# Patient Record
Sex: FEMALE | Race: OTHER | HISPANIC OR LATINO | Employment: OTHER | URBAN - METROPOLITAN AREA
[De-identification: names, ages, dates, MRNs, and addresses within clinical notes are randomized per-mention and may not be internally consistent; named-entity substitution may affect disease eponyms.]

---

## 2017-01-27 ENCOUNTER — HOSPITAL ENCOUNTER (EMERGENCY)
Facility: HOSPITAL | Age: 72
Discharge: HOME | End: 2017-01-27
Attending: EMERGENCY MEDICINE | Admitting: EMERGENCY MEDICINE
Payer: MEDICARE

## 2017-01-27 ENCOUNTER — APPOINTMENT (EMERGENCY)
Dept: CT IMAGING | Facility: HOSPITAL | Age: 72
End: 2017-01-27
Payer: MEDICARE

## 2017-01-27 ENCOUNTER — APPOINTMENT (EMERGENCY)
Dept: RADIOLOGY | Facility: HOSPITAL | Age: 72
End: 2017-01-27
Payer: MEDICARE

## 2017-01-27 VITALS
HEART RATE: 69 BPM | RESPIRATION RATE: 16 BRPM | SYSTOLIC BLOOD PRESSURE: 138 MMHG | DIASTOLIC BLOOD PRESSURE: 75 MMHG | TEMPERATURE: 98.6 F | OXYGEN SATURATION: 95 %

## 2017-01-27 DIAGNOSIS — J18.9 PNEUMONIA: Primary | ICD-10-CM

## 2017-01-27 DIAGNOSIS — R10.9 ABDOMINAL PAIN: ICD-10-CM

## 2017-01-27 LAB
ANION GAP SERPL CALCULATED.3IONS-SCNC: 6 MMOL/L (ref 4–13)
BASOPHILS # BLD AUTO: 0.01 THOUSANDS/ΜL (ref 0–0.1)
BASOPHILS NFR BLD AUTO: 0 % (ref 0–1)
BILIRUB UR QL STRIP: NEGATIVE
BUN SERPL-MCNC: 11 MG/DL (ref 5–25)
CALCIUM SERPL-MCNC: 8.8 MG/DL (ref 8.3–10.1)
CHLORIDE SERPL-SCNC: 107 MMOL/L (ref 100–108)
CLARITY UR: CLEAR
CO2 SERPL-SCNC: 28 MMOL/L (ref 21–32)
COLOR UR: YELLOW
COLOR, POC: YELLOW
CREAT SERPL-MCNC: 0.63 MG/DL (ref 0.6–1.3)
EOSINOPHIL # BLD AUTO: 0.06 THOUSAND/ΜL (ref 0–0.61)
EOSINOPHIL NFR BLD AUTO: 1 % (ref 0–6)
ERYTHROCYTE [DISTWIDTH] IN BLOOD BY AUTOMATED COUNT: 13.3 % (ref 11.6–15.1)
GFR SERPL CREATININE-BSD FRML MDRD: >60 ML/MIN/1.73SQ M
GLUCOSE SERPL-MCNC: 95 MG/DL (ref 65–140)
GLUCOSE UR STRIP-MCNC: NEGATIVE MG/DL
HCT VFR BLD AUTO: 35.1 % (ref 34.8–46.1)
HGB BLD-MCNC: 11.5 G/DL (ref 11.5–15.4)
HGB UR QL STRIP.AUTO: NEGATIVE
KETONES UR STRIP-MCNC: NEGATIVE MG/DL
LEUKOCYTE ESTERASE UR QL STRIP: NEGATIVE
LIPASE SERPL-CCNC: 160 U/L (ref 73–393)
LYMPHOCYTES # BLD AUTO: 1.92 THOUSANDS/ΜL (ref 0.6–4.47)
LYMPHOCYTES NFR BLD AUTO: 36 % (ref 14–44)
MCH RBC QN AUTO: 30.1 PG (ref 26.8–34.3)
MCHC RBC AUTO-ENTMCNC: 32.8 G/DL (ref 31.4–37.4)
MCV RBC AUTO: 92 FL (ref 82–98)
MONOCYTES # BLD AUTO: 0.46 THOUSAND/ΜL (ref 0.17–1.22)
MONOCYTES NFR BLD AUTO: 9 % (ref 4–12)
NEUTROPHILS # BLD AUTO: 2.93 THOUSANDS/ΜL (ref 1.85–7.62)
NEUTS SEG NFR BLD AUTO: 54 % (ref 43–75)
NITRITE UR QL STRIP: NEGATIVE
NRBC BLD AUTO-RTO: 0 /100 WBCS
PH UR STRIP.AUTO: 5 [PH] (ref 4.5–8)
PLATELET # BLD AUTO: 203 THOUSANDS/UL (ref 149–390)
PMV BLD AUTO: 10.4 FL (ref 8.9–12.7)
POTASSIUM SERPL-SCNC: 4 MMOL/L (ref 3.5–5.3)
PROT UR STRIP-MCNC: NEGATIVE MG/DL
RBC # BLD AUTO: 3.82 MILLION/UL (ref 3.81–5.12)
SODIUM SERPL-SCNC: 141 MMOL/L (ref 136–145)
SP GR UR STRIP.AUTO: 1.01 (ref 1–1.03)
UROBILINOGEN UR QL STRIP.AUTO: 0.2 E.U./DL
WBC # BLD AUTO: 5.38 THOUSAND/UL (ref 4.31–10.16)

## 2017-01-27 PROCEDURE — 74177 CT ABD & PELVIS W/CONTRAST: CPT

## 2017-01-27 PROCEDURE — 85025 COMPLETE CBC W/AUTO DIFF WBC: CPT | Performed by: EMERGENCY MEDICINE

## 2017-01-27 PROCEDURE — 94640 AIRWAY INHALATION TREATMENT: CPT

## 2017-01-27 PROCEDURE — 71020 HB CHEST X-RAY 2VW FRONTAL&LATL: CPT

## 2017-01-27 PROCEDURE — 81002 URINALYSIS NONAUTO W/O SCOPE: CPT | Performed by: EMERGENCY MEDICINE

## 2017-01-27 PROCEDURE — 96374 THER/PROPH/DIAG INJ IV PUSH: CPT

## 2017-01-27 PROCEDURE — 83690 ASSAY OF LIPASE: CPT | Performed by: EMERGENCY MEDICINE

## 2017-01-27 PROCEDURE — 99284 EMERGENCY DEPT VISIT MOD MDM: CPT

## 2017-01-27 PROCEDURE — 81003 URINALYSIS AUTO W/O SCOPE: CPT

## 2017-01-27 PROCEDURE — 36415 COLL VENOUS BLD VENIPUNCTURE: CPT | Performed by: EMERGENCY MEDICINE

## 2017-01-27 PROCEDURE — 80048 BASIC METABOLIC PNL TOTAL CA: CPT | Performed by: EMERGENCY MEDICINE

## 2017-01-27 RX ORDER — LEVOFLOXACIN 750 MG/1
750 TABLET ORAL DAILY
Qty: 4 TABLET | Refills: 0 | Status: SHIPPED | OUTPATIENT
Start: 2017-01-27 | End: 2019-01-14

## 2017-01-27 RX ORDER — KETOROLAC TROMETHAMINE 30 MG/ML
15 INJECTION, SOLUTION INTRAMUSCULAR; INTRAVENOUS ONCE
Status: COMPLETED | OUTPATIENT
Start: 2017-01-27 | End: 2017-01-27

## 2017-01-27 RX ORDER — ALBUTEROL SULFATE 90 UG/1
2 AEROSOL, METERED RESPIRATORY (INHALATION) EVERY 4 HOURS PRN
Qty: 1 INHALER | Refills: 0 | Status: SHIPPED | OUTPATIENT
Start: 2017-01-27 | End: 2017-02-26

## 2017-01-27 RX ADMIN — KETOROLAC TROMETHAMINE 15 MG: 30 INJECTION, SOLUTION INTRAMUSCULAR at 13:36

## 2017-01-27 RX ADMIN — LEVOFLOXACIN 750 MG: 500 TABLET, FILM COATED ORAL at 14:56

## 2017-01-27 RX ADMIN — IOHEXOL 100 ML: 350 INJECTION, SOLUTION INTRAVENOUS at 14:26

## 2017-01-27 RX ADMIN — ALBUTEROL SULFATE 5 MG: 2.5 SOLUTION RESPIRATORY (INHALATION) at 13:34

## 2018-06-18 ENCOUNTER — OFFICE VISIT (OUTPATIENT)
Dept: INTERNAL MEDICINE CLINIC | Facility: CLINIC | Age: 73
End: 2018-06-18
Payer: MEDICARE

## 2018-06-18 VITALS
RESPIRATION RATE: 15 BRPM | WEIGHT: 147 LBS | BODY MASS INDEX: 27.05 KG/M2 | DIASTOLIC BLOOD PRESSURE: 72 MMHG | HEIGHT: 62 IN | SYSTOLIC BLOOD PRESSURE: 134 MMHG | TEMPERATURE: 98.2 F | HEART RATE: 61 BPM

## 2018-06-18 DIAGNOSIS — R01.1 SYSTOLIC MURMUR: ICD-10-CM

## 2018-06-18 DIAGNOSIS — R00.2 PALPITATION: ICD-10-CM

## 2018-06-18 DIAGNOSIS — R51.9 FREQUENT HEADACHES: ICD-10-CM

## 2018-06-18 DIAGNOSIS — M17.0 PRIMARY OSTEOARTHRITIS OF BOTH KNEES: ICD-10-CM

## 2018-06-18 DIAGNOSIS — M85.80 OSTEOPENIA, UNSPECIFIED LOCATION: ICD-10-CM

## 2018-06-18 DIAGNOSIS — H53.8 BLURRED VISION, BILATERAL: ICD-10-CM

## 2018-06-18 DIAGNOSIS — K21.9 GASTROESOPHAGEAL REFLUX DISEASE, ESOPHAGITIS PRESENCE NOT SPECIFIED: ICD-10-CM

## 2018-06-18 DIAGNOSIS — M85.89 OTHER SPECIFIED DISORDERS OF BONE DENSITY AND STRUCTURE, MULTIPLE SITES: ICD-10-CM

## 2018-06-18 DIAGNOSIS — I10 ESSENTIAL HYPERTENSION: Primary | ICD-10-CM

## 2018-06-18 PROCEDURE — 99204 OFFICE O/P NEW MOD 45 MIN: CPT | Performed by: INTERNAL MEDICINE

## 2018-06-18 RX ORDER — MELOXICAM 7.5 MG/1
7.5 TABLET ORAL DAILY
COMMUNITY
End: 2018-06-18 | Stop reason: SDUPTHER

## 2018-06-18 RX ORDER — LIDOCAINE 40 MG/G
CREAM TOPICAL AS NEEDED
Qty: 30 G | Refills: 2 | Status: SHIPPED | OUTPATIENT
Start: 2018-06-18 | End: 2018-08-29 | Stop reason: SDUPTHER

## 2018-06-18 RX ORDER — METOPROLOL SUCCINATE 50 MG/1
50 TABLET, EXTENDED RELEASE ORAL DAILY
COMMUNITY
End: 2018-06-18 | Stop reason: SDUPTHER

## 2018-06-18 RX ORDER — OMEPRAZOLE 20 MG/1
20 CAPSULE, DELAYED RELEASE ORAL DAILY
COMMUNITY
End: 2018-06-18 | Stop reason: SDUPTHER

## 2018-06-18 RX ORDER — METOPROLOL SUCCINATE 50 MG/1
50 TABLET, EXTENDED RELEASE ORAL DAILY
Qty: 30 TABLET | Refills: 1 | Status: SHIPPED | OUTPATIENT
Start: 2018-06-18 | End: 2018-07-18 | Stop reason: SDUPTHER

## 2018-06-18 RX ORDER — OMEPRAZOLE 20 MG/1
20 CAPSULE, DELAYED RELEASE ORAL DAILY
Qty: 30 CAPSULE | Refills: 1 | Status: SHIPPED | OUTPATIENT
Start: 2018-06-18 | End: 2018-07-18 | Stop reason: SDUPTHER

## 2018-06-18 RX ORDER — MELOXICAM 7.5 MG/1
7.5 TABLET ORAL DAILY PRN
Qty: 30 TABLET | Refills: 0 | Status: SHIPPED | OUTPATIENT
Start: 2018-06-18 | End: 2018-08-29 | Stop reason: SDUPTHER

## 2018-06-18 RX ORDER — ACETAMINOPHEN 325 MG/1
650 TABLET ORAL EVERY 6 HOURS PRN
COMMUNITY

## 2018-06-18 RX ORDER — MULTIVITAMIN
1 CAPSULE ORAL EVERY MORNING
COMMUNITY
End: 2022-07-21 | Stop reason: SDUPTHER

## 2018-06-18 NOTE — ASSESSMENT & PLAN NOTE
Try Tylenol, topical agents, Mobic if needed with caution of worsening acid reflux  She notes that she did not tolerate or have a good therapeutic response from steroid injections  Depending on her x-ray results, will refer to see Orthopedics for operative management if needed

## 2018-06-18 NOTE — ASSESSMENT & PLAN NOTE
Well controlled on current dose of metoprolol  Will get an echocardiogram and Holter monitor given her systolic murmur and palpitations

## 2018-06-18 NOTE — PROGRESS NOTES
Assessment/Plan:    Essential hypertension  Well controlled on current dose of metoprolol  Will get an echocardiogram and Holter monitor given her systolic murmur and palpitations  Gastroesophageal reflux disease  Well controlled on current dose of omeprazole  Asked her to avoid meloxicam as much as possible  Primary osteoarthritis of both knees  Try Tylenol, topical agents, Mobic if needed with caution of worsening acid reflux  She notes that she did not tolerate or have a good therapeutic response from steroid injections  Depending on her x-ray results, will refer to see Orthopedics for operative management if needed  Osteopenia  Recheck DEXA scan, vitamin-D level  Diagnoses and all orders for this visit:    Essential hypertension  -     CBC and differential  -     Comprehensive metabolic panel  -     TSH, 3rd generation with Free T4 reflex  -     Lipid Panel with Direct LDL reflex  -     Hemoglobin A1C  -     Microalbumin / creatinine urine ratio  -     Sedimentation rate, automated  -     C-reactive protein  -     Echo complete with contrast if indicated; Future  -     Holter monitor - 48 hour; Future  -     metoprolol succinate (TOPROL-XL) 50 mg 24 hr tablet; Take 1 tablet (50 mg total) by mouth daily    Blurred vision, bilateral  -     CBC and differential  -     Comprehensive metabolic panel  -     TSH, 3rd generation with Free T4 reflex  -     Sedimentation rate, automated  -     C-reactive protein  -     Ambulatory Referral to Ophthalmology; Future    Frequent headaches  -     Sedimentation rate, automated  -     C-reactive protein    Palpitation  -     Echo complete with contrast if indicated; Future  -     Holter monitor - 48 hour; Future    Systolic murmur  -     Echo complete with contrast if indicated; Future  -     Holter monitor - 48 hour; Future    Gastroesophageal reflux disease, esophagitis presence not specified  -     omeprazole (PriLOSEC) 20 mg delayed release capsule;  Take 1 capsule (20 mg total) by mouth daily    Primary osteoarthritis of both knees  -     CBC and differential  -     Comprehensive metabolic panel  -     TSH, 3rd generation with Free T4 reflex  -     Vitamin D 25 hydroxy  -     meloxicam (MOBIC) 7 5 mg tablet; Take 1 tablet (7 5 mg total) by mouth daily as needed for moderate pain  -     lidocaine (LMX) 4 % cream; Apply topically as needed for mild pain  -     XR knee 3 vw left non injury; Future  -     XR knee 3 vw right non injury; Future    Osteopenia, unspecified location  -     DXA bone density spine hip and pelvis; Future    Other specified disorders of bone density and structure, multiple sites   -     DXA bone density spine hip and pelvis; Future    Other orders  -     Discontinue: meloxicam (MOBIC) 7 5 mg tablet; Take 7 5 mg by mouth daily  -     Discontinue: metoprolol succinate (TOPROL-XL) 50 mg 24 hr tablet; Take 50 mg by mouth daily  -     Discontinue: omeprazole (PriLOSEC) 20 mg delayed release capsule; Take 20 mg by mouth daily  -     acetaminophen (TYLENOL) 325 mg tablet; Take 650 mg by mouth every 6 (six) hours as needed for mild pain  -     Multiple Vitamin (MULTIVITAMIN) capsule; Take 1 capsule by mouth daily          Subjective:   Chief Complaint   Patient presents with   [address] patient  PHQ-9 score is 4, no falls, no urinary incontinence   Blurred Vision    Anxiety    Knee Pain     bilateral knee pain for a long time  Patient ID: Mitchell Mcarthur is a 81 y o  female  She comes in to establish care as a new pt  Chief complaint is headaches, dizzy spells recently  Has had blurred vision recently too  No double vision    She has had palpitations for some time  Doesn't get dizzy at that time  This was assessed by her primary care physician who referred her to see a cardiologist as well    She tells me she had a Holter monitor and an echocardiogram done but unfortunately is not able to get records    Has bilateral knee pain  Was told that she needs surgery but she got worried and didn't get it  Anxiety   Symptoms include dizziness  Patient reports no chest pain, confusion, nausea, palpitations or shortness of breath  Knee Pain          The following portions of the patient's history were reviewed and updated as appropriate: past family history, past medical history, past social history and past surgical history  PHQ-9 Depression Screening    PHQ-9:    Frequency of the following problems over the past two weeks:       Little interest or pleasure in doing things:  1 - several days  Feeling down, depressed, or hopeless:  1 - several days  Trouble falling or staying asleep, or sleeping too much:  0 - not at all  Feeling tired or having little energy:  1 - several days  Poor appetite or overeatin - several days  Feeling bad about yourself - or that you are a failure or have let yourself or your family down:  0 - not at all  Trouble concentrating on things, such as reading the newspaper or watching television:  0 - not at all  Moving or speaking so slowly that other people could have noticed   Or the opposite - being so fidgety or restless that you have been moving around a lot more than usual:  0 - not at all  Thoughts that you would be better off dead, or of hurting yourself in some way:  0 - not at all  PHQ-2 Score:  2  PHQ-9 Score:  4           Current Outpatient Prescriptions:     acetaminophen (TYLENOL) 325 mg tablet, Take 650 mg by mouth every 6 (six) hours as needed for mild pain, Disp: , Rfl:     meloxicam (MOBIC) 7 5 mg tablet, Take 1 tablet (7 5 mg total) by mouth daily as needed for moderate pain, Disp: 30 tablet, Rfl: 0    metoprolol succinate (TOPROL-XL) 50 mg 24 hr tablet, Take 1 tablet (50 mg total) by mouth daily, Disp: 30 tablet, Rfl: 1    Multiple Vitamin (MULTIVITAMIN) capsule, Take 1 capsule by mouth daily, Disp: , Rfl:     omeprazole (PriLOSEC) 20 mg delayed release capsule, Take 1 capsule (20 mg total) by mouth daily, Disp: 30 capsule, Rfl: 1    lidocaine (LMX) 4 % cream, Apply topically as needed for mild pain, Disp: 30 g, Rfl: 2    Review of Systems   Constitutional: Positive for fatigue  Negative for fever and unexpected weight change  HENT: Negative for ear pain, hearing loss and sore throat  Eyes: Positive for visual disturbance  Negative for pain and discharge  Respiratory: Negative for cough, chest tightness and shortness of breath  Cardiovascular: Negative for chest pain and palpitations  Gastrointestinal: Negative for abdominal pain, blood in stool, constipation, diarrhea and nausea  Genitourinary: Negative for dysuria, frequency and hematuria  Musculoskeletal: Negative for arthralgias and joint swelling  Skin: Negative for rash  Allergic/Immunologic: Negative for immunocompromised state  Neurological: Positive for dizziness and headaches  Hematological: Negative for adenopathy  Psychiatric/Behavioral: Negative for confusion and sleep disturbance  Objective:  /72 (BP Location: Left arm, Patient Position: Sitting, Cuff Size: Standard)   Pulse 61   Temp 98 2 °F (36 8 °C)   Resp 15   Ht 5' 1 5" (1 562 m)   Wt 66 7 kg (147 lb)   BMI 27 33 kg/m²      Physical Exam   Constitutional: She appears well-developed and well-nourished  HENT:   Head: Normocephalic and atraumatic  Right Ear: Tympanic membrane normal    Left Ear: Tympanic membrane normal    Nose: Nose normal    Mouth/Throat: Oropharynx is clear and moist  No posterior oropharyngeal edema or posterior oropharyngeal erythema  Eyes: Conjunctivae are normal  Pupils are equal, round, and reactive to light  Right eye exhibits no discharge  Neck: Normal range of motion  Neck supple  No thyromegaly present  Cardiovascular: Normal rate, regular rhythm, S1 normal and S2 normal   PMI is not displaced  Murmur heard     Systolic murmur is present with a grade of 2/6 Pulmonary/Chest: Effort normal and breath sounds normal  No accessory muscle usage  No apnea  No respiratory distress  She has no rhonchi  She has no rales  Abdominal: Soft  Normal appearance and bowel sounds are normal  She exhibits no shifting dullness  There is no hepatosplenomegaly  There is no tenderness  There is no rebound and no CVA tenderness  Musculoskeletal: Normal range of motion  She exhibits no edema  Right knee: Tenderness found  Medial joint line tenderness noted  Left knee: Tenderness found  Medial joint line tenderness noted  Bilateral knee crepitus   Lymphadenopathy:     She has no cervical adenopathy  Neurological: She is alert  Skin: Skin is warm and intact  No rash noted  Psychiatric: She has a normal mood and affect  Her speech is normal    Nursing note and vitals reviewed  No results found for this or any previous visit (from the past 1008 hour(s))  ]    No results found

## 2018-06-20 ENCOUNTER — TRANSCRIBE ORDERS (OUTPATIENT)
Dept: LAB | Facility: CLINIC | Age: 73
End: 2018-06-20

## 2018-06-22 ENCOUNTER — APPOINTMENT (OUTPATIENT)
Dept: LAB | Age: 73
End: 2018-06-22
Payer: MEDICARE

## 2018-06-22 ENCOUNTER — APPOINTMENT (OUTPATIENT)
Dept: RADIOLOGY | Age: 73
End: 2018-06-22
Payer: MEDICARE

## 2018-06-22 ENCOUNTER — HOSPITAL ENCOUNTER (OUTPATIENT)
Dept: RADIOLOGY | Age: 73
Discharge: HOME | End: 2018-06-22
Payer: MEDICARE

## 2018-06-22 ENCOUNTER — TRANSCRIBE ORDERS (OUTPATIENT)
Dept: ADMINISTRATIVE | Age: 73
End: 2018-06-22

## 2018-06-22 DIAGNOSIS — M85.80 OSTEOPENIA, UNSPECIFIED LOCATION: ICD-10-CM

## 2018-06-22 DIAGNOSIS — M17.0 PRIMARY OSTEOARTHRITIS OF BOTH KNEES: ICD-10-CM

## 2018-06-22 DIAGNOSIS — M85.89 OTHER SPECIFIED DISORDERS OF BONE DENSITY AND STRUCTURE, MULTIPLE SITES: ICD-10-CM

## 2018-06-22 LAB
25(OH)D3 SERPL-MCNC: 28.4 NG/ML (ref 30–100)
ALBUMIN SERPL BCP-MCNC: 3.9 G/DL (ref 3.5–5)
ALP SERPL-CCNC: 102 U/L (ref 46–116)
ALT SERPL W P-5'-P-CCNC: 33 U/L (ref 12–78)
ANION GAP SERPL CALCULATED.3IONS-SCNC: 6 MMOL/L (ref 4–13)
AST SERPL W P-5'-P-CCNC: 30 U/L (ref 5–45)
BASOPHILS # BLD AUTO: 0.02 THOUSANDS/ΜL (ref 0–0.1)
BASOPHILS NFR BLD AUTO: 0 % (ref 0–1)
BILIRUB SERPL-MCNC: 0.36 MG/DL (ref 0.2–1)
BUN SERPL-MCNC: 14 MG/DL (ref 5–25)
CALCIUM SERPL-MCNC: 9.2 MG/DL (ref 8.3–10.1)
CHLORIDE SERPL-SCNC: 106 MMOL/L (ref 100–108)
CHOLEST SERPL-MCNC: 189 MG/DL (ref 50–200)
CO2 SERPL-SCNC: 29 MMOL/L (ref 21–32)
CREAT SERPL-MCNC: 0.63 MG/DL (ref 0.6–1.3)
CREAT UR-MCNC: 76.9 MG/DL
CRP SERPL QL: <3 MG/L
EOSINOPHIL # BLD AUTO: 0.11 THOUSAND/ΜL (ref 0–0.61)
EOSINOPHIL NFR BLD AUTO: 2 % (ref 0–6)
ERYTHROCYTE [DISTWIDTH] IN BLOOD BY AUTOMATED COUNT: 12.9 % (ref 11.6–15.1)
ERYTHROCYTE [SEDIMENTATION RATE] IN BLOOD: 16 MM/HOUR (ref 0–20)
EST. AVERAGE GLUCOSE BLD GHB EST-MCNC: 126 MG/DL
GFR SERPL CREATININE-BSD FRML MDRD: 91 ML/MIN/1.73SQ M
GLUCOSE P FAST SERPL-MCNC: 84 MG/DL (ref 65–99)
HBA1C MFR BLD: 6 % (ref 4.2–6.3)
HCT VFR BLD AUTO: 37.4 % (ref 34.8–46.1)
HDLC SERPL-MCNC: 50 MG/DL (ref 40–60)
HGB BLD-MCNC: 11.7 G/DL (ref 11.5–15.4)
IMM GRANULOCYTES # BLD AUTO: 0.01 THOUSAND/UL (ref 0–0.2)
IMM GRANULOCYTES NFR BLD AUTO: 0 % (ref 0–2)
LDLC SERPL CALC-MCNC: 113 MG/DL (ref 0–100)
LYMPHOCYTES # BLD AUTO: 1.97 THOUSANDS/ΜL (ref 0.6–4.47)
LYMPHOCYTES NFR BLD AUTO: 30 % (ref 14–44)
MCH RBC QN AUTO: 29.5 PG (ref 26.8–34.3)
MCHC RBC AUTO-ENTMCNC: 31.3 G/DL (ref 31.4–37.4)
MCV RBC AUTO: 94 FL (ref 82–98)
MICROALBUMIN UR-MCNC: <5 MG/L (ref 0–20)
MICROALBUMIN/CREAT 24H UR: <7 MG/G CREATININE (ref 0–30)
MONOCYTES # BLD AUTO: 0.64 THOUSAND/ΜL (ref 0.17–1.22)
MONOCYTES NFR BLD AUTO: 10 % (ref 4–12)
NEUTROPHILS # BLD AUTO: 3.76 THOUSANDS/ΜL (ref 1.85–7.62)
NEUTS SEG NFR BLD AUTO: 58 % (ref 43–75)
NRBC BLD AUTO-RTO: 0 /100 WBCS
PLATELET # BLD AUTO: 234 THOUSANDS/UL (ref 149–390)
PMV BLD AUTO: 11 FL (ref 8.9–12.7)
POTASSIUM SERPL-SCNC: 4.3 MMOL/L (ref 3.5–5.3)
PROT SERPL-MCNC: 7.5 G/DL (ref 6.4–8.2)
RBC # BLD AUTO: 3.96 MILLION/UL (ref 3.81–5.12)
SODIUM SERPL-SCNC: 141 MMOL/L (ref 136–145)
TRIGL SERPL-MCNC: 132 MG/DL
TSH SERPL DL<=0.05 MIU/L-ACNC: 1.48 UIU/ML (ref 0.36–3.74)
WBC # BLD AUTO: 6.51 THOUSAND/UL (ref 4.31–10.16)

## 2018-06-22 PROCEDURE — 86140 C-REACTIVE PROTEIN: CPT | Performed by: INTERNAL MEDICINE

## 2018-06-22 PROCEDURE — 77080 DXA BONE DENSITY AXIAL: CPT

## 2018-06-22 PROCEDURE — 82570 ASSAY OF URINE CREATININE: CPT | Performed by: INTERNAL MEDICINE

## 2018-06-22 PROCEDURE — 80053 COMPREHEN METABOLIC PANEL: CPT | Performed by: INTERNAL MEDICINE

## 2018-06-22 PROCEDURE — 85652 RBC SED RATE AUTOMATED: CPT | Performed by: INTERNAL MEDICINE

## 2018-06-22 PROCEDURE — 82043 UR ALBUMIN QUANTITATIVE: CPT | Performed by: INTERNAL MEDICINE

## 2018-06-22 PROCEDURE — 73562 X-RAY EXAM OF KNEE 3: CPT

## 2018-06-22 PROCEDURE — 84443 ASSAY THYROID STIM HORMONE: CPT | Performed by: INTERNAL MEDICINE

## 2018-06-22 PROCEDURE — 82306 VITAMIN D 25 HYDROXY: CPT | Performed by: INTERNAL MEDICINE

## 2018-06-22 PROCEDURE — 83036 HEMOGLOBIN GLYCOSYLATED A1C: CPT | Performed by: INTERNAL MEDICINE

## 2018-06-22 PROCEDURE — 36415 COLL VENOUS BLD VENIPUNCTURE: CPT | Performed by: INTERNAL MEDICINE

## 2018-06-22 PROCEDURE — 80061 LIPID PANEL: CPT | Performed by: INTERNAL MEDICINE

## 2018-06-22 PROCEDURE — 85025 COMPLETE CBC W/AUTO DIFF WBC: CPT | Performed by: INTERNAL MEDICINE

## 2018-06-26 ENCOUNTER — TELEPHONE (OUTPATIENT)
Dept: INTERNAL MEDICINE CLINIC | Facility: CLINIC | Age: 73
End: 2018-06-26

## 2018-06-26 NOTE — TELEPHONE ENCOUNTER
----- Message from Elida Santos MD sent at 6/22/2018  2:21 PM EDT -----  No major abnormalities on blood work, DEXA scan showed osteopenia  We can discuss in details when she comes for her follow-up  No new medications needed at this time

## 2018-06-27 ENCOUNTER — HOSPITAL ENCOUNTER (OUTPATIENT)
Dept: NON INVASIVE DIAGNOSTICS | Facility: CLINIC | Age: 73
Discharge: HOME | End: 2018-06-27
Payer: MEDICARE

## 2018-06-27 DIAGNOSIS — I10 ESSENTIAL HYPERTENSION: ICD-10-CM

## 2018-06-27 DIAGNOSIS — R00.2 PALPITATION: ICD-10-CM

## 2018-06-27 DIAGNOSIS — R01.1 SYSTOLIC MURMUR: ICD-10-CM

## 2018-06-27 PROCEDURE — 93306 TTE W/DOPPLER COMPLETE: CPT

## 2018-06-27 PROCEDURE — 93226 XTRNL ECG REC<48 HR SCAN A/R: CPT

## 2018-06-27 PROCEDURE — 93306 TTE W/DOPPLER COMPLETE: CPT | Performed by: INTERNAL MEDICINE

## 2018-06-27 PROCEDURE — 93225 XTRNL ECG REC<48 HRS REC: CPT

## 2018-06-29 PROCEDURE — 93227 XTRNL ECG REC<48 HR R&I: CPT | Performed by: INTERNAL MEDICINE

## 2018-07-02 ENCOUNTER — TELEPHONE (OUTPATIENT)
Dept: INTERNAL MEDICINE CLINIC | Facility: CLINIC | Age: 73
End: 2018-07-02

## 2018-07-02 NOTE — TELEPHONE ENCOUNTER
----- Message from Brenda Vides MD sent at 6/30/2018  2:13 PM EDT -----  Please let patient know that lholter did not have any major abnormalitites   We will discuss details on follow up

## 2018-07-18 ENCOUNTER — OFFICE VISIT (OUTPATIENT)
Dept: INTERNAL MEDICINE CLINIC | Facility: CLINIC | Age: 73
End: 2018-07-18
Payer: MEDICARE

## 2018-07-18 VITALS
WEIGHT: 149.6 LBS | DIASTOLIC BLOOD PRESSURE: 78 MMHG | HEIGHT: 62 IN | HEART RATE: 70 BPM | SYSTOLIC BLOOD PRESSURE: 122 MMHG | RESPIRATION RATE: 14 BRPM | TEMPERATURE: 97.9 F | BODY MASS INDEX: 27.53 KG/M2

## 2018-07-18 DIAGNOSIS — M54.12 CERVICAL RADICULOPATHY: ICD-10-CM

## 2018-07-18 DIAGNOSIS — I10 ESSENTIAL HYPERTENSION: ICD-10-CM

## 2018-07-18 DIAGNOSIS — Z23 NEED FOR VACCINATION FOR STREP PNEUMONIAE: ICD-10-CM

## 2018-07-18 DIAGNOSIS — M85.80 OSTEOPENIA, UNSPECIFIED LOCATION: ICD-10-CM

## 2018-07-18 DIAGNOSIS — K21.9 GASTROESOPHAGEAL REFLUX DISEASE, ESOPHAGITIS PRESENCE NOT SPECIFIED: ICD-10-CM

## 2018-07-18 DIAGNOSIS — I77.819 AORTIC DILATATION (HCC): Primary | ICD-10-CM

## 2018-07-18 DIAGNOSIS — M17.0 PRIMARY OSTEOARTHRITIS OF BOTH KNEES: ICD-10-CM

## 2018-07-18 PROCEDURE — 90670 PCV13 VACCINE IM: CPT | Performed by: INTERNAL MEDICINE

## 2018-07-18 PROCEDURE — G0009 ADMIN PNEUMOCOCCAL VACCINE: HCPCS | Performed by: INTERNAL MEDICINE

## 2018-07-18 PROCEDURE — 99214 OFFICE O/P EST MOD 30 MIN: CPT | Performed by: INTERNAL MEDICINE

## 2018-07-18 RX ORDER — OMEPRAZOLE 40 MG/1
40 CAPSULE, DELAYED RELEASE ORAL DAILY
Qty: 30 CAPSULE | Refills: 2 | Status: SHIPPED | OUTPATIENT
Start: 2018-07-18 | End: 2018-08-29 | Stop reason: SDUPTHER

## 2018-07-18 RX ORDER — METOPROLOL SUCCINATE 50 MG/1
50 TABLET, EXTENDED RELEASE ORAL DAILY
Qty: 90 TABLET | Refills: 0 | Status: SHIPPED | OUTPATIENT
Start: 2018-07-18 | End: 2018-08-29 | Stop reason: SDUPTHER

## 2018-07-19 PROBLEM — M54.12 CERVICAL RADICULOPATHY: Status: ACTIVE | Noted: 2018-07-19

## 2018-07-19 PROBLEM — I77.819 AORTIC DILATATION (HCC): Status: ACTIVE | Noted: 2018-07-19

## 2018-07-19 NOTE — ASSESSMENT & PLAN NOTE
Increase omeprazole dose and asked her to take it regularly, stop Mobic and continue Tylenol and topical lidocaine for pain  If symptoms do not get better controlled, will refer to see GI for endoscopy

## 2018-07-19 NOTE — ASSESSMENT & PLAN NOTE
Discussed treatment options with Tylenol, intra-articular steroid injections, total knee replacement  She does not want to get any injections or surgery at this time, will continue to discuss

## 2018-07-19 NOTE — PROGRESS NOTES
Assessment/Plan:    Gastroesophageal reflux disease  Increase omeprazole dose and asked her to take it regularly, stop Mobic and continue Tylenol and topical lidocaine for pain  If symptoms do not get better controlled, will refer to see GI for endoscopy  Essential hypertension  Well control, echocardiogram showed aortic root dilatation, will get a CTA of the chest to rule out aneurysm  Referral to vascular depending on the study results  Osteopenia  Continue vitamin-D supplements, continue to monitor  Primary osteoarthritis of both knees  Discussed treatment options with Tylenol, intra-articular steroid injections, total knee replacement  She does not want to get any injections or surgery at this time, will continue to discuss  Cervical radiculopathy  Get MRI of the neck, referral to see Orthopedics depending on the results    Prevnar was given today, once cardiac workup is done, will refer to see:  GI for colonoscopy  Also at next visit   Will discuss about mammo     Diagnoses and all orders for this visit:    Aortic dilatation ([address] )  -     CTA chest wo w contrast; Future    Cervical radiculopathy  -     MRI cervical spine wo contrast; Future    Osteopenia, unspecified location  -     MRI cervical spine wo contrast; Future    Gastroesophageal reflux disease, esophagitis presence not specified  -     omeprazole (PriLOSEC) 40 MG capsule; Take 1 capsule (40 mg total) by mouth daily    Need for vaccination for Strep pneumoniae  -     PNEUMOCOCCAL CONJUGATE VACCINE 13-VALENT GREATER THAN 6 MONTHS    Essential hypertension    Primary osteoarthritis of both knees          Subjective:   Chief Complaint   Patient presents with    Follow-up     1 month    Neck Pain    Arm Pain     Left    Nausea    Heartburn        Patient ID: Kalie Marti is a 81 y o  female      She comes in for follow-up of hypertension, acid reflux, bilateral knee osteoarthritis    Chief complaint today is tingling and numbness on the left arm, pain on the neck with somewhat radiates to the shoulder and the arm  Acid reflux is not well controlled  She is taking meloxicam only once a week, does not take omeprazole regularly either    Knees continue to bother her but scared of getting surgery    Occasional palpitations continue      Neck Pain    Associated symptoms include numbness  Pertinent negatives include no chest pain, fever or headaches  Arm Pain    Associated symptoms include numbness  Pertinent negatives include no chest pain  Nausea   Associated symptoms include abdominal pain, arthralgias, nausea, neck pain and numbness  Pertinent negatives include no chest pain, coughing, fatigue, fever, headaches, joint swelling, rash or sore throat  Heartburn   She complains of abdominal pain, heartburn and nausea  She reports no chest pain, no coughing or no sore throat  Pertinent negatives include no fatigue  The following portions of the patient's history were reviewed and updated as appropriate: current medications, past medical history, past social history and past surgical history      PHQ-9 Depression Screening    PHQ-9:    Frequency of the following problems over the past two weeks:                Current Outpatient Prescriptions:     acetaminophen (TYLENOL) 325 mg tablet, Take 650 mg by mouth every 6 (six) hours as needed for mild pain, Disp: , Rfl:     Multiple Vitamin (MULTIVITAMIN) capsule, Take 1 capsule by mouth daily, Disp: , Rfl:     omeprazole (PriLOSEC) 40 MG capsule, Take 1 capsule (40 mg total) by mouth daily, Disp: 30 capsule, Rfl: 2    lidocaine (LMX) 4 % cream, Apply topically as needed for mild pain, Disp: 30 g, Rfl: 2    meloxicam (MOBIC) 7 5 mg tablet, Take 1 tablet (7 5 mg total) by mouth daily as needed for moderate pain, Disp: 30 tablet, Rfl: 0    metoprolol succinate (TOPROL-XL) 50 mg 24 hr tablet, Take 1 tablet (50 mg total) by mouth daily, Disp: 90 tablet, Rfl: 0    Review of Systems Constitutional: Negative for fatigue, fever and unexpected weight change  HENT: Negative for ear pain, hearing loss and sore throat  Eyes: Negative for pain and discharge  Respiratory: Negative for cough, chest tightness and shortness of breath  Cardiovascular: Negative for chest pain and palpitations  Gastrointestinal: Positive for abdominal pain, heartburn and nausea  Negative for blood in stool, constipation and diarrhea  Genitourinary: Negative for dysuria, frequency and hematuria  Musculoskeletal: Positive for arthralgias and neck pain  Negative for joint swelling  Skin: Negative for rash  Allergic/Immunologic: Negative for immunocompromised state  Neurological: Positive for numbness  Negative for dizziness and headaches  Hematological: Negative for adenopathy  Psychiatric/Behavioral: Negative for confusion and sleep disturbance  Objective:  /78 (BP Location: Left arm, Patient Position: Sitting, Cuff Size: Standard)   Pulse 70   Temp 97 9 °F (36 6 °C)   Resp 14   Ht 5' 1 5" (1 562 m)   Wt 67 9 kg (149 lb 9 6 oz)   BMI 27 81 kg/m²      Physical Exam   Constitutional: She appears well-developed and well-nourished  HENT:   Head: Normocephalic and atraumatic  Right Ear: Tympanic membrane normal    Left Ear: Tympanic membrane normal    Nose: Nose normal    Mouth/Throat: Oropharynx is clear and moist  No posterior oropharyngeal edema or posterior oropharyngeal erythema  Eyes: Conjunctivae are normal  Pupils are equal, round, and reactive to light  Right eye exhibits no discharge  Neck: Normal range of motion  Neck supple  No thyromegaly present  Cardiovascular: Normal rate, regular rhythm, S1 normal, S2 normal and normal heart sounds  PMI is not displaced  No murmur heard  Pulmonary/Chest: Effort normal and breath sounds normal  No accessory muscle usage  No apnea  No respiratory distress  She has no rhonchi  She has no rales  Abdominal: Soft   Normal appearance and bowel sounds are normal  She exhibits no shifting dullness  There is no hepatosplenomegaly  There is tenderness in the epigastric area  There is no rebound and no CVA tenderness  Musculoskeletal: Normal range of motion  She exhibits no edema or tenderness  Lymphadenopathy:     She has no cervical adenopathy  Neurological: She is alert  Skin: Skin is warm and intact  No rash noted  Psychiatric: She has a normal mood and affect  Her speech is normal    Nursing note and vitals reviewed          Recent Results (from the past 1008 hour(s))   CBC and differential    Collection Time: 06/22/18  9:27 AM   Result Value Ref Range    WBC 6 51 4 31 - 10 16 Thousand/uL    RBC 3 96 3 81 - 5 12 Million/uL    Hemoglobin 11 7 11 5 - 15 4 g/dL    Hematocrit 37 4 34 8 - 46 1 %    MCV 94 82 - 98 fL    MCH 29 5 26 8 - 34 3 pg    MCHC 31 3 (L) 31 4 - 37 4 g/dL    RDW 12 9 11 6 - 15 1 %    MPV 11 0 8 9 - 12 7 fL    Platelets 937 987 - 671 Thousands/uL    nRBC 0 /100 WBCs    Neutrophils Relative 58 43 - 75 %    Immat GRANS % 0 0 - 2 %    Lymphocytes Relative 30 14 - 44 %    Monocytes Relative 10 4 - 12 %    Eosinophils Relative 2 0 - 6 %    Basophils Relative 0 0 - 1 %    Neutrophils Absolute 3 76 1 85 - 7 62 Thousands/µL    Immature Grans Absolute 0 01 0 00 - 0 20 Thousand/uL    Lymphocytes Absolute 1 97 0 60 - 4 47 Thousands/µL    Monocytes Absolute 0 64 0 17 - 1 22 Thousand/µL    Eosinophils Absolute 0 11 0 00 - 0 61 Thousand/µL    Basophils Absolute 0 02 0 00 - 0 10 Thousands/µL   Comprehensive metabolic panel    Collection Time: 06/22/18  9:27 AM   Result Value Ref Range    Sodium 141 136 - 145 mmol/L    Potassium 4 3 3 5 - 5 3 mmol/L    Chloride 106 100 - 108 mmol/L    CO2 29 21 - 32 mmol/L    Anion Gap 6 4 - 13 mmol/L    BUN 14 5 - 25 mg/dL    Creatinine 0 63 0 60 - 1 30 mg/dL    Glucose, Fasting 84 65 - 99 mg/dL    Calcium 9 2 8 3 - 10 1 mg/dL    AST 30 5 - 45 U/L    ALT 33 12 - 78 U/L    Alkaline Phosphatase 102 46 - 116 U/L    Total Protein 7 5 6 4 - 8 2 g/dL    Albumin 3 9 3 5 - 5 0 g/dL    Total Bilirubin 0 36 0 20 - 1 00 mg/dL    eGFR 91 ml/min/1 73sq m   TSH, 3rd generation with Free T4 reflex    Collection Time: 06/22/18  9:27 AM   Result Value Ref Range    TSH 3RD GENERATON 1 480 0 358 - 3 740 uIU/mL   Lipid Panel with Direct LDL reflex    Collection Time: 06/22/18  9:27 AM   Result Value Ref Range    Cholesterol 189 50 - 200 mg/dL    Triglycerides 132 <=150 mg/dL    HDL, Direct 50 40 - 60 mg/dL    LDL Calculated 113 (H) 0 - 100 mg/dL   Hemoglobin A1C    Collection Time: 06/22/18  9:27 AM   Result Value Ref Range    Hemoglobin A1C 6 0 4 2 - 6 3 %     mg/dl   Microalbumin / creatinine urine ratio    Collection Time: 06/22/18  9:27 AM   Result Value Ref Range    Creatinine, Ur 76 9 mg/dL    Microalbum  ,U,Random <5 0 0 0 - 20 0 mg/L    Microalb Creat Ratio <7 0 - 30 mg/g creatinine   Vitamin D 25 hydroxy    Collection Time: 06/22/18  9:27 AM   Result Value Ref Range    Vit D, 25-Hydroxy 28 4 (L) 30 0 - 100 0 ng/mL   Sedimentation rate, automated    Collection Time: 06/22/18  9:27 AM   Result Value Ref Range    Sed Rate 16 0 - 20 mm/hour   C-reactive protein    Collection Time: 06/22/18  9:27 AM   Result Value Ref Range    CRP <3 0 <3 0 mg/L   ]    No results found

## 2018-07-19 NOTE — ASSESSMENT & PLAN NOTE
Well control, echocardiogram showed aortic root dilatation, will get a CTA of the chest to rule out aneurysm  Referral to vascular depending on the study results

## 2018-08-27 ENCOUNTER — HOSPITAL ENCOUNTER (OUTPATIENT)
Dept: RADIOLOGY | Facility: HOSPITAL | Age: 73
Discharge: HOME | End: 2018-08-27
Payer: MEDICARE

## 2018-08-27 ENCOUNTER — TRANSCRIBE ORDERS (OUTPATIENT)
Dept: RADIOLOGY | Facility: HOSPITAL | Age: 73
End: 2018-08-27

## 2018-08-27 DIAGNOSIS — I77.819 AORTIC DILATATION (HCC): ICD-10-CM

## 2018-08-27 DIAGNOSIS — M85.80 OSTEOPENIA, UNSPECIFIED LOCATION: ICD-10-CM

## 2018-08-27 DIAGNOSIS — M54.12 CERVICAL RADICULOPATHY: ICD-10-CM

## 2018-08-27 PROCEDURE — 71275 CT ANGIOGRAPHY CHEST: CPT

## 2018-08-27 PROCEDURE — 72141 MRI NECK SPINE W/O DYE: CPT

## 2018-08-27 RX ADMIN — IOHEXOL 85 ML: 350 INJECTION, SOLUTION INTRAVENOUS at 08:04

## 2018-08-29 ENCOUNTER — OFFICE VISIT (OUTPATIENT)
Dept: INTERNAL MEDICINE CLINIC | Facility: CLINIC | Age: 73
End: 2018-08-29
Payer: COMMERCIAL

## 2018-08-29 VITALS
BODY MASS INDEX: 26.87 KG/M2 | HEART RATE: 66 BPM | HEIGHT: 62 IN | TEMPERATURE: 98 F | WEIGHT: 146 LBS | RESPIRATION RATE: 14 BRPM | DIASTOLIC BLOOD PRESSURE: 73 MMHG | SYSTOLIC BLOOD PRESSURE: 135 MMHG

## 2018-08-29 DIAGNOSIS — M54.12 CERVICAL RADICULOPATHY: ICD-10-CM

## 2018-08-29 DIAGNOSIS — I10 ESSENTIAL HYPERTENSION: ICD-10-CM

## 2018-08-29 DIAGNOSIS — K21.9 GASTROESOPHAGEAL REFLUX DISEASE, ESOPHAGITIS PRESENCE NOT SPECIFIED: ICD-10-CM

## 2018-08-29 DIAGNOSIS — Z00.00 MEDICARE ANNUAL WELLNESS VISIT, INITIAL: Primary | ICD-10-CM

## 2018-08-29 DIAGNOSIS — Z12.39 SCREENING FOR MALIGNANT NEOPLASM OF BREAST: ICD-10-CM

## 2018-08-29 DIAGNOSIS — Z23 NEED FOR INFLUENZA VACCINATION: ICD-10-CM

## 2018-08-29 DIAGNOSIS — M17.0 PRIMARY OSTEOARTHRITIS OF BOTH KNEES: ICD-10-CM

## 2018-08-29 DIAGNOSIS — Z11.59 NEED FOR HEPATITIS C SCREENING TEST: ICD-10-CM

## 2018-08-29 DIAGNOSIS — M85.80 OSTEOPENIA, UNSPECIFIED LOCATION: ICD-10-CM

## 2018-08-29 DIAGNOSIS — I77.819 AORTIC DILATATION (HCC): ICD-10-CM

## 2018-08-29 PROCEDURE — 99214 OFFICE O/P EST MOD 30 MIN: CPT | Performed by: INTERNAL MEDICINE

## 2018-08-29 PROCEDURE — G0438 PPPS, INITIAL VISIT: HCPCS | Performed by: INTERNAL MEDICINE

## 2018-08-29 PROCEDURE — G0008 ADMIN INFLUENZA VIRUS VAC: HCPCS | Performed by: INTERNAL MEDICINE

## 2018-08-29 PROCEDURE — 90662 IIV NO PRSV INCREASED AG IM: CPT | Performed by: INTERNAL MEDICINE

## 2018-08-29 RX ORDER — LIDOCAINE 40 MG/G
CREAM TOPICAL AS NEEDED
Qty: 30 G | Refills: 0 | Status: SHIPPED | OUTPATIENT
Start: 2018-08-29 | End: 2019-01-14

## 2018-08-29 RX ORDER — OMEPRAZOLE 40 MG/1
40 CAPSULE, DELAYED RELEASE ORAL DAILY
Qty: 90 CAPSULE | Refills: 0 | Status: SHIPPED | OUTPATIENT
Start: 2018-08-29 | End: 2018-11-26 | Stop reason: SDUPTHER

## 2018-08-29 RX ORDER — METOPROLOL SUCCINATE 50 MG/1
50 TABLET, EXTENDED RELEASE ORAL DAILY
Qty: 90 TABLET | Refills: 0 | Status: SHIPPED | OUTPATIENT
Start: 2018-08-29 | End: 2018-09-27 | Stop reason: SDUPTHER

## 2018-08-29 RX ORDER — MELOXICAM 7.5 MG/1
7.5 TABLET ORAL DAILY PRN
Qty: 30 TABLET | Refills: 0 | Status: SHIPPED | OUTPATIENT
Start: 2018-08-29 | End: 2019-01-04 | Stop reason: SDUPTHER

## 2018-08-29 NOTE — PROGRESS NOTES
Assessment and Plan:    Problem List Items Addressed This Visit     Essential hypertension    Relevant Medications    metoprolol succinate (TOPROL-XL) 50 mg 24 hr tablet    Other Relevant Orders    Basic metabolic panel    Lipid Panel with Direct LDL reflex    Gastroesophageal reflux disease    Relevant Medications    omeprazole (PriLOSEC) 40 MG capsule    Primary osteoarthritis of both knees    Relevant Medications    meloxicam (MOBIC) 7 5 mg tablet    lidocaine (LMX) 4 % cream    Osteopenia    Relevant Orders    Vitamin D 25 hydroxy    Cervical radiculopathy      Other Visit Diagnoses     Medicare annual wellness visit, initial    -  Primary    Need for hepatitis C screening test        Relevant Orders    Hepatitis C antibody    Need for influenza vaccination        Relevant Orders    influenza vaccine, 7669-9040, high-dose, PF 0 5 mL, for patients 65 yr+ (FLUZONE HIGH-DOSE) (Completed)    Screening for malignant neoplasm of breast        Relevant Orders    Mammo screening bilateral w cad        Health Maintenance Due   Topic Date Due    CRC Screening: Colonoscopy  1945    DTaP,Tdap,and Td Vaccines (1 - Tdap) 08/10/1968         HPI:  Anibal Paulson is a 72 y o  female here for her Initial Wellness Visit  Patient Active Problem List   Diagnosis    Essential hypertension    Palpitation    Gastroesophageal reflux disease    Primary osteoarthritis of both knees    Osteopenia    Cervical radiculopathy    Aortic dilatation ([facility] [address] )     No past medical history on file  No past surgical history on file  No family history on file    History   Smoking Status    Never Smoker   Smokeless Tobacco    Never Used     History   Alcohol Use No      History   Drug Use No       Current Outpatient Prescriptions   Medication Sig Dispense Refill    acetaminophen (TYLENOL) 325 mg tablet Take 650 mg by mouth every 6 (six) hours as needed for mild pain      lidocaine (LMX) 4 % cream Apply topically as needed for mild pain 30 g 0    meloxicam (MOBIC) 7 5 mg tablet Take 1 tablet (7 5 mg total) by mouth daily as needed for moderate pain 30 tablet 0    metoprolol succinate (TOPROL-XL) 50 mg 24 hr tablet Take 1 tablet (50 mg total) by mouth daily 90 tablet 0    Multiple Vitamin (MULTIVITAMIN) capsule Take 1 capsule by mouth daily      omeprazole (PriLOSEC) 40 MG capsule Take 1 capsule (40 mg total) by mouth daily 90 capsule 0     No current facility-administered medications for this visit  Allergies   Allergen Reactions    Other Nausea Only     SEAFOOD     Immunization History   Administered Date(s) Administered    Influenza, high dose seasonal 0 5 mL 08/29/2018    Pneumococcal Conjugate 13-Valent 07/18/2018       Patient Care Team:  Elida Santos MD as PCP - General (Internal Medicine)    Medicare Screening Tests and Risk Assessments:  Kennedy Newton is here for her Initial Wellness visit  Health Risk Assessment:  Patient rates overall health as good  Patient feels that their physical health rating is Same  Eyesight was rated as Slightly worse  Hearing was rated as Same  Patient feels that their emotional and mental health rating is Same  Pain experienced by patient in the last 7 days has been Some  Patient's pain rating has been 5/10  Emotional/Mental Health:  Patient has been feeling nervous/anxious  PHQ-9 Depression Screening:    Frequency of the following problems over the past two weeks:      1  Little interest or pleasure in doing things: 0 - not at all      2  Feeling down, depressed, or hopeless: 0 - not at all  PHQ-2 Score: 0          Broken Bones/Falls: Fall Risk Assessment:    In the past year, patient has experienced: No history of falling in past year          Bladder/Bowel:  Patient has not leaked urine accidently in the last six months  Patient reports no loss of bowel control  Immunizations:  Patient has not had a flu vaccination within the last year    Patient has received a pneumonia shot   Patient has not received a shingles shot  Patient has not received tetanus/diphtheria shot  Home Safety:  Patient has trouble with stairs inside or outside of their home  Patient currently reports that there are no safety hazards present in home, working smoke alarms, working carbon monoxide detectors  Preventative Screenings:   Breast cancer screening performed, colon cancer screen completed, cholesterol screen completed,     Nutrition:  Current diet: Regular with servings of the following:    Medications:  Patient is currently taking over-the-counter supplements  Patient is able to manage medications  Lifestyle Choices:  Patient reports no tobacco use  Patient has not smoked or used tobacco in the past   Patient reports no alcohol use  Patient does not drive a vehicle  Patient wears seat belt  Current level of exercise of physical activity described by patient as: Active  Activities of Daily Living:  Can get out of bed by his or her self, able to dress self, able to make own meals, able to do own shopping, able to bathe self, can do own laundry/housekeeping, can manage own money, pay bills and track expenses    Previous Hospitalizations:  No hospitalization or ED visit in past 12 months        Advanced Directives:  Patient has decided on a power of   Patient has spoken to designated power of   Patient has completed advanced directive          Preventative Screening/Counseling:      Cardiovascular:      General: Screening Current          Diabetes:      General: Screening Current          Colorectal Cancer:      General: Screening Current          Breast Cancer:      General: Risks and Benefits Discussed          Cervical Cancer:      General: Screening Not Indicated          Osteoporosis:      General: Screening Current          AAA:      General: Screening Not Indicated          Glaucoma:      General: Risks and Benefits Discussed          HIV:      General: Screening Not Indicated          Hepatitis C:      General: Risks and Benefits Discussed      Counseling: has received general HCV counseling        Advanced Directives:   Patient has no living will for healthcare, has durable POA for healthcare, patient does not have an advanced directive  Information on ACP and/or AD provided  5 wishes given  End of life assessment reviewed with patient  Provider agrees with end of life decisions   Immunizations:      Influenza: Influenza Due Today      Shingrix: Risks & Benefits Discussed      Hepatitis B (Low risk patients): Series Not Indicated      Other Preventative Counseling (Non-Medicare):  Nutrition Counseling       Advised to increase physical activity as tolerated, try to keep mentally active as well  Influenza vaccination was done today  Screen for hepatitis C with next blood work, Morris Blue was given in July, Pneumovax will be given next July  Advised to get shingrix at her pharmacy  Will get records of her last colonoscopy

## 2018-08-29 NOTE — ASSESSMENT & PLAN NOTE
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

## 2018-08-29 NOTE — PROGRESS NOTES
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 resulting in mild multilevel central and foraminal narrowing  She notes that her symptoms are not very severe at this time  Continue close monitoring, referral to Orthopedics if symptoms get worse  Osteopenia  Monitor vitamin-D levels  She accepted influenza vaccination today, Prevnar was given in July, DEXA scan last done in June 2018, will try to get records of colonoscopy which she notes that was done last year by Dr Shila Yadav at [facility] in [place]  Refer to get a mammogram   Advised to get shingrix at her pharmacy     Diagnoses and all orders for this visit:    Medicare annual wellness visit, initial    Essential hypertension  -     Basic metabolic panel  -     Lipid Panel with Direct LDL reflex  -     metoprolol succinate (TOPROL-XL) 50 mg 24 hr tablet; Take 1 tablet (50 mg total) by mouth daily    Gastroesophageal reflux disease, esophagitis presence not specified  -     omeprazole (PriLOSEC) 40 MG capsule; Take 1 capsule (40 mg total) by mouth daily    Cervical radiculopathy    Osteopenia, unspecified location  -     Vitamin D 25 hydroxy    Need for hepatitis C screening test  -     Hepatitis C antibody    Need for influenza vaccination  -     influenza vaccine, [id number], high-dose, PF 0 5 mL, for patients 65 yr+ (FLUZONE HIGH-DOSE)    Primary osteoarthritis of both knees  -     meloxicam (MOBIC) 7 5 mg tablet;  Take 1 tablet (7 5 mg total) by mouth daily as needed for moderate pain  -     lidocaine (LMX) 4 % cream; Apply topically as needed for mild pain    Screening for malignant neoplasm of breast  -     Mammo screening bilateral w cad; Future    Aortic dilatation (HCC)          Subjective:   Chief Complaint   Patient presents with    Follow-up     1 month    Medicare Wellness Visit        Patient ID: Sid Kiser is a 72 y o  female  She comes in for follow-up of hypertension, acid reflux, osteopenia, osteoarthritis  Blood pressure is well controlled  No headaches or dizziness  No chest pain or shortness of breath  Neck stiffness is somewhat stable  No significant tingling or numbness on the hands  Knees continue to bother for which she takes meloxicam very rarely  Acid reflux is well controlled at this time  The following portions of the patient's history were reviewed and updated as appropriate: current medications, past medical history, past social history and past surgical history  PHQ-9 Depression Screening    PHQ-9:    Frequency of the following problems over the past two weeks:                Current Outpatient Prescriptions:     acetaminophen (TYLENOL) 325 mg tablet, Take 650 mg by mouth every 6 (six) hours as needed for mild pain, Disp: , Rfl:     lidocaine (LMX) 4 % cream, Apply topically as needed for mild pain, Disp: 30 g, Rfl: 0    meloxicam (MOBIC) 7 5 mg tablet, Take 1 tablet (7 5 mg total) by mouth daily as needed for moderate pain, Disp: 30 tablet, Rfl: 0    metoprolol succinate (TOPROL-XL) 50 mg 24 hr tablet, Take 1 tablet (50 mg total) by mouth daily, Disp: 90 tablet, Rfl: 0    Multiple Vitamin (MULTIVITAMIN) capsule, Take 1 capsule by mouth daily, Disp: , Rfl:     omeprazole (PriLOSEC) 40 MG capsule, Take 1 capsule (40 mg total) by mouth daily, Disp: 90 capsule, Rfl: 0    Review of Systems   Constitutional: Negative for fatigue, fever and unexpected weight change  HENT: Negative for ear pain, hearing loss and sore throat  Eyes: Negative for pain and discharge  Respiratory: Negative for cough, chest tightness and shortness of breath      Cardiovascular: Negative for chest pain and palpitations  Gastrointestinal: Negative for abdominal pain, blood in stool, constipation, diarrhea and nausea  Genitourinary: Negative for dysuria, frequency and hematuria  Musculoskeletal: Positive for arthralgias  Negative for joint swelling  Skin: Negative for rash  Allergic/Immunologic: Negative for immunocompromised state  Neurological: Negative for dizziness and headaches  Hematological: Negative for adenopathy  Psychiatric/Behavioral: Negative for confusion and sleep disturbance  Objective:  /73 (BP Location: Left arm, Patient Position: Sitting, Cuff Size: Standard)   Pulse 66   Temp 98 °F (36 7 °C)   Resp 14   Ht 5' 1 5" (1 562 m)   Wt 66 2 kg (146 lb)   BMI 27 14 kg/m²      Physical Exam   Constitutional: She appears well-developed and well-nourished  HENT:   Head: Normocephalic and atraumatic  Right Ear: Tympanic membrane normal    Left Ear: Tympanic membrane normal    Nose: Nose normal    Mouth/Throat: Oropharynx is clear and moist  No posterior oropharyngeal edema or posterior oropharyngeal erythema  Eyes: Conjunctivae are normal  Pupils are equal, round, and reactive to light  Right eye exhibits no discharge  Neck: Normal range of motion  Neck supple  No thyromegaly present  Cardiovascular: Normal rate, regular rhythm, S1 normal and S2 normal   PMI is not displaced  Murmur heard  Systolic murmur is present with a grade of 2/6   Pulmonary/Chest: Effort normal and breath sounds normal  No accessory muscle usage  No apnea  No respiratory distress  She has no rhonchi  She has no rales  Abdominal: Soft  Normal appearance and bowel sounds are normal  She exhibits no shifting dullness  There is no hepatosplenomegaly  There is no tenderness  There is no rebound and no CVA tenderness  Musculoskeletal: Normal range of motion  She exhibits no edema or tenderness  Lymphadenopathy:     She has no cervical adenopathy  Neurological: She is alert     Skin: Skin is warm and intact  No rash noted  Psychiatric: She has a normal mood and affect  Her speech is normal    Nursing note and vitals reviewed  No results found for this or any previous visit (from the past 1008 hour(s))  ]    No results found

## 2018-08-31 ENCOUNTER — TELEPHONE (OUTPATIENT)
Dept: INTERNAL MEDICINE CLINIC | Facility: CLINIC | Age: 73
End: 2018-08-31

## 2018-08-31 NOTE — TELEPHONE ENCOUNTER
----- Message from Chantel Mcguire MD sent at 8/30/2018  1:59 PM EDT -----  Her CT scan showed that she has an ascending aortic aneurysm but the size is not at a dangerous level  No concerns at this time, repeat imaging in 6 months  I can explain to her more when she comes back for her follow-up in 3 mo    Also when she was checked out the other day I forgot to give her the screening mammography script if you can give her the phone number for [facility] scheduling to help her schedule the mammography

## 2018-09-27 DIAGNOSIS — I10 ESSENTIAL HYPERTENSION: ICD-10-CM

## 2018-09-28 RX ORDER — METOPROLOL SUCCINATE 50 MG/1
50 TABLET, EXTENDED RELEASE ORAL DAILY
Qty: 90 TABLET | Refills: 2 | Status: SHIPPED | OUTPATIENT
Start: 2018-09-28 | End: 2018-11-29 | Stop reason: SDUPTHER

## 2018-11-26 DIAGNOSIS — K21.9 GASTROESOPHAGEAL REFLUX DISEASE, ESOPHAGITIS PRESENCE NOT SPECIFIED: ICD-10-CM

## 2018-11-26 RX ORDER — OMEPRAZOLE 40 MG/1
CAPSULE, DELAYED RELEASE ORAL
Qty: 90 CAPSULE | Refills: 0 | Status: SHIPPED | OUTPATIENT
Start: 2018-11-26 | End: 2019-03-12 | Stop reason: SDUPTHER

## 2018-11-29 ENCOUNTER — OFFICE VISIT (OUTPATIENT)
Dept: INTERNAL MEDICINE CLINIC | Facility: CLINIC | Age: 73
End: 2018-11-29
Payer: MEDICARE

## 2018-11-29 VITALS
HEART RATE: 73 BPM | RESPIRATION RATE: 15 BRPM | BODY MASS INDEX: 27.82 KG/M2 | WEIGHT: 151.2 LBS | HEIGHT: 62 IN | TEMPERATURE: 98.2 F | DIASTOLIC BLOOD PRESSURE: 86 MMHG | SYSTOLIC BLOOD PRESSURE: 145 MMHG

## 2018-11-29 DIAGNOSIS — R00.2 PALPITATION: ICD-10-CM

## 2018-11-29 DIAGNOSIS — I10 ESSENTIAL HYPERTENSION: ICD-10-CM

## 2018-11-29 DIAGNOSIS — D22.9 NEVUS: ICD-10-CM

## 2018-11-29 DIAGNOSIS — M75.101 ROTATOR CUFF SYNDROME OF RIGHT SHOULDER: ICD-10-CM

## 2018-11-29 DIAGNOSIS — Z11.59 NEED FOR HEPATITIS C SCREENING TEST: ICD-10-CM

## 2018-11-29 DIAGNOSIS — M85.80 OSTEOPENIA, UNSPECIFIED LOCATION: ICD-10-CM

## 2018-11-29 DIAGNOSIS — J20.9 ACUTE BRONCHITIS, UNSPECIFIED ORGANISM: Primary | ICD-10-CM

## 2018-11-29 PROCEDURE — 99214 OFFICE O/P EST MOD 30 MIN: CPT | Performed by: INTERNAL MEDICINE

## 2018-11-29 RX ORDER — METOPROLOL SUCCINATE 50 MG/1
50 TABLET, EXTENDED RELEASE ORAL DAILY
Qty: 90 TABLET | Refills: 1 | Status: SHIPPED | OUTPATIENT
Start: 2018-11-29 | End: 2019-02-21 | Stop reason: SDUPTHER

## 2018-11-30 NOTE — ASSESSMENT & PLAN NOTE
Slightly high today, not sure if high BP at night is true, get 24hr BP monitor  Continue current dose of metoprolol for now

## 2018-11-30 NOTE — PROGRESS NOTES
Assessment/Plan:    Palpitation  Continues to be symptomatic, refer to see cardiology    Osteopenia  Monitor vit d level    Essential hypertension  Slightly high today, not sure if high BP at night is true, get 24hr BP monitor  Continue current dose of metoprolol for now  Rotator cuff syndrome of right shoulder  Start physical therapy, xray to evaluate for significant OA    Refer to see dermatology for nevus removal    Bronchitis seems to be viral, if not improved, get chest xray  Diagnoses and all orders for this visit:    Acute bronchitis, unspecified organism  -     XR chest pa & lateral; Future  -     CBC and differential  -     Comprehensive metabolic panel    Essential hypertension  -     metoprolol succinate (TOPROL-XL) 50 mg 24 hr tablet; Take 1 tablet (50 mg total) by mouth daily  -     CBC and differential  -     Comprehensive metabolic panel  -     Lipid Panel with Direct LDL reflex  -     24 hour blood pressure monitoring; Future    Palpitation  -     Ambulatory referral to Cardiology; Future  -     24 hour blood pressure monitoring; Future    Osteopenia, unspecified location  -     CBC and differential  -     Vitamin D 25 hydroxy    Rotator cuff syndrome of right shoulder  -     Ambulatory referral to Physical Therapy; Future  -     XR shoulder 2+ vw right; Future    Nevus  -     Ambulatory referral to Dermatology; Future    Need for hepatitis C screening test  -     Hepatitis C antibody          Subjective:   Chief Complaint   Patient presents with    Follow-up     3 month    Shoulder Pain     Right    Cough    Headache    Hypertension        Patient ID: Eric Angel is a 72 y o  female  She comes in for follow up of HTN, OA, osteopenia  She notes that she has been having palpitations more recently  No chest pain or SOB on exertion  She feels like her BP is high at night, few nights back she measured it and was 132 systolic  She never checked it again      Cough for last 1 week, no fever or SOB  Acute pain in right shoulder for 1 week, no injury  Mole on her chest, bothering her now, would like it removed        The following portions of the patient's history were reviewed and updated as appropriate: current medications, past medical history, past social history and past surgical history  PHQ-9 Depression Screening    PHQ-9:    Frequency of the following problems over the past two weeks:                Current Outpatient Prescriptions:     acetaminophen (TYLENOL) 325 mg tablet, Take 650 mg by mouth every 6 (six) hours as needed for mild pain, Disp: , Rfl:     lidocaine (LMX) 4 % cream, Apply topically as needed for mild pain, Disp: 30 g, Rfl: 0    meloxicam (MOBIC) 7 5 mg tablet, Take 1 tablet (7 5 mg total) by mouth daily as needed for moderate pain, Disp: 30 tablet, Rfl: 0    metoprolol succinate (TOPROL-XL) 50 mg 24 hr tablet, Take 1 tablet (50 mg total) by mouth daily, Disp: 90 tablet, Rfl: 1    Multiple Vitamin (MULTIVITAMIN) capsule, Take 1 capsule by mouth daily, Disp: , Rfl:     omeprazole (PriLOSEC) 40 MG capsule, TAKE 1 CAPSULE(40 MG) BY MOUTH DAILY, Disp: 90 capsule, Rfl: 0    Review of Systems   Constitutional: Negative for fatigue, fever and unexpected weight change  HENT: Negative for ear pain, hearing loss and sore throat  Eyes: Negative for pain and discharge  Respiratory: Positive for cough  Negative for chest tightness and shortness of breath  Cardiovascular: Positive for palpitations  Negative for chest pain  Gastrointestinal: Negative for abdominal pain, blood in stool, constipation, diarrhea and nausea  Genitourinary: Negative for dysuria, frequency and hematuria  Musculoskeletal: Positive for arthralgias  Negative for joint swelling  Skin: Negative for rash  Allergic/Immunologic: Negative for immunocompromised state  Neurological: Negative for dizziness and headaches  Hematological: Negative for adenopathy  Psychiatric/Behavioral: Negative for confusion and sleep disturbance  Objective:  /86 (BP Location: Left arm, Patient Position: Sitting, Cuff Size: Standard)   Pulse 73   Temp 98 2 °F (36 8 °C)   Resp 15   Ht 5' 1 5" (1 562 m)   Wt 68 6 kg (151 lb 3 2 oz)   BMI 28 11 kg/m²      Physical Exam   Constitutional: She appears well-developed and well-nourished  HENT:   Head: Normocephalic and atraumatic  Right Ear: Tympanic membrane normal    Left Ear: Tympanic membrane normal    Nose: Nose normal    Mouth/Throat: Oropharynx is clear and moist  No posterior oropharyngeal edema or posterior oropharyngeal erythema  Eyes: Pupils are equal, round, and reactive to light  Conjunctivae are normal  Right eye exhibits no discharge  Neck: Normal range of motion  Neck supple  No thyromegaly present  Cardiovascular: Normal rate, regular rhythm, S1 normal, S2 normal and normal heart sounds  PMI is not displaced  No murmur heard  Pulmonary/Chest: Effort normal and breath sounds normal  No accessory muscle usage  No apnea  No respiratory distress  She has no rhonchi  She has no rales  Abdominal: Soft  Normal appearance and bowel sounds are normal  She exhibits no shifting dullness  There is no hepatosplenomegaly  There is no tenderness  There is no rebound and no CVA tenderness  Musculoskeletal: Normal range of motion  She exhibits no edema  Right shoulder: She exhibits tenderness  Limited abduction beyond 90 degrees, limited flexion and external rotation   Lymphadenopathy:     She has no cervical adenopathy  Neurological: She is alert  Skin: Skin is warm and intact  No rash noted  Psychiatric: She has a normal mood and affect  Her speech is normal    Nursing note and vitals reviewed  No results found for this or any previous visit (from the past 1008 hour(s))  ]    No results found

## 2019-01-04 ENCOUNTER — APPOINTMENT (OUTPATIENT)
Dept: RADIOLOGY | Facility: MEDICAL CENTER | Age: 74
End: 2019-01-04
Payer: COMMERCIAL

## 2019-01-04 ENCOUNTER — OFFICE VISIT (OUTPATIENT)
Dept: INTERNAL MEDICINE CLINIC | Facility: CLINIC | Age: 74
End: 2019-01-04
Payer: COMMERCIAL

## 2019-01-04 VITALS
SYSTOLIC BLOOD PRESSURE: 156 MMHG | WEIGHT: 151 LBS | HEART RATE: 72 BPM | TEMPERATURE: 98.2 F | DIASTOLIC BLOOD PRESSURE: 82 MMHG | RESPIRATION RATE: 16 BRPM | HEIGHT: 62 IN | BODY MASS INDEX: 27.79 KG/M2

## 2019-01-04 DIAGNOSIS — M75.101 ROTATOR CUFF SYNDROME OF RIGHT SHOULDER: ICD-10-CM

## 2019-01-04 DIAGNOSIS — M17.0 PRIMARY OSTEOARTHRITIS OF BOTH KNEES: ICD-10-CM

## 2019-01-04 DIAGNOSIS — M17.0 PRIMARY OSTEOARTHRITIS OF BOTH KNEES: Primary | ICD-10-CM

## 2019-01-04 DIAGNOSIS — I10 ESSENTIAL HYPERTENSION: ICD-10-CM

## 2019-01-04 PROCEDURE — 73562 X-RAY EXAM OF KNEE 3: CPT

## 2019-01-04 PROCEDURE — 99214 OFFICE O/P EST MOD 30 MIN: CPT | Performed by: INTERNAL MEDICINE

## 2019-01-04 RX ORDER — LISINOPRIL 10 MG/1
10 TABLET ORAL DAILY
Qty: 30 TABLET | Refills: 2 | Status: SHIPPED | OUTPATIENT
Start: 2019-01-04 | End: 2019-01-07

## 2019-01-04 NOTE — PROGRESS NOTES
Assessment/Plan:    Essential hypertension  Not at goal, will add lisinopril to metoprolol  Cardiology appointment in 2 weeks, advised to call the office if notices any dizziness or lightheadedness  Try to monitor blood pressure at home  Primary osteoarthritis of both knees  Get an x-ray, try meloxicam at the pain is severe, can use Voltaren if not so severe since she has significant acid reflux  if pain does not improve with meloxicam or topical voltaren, will refer to Orthopedics for further treatment  Rotator cuff syndrome of right shoulder  Reminded to start physical therapy       Diagnoses and all orders for this visit:    Primary osteoarthritis of both knees  -     XR knee 3 vw right non injury; Future  -     diclofenac sodium (VOLTAREN) 1 %; Apply 2 g topically 4 (four) times a day    Essential hypertension  -     lisinopril (ZESTRIL) 10 mg tablet; Take 1 tablet (10 mg total) by mouth daily    Rotator cuff syndrome of right shoulder          Subjective:   Chief Complaint   Patient presents with    Follow-up     4 weeks, pain in knees and arms        Patient ID: Irasema Adames is a 72 y o  female  She comes in for follow-up of hypertension, shoulder pain, knee pain  She notes that for the last 3 days she has had increased pain in the knee  She noticed swelling  She is not sure if she hurt herself  A Mobic help somewhat with the pain  She has not monitored her blood pressure but thinks that is probably high specially at night  She feels palpitations and unwell  Shoulder pain is not improved  She will try to make an appointment with physical therapy soon  The following portions of the patient's history were reviewed and updated as appropriate: current medications, past medical history, past social history and past surgical history      PHQ-9 Depression Screening    PHQ-9:    Frequency of the following problems over the past two weeks:                Current Outpatient Prescriptions:     acetaminophen (TYLENOL) 325 mg tablet, Take 650 mg by mouth every 6 (six) hours as needed for mild pain, Disp: , Rfl:     lidocaine (LMX) 4 % cream, Apply topically as needed for mild pain, Disp: 30 g, Rfl: 0    meloxicam (MOBIC) 7 5 mg tablet, Take 1 tablet (7 5 mg total) by mouth daily as needed for moderate pain, Disp: 30 tablet, Rfl: 0    metoprolol succinate (TOPROL-XL) 50 mg 24 hr tablet, Take 1 tablet (50 mg total) by mouth daily, Disp: 90 tablet, Rfl: 1    Multiple Vitamin (MULTIVITAMIN) capsule, Take 1 capsule by mouth daily, Disp: , Rfl:     omeprazole (PriLOSEC) 40 MG capsule, TAKE 1 CAPSULE(40 MG) BY MOUTH DAILY, Disp: 90 capsule, Rfl: 0    diclofenac sodium (VOLTAREN) 1 %, Apply 2 g topically 4 (four) times a day, Disp: 1 Tube, Rfl: 2    lisinopril (ZESTRIL) 10 mg tablet, Take 1 tablet (10 mg total) by mouth daily, Disp: 30 tablet, Rfl: 2    Review of Systems   Constitutional: Negative for fatigue, fever and unexpected weight change  HENT: Negative for ear pain, hearing loss and sore throat  Eyes: Negative for pain and discharge  Respiratory: Negative for cough, chest tightness and shortness of breath  Cardiovascular: Negative for chest pain and palpitations  Gastrointestinal: Negative for abdominal pain, blood in stool, constipation, diarrhea and nausea  Genitourinary: Negative for dysuria, frequency and hematuria  Musculoskeletal: Positive for arthralgias and joint swelling  Skin: Negative for rash  Allergic/Immunologic: Negative for immunocompromised state  Neurological: Negative for dizziness and headaches  Hematological: Negative for adenopathy  Psychiatric/Behavioral: Negative for confusion and sleep disturbance           Objective:  /82 (BP Location: Left arm, Patient Position: Sitting, Cuff Size: Standard)   Pulse 72   Temp 98 2 °F (36 8 °C) (Tympanic)   Resp 16   Ht 5' 1 5" (1 562 m)   Wt 68 5 kg (151 lb)   BMI 28 07 kg/m² Physical Exam   Constitutional: She appears well-developed and well-nourished  HENT:   Head: Normocephalic and atraumatic  Right Ear: Tympanic membrane normal    Left Ear: Tympanic membrane normal    Nose: Nose normal    Mouth/Throat: Oropharynx is clear and moist  No posterior oropharyngeal edema or posterior oropharyngeal erythema  Eyes: Pupils are equal, round, and reactive to light  Conjunctivae are normal  Right eye exhibits no discharge  Neck: Normal range of motion  Neck supple  No thyromegaly present  Cardiovascular: Normal rate, regular rhythm, S1 normal, S2 normal and normal heart sounds  PMI is not displaced  No murmur heard  Pulmonary/Chest: Effort normal and breath sounds normal  No accessory muscle usage  No apnea  No respiratory distress  She has no rhonchi  She has no rales  Abdominal: Soft  Normal appearance and bowel sounds are normal  She exhibits no shifting dullness  There is no hepatosplenomegaly  There is no tenderness  There is no rebound and no CVA tenderness  Musculoskeletal: Normal range of motion  She exhibits no edema  Right knee: She exhibits swelling  Tenderness found  Medial joint line tenderness noted  Lymphadenopathy:     She has no cervical adenopathy  Neurological: She is alert  Skin: Skin is warm and intact  No rash noted  Psychiatric: She has a normal mood and affect  Her speech is normal    Nursing note and vitals reviewed  No results found for this or any previous visit (from the past 1008 hour(s))  ]    No results found

## 2019-01-04 NOTE — ASSESSMENT & PLAN NOTE
Get an x-ray, try meloxicam at the pain is severe, can use Voltaren if not so severe since she has significant acid reflux  if pain does not improve with meloxicam or topical voltaren, will refer to Orthopedics for further treatment

## 2019-01-04 NOTE — ASSESSMENT & PLAN NOTE
Not at goal, will add lisinopril to metoprolol  Cardiology appointment in 2 weeks, advised to call the office if notices any dizziness or lightheadedness  Try to monitor blood pressure at home

## 2019-01-07 ENCOUNTER — TELEPHONE (OUTPATIENT)
Dept: INTERNAL MEDICINE CLINIC | Facility: CLINIC | Age: 74
End: 2019-01-07

## 2019-01-07 DIAGNOSIS — M25.561 RIGHT KNEE PAIN, UNSPECIFIED CHRONICITY: Primary | ICD-10-CM

## 2019-01-07 DIAGNOSIS — M17.0 PRIMARY OSTEOARTHRITIS OF BOTH KNEES: Primary | ICD-10-CM

## 2019-01-07 DIAGNOSIS — M17.0 PRIMARY OSTEOARTHRITIS OF BOTH KNEES: ICD-10-CM

## 2019-01-07 DIAGNOSIS — I10 ESSENTIAL HYPERTENSION: Primary | ICD-10-CM

## 2019-01-07 RX ORDER — MELOXICAM 7.5 MG/1
7.5 TABLET ORAL DAILY PRN
Qty: 30 TABLET | Refills: 0 | Status: SHIPPED | OUTPATIENT
Start: 2019-01-07 | End: 2019-03-12 | Stop reason: SDUPTHER

## 2019-01-07 RX ORDER — MELOXICAM 7.5 MG/1
TABLET ORAL
Qty: 30 TABLET | Refills: 0 | Status: SHIPPED | OUTPATIENT
Start: 2019-01-07 | End: 2019-01-07 | Stop reason: SDUPTHER

## 2019-01-07 RX ORDER — LOSARTAN POTASSIUM 25 MG/1
25 TABLET ORAL DAILY
Qty: 30 TABLET | Refills: 1 | Status: SHIPPED | OUTPATIENT
Start: 2019-01-07 | End: 2019-02-11 | Stop reason: SDUPTHER

## 2019-01-07 NOTE — TELEPHONE ENCOUNTER
Patient was given the results in [address]  She is requesting that she be referred to orthopedics   She was told that we would place the referral in the chart

## 2019-01-07 NOTE — TELEPHONE ENCOUNTER
----- Message from Lilian Abbasi MD sent at 1/7/2019  8:33 AM EST -----  Her knee is showing worsening arthritis   If she would like to, I would recommend referral to orthopedics

## 2019-01-10 ENCOUNTER — OFFICE VISIT (OUTPATIENT)
Dept: INTERNAL MEDICINE CLINIC | Facility: CLINIC | Age: 74
End: 2019-01-10
Payer: COMMERCIAL

## 2019-01-10 VITALS
BODY MASS INDEX: 27.6 KG/M2 | DIASTOLIC BLOOD PRESSURE: 71 MMHG | HEART RATE: 65 BPM | SYSTOLIC BLOOD PRESSURE: 165 MMHG | TEMPERATURE: 98.4 F | WEIGHT: 150 LBS | OXYGEN SATURATION: 97 % | HEIGHT: 62 IN

## 2019-01-10 DIAGNOSIS — J20.9 ACUTE BRONCHITIS, UNSPECIFIED ORGANISM: Primary | ICD-10-CM

## 2019-01-10 PROCEDURE — 99213 OFFICE O/P EST LOW 20 MIN: CPT | Performed by: INTERNAL MEDICINE

## 2019-01-10 RX ORDER — FLUTICASONE PROPIONATE 50 MCG
1 SPRAY, SUSPENSION (ML) NASAL EVERY 12 HOURS PRN
Qty: 1 BOTTLE | Refills: 0 | Status: SHIPPED | OUTPATIENT
Start: 2019-01-10 | End: 2021-05-04

## 2019-01-10 RX ORDER — BENZONATATE 100 MG/1
100 CAPSULE ORAL 3 TIMES DAILY PRN
Qty: 30 CAPSULE | Refills: 0 | Status: SHIPPED | OUTPATIENT
Start: 2019-01-10 | End: 2019-01-23

## 2019-01-10 RX ORDER — AZITHROMYCIN 250 MG/1
TABLET, FILM COATED ORAL
Qty: 6 TABLET | Refills: 0 | Status: SHIPPED | OUTPATIENT
Start: 2019-01-10 | End: 2019-01-15

## 2019-01-11 NOTE — PROGRESS NOTES
Assessment/Plan:  Start zpack, tessalon, flonase  Call if sympotms dont improve in next 2-3 days  BP still elevated but losartan started only 2 days back in setting of acute illness  Seeing cardiology soon     Diagnoses and all orders for this visit:    Acute bronchitis, unspecified organism  -     benzonatate (TESSALON PERLES) 100 mg capsule; Take 1 capsule (100 mg total) by mouth 3 (three) times a day as needed for cough  -     azithromycin (ZITHROMAX) 250 mg tablet; Take 2 today then 1 daily for 4 days  -     fluticasone (FLONASE) 50 mcg/act nasal spray; 1 spray into each nostril every 12 (twelve) hours as needed for rhinitis          Subjective:   Chief Complaint   Patient presents with    Cough    Nasal Congestion        Patient ID: Ayesha Pleitez is a 72 y o  female  She comes in for an acute appt  Cough, chest congestion since last week  Subjective fever, no SOB or wheezing, shakeel congestion  Taking losartan        The following portions of the patient's history were reviewed and updated as appropriate: current medications, past medical history, past social history and past surgical history      PHQ-9 Depression Screening    PHQ-9:    Frequency of the following problems over the past two weeks:                Current Outpatient Prescriptions:     acetaminophen (TYLENOL) 325 mg tablet, Take 650 mg by mouth every 6 (six) hours as needed for mild pain, Disp: , Rfl:     diclofenac sodium (VOLTAREN) 1 %, Apply 2 g topically 4 (four) times a day, Disp: 1 Tube, Rfl: 2    lidocaine (LMX) 4 % cream, Apply topically as needed for mild pain, Disp: 30 g, Rfl: 0    losartan (COZAAR) 25 mg tablet, Take 1 tablet (25 mg total) by mouth daily, Disp: 30 tablet, Rfl: 1    meloxicam (MOBIC) 7 5 mg tablet, Take 1 tablet (7 5 mg total) by mouth daily as needed for moderate pain, Disp: 30 tablet, Rfl: 0    metoprolol succinate (TOPROL-XL) 50 mg 24 hr tablet, Take 1 tablet (50 mg total) by mouth daily, Disp: 90 tablet, Rfl: 1    Multiple Vitamin (MULTIVITAMIN) capsule, Take 1 capsule by mouth daily, Disp: , Rfl:     omeprazole (PriLOSEC) 40 MG capsule, TAKE 1 CAPSULE(40 MG) BY MOUTH DAILY, Disp: 90 capsule, Rfl: 0    azithromycin (ZITHROMAX) 250 mg tablet, Take 2 today then 1 daily for 4 days, Disp: 6 tablet, Rfl: 0    benzonatate (TESSALON PERLES) 100 mg capsule, Take 1 capsule (100 mg total) by mouth 3 (three) times a day as needed for cough, Disp: 30 capsule, Rfl: 0    fluticasone (FLONASE) 50 mcg/act nasal spray, 1 spray into each nostril every 12 (twelve) hours as needed for rhinitis, Disp: 1 Bottle, Rfl: 0    Review of Systems   Constitutional: Positive for fatigue  Negative for fever and unexpected weight change  HENT: Positive for congestion and sinus pressure  Negative for ear pain, hearing loss and sore throat  Eyes: Negative for pain and discharge  Respiratory: Positive for cough  Negative for chest tightness and shortness of breath  Cardiovascular: Negative for chest pain and palpitations  Gastrointestinal: Negative for abdominal pain, blood in stool, constipation, diarrhea and nausea  Genitourinary: Negative for dysuria, frequency and hematuria  Musculoskeletal: Negative for arthralgias and joint swelling  Skin: Negative for rash  Allergic/Immunologic: Negative for immunocompromised state  Neurological: Negative for dizziness and headaches  Hematological: Negative for adenopathy  Psychiatric/Behavioral: Negative for confusion and sleep disturbance  Objective:  /71 (BP Location: Left arm, Patient Position: Sitting, Cuff Size: Standard)   Pulse 65   Temp 98 4 °F (36 9 °C) (Tympanic)   Ht 5' 1 5" (1 562 m)   Wt 68 kg (150 lb)   SpO2 97%   BMI 27 88 kg/m²      Physical Exam   Constitutional: She appears well-developed and well-nourished  HENT:   Head: Normocephalic and atraumatic     Right Ear: Tympanic membrane normal    Left Ear: Tympanic membrane normal  Nose: Nose normal    Mouth/Throat: Posterior oropharyngeal erythema present  No posterior oropharyngeal edema  Eyes: Pupils are equal, round, and reactive to light  Conjunctivae are normal  Right eye exhibits no discharge  Neck: Normal range of motion  Neck supple  No thyromegaly present  Cardiovascular: Normal rate, regular rhythm, S1 normal, S2 normal and normal heart sounds  PMI is not displaced  No murmur heard  Pulmonary/Chest: Effort normal and breath sounds normal  No accessory muscle usage  No apnea  No respiratory distress  She has no rhonchi  She has no rales  Abdominal: Soft  Normal appearance and bowel sounds are normal  She exhibits no shifting dullness  There is no hepatosplenomegaly  There is no tenderness  There is no rebound and no CVA tenderness  Musculoskeletal: Normal range of motion  She exhibits no edema or tenderness  Lymphadenopathy:     She has no cervical adenopathy  Neurological: She is alert  Skin: Skin is warm and intact  No rash noted  Psychiatric: She has a normal mood and affect  Her speech is normal    Nursing note and vitals reviewed  No results found for this or any previous visit (from the past 1008 hour(s))  ]    No results found

## 2019-01-14 ENCOUNTER — HOSPITAL ENCOUNTER (EMERGENCY)
Facility: HOSPITAL | Age: 74
Discharge: HOME | End: 2019-01-14
Attending: EMERGENCY MEDICINE | Admitting: EMERGENCY MEDICINE
Payer: COMMERCIAL

## 2019-01-14 ENCOUNTER — APPOINTMENT (EMERGENCY)
Dept: RADIOLOGY | Facility: HOSPITAL | Age: 74
End: 2019-01-14
Payer: COMMERCIAL

## 2019-01-14 VITALS
RESPIRATION RATE: 18 BRPM | OXYGEN SATURATION: 98 % | BODY MASS INDEX: 27.14 KG/M2 | DIASTOLIC BLOOD PRESSURE: 78 MMHG | HEIGHT: 62 IN | SYSTOLIC BLOOD PRESSURE: 135 MMHG | TEMPERATURE: 98 F | HEART RATE: 75 BPM | WEIGHT: 147.49 LBS

## 2019-01-14 DIAGNOSIS — J18.9 PNEUMONIA: Primary | ICD-10-CM

## 2019-01-14 LAB
ALBUMIN SERPL BCP-MCNC: 3.8 G/DL (ref 3.5–5)
ALP SERPL-CCNC: 104 U/L (ref 46–116)
ALT SERPL W P-5'-P-CCNC: 31 U/L (ref 12–78)
ANION GAP SERPL CALCULATED.3IONS-SCNC: 7 MMOL/L (ref 4–13)
APTT PPP: 33 SECONDS (ref 26–38)
AST SERPL W P-5'-P-CCNC: 27 U/L (ref 5–45)
ATRIAL RATE: 74 BPM
BASOPHILS # BLD AUTO: 0.05 THOUSANDS/ΜL (ref 0–0.1)
BASOPHILS NFR BLD AUTO: 1 % (ref 0–1)
BILIRUB SERPL-MCNC: 0.27 MG/DL (ref 0.2–1)
BUN SERPL-MCNC: 12 MG/DL (ref 5–25)
CALCIUM SERPL-MCNC: 9.5 MG/DL (ref 8.3–10.1)
CHLORIDE SERPL-SCNC: 104 MMOL/L (ref 100–108)
CO2 SERPL-SCNC: 29 MMOL/L (ref 21–32)
CREAT SERPL-MCNC: 0.65 MG/DL (ref 0.6–1.3)
EOSINOPHIL # BLD AUTO: 0.13 THOUSAND/ΜL (ref 0–0.61)
EOSINOPHIL NFR BLD AUTO: 2 % (ref 0–6)
ERYTHROCYTE [DISTWIDTH] IN BLOOD BY AUTOMATED COUNT: 13 % (ref 11.6–15.1)
GFR SERPL CREATININE-BSD FRML MDRD: 90 ML/MIN/1.73SQ M
GLUCOSE SERPL-MCNC: 96 MG/DL (ref 65–140)
HCT VFR BLD AUTO: 37.5 % (ref 34.8–46.1)
HGB BLD-MCNC: 12 G/DL (ref 11.5–15.4)
IMM GRANULOCYTES # BLD AUTO: 0.03 THOUSAND/UL (ref 0–0.2)
IMM GRANULOCYTES NFR BLD AUTO: 1 % (ref 0–2)
INR PPP: 1.13 (ref 0.86–1.17)
LYMPHOCYTES # BLD AUTO: 1.59 THOUSANDS/ΜL (ref 0.6–4.47)
LYMPHOCYTES NFR BLD AUTO: 27 % (ref 14–44)
MCH RBC QN AUTO: 29.9 PG (ref 26.8–34.3)
MCHC RBC AUTO-ENTMCNC: 32 G/DL (ref 31.4–37.4)
MCV RBC AUTO: 94 FL (ref 82–98)
MONOCYTES # BLD AUTO: 1.13 THOUSAND/ΜL (ref 0.17–1.22)
MONOCYTES NFR BLD AUTO: 19 % (ref 4–12)
NEUTROPHILS # BLD AUTO: 3.07 THOUSANDS/ΜL (ref 1.85–7.62)
NEUTS SEG NFR BLD AUTO: 50 % (ref 43–75)
NRBC BLD AUTO-RTO: 0 /100 WBCS
P AXIS: 50 DEGREES
PLATELET # BLD AUTO: 253 THOUSANDS/UL (ref 149–390)
PMV BLD AUTO: 10 FL (ref 8.9–12.7)
POTASSIUM SERPL-SCNC: 4 MMOL/L (ref 3.5–5.3)
PR INTERVAL: 152 MS
PROT SERPL-MCNC: 7.6 G/DL (ref 6.4–8.2)
PROTHROMBIN TIME: 14.6 SECONDS (ref 11.8–14.2)
QRS AXIS: 28 DEGREES
QRSD INTERVAL: 134 MS
QT INTERVAL: 428 MS
QTC INTERVAL: 475 MS
RBC # BLD AUTO: 4.01 MILLION/UL (ref 3.81–5.12)
SODIUM SERPL-SCNC: 140 MMOL/L (ref 136–145)
T WAVE AXIS: 64 DEGREES
TROPONIN I SERPL-MCNC: 0.02 NG/ML
TSH SERPL DL<=0.05 MIU/L-ACNC: 1.29 UIU/ML (ref 0.36–3.74)
VENTRICULAR RATE: 74 BPM
WBC # BLD AUTO: 6 THOUSAND/UL (ref 4.31–10.16)

## 2019-01-14 PROCEDURE — 71046 X-RAY EXAM CHEST 2 VIEWS: CPT

## 2019-01-14 PROCEDURE — 93005 ELECTROCARDIOGRAM TRACING: CPT

## 2019-01-14 PROCEDURE — 96360 HYDRATION IV INFUSION INIT: CPT

## 2019-01-14 PROCEDURE — 80053 COMPREHEN METABOLIC PANEL: CPT | Performed by: EMERGENCY MEDICINE

## 2019-01-14 PROCEDURE — 84443 ASSAY THYROID STIM HORMONE: CPT | Performed by: EMERGENCY MEDICINE

## 2019-01-14 PROCEDURE — 85610 PROTHROMBIN TIME: CPT | Performed by: EMERGENCY MEDICINE

## 2019-01-14 PROCEDURE — 36415 COLL VENOUS BLD VENIPUNCTURE: CPT | Performed by: EMERGENCY MEDICINE

## 2019-01-14 PROCEDURE — 99284 EMERGENCY DEPT VISIT MOD MDM: CPT

## 2019-01-14 PROCEDURE — 84484 ASSAY OF TROPONIN QUANT: CPT | Performed by: EMERGENCY MEDICINE

## 2019-01-14 PROCEDURE — 85025 COMPLETE CBC W/AUTO DIFF WBC: CPT | Performed by: EMERGENCY MEDICINE

## 2019-01-14 PROCEDURE — 93010 ELECTROCARDIOGRAM REPORT: CPT | Performed by: INTERNAL MEDICINE

## 2019-01-14 PROCEDURE — 85730 THROMBOPLASTIN TIME PARTIAL: CPT | Performed by: EMERGENCY MEDICINE

## 2019-01-14 RX ORDER — LEVOFLOXACIN 500 MG/1
500 TABLET, FILM COATED ORAL DAILY
Qty: 6 TABLET | Refills: 0 | Status: SHIPPED | OUTPATIENT
Start: 2019-01-14 | End: 2019-01-14

## 2019-01-14 RX ORDER — LEVOFLOXACIN 500 MG/1
500 TABLET, FILM COATED ORAL DAILY
Qty: 7 TABLET | Refills: 0 | Status: SHIPPED | OUTPATIENT
Start: 2019-01-14 | End: 2019-01-21

## 2019-01-14 RX ORDER — METOPROLOL SUCCINATE 50 MG/1
50 TABLET, EXTENDED RELEASE ORAL DAILY
Status: DISCONTINUED | OUTPATIENT
Start: 2019-01-14 | End: 2019-01-14 | Stop reason: HOSPADM

## 2019-01-14 RX ADMIN — SODIUM CHLORIDE 500 ML: 0.9 INJECTION, SOLUTION INTRAVENOUS at 10:39

## 2019-01-14 RX ADMIN — METOPROLOL SUCCINATE 50 MG: 50 TABLET, EXTENDED RELEASE ORAL at 10:39

## 2019-01-14 NOTE — ED PROVIDER NOTES
History  Chief Complaint   Patient presents with    Cough     Productve cough, dizzy, feeling fatigued symptoms x 1 week, denies fevers  Saw PCP, taking flonase, z-pack, tessalon pearls, and mucinex with no relief     C/o productive cough for 5 days, worsening  Pt  Was seen by her family   4 days ago and given zithromax, tessolon perrles, and mucinex without relief  No fevers  No wheezing, no h/o asthma or COPD  Nonsmoker  Pt  Got a flu shot this year  Prior to Admission Medications   Prescriptions Last Dose Informant Patient Reported? Taking?    Dextromethorphan-Guaifenesin ([place] [address] FAST-MAX DM MAX) 5-100 MG/5ML LIQD   Yes Yes   Sig: Take by mouth   Multiple Vitamin (MULTIVITAMIN) capsule  Self Yes Yes   Sig: Take 1 capsule by mouth daily   acetaminophen (TYLENOL) 325 mg tablet  Self Yes Yes   Sig: Take 650 mg by mouth every 6 (six) hours as needed for mild pain   azithromycin (ZITHROMAX) 250 mg tablet   No Yes   Sig: Take 2 today then 1 daily for 4 days   benzonatate (TESSALON PERLES) 100 mg capsule   No Yes   Sig: Take 1 capsule (100 mg total) by mouth 3 (three) times a day as needed for cough   diclofenac sodium (VOLTAREN) 1 %  Self No Yes   Sig: Apply 2 g topically 4 (four) times a day   fluticasone (FLONASE) 50 mcg/act nasal spray   No Yes   Si spray into each nostril every 12 (twelve) hours as needed for rhinitis   losartan (COZAAR) 25 mg tablet  Self No Yes   Sig: Take 1 tablet (25 mg total) by mouth daily   meloxicam (MOBIC) 7 5 mg tablet  Self No Yes   Sig: Take 1 tablet (7 5 mg total) by mouth daily as needed for moderate pain   metoprolol succinate (TOPROL-XL) 50 mg 24 hr tablet  Self No Yes   Sig: Take 1 tablet (50 mg total) by mouth daily   omeprazole (PriLOSEC) 40 MG capsule  Self No Yes   Sig: TAKE 1 CAPSULE(40 MG) BY MOUTH DAILY      Facility-Administered Medications: None       Past Medical History:   Diagnosis Date    Diverticulosis     Hypertension        Past Surgical History:   Procedure Laterality Date    TUBAL LIGATION         History reviewed  No pertinent family history  I have reviewed and agree with the history as documented  Social History   Substance Use Topics    Smoking status: Never Smoker    Smokeless tobacco: Never Used    Alcohol use No        Review of Systems   Constitutional: Positive for fatigue  Negative for appetite change and fever  HENT: Positive for congestion  Negative for sore throat  Respiratory: Positive for cough  Negative for shortness of breath and wheezing  Cardiovascular: Negative for chest pain and leg swelling  Gastrointestinal: Negative for abdominal pain, diarrhea and vomiting  Genitourinary: Negative for dysuria and flank pain  Musculoskeletal: Negative for back pain and neck pain  Skin: Negative for rash  Neurological: Positive for dizziness and weakness  Negative for syncope and headaches  Psychiatric/Behavioral:        Mood normal       Physical Exam  Physical Exam   Constitutional: She is oriented to person, place, and time  She appears well-developed and well-nourished  HENT:   Head: Normocephalic and atraumatic  Mildly dry MM   Neck: Normal range of motion  Neck supple  Cardiovascular: Normal rate and regular rhythm  Pulmonary/Chest: Effort normal  No respiratory distress  She has no wheezes  Slight decreased air movement b/l   Abdominal: Soft  There is no tenderness  Musculoskeletal: Normal range of motion  Neurological: She is alert and oriented to person, place, and time  Skin: Skin is warm and dry  Nursing note and vitals reviewed        Vital Signs  ED Triage Vitals   Temperature Pulse Respirations Blood Pressure SpO2   01/14/19 0916 01/14/19 0916 01/14/19 0916 01/14/19 0916 01/14/19 0916   98 °F (36 7 °C) 82 18 157/87 98 %      Temp Source Heart Rate Source Patient Position - Orthostatic VS BP Location FiO2 (%)   01/14/19 0916 01/14/19 0916 01/14/19 1038 01/14/19 0916 --   Oral Monitor Lying Right arm       Pain Score       01/14/19 0916       8           Vitals:    01/14/19 0916 01/14/19 1038   BP: 157/87 135/78   Pulse: 82 75   Patient Position - Orthostatic VS:  Lying       Visual Acuity      ED Medications  Medications   sodium chloride 0 9 % bolus 500 mL (0 mL Intravenous Stopped 1/14/19 1139)       Diagnostic Studies  Results Reviewed     Procedure Component Value Units Date/Time    TSH [[id number]]  (Normal) Collected:  01/14/19 1025    Lab Status:  Final result Specimen:  Blood from Arm, Right Updated:  01/14/19 1117     TSH 3RD GENERATON 1 290 uIU/mL     Narrative:         Patients undergoing fluorescein dye angiography may retain small amounts of fluorescein in the body for 48-72 hours post procedure  Samples containing fluorescein can produce falsely depressed TSH values  If the patient had this procedure,a specimen should be resubmitted post fluorescein clearance            The recommended reference ranges for TSH during pregnancy are as follows:  First trimester 0 1 to 2 5 uIU/mL  Second trimester  0 2 to 3 0 uIU/mL  Third trimester 0 3 to 3 0 uIU/m      Troponin I [[id number]]  (Normal) Collected:  01/14/19 1025    Lab Status:  Final result Specimen:  Blood from Arm, Right Updated:  01/14/19 1054     Troponin I 0 02 ng/mL     Comprehensive metabolic panel [[id number]] Collected:  01/14/19 1025    Lab Status:  Final result Specimen:  Blood from Arm, Right Updated:  01/14/19 1053     Sodium 140 mmol/L      Potassium 4 0 mmol/L      Chloride 104 mmol/L      CO2 29 mmol/L      ANION GAP 7 mmol/L      BUN 12 mg/dL      Creatinine 0 65 mg/dL      Glucose 96 mg/dL      Calcium 9 5 mg/dL      AST 27 U/L      ALT 31 U/L      Alkaline Phosphatase 104 U/L      Total Protein 7 6 g/dL      Albumin 3 8 g/dL      Total Bilirubin 0 27 mg/dL      eGFR 90 ml/min/1 73sq m     Narrative:         National Kidney Disease Education Program recommendations are as follows:  GFR calculation is accurate only with a steady state creatinine  Chronic Kidney disease less than 60 ml/min/1 73 sq  meters  Kidney failure less than 15 ml/min/1 73 sq  meters  Protime-INR [[id number]]  (Abnormal) Collected:  01/14/19 1025    Lab Status:  Final result Specimen:  Blood from Arm, Right Updated:  01/14/19 1046     Protime 14 6 (H) seconds      INR 1 13    APTT [[id number]]  (Normal) Collected:  01/14/19 1025    Lab Status:  Final result Specimen:  Blood from Arm, Right Updated:  01/14/19 1046     PTT 33 seconds     CBC and differential [[id number]]  (Abnormal) Collected:  01/14/19 1025    Lab Status:  Final result Specimen:  Blood from Arm, Right Updated:  01/14/19 1034     WBC 6 00 Thousand/uL      RBC 4 01 Million/uL      Hemoglobin 12 0 g/dL      Hematocrit 37 5 %      MCV 94 fL      MCH 29 9 pg      MCHC 32 0 g/dL      RDW 13 0 %      MPV 10 0 fL      Platelets 052 Thousands/uL      nRBC 0 /100 WBCs      Neutrophils Relative 50 %      Immat GRANS % 1 %      Lymphocytes Relative 27 %      Monocytes Relative 19 (H) %      Eosinophils Relative 2 %      Basophils Relative 1 %      Neutrophils Absolute 3 07 Thousands/µL      Immature Grans Absolute 0 03 Thousand/uL      Lymphocytes Absolute 1 59 Thousands/µL      Monocytes Absolute 1 13 Thousand/µL      Eosinophils Absolute 0 13 Thousand/µL      Basophils Absolute 0 05 Thousands/µL                  XR chest 2 views   Final Result by Jagdeep Oneill MD (01/14 9322)      No acute cardiopulmonary disease              Workstation performed: [id number]                    Procedures  ECG 12 Lead Documentation  Date/Time: 1/14/2019 10:34 AM  Performed by: YURI Smiley  Authorized by: YURI Smiley     Rate:     ECG rate:  74    ECG rate assessment: normal    Rhythm:     Rhythm: sinus rhythm    Comments:      RBBB           Phone Contacts  ED Phone Contact    ED Course                               MDM  Number of Diagnoses or Management Options  Pneumonia:      Amount and/or Complexity of Data Reviewed  Clinical lab tests: ordered and reviewed  Tests in the radiology section of CPT®: ordered and reviewed    Risk of Complications, Morbidity, and/or Mortality  Presenting problems: moderate  General comments: Pt  Felt better in ER, pulse ox 98% RA, stable for outpt  Follow up      CritCare Time    Disposition  Final diagnoses:   Pneumonia     Time reflects when diagnosis was documented in both MDM as applicable and the Disposition within this note     Time User Action Codes Description Comment    1/14/2019 12:07 PM Anselmo William 6 [J18 9] Pneumonia       ED Disposition     ED Disposition Condition Comment    Discharge  Yazan Cherry discharge to home/self care      Condition at discharge: Stable        Follow-up Information     Follow up With Specialties Details Why Contact Info    Rosario Davila MD Internal Medicine   [address]  [facility] [address]   [phone]            Discharge Medication List as of 1/14/2019 12:09 PM      START taking these medications    Details   pseudoephedrine-codeine-guaifenesin (MYTUSSIN DAC) 30- MG/5ML solution Take 5 mL by mouth 4 (four) times a day as needed for allergies for up to 10 days, Starting Mon 1/14/2019, Until Thu 1/24/2019, Print      levofloxacin (LEVAQUIN) 500 mg tablet Take 1 tablet (500 mg total) by mouth daily for 6 days, Starting Mon 1/14/2019, Until Sun 1/20/2019, Print         CONTINUE these medications which have NOT CHANGED    Details   acetaminophen (TYLENOL) 325 mg tablet Take 650 mg by mouth every 6 (six) hours as needed for mild pain, Historical Med      azithromycin (ZITHROMAX) 250 mg tablet Take 2 today then 1 daily for 4 days, Normal      benzonatate (TESSALON PERLES) 100 mg capsule Take 1 capsule (100 mg total) by mouth 3 (three) times a day as needed for cough, Starting Thu 1/10/2019, Normal      Dextromethorphan-Guaifenesin (MUC[place] FAST-MAX DM MAX) 5-100 MG/5ML LIQD Take by mouth, Historical Med      diclofenac sodium (VOLTAREN) 1 % Apply 2 g topically 4 (four) times a day, Starting Fri 1/4/2019, Normal      fluticasone (FLONASE) 50 mcg/act nasal spray 1 spray into each nostril every 12 (twelve) hours as needed for rhinitis, Starting Thu 1/10/2019, Normal      losartan (COZAAR) 25 mg tablet Take 1 tablet (25 mg total) by mouth daily, Starting Mon 1/7/2019, Normal      meloxicam (MOBIC) 7 5 mg tablet Take 1 tablet (7 5 mg total) by mouth daily as needed for moderate pain, Starting Mon 1/7/2019, Normal      metoprolol succinate (TOPROL-XL) 50 mg 24 hr tablet Take 1 tablet (50 mg total) by mouth daily, Starting Thu 11/29/2018, Normal      Multiple Vitamin (MULTIVITAMIN) capsule Take 1 capsule by mouth daily, Historical Med      omeprazole (PriLOSEC) 40 MG capsule TAKE 1 CAPSULE(40 MG) BY MOUTH DAILY, Normal           No discharge procedures on file      ED Provider  Electronically Signed by           Judy Chen MD  01/17/19 5007

## 2019-01-14 NOTE — DISCHARGE INSTRUCTIONS
Neumonía adquirida en la comunidad   LO QUE NECESITA SABER:   La neumonía adquirida en la comunidad (NAC) es rodrigue infección pulmonar que se contrae fuera de un hospital o de un hogar de ancianos  Luis Miguel pulmones se inflaman y no pueden funcionar de la [place]  La neumonía adquirida en la comunidad puede ser causada por rodrigue bacteria, un virus o un hongo  INSTRUCCIONES SOBRE [facility]:   Regrese a la johnson de emergencias si:   · Usted está confundido y no puede pensar con claridad  · Usted tiene más dificultad para respirar  · Luis Miguel labios o uñas de las lincoln se tornan grises o [place]  Pregúntele a vance Bunch Eli vitaminas y minerales son adecuados para usted  · Luis Miguel síntomas no mejoran o Scarlett Grieves  · Usted está orinando menos o no [place]  · Usted tiene preguntas o inquietudes acerca de vance condición o cuidado  Medicamentos:   · Medicamentos,  para tratar rodrigue infección bacterial, viral o causada por hongos  También podrían administrarle un medicamento para dilatar luis miguel bronquios y ayudarle a que respire con mayor facilidad  · Peterman luis miguel medicamentos camron se le haya indicado  Consulte con vance médico si usted inessa que vance medicamento no le está ayudando o si presenta efectos secundarios  Infórmele si es alérgico a cualquier medicamento  Mantenga rodrigue lista actualizada de los [place], las vitaminas y los productos herbales que curtis  Incluya los siguientes datos de los medicamentos: cantidad, frecuencia y motivo de administración  Traiga con usted la lista o los envases de la píldoras a luis miguel citas de seguimiento  Lleve la lista de los medicamentos con usted en cherie de rodrigue emergencia  Programe rodrigue zelalem con vance médico dentro de 3 días o según indicaciones:  Es posible que deban tomarle otra radiografía  Anote luis miguel preguntas para que se acuerde de hacerlas adarsh luis miguel visitas  Tos y respiración profunda:  La respiración profunda ayuda a abrir las vía aéreas de luis miguel pulmones   El toser [employer] a expulsar las flemas de luis miguel pulmones  Respire hondo y contenga el aliento tanto camron pueda  Luego expulse el aire de luis miguel pulmones con rodrigue tos o expectoración daphne y profunda  Expectore la flema  [place] 10 inhalaciones profundas seguidas, rodrigue detrás de la Lu, cada hora que usted esté despierto  Recuerde toser después de hacer rodrigue inhalación profunda  No fume ni permita que otras personas fumen a vance alrededor:  La nicotina y otras sustancias químicas que contienen los cigarrillos y cigarros pueden dañar los pulmones  Pida información a vance médico si usted actualmente fuma y necesita ayuda para dejar de fumar  Los cigarrillos electrónicos o tabaco sin humo todavía contienen nicotina  Consulte con vance médico antes de [employer]  Mantenga vance neumonía adquirida en la comunidad bajo control en vance hogar:   · Respire aire cálido y húmedo  [place] ayuda a aflojar la flema  Coloque sin presionar rodrigue toalla pequeña tibia y húmeda sobre vance Malissa [place] and Jeni y vance boca  Un humidificador de [employer] puede poner humedad en el aire  · [address] se le haya indicado  Pregunte a vance médico cuánto líquido debe alex a diario y qué líquidos le recomienda  Los líquidos ayudan a disolver la flema y expulsarla de vance cuerpo  · Dese golpecitos suaves en el pecho  [place] ayuda a aflojar la flema y hace que sea más fácil toser  Acuéstese boca arriba con la shade más abajo que vance pecho varias veces al día y golpéese con suavidad el pecho  · Descanse lo suficiente  El descanso ayuda a que vance cuerpo se recupere  Prevenir el CAP:   · [place] frecuentemente con agua y Luis Manuel  Lleve un gel antibacterial con usted  Usted puede usar el gel para limpiar luis miguel [place] cuando no Ester Hollanamaria y agua disponibles  No se toque los ojos, la nariz o la boca a menos que se haya lavado las lincoln larissa  · Limpie las superficies con frecuencia    [address] afua, los muebles de la cocina, teléfonos celulares y otras superficies que la gente toca con frecuencia  · Siempre cubra noel boca al toser  Tosa en un pañuelo desechable o en la manga de noel camisa para no contagiar los gérmenes con luis miguel [place]incoln  · Trate de evitar a las personas que están resfriadas o tienen gripe  Si usted está enfermo, manténgase alejado de otras personas lo más que pueda  · [place] vacunas  Es posible que usted necesite recibir rodrigue vacuna que sirve para prevenir la neumonía  Acuda a que le apliquen la vacuna de la influenza (gripe) cada año tan pronto camron esté disponible  © 2017 [facility] Information is for End User's use only and may not be sold, redistributed or otherwise used for commercial purposes  All illustrations and images included in CareNotes® are the copyrighted property of A D A M , Inc  or Angel Clark  Esta información es sólo para uso en educación  Noel intención no es darle un consejo médico sobre enfermedades o tratamientos  Colsulte con noel Sary [place] farmacéutico antes de seguir cualquier régimen médico para saber si es seguro y efectivo para usted

## 2019-01-23 ENCOUNTER — APPOINTMENT (OUTPATIENT)
Dept: LAB | Facility: HOSPITAL | Age: 74
End: 2019-01-23
Payer: COMMERCIAL

## 2019-01-23 ENCOUNTER — OFFICE VISIT (OUTPATIENT)
Dept: INTERNAL MEDICINE CLINIC | Facility: CLINIC | Age: 74
End: 2019-01-23
Payer: COMMERCIAL

## 2019-01-23 VITALS
HEIGHT: 62 IN | BODY MASS INDEX: 27.31 KG/M2 | TEMPERATURE: 98.5 F | WEIGHT: 148.4 LBS | DIASTOLIC BLOOD PRESSURE: 80 MMHG | OXYGEN SATURATION: 98 % | SYSTOLIC BLOOD PRESSURE: 140 MMHG | HEART RATE: 70 BPM

## 2019-01-23 DIAGNOSIS — I10 ESSENTIAL HYPERTENSION: Primary | ICD-10-CM

## 2019-01-23 DIAGNOSIS — J18.9 COMMUNITY ACQUIRED PNEUMONIA, UNSPECIFIED LATERALITY: ICD-10-CM

## 2019-01-23 DIAGNOSIS — M17.0 PRIMARY OSTEOARTHRITIS OF BOTH KNEES: ICD-10-CM

## 2019-01-23 LAB
25(OH)D3 SERPL-MCNC: 31.9 NG/ML (ref 30–100)
ALBUMIN SERPL BCP-MCNC: 3.8 G/DL (ref 3.5–5)
ALP SERPL-CCNC: 102 U/L (ref 46–116)
ALT SERPL W P-5'-P-CCNC: 33 U/L (ref 12–78)
ANION GAP SERPL CALCULATED.3IONS-SCNC: 8 MMOL/L (ref 4–13)
AST SERPL W P-5'-P-CCNC: 32 U/L (ref 5–45)
BASOPHILS # BLD AUTO: 0.03 THOUSANDS/ΜL (ref 0–0.1)
BASOPHILS NFR BLD AUTO: 1 % (ref 0–1)
BILIRUB SERPL-MCNC: 0.37 MG/DL (ref 0.2–1)
BUN SERPL-MCNC: 13 MG/DL (ref 5–25)
CALCIUM SERPL-MCNC: 9.2 MG/DL (ref 8.3–10.1)
CHLORIDE SERPL-SCNC: 104 MMOL/L (ref 100–108)
CHOLEST SERPL-MCNC: 178 MG/DL (ref 50–200)
CO2 SERPL-SCNC: 29 MMOL/L (ref 21–32)
CREAT SERPL-MCNC: 0.63 MG/DL (ref 0.6–1.3)
EOSINOPHIL # BLD AUTO: 0.16 THOUSAND/ΜL (ref 0–0.61)
EOSINOPHIL NFR BLD AUTO: 3 % (ref 0–6)
ERYTHROCYTE [DISTWIDTH] IN BLOOD BY AUTOMATED COUNT: 12.9 % (ref 11.6–15.1)
GFR SERPL CREATININE-BSD FRML MDRD: 91 ML/MIN/1.73SQ M
GLUCOSE P FAST SERPL-MCNC: 95 MG/DL (ref 65–99)
HCT VFR BLD AUTO: 38.3 % (ref 34.8–46.1)
HCV AB SER QL: NORMAL
HDLC SERPL-MCNC: 50 MG/DL (ref 40–60)
HGB BLD-MCNC: 12.1 G/DL (ref 11.5–15.4)
IMM GRANULOCYTES # BLD AUTO: 0.02 THOUSAND/UL (ref 0–0.2)
IMM GRANULOCYTES NFR BLD AUTO: 0 % (ref 0–2)
LDLC SERPL CALC-MCNC: 103 MG/DL (ref 0–100)
LYMPHOCYTES # BLD AUTO: 2.22 THOUSANDS/ΜL (ref 0.6–4.47)
LYMPHOCYTES NFR BLD AUTO: 37 % (ref 14–44)
MCH RBC QN AUTO: 30 PG (ref 26.8–34.3)
MCHC RBC AUTO-ENTMCNC: 31.6 G/DL (ref 31.4–37.4)
MCV RBC AUTO: 95 FL (ref 82–98)
MONOCYTES # BLD AUTO: 0.61 THOUSAND/ΜL (ref 0.17–1.22)
MONOCYTES NFR BLD AUTO: 10 % (ref 4–12)
NEUTROPHILS # BLD AUTO: 2.94 THOUSANDS/ΜL (ref 1.85–7.62)
NEUTS SEG NFR BLD AUTO: 49 % (ref 43–75)
NRBC BLD AUTO-RTO: 0 /100 WBCS
PLATELET # BLD AUTO: 264 THOUSANDS/UL (ref 149–390)
PMV BLD AUTO: 10 FL (ref 8.9–12.7)
POTASSIUM SERPL-SCNC: 3.9 MMOL/L (ref 3.5–5.3)
PROT SERPL-MCNC: 7.5 G/DL (ref 6.4–8.2)
RBC # BLD AUTO: 4.03 MILLION/UL (ref 3.81–5.12)
SODIUM SERPL-SCNC: 141 MMOL/L (ref 136–145)
TRIGL SERPL-MCNC: 123 MG/DL
WBC # BLD AUTO: 5.98 THOUSAND/UL (ref 4.31–10.16)

## 2019-01-23 PROCEDURE — 85025 COMPLETE CBC W/AUTO DIFF WBC: CPT | Performed by: INTERNAL MEDICINE

## 2019-01-23 PROCEDURE — 36415 COLL VENOUS BLD VENIPUNCTURE: CPT | Performed by: INTERNAL MEDICINE

## 2019-01-23 PROCEDURE — 99214 OFFICE O/P EST MOD 30 MIN: CPT | Performed by: INTERNAL MEDICINE

## 2019-01-23 PROCEDURE — 80053 COMPREHEN METABOLIC PANEL: CPT | Performed by: INTERNAL MEDICINE

## 2019-01-23 PROCEDURE — 80061 LIPID PANEL: CPT | Performed by: INTERNAL MEDICINE

## 2019-01-23 PROCEDURE — 86803 HEPATITIS C AB TEST: CPT | Performed by: INTERNAL MEDICINE

## 2019-01-23 PROCEDURE — 82306 VITAMIN D 25 HYDROXY: CPT | Performed by: INTERNAL MEDICINE

## 2019-01-23 RX ORDER — ALBUTEROL SULFATE 90 UG/1
2 AEROSOL, METERED RESPIRATORY (INHALATION) EVERY 6 HOURS PRN
Qty: 18 G | Refills: 0 | Status: SHIPPED | OUTPATIENT
Start: 2019-01-23 | End: 2019-03-12

## 2019-01-23 RX ORDER — PROMETHAZINE HYDROCHLORIDE AND CODEINE PHOSPHATE 6.25; 1 MG/5ML; MG/5ML
5 SYRUP ORAL
Qty: 120 ML | Refills: 0 | Status: SHIPPED | OUTPATIENT
Start: 2019-01-23 | End: 2019-03-12

## 2019-01-23 NOTE — PROGRESS NOTES
Assessment/Plan:    Essential hypertension  Improved, stopped codeine with pseudoephedrine  Follow-up with Cardiology soon  Primary osteoarthritis of both knees  Await appointment with Orthopedics  Completed Levaquin  Can use codeine at nighttime to help with the cough  Use Ventolin for possible postinfectious bronchospasm  Diagnoses and all orders for this visit:    Essential hypertension    Community acquired pneumonia, unspecified laterality  -     promethazine-codeine (PHENERGAN WITH CODEINE) 6 25-10 mg/5 mL syrup; Take 5 mL by mouth daily at bedtime as needed for cough  -     albuterol (VENTOLIN HFA) 90 mcg/act inhaler; Inhale 2 puffs every 6 (six) hours as needed for wheezing    Primary osteoarthritis of both knees          Subjective:   Chief Complaint   Patient presents with    Follow-up     f/u pneumonia        Patient ID: Audelia Dunbar is a 72 y o  female  She comes in for follow-up after an ER visit  She was seen for continues and worsening cough  She was diagnosed with pneumonia, chest x-ray did not show any consolidation  She was prescribed Levaquin instead of azithromycin and codeine cough syrup  She notes that the cough is slightly improved but she still is unable to sleep well at night  She stopped taking the codeine cough syrup because it was making her tired  She denies any shortness of breath, occasionally feels chest tightness when coughing a lot  She has some fatigue as well  Taking her blood pressure medications  Most blood pressure readings at home have been between 419-600 systolic  Continues to have knee pain  The following portions of the patient's history were reviewed and updated as appropriate: current medications, past medical history, past social history and past surgical history      PHQ-9 Depression Screening    PHQ-9:    Frequency of the following problems over the past two weeks:                Current Outpatient Prescriptions:    acetaminophen (TYLENOL) 325 mg tablet, Take 650 mg by mouth every 6 (six) hours as needed for mild pain, Disp: , Rfl:     Dextromethorphan-Guaifenesin (MUCINEX FAST-MAX DM MAX) 5-100 MG/5ML LIQD, Take by mouth, Disp: , Rfl:     diclofenac sodium (VOLTAREN) 1 %, Apply 2 g topically 4 (four) times a day, Disp: 1 Tube, Rfl: 2    fluticasone (FLONASE) 50 mcg/act nasal spray, 1 spray into each nostril every 12 (twelve) hours as needed for rhinitis, Disp: 1 Bottle, Rfl: 0    losartan (COZAAR) 25 mg tablet, Take 1 tablet (25 mg total) by mouth daily, Disp: 30 tablet, Rfl: 1    meloxicam (MOBIC) 7 5 mg tablet, Take 1 tablet (7 5 mg total) by mouth daily as needed for moderate pain, Disp: 30 tablet, Rfl: 0    metoprolol succinate (TOPROL-XL) 50 mg 24 hr tablet, Take 1 tablet (50 mg total) by mouth daily, Disp: 90 tablet, Rfl: 1    Multiple Vitamin (MULTIVITAMIN) capsule, Take 1 capsule by mouth daily, Disp: , Rfl:     omeprazole (PriLOSEC) 40 MG capsule, TAKE 1 CAPSULE(40 MG) BY MOUTH DAILY, Disp: 90 capsule, Rfl: 0    albuterol (VENTOLIN HFA) 90 mcg/act inhaler, Inhale 2 puffs every 6 (six) hours as needed for wheezing, Disp: 18 g, Rfl: 0    promethazine-codeine (PHENERGAN WITH CODEINE) 6 25-10 mg/5 mL syrup, Take 5 mL by mouth daily at bedtime as needed for cough, Disp: 120 mL, Rfl: 0    Review of Systems   Constitutional: Positive for fatigue  Negative for fever and unexpected weight change  HENT: Negative for ear pain, hearing loss and sore throat  Eyes: Negative for pain and discharge  Respiratory: Positive for cough and chest tightness  Negative for shortness of breath  Cardiovascular: Negative for chest pain and palpitations  Gastrointestinal: Negative for abdominal pain, blood in stool, constipation, diarrhea and nausea  Genitourinary: Negative for dysuria, frequency and hematuria  Musculoskeletal: Positive for arthralgias  Negative for joint swelling  Skin: Negative for rash  Allergic/Immunologic: Negative for immunocompromised state  Neurological: Negative for dizziness and headaches  Hematological: Negative for adenopathy  Psychiatric/Behavioral: Negative for confusion and sleep disturbance  Objective:  /80   Pulse 70   Temp 98 5 °F (36 9 °C) (Tympanic)   Ht 5' 2" (1 575 m)   Wt 67 3 kg (148 lb 6 4 oz)   SpO2 98%   BMI 27 14 kg/m²      Physical Exam   Constitutional: She appears well-developed and well-nourished  HENT:   Head: Normocephalic and atraumatic  Right Ear: Tympanic membrane normal    Left Ear: Tympanic membrane normal    Nose: Nose normal    Mouth/Throat: Oropharynx is clear and moist  No posterior oropharyngeal edema or posterior oropharyngeal erythema  Eyes: Pupils are equal, round, and reactive to light  Conjunctivae are normal  Right eye exhibits no discharge  Neck: Normal range of motion  Neck supple  No thyromegaly present  Cardiovascular: Normal rate, regular rhythm, S1 normal and S2 normal   PMI is not displaced  Murmur heard  Pulmonary/Chest: Effort normal and breath sounds normal  No accessory muscle usage  No apnea  No respiratory distress  She has no rhonchi  She has no rales  Abdominal: Soft  Normal appearance and bowel sounds are normal  She exhibits no shifting dullness  There is no hepatosplenomegaly  There is no tenderness  There is no rebound and no CVA tenderness  Musculoskeletal: Normal range of motion  She exhibits no edema or tenderness  Lymphadenopathy:     She has no cervical adenopathy  Neurological: She is alert  Skin: Skin is warm and intact  No rash noted  Psychiatric: She has a normal mood and affect  Her speech is normal    Nursing note and vitals reviewed          Recent Results (from the past 1008 hour(s))   CBC and differential    Collection Time: 01/14/19 10:25 AM   Result Value Ref Range    WBC 6 00 4 31 - 10 16 Thousand/uL    RBC 4 01 3 81 - 5 12 Million/uL    Hemoglobin 12 0 11 5 - 15 4 g/dL    Hematocrit 37 5 34 8 - 46 1 %    MCV 94 82 - 98 fL    MCH 29 9 26 8 - 34 3 pg    MCHC 32 0 31 4 - 37 4 g/dL    RDW 13 0 11 6 - 15 1 %    MPV 10 0 8 9 - 12 7 fL    Platelets 139 396 - 949 Thousands/uL    nRBC 0 /100 WBCs    Neutrophils Relative 50 43 - 75 %    Immat GRANS % 1 0 - 2 %    Lymphocytes Relative 27 14 - 44 %    Monocytes Relative 19 (H) 4 - 12 %    Eosinophils Relative 2 0 - 6 %    Basophils Relative 1 0 - 1 %    Neutrophils Absolute 3 07 1 85 - 7 62 Thousands/µL    Immature Grans Absolute 0 03 0 00 - 0 20 Thousand/uL    Lymphocytes Absolute 1 59 0 60 - 4 47 Thousands/µL    Monocytes Absolute 1 13 0 17 - 1 22 Thousand/µL    Eosinophils Absolute 0 13 0 00 - 0 61 Thousand/µL    Basophils Absolute 0 05 0 00 - 0 10 Thousands/µL   Protime-INR    Collection Time: 01/14/19 10:25 AM   Result Value Ref Range    Protime 14 6 (H) 11 8 - 14 2 seconds    INR 1 13 0 86 - 1 17   APTT    Collection Time: 01/14/19 10:25 AM   Result Value Ref Range    PTT 33 26 - 38 seconds   Comprehensive metabolic panel    Collection Time: 01/14/19 10:25 AM   Result Value Ref Range    Sodium 140 136 - 145 mmol/L    Potassium 4 0 3 5 - 5 3 mmol/L    Chloride 104 100 - 108 mmol/L    CO2 29 21 - 32 mmol/L    ANION GAP 7 4 - 13 mmol/L    BUN 12 5 - 25 mg/dL    Creatinine 0 65 0 60 - 1 30 mg/dL    Glucose 96 65 - 140 mg/dL    Calcium 9 5 8 3 - 10 1 mg/dL    AST 27 5 - 45 U/L    ALT 31 12 - 78 U/L    Alkaline Phosphatase 104 46 - 116 U/L    Total Protein 7 6 6 4 - 8 2 g/dL    Albumin 3 8 3 5 - 5 0 g/dL    Total Bilirubin 0 27 0 20 - 1 00 mg/dL    eGFR 90 ml/min/1 73sq m   TSH    Collection Time: 01/14/19 10:25 AM   Result Value Ref Range    TSH 3RD GENERATON 1 290 0 358 - 3 740 uIU/mL   Troponin I    Collection Time: 01/14/19 10:25 AM   Result Value Ref Range    Troponin I 0 02 <=0 04 ng/mL   ECG 12 lead    Collection Time: 01/14/19 10:34 AM   Result Value Ref Range    Ventricular Rate 74 BPM    Atrial Rate 74 BPM    HI Interval 152 ms QRSD Interval 134 ms    QT Interval 428 ms    QTC Interval 475 ms    P Axis 50 degrees    QRS Axis 28 degrees    T Wave Axis 64 degrees   Vitamin D 25 hydroxy    Collection Time: 01/23/19  7:11 AM   Result Value Ref Range    Vit D, 25-Hydroxy 31 9 30 0 - 100 0 ng/mL   Hepatitis C antibody    Collection Time: 01/23/19  7:11 AM   Result Value Ref Range    Hepatitis C Ab Non-reactive Non-reactive   Lipid Panel with Direct LDL reflex    Collection Time: 01/23/19  7:11 AM   Result Value Ref Range    Cholesterol 178 50 - 200 mg/dL    Triglycerides 123 <=150 mg/dL    HDL, Direct 50 40 - 60 mg/dL    LDL Calculated 103 (H) 0 - 100 mg/dL   CBC and differential    Collection Time: 01/23/19  7:11 AM   Result Value Ref Range    WBC 5 98 4 31 - 10 16 Thousand/uL    RBC 4 03 3 81 - 5 12 Million/uL    Hemoglobin 12 1 11 5 - 15 4 g/dL    Hematocrit 38 3 34 8 - 46 1 %    MCV 95 82 - 98 fL    MCH 30 0 26 8 - 34 3 pg    MCHC 31 6 31 4 - 37 4 g/dL    RDW 12 9 11 6 - 15 1 %    MPV 10 0 8 9 - 12 7 fL    Platelets 200 948 - 301 Thousands/uL    nRBC 0 /100 WBCs    Neutrophils Relative 49 43 - 75 %    Immat GRANS % 0 0 - 2 %    Lymphocytes Relative 37 14 - 44 %    Monocytes Relative 10 4 - 12 %    Eosinophils Relative 3 0 - 6 %    Basophils Relative 1 0 - 1 %    Neutrophils Absolute 2 94 1 85 - 7 62 Thousands/µL    Immature Grans Absolute 0 02 0 00 - 0 20 Thousand/uL    Lymphocytes Absolute 2 22 0 60 - 4 47 Thousands/µL    Monocytes Absolute 0 61 0 17 - 1 22 Thousand/µL    Eosinophils Absolute 0 16 0 00 - 0 61 Thousand/µL    Basophils Absolute 0 03 0 00 - 0 10 Thousands/µL   Comprehensive metabolic panel    Collection Time: 01/23/19  7:11 AM   Result Value Ref Range    Sodium 141 136 - 145 mmol/L    Potassium 3 9 3 5 - 5 3 mmol/L    Chloride 104 100 - 108 mmol/L    CO2 29 21 - 32 mmol/L    ANION GAP 8 4 - 13 mmol/L    BUN 13 5 - 25 mg/dL    Creatinine 0 63 0 60 - 1 30 mg/dL    Glucose, Fasting 95 65 - 99 mg/dL    Calcium 9 2 8 3 - 10 1 mg/dL    AST 32 5 - 45 U/L    ALT 33 12 - 78 U/L    Alkaline Phosphatase 102 46 - 116 U/L    Total Protein 7 5 6 4 - 8 2 g/dL    Albumin 3 8 3 5 - 5 0 g/dL    Total Bilirubin 0 37 0 20 - 1 00 mg/dL    eGFR 91 ml/min/1 73sq m   ]    No results found

## 2019-01-24 RX ORDER — PROMETHAZINE HYDROCHLORIDE AND CODEINE PHOSPHATE 6.25; 1 MG/5ML; MG/5ML
5 SOLUTION ORAL
Qty: 120 ML | Refills: 0 | OUTPATIENT
Start: 2019-01-24

## 2019-01-29 PROBLEM — R94.31 ABNORMAL EKG: Status: ACTIVE | Noted: 2019-01-29

## 2019-01-29 PROBLEM — I35.1 NONRHEUMATIC AORTIC VALVE INSUFFICIENCY: Status: ACTIVE | Noted: 2019-01-29

## 2019-02-11 DIAGNOSIS — I10 ESSENTIAL HYPERTENSION: ICD-10-CM

## 2019-02-13 RX ORDER — LOSARTAN POTASSIUM 25 MG/1
TABLET ORAL
Qty: 90 TABLET | Refills: 1 | Status: SHIPPED | OUTPATIENT
Start: 2019-02-13 | End: 2019-03-12 | Stop reason: SDUPTHER

## 2019-02-21 ENCOUNTER — OFFICE VISIT (OUTPATIENT)
Dept: CARDIOLOGY CLINIC | Facility: CLINIC | Age: 74
End: 2019-02-21
Payer: COMMERCIAL

## 2019-02-21 VITALS
HEIGHT: 62 IN | HEART RATE: 66 BPM | WEIGHT: 149 LBS | DIASTOLIC BLOOD PRESSURE: 82 MMHG | BODY MASS INDEX: 27.42 KG/M2 | SYSTOLIC BLOOD PRESSURE: 132 MMHG

## 2019-02-21 DIAGNOSIS — I35.1 NONRHEUMATIC AORTIC VALVE INSUFFICIENCY: ICD-10-CM

## 2019-02-21 DIAGNOSIS — I10 ESSENTIAL HYPERTENSION: Primary | ICD-10-CM

## 2019-02-21 DIAGNOSIS — R00.2 PALPITATION: ICD-10-CM

## 2019-02-21 DIAGNOSIS — I10 ESSENTIAL HYPERTENSION: ICD-10-CM

## 2019-02-21 DIAGNOSIS — R94.31 ABNORMAL EKG: ICD-10-CM

## 2019-02-21 DIAGNOSIS — I77.819 AORTIC DILATATION (HCC): ICD-10-CM

## 2019-02-21 PROCEDURE — 99203 OFFICE O/P NEW LOW 30 MIN: CPT | Performed by: INTERNAL MEDICINE

## 2019-02-21 RX ORDER — METOPROLOL SUCCINATE 50 MG/1
50 TABLET, EXTENDED RELEASE ORAL DAILY
Qty: 30 TABLET | Refills: 6 | Status: SHIPPED | OUTPATIENT
Start: 2019-02-21 | End: 2019-04-16 | Stop reason: SDUPTHER

## 2019-02-21 RX ORDER — METOPROLOL SUCCINATE 25 MG/1
25 TABLET, EXTENDED RELEASE ORAL DAILY
Qty: 30 TABLET | Refills: 6 | Status: SHIPPED | OUTPATIENT
Start: 2019-02-21 | End: 2019-04-01 | Stop reason: SDUPTHER

## 2019-02-21 NOTE — PROGRESS NOTES
Cardiology Follow Up    Eliel Bond  1945  [id number]  [address] [facility] [address] [place] [address]  [phone]    1  Essential hypertension  Echo stress test w contrast if indicated   2  Aortic dilatation (HCC)     3  Nonrheumatic aortic valve insufficiency     4  Palpitation  Ambulatory referral to Cardiology    Echo stress test w contrast if indicated   5  Abnormal EKG  Echo stress test w contrast if indicated       Interval History:   Cardiology consultation  Pleasant 33-year-old female who has history of long-standing palpitations  For over a decade  She attributes since her menopausal state  She describes a intermittent rapid heartbeat, can last for several minutes, she has never suffer syncope or presyncope  There is no crescendo pattern  She also complains of exertional dyspnea and fatigue  Over last several months  There is no associated chest discomfort denies any orthopnea or PND  She states that she is anxious at times  She has been on chronic beta-blocker therapy as well as ARB therapy for longstanding hypertension that is been well controlled  She is active but does not exercise regularly  Lipids recently checked, total cholesterol 178 with an HDL 50 and   Patient was recently seen emergency room for possible pneumonia, her chest x-ray was unremarkable however she did not have a high white cell count, she was treated with antibiotics, with improvement of symptoms  Reason EKG, personally reviewed revealed normal sinus rhythm with complete right bundle-branch block and secondary ST segment changes in the septal leads  She underwent a Holter monitor, where she experience symptoms, she did have premature atrial contractions and no sustained tachyarrhythmias  Resting normal sinus rhythm with an average of 69 beats per minute      Patient Active Problem List   Diagnosis  Essential hypertension    Palpitation    Gastroesophageal reflux disease    Primary osteoarthritis of both knees    Osteopenia    Cervical radiculopathy    Aortic dilatation (HCC)    Rotator cuff syndrome of right shoulder    Nonrheumatic aortic valve insufficiency    Abnormal EKG     Past Medical History:   Diagnosis Date    Diverticulosis     Hypertension      Social History     Socioeconomic History    Marital status: /Civil Union     Spouse name: Not on file    Number of children: Not on file    Years of education: Not on file    Highest education level: Not on file   Occupational History    Not on file   Social Needs    Financial resource strain: Not on file    Food insecurity:     Worry: Not on file     Inability: Not on file    Transportation needs:     Medical: Not on file     Non-medical: Not on file   Tobacco Use    Smoking status: Never Smoker    Smokeless tobacco: Never Used   Substance and Sexual Activity    Alcohol use: No    Drug use: No    Sexual activity: Not on file   Lifestyle    Physical activity:     Days per week: Not on file     Minutes per session: Not on file    Stress: Not on file   Relationships    Social connections:     Talks on phone: Not on file     Gets together: Not on file     Attends [employer] service: Not on file     Active member of club or organization: Not on file     Attends meetings of clubs or organizations: Not on file     Relationship status: Not on file    Intimate partner violence:     Fear of current or ex partner: Not on file     Emotionally abused: Not on file     Physically abused: Not on file     Forced sexual activity: Not on file   Other Topics Concern    Not on file   Social History Narrative    ** Merged History Encounter **           History reviewed  No pertinent family history    Past Surgical History:   Procedure Laterality Date    TUBAL LIGATION         Current Outpatient Medications:     acetaminophen (TYLENOL) 325 mg tablet, Take 650 mg by mouth every 6 (six) hours as needed for mild pain, Disp: , Rfl:     albuterol (VENTOLIN HFA) 90 mcg/act inhaler, Inhale 2 puffs every 6 (six) hours as needed for wheezing, Disp: 18 g, Rfl: 0    Dextromethorphan-Guaifenesin (MUCINEX FAST-MAX DM MAX) 5-100 MG/5ML LIQD, Take by mouth, Disp: , Rfl:     diclofenac sodium (VOLTAREN) 1 %, Apply 2 g topically 4 (four) times a day, Disp: 1 Tube, Rfl: 2    fluticasone (FLONASE) 50 mcg/act nasal spray, 1 spray into each nostril every 12 (twelve) hours as needed for rhinitis, Disp: 1 Bottle, Rfl: 0    losartan (COZAAR) 25 mg tablet, TAKE 1 TABLET(25 MG) BY MOUTH DAILY, Disp: 90 tablet, Rfl: 1    meloxicam (MOBIC) 7 5 mg tablet, Take 1 tablet (7 5 mg total) by mouth daily as needed for moderate pain, Disp: 30 tablet, Rfl: 0    metoprolol succinate (TOPROL-XL) 50 mg 24 hr tablet, Take 1 tablet (50 mg total) by mouth daily, Disp: 90 tablet, Rfl: 1    Multiple Vitamin (MULTIVITAMIN) capsule, Take 1 capsule by mouth daily, Disp: , Rfl:     omeprazole (PriLOSEC) 40 MG capsule, TAKE 1 CAPSULE(40 MG) BY MOUTH DAILY, Disp: 90 capsule, Rfl: 0    promethazine-codeine (PHENERGAN WITH CODEINE) 6 25-10 mg/5 mL syrup, Take 5 mL by mouth daily at bedtime as needed for cough, Disp: 120 mL, Rfl: 0  Allergies   Allergen Reactions    Other      Seafood    Other Nausea Only     SEAFOOD    Lisinopril Cough       Labs:  Admission on 01/14/2019, Discharged on 01/14/2019   Component Date Value    WBC 01/14/2019 6 00     RBC 01/14/2019 4 01     Hemoglobin 01/14/2019 12 0     Hematocrit 01/14/2019 37 5     MCV 01/14/2019 94     MCH 01/14/2019 29 9     MCHC 01/14/2019 32 0     RDW 01/14/2019 13 0     MPV 01/14/2019 10 0     Platelets 55/91/8671 253     nRBC 01/14/2019 0     Neutrophils Relative 01/14/2019 50     Immat GRANS % 01/14/2019 1     Lymphocytes Relative 01/14/2019 27     Monocytes Relative 01/14/2019 19*    Eosinophils Relative 01/14/2019 2  Basophils Relative 01/14/2019 1     Neutrophils Absolute 01/14/2019 3 07     Immature Grans Absolute 01/14/2019 0 03     Lymphocytes Absolute 01/14/2019 1 59     Monocytes Absolute 01/14/2019 1 13     Eosinophils Absolute 01/14/2019 0 13     Basophils Absolute 01/14/2019 0 05     Protime 01/14/2019 14 6*    INR 01/14/2019 1 13     PTT 01/14/2019 33     Sodium 01/14/2019 140     Potassium 01/14/2019 4 0     Chloride 01/14/2019 104     CO2 01/14/2019 29     ANION GAP 01/14/2019 7     BUN 01/14/2019 12     Creatinine 01/14/2019 0 65     Glucose 01/14/2019 96     Calcium 01/14/2019 9 5     AST 01/14/2019 27     ALT 01/14/2019 31     Alkaline Phosphatase 01/14/2019 104     Total Protein 01/14/2019 7 6     Albumin 01/14/2019 3 8     Total Bilirubin 01/14/2019 0 27     eGFR 01/14/2019 90     TSH 3RD GENERATON 01/14/2019 1 290     Troponin I 01/14/2019 0 02     Ventricular Rate 01/14/2019 74     Atrial Rate 01/14/2019 74     AL Interval 01/14/2019 152     QRSD Interval 01/14/2019 134     QT Interval 01/14/2019 428     QTC Interval 01/14/2019 475     P Axis 01/14/2019 50     QRS Axis 01/14/2019 28     T Wave [place] 01/14/2019 64    Office Visit on 11/29/2018   Component Date Value    WBC 01/23/2019 5 98     RBC 01/23/2019 4 03     Hemoglobin 01/23/2019 12 1     Hematocrit 01/23/2019 38 3     MCV 01/23/2019 95     MCH 01/23/2019 30 0     MCHC 01/23/2019 31 6     RDW 01/23/2019 12 9     MPV 01/23/2019 10 0     Platelets 08/50/3776 264     nRBC 01/23/2019 0     Neutrophils Relative 01/23/2019 49     Immat GRANS % 01/23/2019 0     Lymphocytes Relative 01/23/2019 37     Monocytes Relative 01/23/2019 10     Eosinophils Relative 01/23/2019 3     Basophils Relative 01/23/2019 1     Neutrophils Absolute 01/23/2019 2 94     Immature Grans Absolute 01/23/2019 0 02     Lymphocytes Absolute 01/23/2019 2 22     Monocytes Absolute 01/23/2019 0 61     Eosinophils Absolute 01/23/2019 0 16     Basophils Absolute 01/23/2019 0 03     Sodium 01/23/2019 141     Potassium 01/23/2019 3 9     Chloride 01/23/2019 104     CO2 01/23/2019 29     ANION GAP 01/23/2019 8     BUN 01/23/2019 13     Creatinine 01/23/2019 0 63     Glucose, Fasting 01/23/2019 95     Calcium 01/23/2019 9 2     AST 01/23/2019 32     ALT 01/23/2019 33     Alkaline Phosphatase 01/23/2019 102     Total Protein 01/23/2019 7 5     Albumin 01/23/2019 3 8     Total Bilirubin 01/23/2019 0 37     eGFR 01/23/2019 91    Office Visit on 08/29/2018   Component Date Value    Vit D, 25-Hydroxy 01/23/2019 31 9     Hepatitis C Ab 01/23/2019 Non-reactive     Cholesterol 01/23/2019 178     Triglycerides 01/23/2019 123     HDL, Direct 01/23/2019 50     LDL Calculated 01/23/2019 103*     Imaging: No results found  Review of Systems:  Review of Systems   Constitutional: Positive for fatigue  Negative for activity change, appetite change, chills, diaphoresis, fever and unexpected weight change  HENT: Positive for congestion  Negative for hearing loss, nosebleeds and trouble swallowing  Eyes: Negative for visual disturbance  Respiratory: Positive for cough and shortness of breath  Negative for apnea, choking, chest tightness, wheezing and stridor  Cardiovascular: Positive for palpitations  Negative for chest pain and leg swelling  Gastrointestinal: Positive for constipation  Negative for abdominal distention, abdominal pain, anal bleeding, blood in stool, diarrhea, nausea, rectal pain and vomiting  Endocrine: Negative for cold intolerance and heat intolerance  Genitourinary: Negative for difficulty urinating, dysuria, flank pain, frequency, hematuria and urgency  Musculoskeletal: Positive for arthralgias, back pain and gait problem  Negative for myalgias  Skin: Negative for pallor and rash  Allergic/Immunologic: Negative for immunocompromised state     Neurological: Positive for dizziness, weakness, light-headedness, numbness and headaches  Negative for tremors, seizures, syncope, facial asymmetry and speech difficulty  Hematological: Does not bruise/bleed easily  Psychiatric/Behavioral: Positive for sleep disturbance  Negative for behavioral problems and dysphoric mood  The patient is nervous/anxious  Physical Exam:  Physical Exam   Constitutional: She is oriented to person, place, and time  She appears well-developed and well-nourished  No distress  HENT:   Head: Normocephalic  Eyes: Conjunctivae are normal  No scleral icterus  Neck: No JVD (Prominent external jugular vein on the right side) present  No tracheal deviation present  No thyromegaly present  Cardiovascular: Normal rate, regular rhythm, normal heart sounds and intact distal pulses  Exam reveals no gallop and no friction rub  No murmur heard  Pulses symmetrical and synchronous   Pulmonary/Chest: Effort normal and breath sounds normal  No stridor  No respiratory distress  She has no wheezes  She has no rales  She exhibits no tenderness  Abdominal: Soft  Bowel sounds are normal  She exhibits no distension and no mass  There is no tenderness  There is no rebound and no guarding  Musculoskeletal: She exhibits no edema  Neurological: She is alert and oriented to person, place, and time  Skin: Skin is warm and dry  She is not diaphoretic  No erythema  No pallor  Psychiatric: She has a normal mood and affect  Discussion/Summary:  Palpitations, likely related to her atrial arrhythmia which is benign  She is already on beta-blocker favor increasing to 75 mg a day  Her EKG is abnormal with right bundle-branch block and secondary ST segment changes  She does have some dyspnea, recent CT scan revealed some mild coronary calcium personally reviewed  Will do a stress echocardiogram   Ascending aortic aneurysm, CTA last year revealed measurements of 43 mm    Very tortuous common carotid arteries and right subclavian systems  Personally reviewed an echocardiogram revealed normal left systolic function with stage I diastolic dysfunction, mild left atrial enlargement very mild mitral insufficiency and mild-to-moderate aortic insufficiency  Mild tricuspid insufficiency with estimated normal pulmonary artery pressures suggested by Doppler criteria  Aortic root was 30 mm in the ascending aorta was reported 43 mm consistent with the CT findings  At the present time blood pressure control including beta-blocker therapy which has been increased at been recommended, avoidance of heavy lifting  And regular aerobic exercise recommended  We will consider statin therapy but will hold that for the time being  Multiple questions were answered to satisfaction  The patient was interviewed in [facility] ([place]  This note was completed in part utilizing [employer] direct voice recognition software  Grammatical errors, random word insertion, spelling mistakes, and incomplete sentences may be an occasional consequence of the system secondary to software limitations, ambient noise and hardware issues  At the time of dictation, efforts were made to edit, clarify and /or correct errors  Please read the chart carefully and recognize, using context, where substitutions have occurred  If you have any questions or concerns about the context, text or information contained within the body of this dictation, please contact myself, the provider, for further clarification

## 2019-03-12 ENCOUNTER — OFFICE VISIT (OUTPATIENT)
Dept: INTERNAL MEDICINE CLINIC | Facility: CLINIC | Age: 74
End: 2019-03-12
Payer: COMMERCIAL

## 2019-03-12 VITALS
DIASTOLIC BLOOD PRESSURE: 78 MMHG | HEART RATE: 69 BPM | BODY MASS INDEX: 28.16 KG/M2 | SYSTOLIC BLOOD PRESSURE: 120 MMHG | TEMPERATURE: 98.7 F | WEIGHT: 153 LBS | HEIGHT: 62 IN

## 2019-03-12 DIAGNOSIS — R09.82 POSTNASAL DRIP: ICD-10-CM

## 2019-03-12 DIAGNOSIS — Z12.39 SCREENING FOR MALIGNANT NEOPLASM OF BREAST: Primary | ICD-10-CM

## 2019-03-12 DIAGNOSIS — K21.9 GASTROESOPHAGEAL REFLUX DISEASE, ESOPHAGITIS PRESENCE NOT SPECIFIED: ICD-10-CM

## 2019-03-12 DIAGNOSIS — M17.0 PRIMARY OSTEOARTHRITIS OF BOTH KNEES: ICD-10-CM

## 2019-03-12 DIAGNOSIS — I10 ESSENTIAL HYPERTENSION: ICD-10-CM

## 2019-03-12 PROCEDURE — 3078F DIAST BP <80 MM HG: CPT | Performed by: INTERNAL MEDICINE

## 2019-03-12 PROCEDURE — 3074F SYST BP LT 130 MM HG: CPT | Performed by: INTERNAL MEDICINE

## 2019-03-12 PROCEDURE — 3008F BODY MASS INDEX DOCD: CPT | Performed by: INTERNAL MEDICINE

## 2019-03-12 PROCEDURE — 99214 OFFICE O/P EST MOD 30 MIN: CPT | Performed by: INTERNAL MEDICINE

## 2019-03-12 PROCEDURE — 1036F TOBACCO NON-USER: CPT | Performed by: INTERNAL MEDICINE

## 2019-03-12 PROCEDURE — 1160F RVW MEDS BY RX/DR IN RCRD: CPT | Performed by: INTERNAL MEDICINE

## 2019-03-12 RX ORDER — MELOXICAM 7.5 MG/1
7.5 TABLET ORAL DAILY PRN
Qty: 30 TABLET | Refills: 0 | Status: SHIPPED | OUTPATIENT
Start: 2019-03-12 | End: 2019-04-08 | Stop reason: SDUPTHER

## 2019-03-12 RX ORDER — OMEPRAZOLE 40 MG/1
40 CAPSULE, DELAYED RELEASE ORAL DAILY
Qty: 90 CAPSULE | Refills: 1 | Status: SHIPPED | OUTPATIENT
Start: 2019-03-12 | End: 2019-07-18 | Stop reason: SDUPTHER

## 2019-03-12 RX ORDER — LOSARTAN POTASSIUM 25 MG/1
25 TABLET ORAL DAILY
Qty: 90 TABLET | Refills: 1 | Status: SHIPPED | OUTPATIENT
Start: 2019-03-12 | End: 2019-04-16 | Stop reason: SDUPTHER

## 2019-03-13 NOTE — PROGRESS NOTES
Assessment/Plan:    Essential hypertension  Seems well controlled, await stress test, explained in detail what to expect from stress test, what to expect if test is positive  LDL slight;y elevated but not very high risk otherwise  Monitor withut statin at this time    Primary osteoarthritis of both knees  Pain is stable, infrequent use of meloxicam     Gastroesophageal reflux disease  Well controlled  Try flonase for possible post nasal drip, if cough continues, will stop ARB  Diagnoses and all orders for this visit:    Screening for malignant neoplasm of breast  -     Mammo screening bilateral w cad; Future    Essential hypertension  -     losartan (COZAAR) 25 mg tablet; Take 1 tablet (25 mg total) by mouth daily    Gastroesophageal reflux disease, esophagitis presence not specified  -     omeprazole (PriLOSEC) 40 MG capsule; Take 1 capsule (40 mg total) by mouth daily    Primary osteoarthritis of both knees  -     meloxicam (MOBIC) 7 5 mg tablet; Take 1 tablet (7 5 mg total) by mouth daily as needed for moderate pain    Postnasal drip          Subjective:   Chief Complaint   Patient presents with    Follow-up     test results and cough         Patient ID: Kameron Andersen is a 72 y o  female  She comes in for follow-up of hypertension, shoulder pain, knee pain  BP improved, no headache, mild palpitation  Worried about stress test   Has had some continued cough, feels phlegm in back of throat  Knee pain continues  Not exercising much  The following portions of the patient's history were reviewed and updated as appropriate: current medications, past medical history, past social history and past surgical history      PHQ-9 Depression Screening    PHQ-9:    Frequency of the following problems over the past two weeks:                Current Outpatient Medications:     acetaminophen (TYLENOL) 325 mg tablet, Take 650 mg by mouth every 6 (six) hours as needed for mild pain, Disp: , Rfl:    diclofenac sodium (VOLTAREN) 1 %, Apply 2 g topically 4 (four) times a day, Disp: 1 Tube, Rfl: 2    losartan (COZAAR) 25 mg tablet, Take 1 tablet (25 mg total) by mouth daily, Disp: 90 tablet, Rfl: 1    meloxicam (MOBIC) 7 5 mg tablet, Take 1 tablet (7 5 mg total) by mouth daily as needed for moderate pain, Disp: 30 tablet, Rfl: 0    metoprolol succinate (TOPROL-XL) 25 mg 24 hr tablet, Take 1 tablet (25 mg total) by mouth daily, Disp: 30 tablet, Rfl: 6    metoprolol succinate (TOPROL-XL) 50 mg 24 hr tablet, Take 1 tablet (50 mg total) by mouth daily, Disp: 30 tablet, Rfl: 6    Multiple Vitamin (MULTIVITAMIN) capsule, Take 1 capsule by mouth daily, Disp: , Rfl:     omeprazole (PriLOSEC) 40 MG capsule, Take 1 capsule (40 mg total) by mouth daily, Disp: 90 capsule, Rfl: 1    fluticasone (FLONASE) 50 mcg/act nasal spray, 1 spray into each nostril every 12 (twelve) hours as needed for rhinitis (Patient not taking: Reported on 3/12/2019), Disp: 1 Bottle, Rfl: 0    Review of Systems   Constitutional: Negative for fatigue, fever and unexpected weight change  HENT: Negative for ear pain, hearing loss and sore throat  Eyes: Negative for pain and discharge  Respiratory: Negative for cough, chest tightness and shortness of breath  Cardiovascular: Positive for palpitations  Negative for chest pain  Gastrointestinal: Negative for abdominal pain, blood in stool, constipation, diarrhea and nausea  Genitourinary: Negative for dysuria, frequency and hematuria  Musculoskeletal: Negative for arthralgias and joint swelling  Skin: Negative for rash  Allergic/Immunologic: Negative for immunocompromised state  Neurological: Negative for dizziness and headaches  Hematological: Negative for adenopathy  Psychiatric/Behavioral: Negative for confusion and sleep disturbance  The patient is nervous/anxious            Objective:  /78   Pulse 69   Temp 98 7 °F (37 1 °C) (Tympanic)   Ht 5' 2" (1 575 m)   Wt 69 4 kg (153 lb)   BMI 27 98 kg/m²      Physical Exam   Constitutional: She appears well-developed and well-nourished  HENT:   Head: Normocephalic and atraumatic  Right Ear: Tympanic membrane normal    Left Ear: Tympanic membrane normal    Nose: Nose normal    Mouth/Throat: Oropharynx is clear and moist  No posterior oropharyngeal edema or posterior oropharyngeal erythema  Eyes: Pupils are equal, round, and reactive to light  Conjunctivae are normal  Right eye exhibits no discharge  Neck: Normal range of motion  Neck supple  No thyromegaly present  Cardiovascular: Normal rate, regular rhythm, S1 normal, S2 normal and normal heart sounds  PMI is not displaced  No murmur heard  Pulmonary/Chest: Effort normal and breath sounds normal  No accessory muscle usage  No apnea  No respiratory distress  She has no rhonchi  She has no rales  Abdominal: Soft  Normal appearance and bowel sounds are normal  She exhibits no shifting dullness  There is no hepatosplenomegaly  There is no tenderness  There is no rebound and no CVA tenderness  Musculoskeletal: Normal range of motion  She exhibits no edema or tenderness  Lymphadenopathy:     She has no cervical adenopathy  Neurological: She is alert  Skin: Skin is warm and intact  No rash noted  Psychiatric: She has a normal mood and affect  Her speech is normal    Nursing note and vitals reviewed  No results found for this or any previous visit (from the past 1008 hour(s))  ]    Procedure: Xr Chest 2 Views    Result Date: 1/14/2019  Narrative: CHEST INDICATION:   cough  Dizziness and fatigue for last week COMPARISON:  1/27/2017; 8/27/2018 EXAM PERFORMED/VIEWS:  XR CHEST PA & LATERAL FINDINGS: Aorta is atherosclerotic and uncoiled  Heart size normal  The lungs are clear  No pneumothorax or pleural effusion  Osseous structures appear within normal limits for patient age  Impression: No acute cardiopulmonary disease   Workstation performed: [id number]     BMI Counseling: Body mass index is 27 98 kg/m²  Discussed the patient's BMI with her  The BMI is above average  BMI counseling and education was provided to the patient  Exercise recommendations include strength training exercises

## 2019-03-13 NOTE — ASSESSMENT & PLAN NOTE
Seems well controlled, await stress test, explained in detail what to expect from stress test, what to expect if test is positive  LDL slight;y elevated but not very high risk otherwise   Monitor withut statin at this time

## 2019-03-20 ENCOUNTER — TELEPHONE (OUTPATIENT)
Dept: CARDIOLOGY CLINIC | Facility: CLINIC | Age: 74
End: 2019-03-20

## 2019-03-20 ENCOUNTER — HOSPITAL ENCOUNTER (OUTPATIENT)
Dept: NON INVASIVE DIAGNOSTICS | Facility: CLINIC | Age: 74
Discharge: HOME | End: 2019-03-20
Payer: COMMERCIAL

## 2019-03-20 DIAGNOSIS — R00.2 PALPITATION: ICD-10-CM

## 2019-03-20 DIAGNOSIS — R94.31 ABNORMAL EKG: ICD-10-CM

## 2019-03-20 DIAGNOSIS — I10 ESSENTIAL HYPERTENSION: ICD-10-CM

## 2019-03-20 PROCEDURE — 93351 STRESS TTE COMPLETE: CPT | Performed by: INTERNAL MEDICINE

## 2019-03-20 PROCEDURE — 93350 STRESS TTE ONLY: CPT

## 2019-03-20 NOTE — TELEPHONE ENCOUNTER
Message   Received: Today   Message Contents   MD Sandy Liriano, RN             Please call the patient regarding her normal result   No new recommendations      I spoke with pt and advised  Verbalized understanding

## 2019-03-21 LAB
CHEST PAIN STATEMENT: NORMAL
MAX DIASTOLIC BP: 80 MMHG
MAX HEART RATE: 134 BPM
MAX PREDICTED HEART RATE: 149 BPM
MAX. SYSTOLIC BP: 160 MMHG
PROTOCOL NAME: NORMAL
REASON FOR TERMINATION: NORMAL
TARGET HR FORMULA: NORMAL
TEST INDICATION: NORMAL
TIME IN EXERCISE PHASE: NORMAL

## 2019-04-01 DIAGNOSIS — I10 ESSENTIAL HYPERTENSION: ICD-10-CM

## 2019-04-01 RX ORDER — METOPROLOL SUCCINATE 25 MG/1
TABLET, EXTENDED RELEASE ORAL
Qty: 90 TABLET | Refills: 6 | Status: SHIPPED | OUTPATIENT
Start: 2019-04-01 | End: 2019-04-16 | Stop reason: SDUPTHER

## 2019-04-08 DIAGNOSIS — M17.0 PRIMARY OSTEOARTHRITIS OF BOTH KNEES: ICD-10-CM

## 2019-04-08 RX ORDER — MELOXICAM 7.5 MG/1
TABLET ORAL
Qty: 30 TABLET | Refills: 0 | Status: SHIPPED | OUTPATIENT
Start: 2019-04-08 | End: 2019-05-13 | Stop reason: SDUPTHER

## 2019-04-11 ENCOUNTER — HOSPITAL ENCOUNTER (OUTPATIENT)
Dept: MAMMOGRAPHY | Facility: MEDICAL CENTER | Age: 74
Discharge: HOME | End: 2019-04-11
Payer: COMMERCIAL

## 2019-04-11 VITALS — HEIGHT: 64 IN | BODY MASS INDEX: 26.46 KG/M2 | WEIGHT: 155 LBS

## 2019-04-11 DIAGNOSIS — Z12.39 SCREENING FOR MALIGNANT NEOPLASM OF BREAST: ICD-10-CM

## 2019-04-11 PROCEDURE — 77067 SCR MAMMO BI INCL CAD: CPT

## 2019-04-16 ENCOUNTER — OFFICE VISIT (OUTPATIENT)
Dept: INTERNAL MEDICINE CLINIC | Facility: CLINIC | Age: 74
End: 2019-04-16
Payer: COMMERCIAL

## 2019-04-16 VITALS
HEART RATE: 68 BPM | HEIGHT: 64 IN | TEMPERATURE: 98.1 F | WEIGHT: 151.6 LBS | OXYGEN SATURATION: 97 % | DIASTOLIC BLOOD PRESSURE: 78 MMHG | SYSTOLIC BLOOD PRESSURE: 128 MMHG | BODY MASS INDEX: 25.88 KG/M2

## 2019-04-16 DIAGNOSIS — R42 DIZZY SPELLS: Primary | ICD-10-CM

## 2019-04-16 DIAGNOSIS — I10 ESSENTIAL HYPERTENSION: ICD-10-CM

## 2019-04-16 DIAGNOSIS — E78.5 HYPERLIPIDEMIA, UNSPECIFIED HYPERLIPIDEMIA TYPE: ICD-10-CM

## 2019-04-16 DIAGNOSIS — R73.03 PREDIABETES: ICD-10-CM

## 2019-04-16 DIAGNOSIS — R00.2 PALPITATION: ICD-10-CM

## 2019-04-16 PROCEDURE — 3078F DIAST BP <80 MM HG: CPT | Performed by: INTERNAL MEDICINE

## 2019-04-16 PROCEDURE — 1036F TOBACCO NON-USER: CPT | Performed by: INTERNAL MEDICINE

## 2019-04-16 PROCEDURE — 3008F BODY MASS INDEX DOCD: CPT | Performed by: INTERNAL MEDICINE

## 2019-04-16 PROCEDURE — 99214 OFFICE O/P EST MOD 30 MIN: CPT | Performed by: INTERNAL MEDICINE

## 2019-04-16 PROCEDURE — 3074F SYST BP LT 130 MM HG: CPT | Performed by: INTERNAL MEDICINE

## 2019-04-16 RX ORDER — METOPROLOL SUCCINATE 25 MG/1
25 TABLET, EXTENDED RELEASE ORAL DAILY
Qty: 90 TABLET | Refills: 1 | Status: SHIPPED | OUTPATIENT
Start: 2019-04-16 | End: 2019-07-18 | Stop reason: SDUPTHER

## 2019-04-16 RX ORDER — METOPROLOL SUCCINATE 50 MG/1
50 TABLET, EXTENDED RELEASE ORAL DAILY
Qty: 90 TABLET | Refills: 1 | Status: SHIPPED | OUTPATIENT
Start: 2019-04-16 | End: 2019-07-18 | Stop reason: SDUPTHER

## 2019-04-16 RX ORDER — LOSARTAN POTASSIUM 25 MG/1
25 TABLET ORAL DAILY
Qty: 90 TABLET | Refills: 1 | Status: SHIPPED | OUTPATIENT
Start: 2019-04-16 | End: 2019-07-18 | Stop reason: SDUPTHER

## 2019-05-13 DIAGNOSIS — M17.0 PRIMARY OSTEOARTHRITIS OF BOTH KNEES: ICD-10-CM

## 2019-05-13 RX ORDER — MELOXICAM 7.5 MG/1
TABLET ORAL
Qty: 30 TABLET | Refills: 2 | Status: SHIPPED | OUTPATIENT
Start: 2019-05-13 | End: 2019-07-18

## 2019-06-28 ENCOUNTER — APPOINTMENT (OUTPATIENT)
Dept: LAB | Facility: HOSPITAL | Age: 74
End: 2019-06-28
Payer: COMMERCIAL

## 2019-06-28 LAB
ALBUMIN SERPL BCP-MCNC: 3.7 G/DL (ref 3.5–5)
ALP SERPL-CCNC: 109 U/L (ref 46–116)
ALT SERPL W P-5'-P-CCNC: 38 U/L (ref 12–78)
ANION GAP SERPL CALCULATED.3IONS-SCNC: 8 MMOL/L (ref 4–13)
AST SERPL W P-5'-P-CCNC: 34 U/L (ref 5–45)
BASOPHILS # BLD AUTO: 0.03 THOUSANDS/ΜL (ref 0–0.1)
BASOPHILS NFR BLD AUTO: 1 % (ref 0–1)
BILIRUB SERPL-MCNC: 0.31 MG/DL (ref 0.2–1)
BUN SERPL-MCNC: 13 MG/DL (ref 5–25)
CALCIUM SERPL-MCNC: 9.5 MG/DL (ref 8.3–10.1)
CHLORIDE SERPL-SCNC: 104 MMOL/L (ref 100–108)
CHOLEST SERPL-MCNC: 191 MG/DL (ref 50–200)
CO2 SERPL-SCNC: 28 MMOL/L (ref 21–32)
CREAT SERPL-MCNC: 0.66 MG/DL (ref 0.6–1.3)
CREAT UR-MCNC: 51.1 MG/DL
EOSINOPHIL # BLD AUTO: 0.14 THOUSAND/ΜL (ref 0–0.61)
EOSINOPHIL NFR BLD AUTO: 2 % (ref 0–6)
ERYTHROCYTE [DISTWIDTH] IN BLOOD BY AUTOMATED COUNT: 13.2 % (ref 11.6–15.1)
EST. AVERAGE GLUCOSE BLD GHB EST-MCNC: 128 MG/DL
GFR SERPL CREATININE-BSD FRML MDRD: 89 ML/MIN/1.73SQ M
GLUCOSE P FAST SERPL-MCNC: 89 MG/DL (ref 65–99)
HBA1C MFR BLD: 6.1 % (ref 4.2–6.3)
HCT VFR BLD AUTO: 36.7 % (ref 34.8–46.1)
HDLC SERPL-MCNC: 54 MG/DL (ref 40–60)
HGB BLD-MCNC: 11.7 G/DL (ref 11.5–15.4)
IMM GRANULOCYTES # BLD AUTO: 0.01 THOUSAND/UL (ref 0–0.2)
IMM GRANULOCYTES NFR BLD AUTO: 0 % (ref 0–2)
LDLC SERPL CALC-MCNC: 118 MG/DL (ref 0–100)
LYMPHOCYTES # BLD AUTO: 2.21 THOUSANDS/ΜL (ref 0.6–4.47)
LYMPHOCYTES NFR BLD AUTO: 36 % (ref 14–44)
MCH RBC QN AUTO: 30.6 PG (ref 26.8–34.3)
MCHC RBC AUTO-ENTMCNC: 31.9 G/DL (ref 31.4–37.4)
MCV RBC AUTO: 96 FL (ref 82–98)
MICROALBUMIN UR-MCNC: <5 MG/L (ref 0–20)
MICROALBUMIN/CREAT 24H UR: <10 MG/G CREATININE (ref 0–30)
MONOCYTES # BLD AUTO: 0.64 THOUSAND/ΜL (ref 0.17–1.22)
MONOCYTES NFR BLD AUTO: 11 % (ref 4–12)
NEUTROPHILS # BLD AUTO: 3.05 THOUSANDS/ΜL (ref 1.85–7.62)
NEUTS SEG NFR BLD AUTO: 50 % (ref 43–75)
NRBC BLD AUTO-RTO: 0 /100 WBCS
PLATELET # BLD AUTO: 241 THOUSANDS/UL (ref 149–390)
PMV BLD AUTO: 10.4 FL (ref 8.9–12.7)
POTASSIUM SERPL-SCNC: 3.9 MMOL/L (ref 3.5–5.3)
PROT SERPL-MCNC: 7.5 G/DL (ref 6.4–8.2)
RBC # BLD AUTO: 3.82 MILLION/UL (ref 3.81–5.12)
SODIUM SERPL-SCNC: 140 MMOL/L (ref 136–145)
TRIGL SERPL-MCNC: 96 MG/DL
WBC # BLD AUTO: 6.08 THOUSAND/UL (ref 4.31–10.16)

## 2019-06-28 PROCEDURE — 36415 COLL VENOUS BLD VENIPUNCTURE: CPT | Performed by: INTERNAL MEDICINE

## 2019-06-28 PROCEDURE — 83036 HEMOGLOBIN GLYCOSYLATED A1C: CPT | Performed by: INTERNAL MEDICINE

## 2019-06-28 PROCEDURE — 82570 ASSAY OF URINE CREATININE: CPT | Performed by: INTERNAL MEDICINE

## 2019-06-28 PROCEDURE — 80053 COMPREHEN METABOLIC PANEL: CPT | Performed by: INTERNAL MEDICINE

## 2019-06-28 PROCEDURE — 82043 UR ALBUMIN QUANTITATIVE: CPT | Performed by: INTERNAL MEDICINE

## 2019-06-28 PROCEDURE — 80061 LIPID PANEL: CPT | Performed by: INTERNAL MEDICINE

## 2019-06-28 PROCEDURE — 85025 COMPLETE CBC W/AUTO DIFF WBC: CPT | Performed by: INTERNAL MEDICINE

## 2019-07-18 ENCOUNTER — OFFICE VISIT (OUTPATIENT)
Dept: INTERNAL MEDICINE CLINIC | Facility: CLINIC | Age: 74
End: 2019-07-18
Payer: COMMERCIAL

## 2019-07-18 VITALS
HEART RATE: 70 BPM | HEIGHT: 64 IN | RESPIRATION RATE: 15 BRPM | TEMPERATURE: 98.2 F | DIASTOLIC BLOOD PRESSURE: 78 MMHG | BODY MASS INDEX: 25.44 KG/M2 | WEIGHT: 149 LBS | SYSTOLIC BLOOD PRESSURE: 128 MMHG

## 2019-07-18 DIAGNOSIS — M85.80 OSTEOPENIA, UNSPECIFIED LOCATION: ICD-10-CM

## 2019-07-18 DIAGNOSIS — R73.03 PRE-DIABETES: ICD-10-CM

## 2019-07-18 DIAGNOSIS — M17.0 PRIMARY OSTEOARTHRITIS OF BOTH KNEES: ICD-10-CM

## 2019-07-18 DIAGNOSIS — I10 ESSENTIAL HYPERTENSION: ICD-10-CM

## 2019-07-18 DIAGNOSIS — R00.2 PALPITATION: ICD-10-CM

## 2019-07-18 DIAGNOSIS — K21.9 GASTROESOPHAGEAL REFLUX DISEASE, ESOPHAGITIS PRESENCE NOT SPECIFIED: Primary | ICD-10-CM

## 2019-07-18 PROCEDURE — 99214 OFFICE O/P EST MOD 30 MIN: CPT | Performed by: INTERNAL MEDICINE

## 2019-07-18 RX ORDER — METOPROLOL SUCCINATE 50 MG/1
50 TABLET, EXTENDED RELEASE ORAL DAILY
Qty: 90 TABLET | Refills: 1 | Status: SHIPPED | OUTPATIENT
Start: 2019-07-18 | End: 2019-10-24 | Stop reason: SDUPTHER

## 2019-07-18 RX ORDER — OMEPRAZOLE 40 MG/1
40 CAPSULE, DELAYED RELEASE ORAL DAILY
Qty: 90 CAPSULE | Refills: 1 | Status: SHIPPED | OUTPATIENT
Start: 2019-07-18 | End: 2020-04-16

## 2019-07-18 RX ORDER — METOPROLOL SUCCINATE 25 MG/1
25 TABLET, EXTENDED RELEASE ORAL DAILY
Qty: 90 TABLET | Refills: 1 | Status: SHIPPED | OUTPATIENT
Start: 2019-07-18 | End: 2019-10-24 | Stop reason: SDUPTHER

## 2019-07-18 RX ORDER — LOSARTAN POTASSIUM 25 MG/1
25 TABLET ORAL DAILY
Qty: 90 TABLET | Refills: 1 | Status: SHIPPED | OUTPATIENT
Start: 2019-07-18 | End: 2019-10-24 | Stop reason: SDUPTHER

## 2019-07-18 NOTE — ASSESSMENT & PLAN NOTE
Reassured that no significant abnormality in testing  Likely symptomatic PVCs    Continue current dose of metoprolol

## 2019-07-18 NOTE — ASSESSMENT & PLAN NOTE
A1c stable, discussed lifestyle modifications  Continue monitoring at this time  LDL slightly high as well but no indication for coronary artery disease

## 2019-07-18 NOTE — ASSESSMENT & PLAN NOTE
Take omeprazole everyday  Stop mobic completely, only tylenol for pain  Given her age, will refer to GI for possible EGD

## 2019-07-18 NOTE — PROGRESS NOTES
Assessment/Plan:    Gastroesophageal reflux disease  Take omeprazole everyday  Stop mobic completely, only tylenol for pain  Given her age, will refer to GI for possible EGD  Essential hypertension  Well controlled    Palpitation  Reassured that no significant abnormality in testing  Likely symptomatic PVCs  Continue current dose of metoprolol    Primary osteoarthritis of both knees  Discussed treatment options with injections or knee replacement  She declines both at this time would like to continue with just Tylenol as needed for now  Pre-diabetes  A1c stable, discussed lifestyle modifications  Continue monitoring at this time  LDL slightly high as well but no indication for coronary artery disease  Pneumovax was given today     Diagnoses and all orders for this visit:    Gastroesophageal reflux disease, esophagitis presence not specified  -     Ambulatory referral to Gastroenterology; Future  -     omeprazole (PriLOSEC) 40 MG capsule; Take 1 capsule (40 mg total) by mouth daily    Essential hypertension  -     metoprolol succinate (TOPROL-XL) 50 mg 24 hr tablet; Take 1 tablet (50 mg total) by mouth daily  -     metoprolol succinate (TOPROL-XL) 25 mg 24 hr tablet; Take 1 tablet (25 mg total) by mouth daily  -     losartan (COZAAR) 25 mg tablet; Take 1 tablet (25 mg total) by mouth daily    Osteopenia, unspecified location    Palpitation    Primary osteoarthritis of both knees    Pre-diabetes          Subjective:   Chief Complaint   Patient presents with    Follow-up     3 month        Patient ID: Leann Vidal is a 72 y o  female  She comes in for follow-up of hypertension, prediabetes, hyperlipidemia, knee pain, acid reflux    Chief complaint today is abdominal pain, flatulence, decreased appetite, nausea for the last several weeks  She takes her omeprazole as needed  No diarrhea or blood in stool, no significant weight loss      Blood pressure well control, knee pain is stable, has not taken Mobic recently  Taking Tylenol as needed  Still having occasional palpitations at nighttime      The following portions of the patient's history were reviewed and updated as appropriate: current medications, past medical history, past social history and past surgical history  PHQ-9 Depression Screening    PHQ-9:    Frequency of the following problems over the past two weeks:                Current Outpatient Medications:     acetaminophen (TYLENOL) 325 mg tablet, Take 650 mg by mouth every 6 (six) hours as needed for mild pain, Disp: , Rfl:     diclofenac sodium (VOLTAREN) 1 %, Apply 2 g topically 4 (four) times a day, Disp: 1 Tube, Rfl: 2    fluticasone (FLONASE) 50 mcg/act nasal spray, 1 spray into each nostril every 12 (twelve) hours as needed for rhinitis, Disp: 1 Bottle, Rfl: 0    losartan (COZAAR) 25 mg tablet, Take 1 tablet (25 mg total) by mouth daily, Disp: 90 tablet, Rfl: 1    metoprolol succinate (TOPROL-XL) 25 mg 24 hr tablet, Take 1 tablet (25 mg total) by mouth daily, Disp: 90 tablet, Rfl: 1    metoprolol succinate (TOPROL-XL) 50 mg 24 hr tablet, Take 1 tablet (50 mg total) by mouth daily, Disp: 90 tablet, Rfl: 1    Multiple Vitamin (MULTIVITAMIN) capsule, Take 1 capsule by mouth daily, Disp: , Rfl:     omeprazole (PriLOSEC) 40 MG capsule, Take 1 capsule (40 mg total) by mouth daily, Disp: 90 capsule, Rfl: 1    Review of Systems   Constitutional: Negative for fatigue, fever and unexpected weight change  HENT: Negative for ear pain, hearing loss and sore throat  Eyes: Negative for pain and discharge  Respiratory: Negative for cough, chest tightness and shortness of breath  Cardiovascular: Positive for palpitations  Negative for chest pain  Gastrointestinal: Positive for abdominal pain and nausea  Negative for blood in stool, constipation and diarrhea  Genitourinary: Negative for dysuria, frequency and hematuria  Musculoskeletal: Positive for arthralgias   Negative for joint swelling  Skin: Negative for rash  Allergic/Immunologic: Negative for immunocompromised state  Neurological: Negative for dizziness and headaches  Hematological: Negative for adenopathy  Psychiatric/Behavioral: Negative for confusion and sleep disturbance  Objective:  /78 (BP Location: Left arm, Patient Position: Sitting, Cuff Size: Standard)   Pulse 70   Temp 98 2 °F (36 8 °C)   Resp 15   Ht 5' 4" (1 626 m)   Wt 67 6 kg (149 lb)   BMI 25 58 kg/m²      Physical Exam   Constitutional: She appears well-developed and well-nourished  HENT:   Head: Normocephalic and atraumatic  Right Ear: Tympanic membrane normal    Left Ear: Tympanic membrane normal    Nose: Nose normal    Mouth/Throat: Oropharynx is clear and moist  No posterior oropharyngeal edema or posterior oropharyngeal erythema  Eyes: Pupils are equal, round, and reactive to light  Conjunctivae are normal  Right eye exhibits no discharge  Left eye exhibits no discharge  Neck: Normal range of motion  Neck supple  No thyromegaly present  Cardiovascular: Normal rate, regular rhythm, S1 normal, S2 normal and normal heart sounds  PMI is not displaced  No murmur heard  Pulmonary/Chest: Effort normal and breath sounds normal  No accessory muscle usage  No apnea  No respiratory distress  She has no rhonchi  She has no rales  Abdominal: Soft  Normal appearance and bowel sounds are normal  She exhibits no shifting dullness  There is no hepatosplenomegaly  There is tenderness in the epigastric area  There is no rebound, no guarding and no CVA tenderness  Musculoskeletal: Normal range of motion  She exhibits no edema or tenderness  Lymphadenopathy:     She has no cervical adenopathy  Neurological: She is alert  Skin: Skin is warm and intact  No rash noted  Psychiatric: She has a normal mood and affect  Her speech is normal    Nursing note and vitals reviewed          Recent Results (from the past 1008 hour(s)) CBC and differential    Collection Time: 06/28/19  7:52 AM   Result Value Ref Range    WBC 6 08 4 31 - 10 16 Thousand/uL    RBC 3 82 3 81 - 5 12 Million/uL    Hemoglobin 11 7 11 5 - 15 4 g/dL    Hematocrit 36 7 34 8 - 46 1 %    MCV 96 82 - 98 fL    MCH 30 6 26 8 - 34 3 pg    MCHC 31 9 31 4 - 37 4 g/dL    RDW 13 2 11 6 - 15 1 %    MPV 10 4 8 9 - 12 7 fL    Platelets 146 595 - 289 Thousands/uL    nRBC 0 /100 WBCs    Neutrophils Relative 50 43 - 75 %    Immat GRANS % 0 0 - 2 %    Lymphocytes Relative 36 14 - 44 %    Monocytes Relative 11 4 - 12 %    Eosinophils Relative 2 0 - 6 %    Basophils Relative 1 0 - 1 %    Neutrophils Absolute 3 05 1 85 - 7 62 Thousands/µL    Immature Grans Absolute 0 01 0 00 - 0 20 Thousand/uL    Lymphocytes Absolute 2 21 0 60 - 4 47 Thousands/µL    Monocytes Absolute 0 64 0 17 - 1 22 Thousand/µL    Eosinophils Absolute 0 14 0 00 - 0 61 Thousand/µL    Basophils Absolute 0 03 0 00 - 0 10 Thousands/µL   Comprehensive metabolic panel    Collection Time: 06/28/19  7:52 AM   Result Value Ref Range    Sodium 140 136 - 145 mmol/L    Potassium 3 9 3 5 - 5 3 mmol/L    Chloride 104 100 - 108 mmol/L    CO2 28 21 - 32 mmol/L    ANION GAP 8 4 - 13 mmol/L    BUN 13 5 - 25 mg/dL    Creatinine 0 66 0 60 - 1 30 mg/dL    Glucose, Fasting 89 65 - 99 mg/dL    Calcium 9 5 8 3 - 10 1 mg/dL    AST 34 5 - 45 U/L    ALT 38 12 - 78 U/L    Alkaline Phosphatase 109 46 - 116 U/L    Total Protein 7 5 6 4 - 8 2 g/dL    Albumin 3 7 3 5 - 5 0 g/dL    Total Bilirubin 0 31 0 20 - 1 00 mg/dL    eGFR 89 ml/min/1 73sq m   Lipid Panel with Direct LDL reflex    Collection Time: 06/28/19  7:52 AM   Result Value Ref Range    Cholesterol 191 50 - 200 mg/dL    Triglycerides 96 <=150 mg/dL    HDL, Direct 54 40 - 60 mg/dL    LDL Calculated 118 (H) 0 - 100 mg/dL   Microalbumin / creatinine urine ratio    Collection Time: 06/28/19  7:52 AM   Result Value Ref Range    Creatinine, Ur 51 1 mg/dL    Microalbum  ,U,Random <5 0 0 0 - 20 0 mg/L Microalb Creat Ratio <10 0 - 30 mg/g creatinine   Hemoglobin A1C    Collection Time: 06/28/19  7:52 AM   Result Value Ref Range    Hemoglobin A1C 6 1 4 2 - 6 3 %     mg/dl   ]    No results found

## 2019-07-18 NOTE — ASSESSMENT & PLAN NOTE
Discussed treatment options with injections or knee replacement  She declines both at this time would like to continue with just Tylenol as needed for now

## 2019-07-30 ENCOUNTER — APPOINTMENT (EMERGENCY)
Dept: CT IMAGING | Facility: HOSPITAL | Age: 74
End: 2019-07-30
Payer: COMMERCIAL

## 2019-07-30 ENCOUNTER — HOSPITAL ENCOUNTER (EMERGENCY)
Facility: HOSPITAL | Age: 74
Discharge: HOME | End: 2019-07-30
Attending: EMERGENCY MEDICINE | Admitting: EMERGENCY MEDICINE
Payer: COMMERCIAL

## 2019-07-30 VITALS
BODY MASS INDEX: 24.94 KG/M2 | TEMPERATURE: 98 F | HEART RATE: 60 BPM | OXYGEN SATURATION: 97 % | SYSTOLIC BLOOD PRESSURE: 169 MMHG | RESPIRATION RATE: 18 BRPM | DIASTOLIC BLOOD PRESSURE: 80 MMHG | WEIGHT: 145.28 LBS

## 2019-07-30 DIAGNOSIS — R20.2 PARESTHESIAS: Primary | ICD-10-CM

## 2019-07-30 LAB
ALBUMIN SERPL BCP-MCNC: 3.7 G/DL (ref 3.5–5)
ALP SERPL-CCNC: 107 U/L (ref 46–116)
ALT SERPL W P-5'-P-CCNC: 42 U/L (ref 12–78)
ANION GAP SERPL CALCULATED.3IONS-SCNC: 7 MMOL/L (ref 4–13)
AST SERPL W P-5'-P-CCNC: 41 U/L (ref 5–45)
BASOPHILS # BLD AUTO: 0.04 THOUSANDS/ΜL (ref 0–0.1)
BASOPHILS NFR BLD AUTO: 1 % (ref 0–1)
BILIRUB SERPL-MCNC: 0.26 MG/DL (ref 0.2–1)
BUN SERPL-MCNC: 11 MG/DL (ref 5–25)
CALCIUM SERPL-MCNC: 9.1 MG/DL (ref 8.3–10.1)
CHLORIDE SERPL-SCNC: 105 MMOL/L (ref 100–108)
CO2 SERPL-SCNC: 27 MMOL/L (ref 21–32)
CREAT SERPL-MCNC: 0.65 MG/DL (ref 0.6–1.3)
EOSINOPHIL # BLD AUTO: 0.1 THOUSAND/ΜL (ref 0–0.61)
EOSINOPHIL NFR BLD AUTO: 2 % (ref 0–6)
ERYTHROCYTE [DISTWIDTH] IN BLOOD BY AUTOMATED COUNT: 12.9 % (ref 11.6–15.1)
GFR SERPL CREATININE-BSD FRML MDRD: 90 ML/MIN/1.73SQ M
GLUCOSE SERPL-MCNC: 99 MG/DL (ref 65–140)
HCT VFR BLD AUTO: 37.8 % (ref 34.8–46.1)
HGB BLD-MCNC: 12.2 G/DL (ref 11.5–15.4)
IMM GRANULOCYTES # BLD AUTO: 0.01 THOUSAND/UL (ref 0–0.2)
IMM GRANULOCYTES NFR BLD AUTO: 0 % (ref 0–2)
LYMPHOCYTES # BLD AUTO: 1.93 THOUSANDS/ΜL (ref 0.6–4.47)
LYMPHOCYTES NFR BLD AUTO: 31 % (ref 14–44)
MCH RBC QN AUTO: 30.6 PG (ref 26.8–34.3)
MCHC RBC AUTO-ENTMCNC: 32.3 G/DL (ref 31.4–37.4)
MCV RBC AUTO: 95 FL (ref 82–98)
MONOCYTES # BLD AUTO: 0.53 THOUSAND/ΜL (ref 0.17–1.22)
MONOCYTES NFR BLD AUTO: 9 % (ref 4–12)
NEUTROPHILS # BLD AUTO: 3.55 THOUSANDS/ΜL (ref 1.85–7.62)
NEUTS SEG NFR BLD AUTO: 57 % (ref 43–75)
NRBC BLD AUTO-RTO: 0 /100 WBCS
PLATELET # BLD AUTO: 227 THOUSANDS/UL (ref 149–390)
PMV BLD AUTO: 9.8 FL (ref 8.9–12.7)
POTASSIUM SERPL-SCNC: 4 MMOL/L (ref 3.5–5.3)
PROT SERPL-MCNC: 7.4 G/DL (ref 6.4–8.2)
RBC # BLD AUTO: 3.99 MILLION/UL (ref 3.81–5.12)
SODIUM SERPL-SCNC: 139 MMOL/L (ref 136–145)
WBC # BLD AUTO: 6.16 THOUSAND/UL (ref 4.31–10.16)

## 2019-07-30 PROCEDURE — 85025 COMPLETE CBC W/AUTO DIFF WBC: CPT | Performed by: EMERGENCY MEDICINE

## 2019-07-30 PROCEDURE — 36415 COLL VENOUS BLD VENIPUNCTURE: CPT | Performed by: EMERGENCY MEDICINE

## 2019-07-30 PROCEDURE — 80053 COMPREHEN METABOLIC PANEL: CPT | Performed by: EMERGENCY MEDICINE

## 2019-07-30 PROCEDURE — 70450 CT HEAD/BRAIN W/O DYE: CPT

## 2019-07-30 PROCEDURE — 99284 EMERGENCY DEPT VISIT MOD MDM: CPT

## 2019-07-30 PROCEDURE — 99284 EMERGENCY DEPT VISIT MOD MDM: CPT | Performed by: EMERGENCY MEDICINE

## 2019-07-30 NOTE — ED NOTES
Pt reports "I feel like my left side is getting more heavy"      Yane Mcguire, [place]  07/30/19 1016

## 2019-07-30 NOTE — ED PROVIDER NOTES
History  Chief Complaint   Patient presents with    Numbness     Pt reports at around 0800 woke up due to having "pressure" in her face  Reports numbness/tingling to left face, left hand and left foot  Speech is clear, pt ambulatory with even steady gait  30-year-old female with a history of hypertension/GERD presents to the emergency department with symptoms of numbness to her left upper lip, left 5th finger and bottom of left foot  Patient woke with the symptoms at about 830 this morning  They are gradually getting better  She has no headache, visual disturbance or speech deficits  No extremity weakness  History provided by:  Patient and relative   used: Yes    CVA/TIA-like Symptoms   Presenting symptoms: sensory loss    Presenting symptoms: no change in level of consciousness, no confusion, no headaches, no disturbances in coordination, no language symptoms, no loss of balance, no visual change and no weakness    Last known well instant: Last p m  Before bed  Onset quality:  Unable to specify  Timing:  Intermittent  Progression:  Improving  Similar to previous episodes: no    Associated symptoms: no chest pain, no trouble swallowing, no dizziness, no facial pain, no fall, no fever, no hearing loss, no bladder incontinence, no nausea, no neck pain, no paresthesias, no seizures, no tinnitus, no vertigo and no vomiting        Prior to Admission Medications   Prescriptions Last Dose Informant Patient Reported? Taking?    Multiple Vitamin (MULTIVITAMIN) capsule  Self Yes No   Sig: Take 1 capsule by mouth daily   acetaminophen (TYLENOL) 325 mg tablet  Self Yes No   Sig: Take 650 mg by mouth every 6 (six) hours as needed for mild pain   diclofenac sodium (VOLTAREN) 1 %  Self No No   Sig: Apply 2 g topically 4 (four) times a day   fluticasone (FLONASE) 50 mcg/act nasal spray  Self No No   Si spray into each nostril every 12 (twelve) hours as needed for rhinitis   losartan (COZAAR) 25 mg tablet   No No   Sig: Take 1 tablet (25 mg total) by mouth daily   metoprolol succinate (TOPROL-XL) 25 mg 24 hr tablet   No No   Sig: Take 1 tablet (25 mg total) by mouth daily   metoprolol succinate (TOPROL-XL) 50 mg 24 hr tablet   No No   Sig: Take 1 tablet (50 mg total) by mouth daily   omeprazole (PriLOSEC) 40 MG capsule   No No   Sig: Take 1 capsule (40 mg total) by mouth daily      Facility-Administered Medications: None       Past Medical History:   Diagnosis Date    Diverticulosis     Hypertension        Past Surgical History:   Procedure Laterality Date    TUBAL LIGATION         History reviewed  No pertinent family history  I have reviewed and agree with the history as documented  Social History     Tobacco Use    Smoking status: Never Smoker    Smokeless tobacco: Never Used   Substance Use Topics    Alcohol use: No    Drug use: No        Review of Systems   Constitutional: Negative  Negative for chills, diaphoresis, fatigue and fever  HENT: Negative  Negative for congestion, hearing loss, rhinorrhea, sore throat, tinnitus and trouble swallowing  Eyes: Negative  Negative for discharge, redness and itching  Respiratory: Negative  Negative for apnea, cough, chest tightness, shortness of breath and wheezing  Cardiovascular: Negative for chest pain, palpitations and leg swelling  Gastrointestinal: Negative  Negative for abdominal pain, nausea and vomiting  Endocrine: Negative  Genitourinary: Negative  Negative for bladder incontinence, flank pain, frequency and urgency  Musculoskeletal: Negative  Negative for back pain and neck pain  Skin: Negative  Allergic/Immunologic: Negative  Neurological: Negative  Negative for dizziness, vertigo, seizures, syncope, weakness, light-headedness, numbness, headaches, disturbances in coordination, paresthesias and loss of balance  Hematological: Negative  Psychiatric/Behavioral: Negative for confusion     All other systems reviewed and are negative  Physical Exam  Physical Exam   Constitutional: She is oriented to person, place, and time  She appears well-developed and well-nourished  Non-toxic appearance  She does not have a sickly appearance  She does not appear ill  No distress  HENT:   Head: Normocephalic and atraumatic  Right Ear: External ear normal    Left Ear: External ear normal    Mouth/Throat: Oropharynx is clear and moist    Eyes: Pupils are equal, round, and reactive to light  Conjunctivae, EOM and lids are normal  Right eye exhibits no discharge  Left eye exhibits no discharge  No scleral icterus  Right eye exhibits normal extraocular motion and no nystagmus  Left eye exhibits normal extraocular motion and no nystagmus  Right pupil is round and reactive  Left pupil is round and reactive  Neck: Normal range of motion  Neck supple  No JVD present  No tracheal deviation present  No thyromegaly present  Cardiovascular: Normal rate and regular rhythm  Exam reveals no gallop and no friction rub  Murmur heard  Systolic murmur is present with a grade of 2/6  Pulmonary/Chest: Effort normal and breath sounds normal  No stridor  No respiratory distress  She has no wheezes  She has no rales  She exhibits no tenderness  Abdominal: Soft  Bowel sounds are normal  She exhibits no distension and no mass  There is no tenderness  No hernia  Musculoskeletal: Normal range of motion  She exhibits no edema, tenderness or deformity  Lymphadenopathy:     She has no cervical adenopathy  Neurological: She is alert and oriented to person, place, and time  She has normal reflexes  She displays normal reflexes  A sensory deficit (Subjective numbness left upper lip left 5th finger) is present  No cranial nerve deficit  She exhibits normal muscle tone  Coordination normal    Skin: Skin is warm and dry  No rash noted  She is not diaphoretic  No erythema  No pallor  Psychiatric: She has a normal mood and affect  Nursing note and vitals reviewed        Vital Signs  ED Triage Vitals   Temperature Pulse Respirations Blood Pressure SpO2   07/30/19 0926 07/30/19 0926 07/30/19 0926 07/30/19 0926 07/30/19 0926   98 °F (36 7 °C) 68 14 153/80 99 %      Temp Source Heart Rate Source Patient Position - Orthostatic VS BP Location FiO2 (%)   07/30/19 0926 07/30/19 0926 07/30/19 1014 07/30/19 1014 --   Temporal Monitor Sitting Right arm       Pain Score       07/30/19 0926       4           Vitals:    07/30/19 0926 07/30/19 1014   BP: 153/80 168/82   Pulse: 68 65   Patient Position - Orthostatic VS:  Sitting         Visual Acuity      ED Medications  Medications - No data to display    Diagnostic Studies  Results Reviewed     Procedure Component Value Units Date/Time    Comprehensive metabolic panel [[id number]] Collected:  07/30/19 0944    Lab Status:  Final result Specimen:  Blood from Arm, Right Updated:  07/30/19 1018     Sodium 139 mmol/L      Potassium 4 0 mmol/L      Chloride 105 mmol/L      CO2 27 mmol/L      ANION GAP 7 mmol/L      BUN 11 mg/dL      Creatinine 0 65 mg/dL      Glucose 99 mg/dL      Calcium 9 1 mg/dL      AST 41 U/L      ALT 42 U/L      Alkaline Phosphatase 107 U/L      Total Protein 7 4 g/dL      Albumin 3 7 g/dL      Total Bilirubin 0 26 mg/dL      eGFR 90 ml/min/1 73sq m     Narrative:       [place] guidelines for Chronic Kidney Disease (CKD):     Stage 1 with normal or high GFR (GFR > 90 mL/min/1 73 square meters)    Stage 2 Mild CKD (GFR = 60-89 mL/min/1 73 square meters)    Stage 3A Moderate CKD (GFR = 45-59 mL/min/1 73 square meters)    Stage 3B Moderate CKD (GFR = 30-44 mL/min/1 73 square meters)    Stage 4 Severe CKD (GFR = 15-29 mL/min/1 73 square meters)    Stage 5 End Stage CKD (GFR <15 mL/min/1 73 square meters)  Note: GFR calculation is accurate only with a steady state creatinine    CBC and differential [[id number]] Collected:  07/30/19 0944    Lab Status:  Final result Specimen:  Blood from Arm, Right Updated:  07/30/19 0951     WBC 6 16 Thousand/uL      RBC 3 99 Million/uL      Hemoglobin 12 2 g/dL      Hematocrit 37 8 %      MCV 95 fL      MCH 30 6 pg      MCHC 32 3 g/dL      RDW 12 9 %      MPV 9 8 fL      Platelets 714 Thousands/uL      nRBC 0 /100 WBCs      Neutrophils Relative 57 %      Immat GRANS % 0 %      Lymphocytes Relative 31 %      Monocytes Relative 9 %      Eosinophils Relative 2 %      Basophils Relative 1 %      Neutrophils Absolute 3 55 Thousands/µL      Immature Grans Absolute 0 01 Thousand/uL      Lymphocytes Absolute 1 93 Thousands/µL      Monocytes Absolute 0 53 Thousand/µL      Eosinophils Absolute 0 10 Thousand/µL      Basophils Absolute 0 04 Thousands/µL                  CT head without contrast   Final Result by Flaquito Rodriguez MD (07/30 1000)      No acute intracranial abnormality  Workstation performed: [id number]                    Procedures  Procedures       ED Course                               MDM  Number of Diagnoses or Management Options  Diagnosis management comments: 82-year-old female presents with numbness to the left upper lip, left 5th finger and bottle left foot  She woke with the symptoms at 8:30 a m  This morning  No weakness, headaches, visual deficits or speech deficits  She states her symptoms are starting to get better  On exam she appears well in no distress  Other than some subjective numbness to the upper lip in left 5th finger the rest of her neuro exam is normal   Patient will not be a stroke alert or tPA candidate secondary to uncertain timing of symptoms, since she woke with the symptoms this morning, and also improvement of the symptoms  Will check basic labs to rule out electrolyte abnormality  Will check CT head to rule out the possibility of CVA         Amount and/or Complexity of Data Reviewed  Clinical lab tests: ordered and reviewed  Tests in the radiology section of CPT®: ordered and reviewed        Disposition  Final diagnoses:   Paresthesias     Time reflects when diagnosis was documented in both MDM as applicable and the Disposition within this note     Time User Action Codes Description Comment    7/30/2019 11:01 AM Rendall Boxer A Add [R20 2] Paresthesias       ED Disposition     ED Disposition Condition Date/Time Comment    Discharge Good Tue Jul 30, 2019 11:01 AM Madison Vega discharge to home/self care  Follow-up Information     Follow up With Specialties Details Why Contact Info    Edilia Cruz MD Internal Medicine Schedule an appointment as soon as possible for a visit in 1 day  [address] [phone]            Patient's Medications   Discharge Prescriptions    No medications on file     No discharge procedures on file      ED Provider  Electronically Signed by           Jazzy Gomez DO  07/30/19 4660

## 2019-08-01 ENCOUNTER — OFFICE VISIT (OUTPATIENT)
Dept: INTERNAL MEDICINE CLINIC | Facility: CLINIC | Age: 74
End: 2019-08-01
Payer: COMMERCIAL

## 2019-08-01 VITALS
RESPIRATION RATE: 15 BRPM | WEIGHT: 147 LBS | BODY MASS INDEX: 25.1 KG/M2 | HEART RATE: 69 BPM | HEIGHT: 64 IN | TEMPERATURE: 98.7 F | DIASTOLIC BLOOD PRESSURE: 80 MMHG | SYSTOLIC BLOOD PRESSURE: 140 MMHG

## 2019-08-01 DIAGNOSIS — I77.819 AORTIC DILATATION (HCC): ICD-10-CM

## 2019-08-01 DIAGNOSIS — R20.0 FACIAL NUMBNESS: ICD-10-CM

## 2019-08-01 DIAGNOSIS — R20.2 PARESTHESIA OF LEFT UPPER AND LOWER EXTREMITY: ICD-10-CM

## 2019-08-01 DIAGNOSIS — I10 ESSENTIAL HYPERTENSION: ICD-10-CM

## 2019-08-01 DIAGNOSIS — R20.2 NUMBNESS AND TINGLING OF LEFT UPPER AND LOWER EXTREMITY: Primary | ICD-10-CM

## 2019-08-01 DIAGNOSIS — E78.49 OTHER HYPERLIPIDEMIA: ICD-10-CM

## 2019-08-01 DIAGNOSIS — R20.0 NUMBNESS AND TINGLING OF LEFT UPPER AND LOWER EXTREMITY: Primary | ICD-10-CM

## 2019-08-01 PROCEDURE — 99215 OFFICE O/P EST HI 40 MIN: CPT | Performed by: INTERNAL MEDICINE

## 2019-08-01 RX ORDER — ATORVASTATIN CALCIUM 40 MG/1
40 TABLET, FILM COATED ORAL DAILY
Qty: 90 TABLET | Refills: 1 | Status: SHIPPED | OUTPATIENT
Start: 2019-08-01 | End: 2019-10-24 | Stop reason: SDUPTHER

## 2019-08-01 NOTE — PROGRESS NOTES
Assessment/Plan:  Sudden onset of left-sided tingling and numbness, continued paresthesias in a 61-year-old lady with hypertension, hyperlipidemia his concerning for an acute CVA  I discussed in detail with the patient and the daughter that she is not candidate for tPA, but we should consider to 1 confirm the diagnosis and 2nd evaluate for possible causes and treat for secondary prevention of a CVA  Likely there is no significant motor symptoms  CT scan of the head done in the ER did not show any intracranial hemorrhage  This point will get an MRI of the brain to evaluate this better, check an echocardiogram and carotid duplex to evaluate for embolic causes  I asked her to resume her ARB since it is already 48 hours since her symptoms started  Also start low-dose aspirin for anti-platelet agent, and statin  Last LDL was above 100  I will send in for referral with Neurology  She has had a Holter monitor in the past for palpitations which did not show any concerning arrhythmias like AFib  I discussed with the daughter that it would be okay to travel to [facility] [place] as long as she feels okay  In the interim if symptoms get worse, encouraged to go to the ER right away  Diagnoses and all orders for this visit:    Numbness and tingling of left upper and lower extremity  -     MRI brain wo contrast; Future  -     Echo complete with contrast if indicated; Future  -     VAS carotid complete study; Future  -     Ambulatory referral to Neurology; Future  -     aspirin 81 MG tablet; Take 1 tablet (81 mg total) by mouth daily    Paresthesia of left upper and lower extremity  -     MRI brain wo contrast; Future  -     Echo complete with contrast if indicated; Future  -     VAS carotid complete study; Future  -     Ambulatory referral to Neurology; Future  -     aspirin 81 MG tablet;  Take 1 tablet (81 mg total) by mouth daily    Facial numbness  -     MRI brain wo contrast; Future  -     Echo complete with contrast if indicated; Future  -     VAS carotid complete study; Future    Essential hypertension    Aortic dilatation (HCC)    Other hyperlipidemia  -     atorvastatin (LIPITOR) 40 mg tablet; Take 1 tablet (40 mg total) by mouth daily          Subjective:   Chief Complaint   Patient presents with    Numbness     Left side        Patient ID: Reena Nam is a 72 y o  female  She comes in for an acute appointment  She was seen in the ER after she woke up with sudden onset of pressure in the head and then felt tingling and numbness on the left side of the face, left arm, left leg  She has been having difficulty holding onto things and feels discomfort while walking due to loss of sensation in the feet  In the emergency to a CT scan was done along with blood work which did not she notes any acute abnormalities  She was discharged in a stable state  Her daughter asked her to not take the losartan since that was latest medicine she was on  Symptoms have not progressed or gotten better since this started  No chest pain or shortness of breath, did not complain of any palpitations  The following portions of the patient's history were reviewed and updated as appropriate: current medications, past medical history, past social history and past surgical history      PHQ-9 Depression Screening    PHQ-9:    Frequency of the following problems over the past two weeks:                Current Outpatient Medications:     acetaminophen (TYLENOL) 325 mg tablet, Take 650 mg by mouth every 6 (six) hours as needed for mild pain, Disp: , Rfl:     diclofenac sodium (VOLTAREN) 1 %, Apply 2 g topically 4 (four) times a day, Disp: 1 Tube, Rfl: 2    fluticasone (FLONASE) 50 mcg/act nasal spray, 1 spray into each nostril every 12 (twelve) hours as needed for rhinitis, Disp: 1 Bottle, Rfl: 0    metoprolol succinate (TOPROL-XL) 25 mg 24 hr tablet, Take 1 tablet (25 mg total) by mouth daily, Disp: 90 tablet, Rfl: 1    metoprolol succinate (TOPROL-XL) 50 mg 24 hr tablet, Take 1 tablet (50 mg total) by mouth daily, Disp: 90 tablet, Rfl: 1    Multiple Vitamin (MULTIVITAMIN) capsule, Take 1 capsule by mouth daily, Disp: , Rfl:     omeprazole (PriLOSEC) 40 MG capsule, Take 1 capsule (40 mg total) by mouth daily, Disp: 90 capsule, Rfl: 1    aspirin 81 MG tablet, Take 1 tablet (81 mg total) by mouth daily, Disp: 90 tablet, Rfl: 1    atorvastatin (LIPITOR) 40 mg tablet, Take 1 tablet (40 mg total) by mouth daily, Disp: 90 tablet, Rfl: 1    losartan (COZAAR) 25 mg tablet, Take 1 tablet (25 mg total) by mouth daily (Patient not taking: Reported on 8/1/2019), Disp: 90 tablet, Rfl: 1    Review of Systems   Constitutional: Positive for fatigue  Negative for fever and unexpected weight change  HENT: Negative for ear pain, hearing loss and sore throat  Eyes: Negative for pain and discharge  Respiratory: Negative for cough, chest tightness and shortness of breath  Cardiovascular: Negative for chest pain and palpitations  Gastrointestinal: Negative for abdominal pain, blood in stool, constipation, diarrhea and nausea  Genitourinary: Negative for dysuria, frequency and hematuria  Musculoskeletal: Negative for arthralgias and joint swelling  Skin: Negative for rash  Allergic/Immunologic: Negative for immunocompromised state  Neurological: Positive for numbness  Negative for dizziness and headaches  Hematological: Negative for adenopathy  Psychiatric/Behavioral: Negative for confusion and sleep disturbance  Objective:  /80 (BP Location: Left arm, Patient Position: Sitting, Cuff Size: Standard)   Pulse 69   Temp 98 7 °F (37 1 °C)   Resp 15   Ht 5' 4" (1 626 m)   Wt 66 7 kg (147 lb)   BMI 25 23 kg/m²      Physical Exam   Constitutional: She is oriented to person, place, and time  She appears well-developed and well-nourished  HENT:   Head: Normocephalic and atraumatic     Right Ear: Tympanic membrane normal  Left Ear: Tympanic membrane normal    Nose: Nose normal    Mouth/Throat: Oropharynx is clear and moist  No posterior oropharyngeal edema or posterior oropharyngeal erythema  Eyes: Pupils are equal, round, and reactive to light  Conjunctivae are normal  Right eye exhibits no discharge  Left eye exhibits no discharge  Neck: Normal range of motion  Neck supple  No thyromegaly present  Cardiovascular: Normal rate, regular rhythm, S1 normal, S2 normal and normal heart sounds  PMI is not displaced  No murmur heard  Pulmonary/Chest: Effort normal and breath sounds normal  No accessory muscle usage  No apnea  No respiratory distress  She has no rhonchi  She has no rales  Abdominal: Soft  Normal appearance and bowel sounds are normal  She exhibits no shifting dullness  There is no hepatosplenomegaly  There is no tenderness  There is no rebound and no CVA tenderness  Musculoskeletal: Normal range of motion  She exhibits no edema or tenderness  Lymphadenopathy:     She has no cervical adenopathy  Neurological: She is alert and oriented to person, place, and time  She has normal strength  She displays no tremor  A sensory deficit is present  No cranial nerve deficit  She displays a negative Romberg sign  Reflex Scores:       Tricep reflexes are 1+ on the right side and 1+ on the left side  Bicep reflexes are 1+ on the right side and 1+ on the left side  Brachioradialis reflexes are 1+ on the right side and 1+ on the left side  Patellar reflexes are 2+ on the right side and 2+ on the left side  Achilles reflexes are 1+ on the right side and 1+ on the left side  Slightly reduced sensation on left upper and lower extremity  Skin: Skin is warm and intact  No rash noted  Psychiatric: She has a normal mood and affect  Her speech is normal    Nursing note and vitals reviewed          Recent Results (from the past 1008 hour(s))   CBC and differential    Collection Time: 06/28/19  7:52 AM Result Value Ref Range    WBC 6 08 4 31 - 10 16 Thousand/uL    RBC 3 82 3 81 - 5 12 Million/uL    Hemoglobin 11 7 11 5 - 15 4 g/dL    Hematocrit 36 7 34 8 - 46 1 %    MCV 96 82 - 98 fL    MCH 30 6 26 8 - 34 3 pg    MCHC 31 9 31 4 - 37 4 g/dL    RDW 13 2 11 6 - 15 1 %    MPV 10 4 8 9 - 12 7 fL    Platelets 101 030 - 412 Thousands/uL    nRBC 0 /100 WBCs    Neutrophils Relative 50 43 - 75 %    Immat GRANS % 0 0 - 2 %    Lymphocytes Relative 36 14 - 44 %    Monocytes Relative 11 4 - 12 %    Eosinophils Relative 2 0 - 6 %    Basophils Relative 1 0 - 1 %    Neutrophils Absolute 3 05 1 85 - 7 62 Thousands/µL    Immature Grans Absolute 0 01 0 00 - 0 20 Thousand/uL    Lymphocytes Absolute 2 21 0 60 - 4 47 Thousands/µL    Monocytes Absolute 0 64 0 17 - 1 22 Thousand/µL    Eosinophils Absolute 0 14 0 00 - 0 61 Thousand/µL    Basophils Absolute 0 03 0 00 - 0 10 Thousands/µL   Comprehensive metabolic panel    Collection Time: 06/28/19  7:52 AM   Result Value Ref Range    Sodium 140 136 - 145 mmol/L    Potassium 3 9 3 5 - 5 3 mmol/L    Chloride 104 100 - 108 mmol/L    CO2 28 21 - 32 mmol/L    ANION GAP 8 4 - 13 mmol/L    BUN 13 5 - 25 mg/dL    Creatinine 0 66 0 60 - 1 30 mg/dL    Glucose, Fasting 89 65 - 99 mg/dL    Calcium 9 5 8 3 - 10 1 mg/dL    AST 34 5 - 45 U/L    ALT 38 12 - 78 U/L    Alkaline Phosphatase 109 46 - 116 U/L    Total Protein 7 5 6 4 - 8 2 g/dL    Albumin 3 7 3 5 - 5 0 g/dL    Total Bilirubin 0 31 0 20 - 1 00 mg/dL    eGFR 89 ml/min/1 73sq m   Lipid Panel with Direct LDL reflex    Collection Time: 06/28/19  7:52 AM   Result Value Ref Range    Cholesterol 191 50 - 200 mg/dL    Triglycerides 96 <=150 mg/dL    HDL, Direct 54 40 - 60 mg/dL    LDL Calculated 118 (H) 0 - 100 mg/dL   Microalbumin / creatinine urine ratio    Collection Time: 06/28/19  7:52 AM   Result Value Ref Range    Creatinine, Ur 51 1 mg/dL    Microalbum  ,U,Random <5 0 0 0 - 20 0 mg/L    Microalb Creat Ratio <10 0 - 30 mg/g creatinine Hemoglobin A1C    Collection Time: 06/28/19  7:52 AM   Result Value Ref Range    Hemoglobin A1C 6 1 4 2 - 6 3 %     mg/dl   CBC and differential    Collection Time: 07/30/19  9:44 AM   Result Value Ref Range    WBC 6 16 4 31 - 10 16 Thousand/uL    RBC 3 99 3 81 - 5 12 Million/uL    Hemoglobin 12 2 11 5 - 15 4 g/dL    Hematocrit 37 8 34 8 - 46 1 %    MCV 95 82 - 98 fL    MCH 30 6 26 8 - 34 3 pg    MCHC 32 3 31 4 - 37 4 g/dL    RDW 12 9 11 6 - 15 1 %    MPV 9 8 8 9 - 12 7 fL    Platelets 897 344 - 472 Thousands/uL    nRBC 0 /100 WBCs    Neutrophils Relative 57 43 - 75 %    Immat GRANS % 0 0 - 2 %    Lymphocytes Relative 31 14 - 44 %    Monocytes Relative 9 4 - 12 %    Eosinophils Relative 2 0 - 6 %    Basophils Relative 1 0 - 1 %    Neutrophils Absolute 3 55 1 85 - 7 62 Thousands/µL    Immature Grans Absolute 0 01 0 00 - 0 20 Thousand/uL    Lymphocytes Absolute 1 93 0 60 - 4 47 Thousands/µL    Monocytes Absolute 0 53 0 17 - 1 22 Thousand/µL    Eosinophils Absolute 0 10 0 00 - 0 61 Thousand/µL    Basophils Absolute 0 04 0 00 - 0 10 Thousands/µL   Comprehensive metabolic panel    Collection Time: 07/30/19  9:44 AM   Result Value Ref Range    Sodium 139 136 - 145 mmol/L    Potassium 4 0 3 5 - 5 3 mmol/L    Chloride 105 100 - 108 mmol/L    CO2 27 21 - 32 mmol/L    ANION GAP 7 4 - 13 mmol/L    BUN 11 5 - 25 mg/dL    Creatinine 0 65 0 60 - 1 30 mg/dL    Glucose 99 65 - 140 mg/dL    Calcium 9 1 8 3 - 10 1 mg/dL    AST 41 5 - 45 U/L    ALT 42 12 - 78 U/L    Alkaline Phosphatase 107 46 - 116 U/L    Total Protein 7 4 6 4 - 8 2 g/dL    Albumin 3 7 3 5 - 5 0 g/dL    Total Bilirubin 0 26 0 20 - 1 00 mg/dL    eGFR 90 ml/min/1 73sq m   ]    No results found

## 2019-08-15 ENCOUNTER — HOSPITAL ENCOUNTER (OUTPATIENT)
Dept: MRI IMAGING | Facility: HOSPITAL | Age: 74
Discharge: HOME | End: 2019-08-15
Payer: COMMERCIAL

## 2019-08-15 DIAGNOSIS — R20.0 FACIAL NUMBNESS: ICD-10-CM

## 2019-08-15 DIAGNOSIS — R20.0 NUMBNESS AND TINGLING OF LEFT UPPER AND LOWER EXTREMITY: ICD-10-CM

## 2019-08-15 DIAGNOSIS — R20.2 PARESTHESIA OF LEFT UPPER AND LOWER EXTREMITY: ICD-10-CM

## 2019-08-15 DIAGNOSIS — R20.2 NUMBNESS AND TINGLING OF LEFT UPPER AND LOWER EXTREMITY: ICD-10-CM

## 2019-08-15 PROCEDURE — 70551 MRI BRAIN STEM W/O DYE: CPT

## 2019-08-20 ENCOUNTER — OFFICE VISIT (OUTPATIENT)
Dept: INTERNAL MEDICINE CLINIC | Facility: CLINIC | Age: 74
End: 2019-08-20
Payer: COMMERCIAL

## 2019-08-20 VITALS
HEIGHT: 64 IN | SYSTOLIC BLOOD PRESSURE: 100 MMHG | WEIGHT: 149 LBS | RESPIRATION RATE: 14 BRPM | DIASTOLIC BLOOD PRESSURE: 70 MMHG | TEMPERATURE: 98.3 F | BODY MASS INDEX: 25.44 KG/M2 | HEART RATE: 60 BPM

## 2019-08-20 DIAGNOSIS — R20.2 NUMBNESS AND TINGLING OF LEFT ARM AND LEG: Primary | ICD-10-CM

## 2019-08-20 DIAGNOSIS — R20.2 PARESTHESIA OF LEFT UPPER AND LOWER EXTREMITY: ICD-10-CM

## 2019-08-20 DIAGNOSIS — M54.12 CERVICAL RADICULOPATHY: ICD-10-CM

## 2019-08-20 DIAGNOSIS — I10 ESSENTIAL HYPERTENSION: ICD-10-CM

## 2019-08-20 DIAGNOSIS — R20.0 NUMBNESS AND TINGLING OF LEFT ARM AND LEG: Primary | ICD-10-CM

## 2019-08-20 PROCEDURE — 3074F SYST BP LT 130 MM HG: CPT | Performed by: INTERNAL MEDICINE

## 2019-08-20 PROCEDURE — 3078F DIAST BP <80 MM HG: CPT | Performed by: INTERNAL MEDICINE

## 2019-08-20 PROCEDURE — 1160F RVW MEDS BY RX/DR IN RCRD: CPT | Performed by: INTERNAL MEDICINE

## 2019-08-20 PROCEDURE — 3008F BODY MASS INDEX DOCD: CPT | Performed by: INTERNAL MEDICINE

## 2019-08-20 PROCEDURE — 99214 OFFICE O/P EST MOD 30 MIN: CPT | Performed by: INTERNAL MEDICINE

## 2019-08-20 PROCEDURE — 3725F SCREEN DEPRESSION PERFORMED: CPT | Performed by: INTERNAL MEDICINE

## 2019-08-20 PROCEDURE — 1036F TOBACCO NON-USER: CPT | Performed by: INTERNAL MEDICINE

## 2019-08-21 NOTE — PROGRESS NOTES
Assessment/Plan:  MRI didn't show any acute ischemic focus, microangiopathic changes  She has continued symptoms, await echo, carotid duplex  Will obtain EMG  BP well controlled, tolertaing statin, on low dose aspirin, arrange for neuro appt     Diagnoses and all orders for this visit:    Numbness and tingling of left arm and leg  -     Vitamin B12  -     Methylmalonic acid, serum  -     EMG 1 Upper/1 Lower Neuropathy; Future    Paresthesia of left upper and lower extremity  -     Vitamin B12  -     Methylmalonic acid, serum  -     EMG 1 Upper/1 Lower Neuropathy; Future    Essential hypertension    Cervical radiculopathy          Subjective:   Chief Complaint   Patient presents with    Follow-up     Meds not verified    Numbness     numbness on the left side of her body   Fatigue     weakness & nausea in the morning    Nausea        Patient ID: Skipper Castleman is a 69 y o  female  She comes In for follow up of acute nset on numbness in left upper and lower extremity  Continues with numbness, bothered by weird burning pain as weel  Quite upset about her symptoms  Taking bp meds, no major issues on cholesterol medication      The following portions of the patient's history were reviewed and updated as appropriate: current medications, past medical history, past social history and past surgical history      PHQ-9 Depression Screening    PHQ-9:    Frequency of the following problems over the past two weeks:       Little interest or pleasure in doing things:  0 - not at all  Feeling down, depressed, or hopeless:  0 - not at all  PHQ-2 Score:  0           Current Outpatient Medications:     acetaminophen (TYLENOL) 325 mg tablet, Take 650 mg by mouth every 6 (six) hours as needed for mild pain, Disp: , Rfl:     aspirin 81 MG tablet, Take 1 tablet (81 mg total) by mouth daily, Disp: 90 tablet, Rfl: 1    atorvastatin (LIPITOR) 40 mg tablet, Take 1 tablet (40 mg total) by mouth daily, Disp: 90 tablet, Rfl: 1    diclofenac sodium (VOLTAREN) 1 %, Apply 2 g topically 4 (four) times a day, Disp: 1 Tube, Rfl: 2    fluticasone (FLONASE) 50 mcg/act nasal spray, 1 spray into each nostril every 12 (twelve) hours as needed for rhinitis, Disp: 1 Bottle, Rfl: 0    losartan (COZAAR) 25 mg tablet, Take 1 tablet (25 mg total) by mouth daily (Patient not taking: Reported on 8/1/2019), Disp: 90 tablet, Rfl: 1    metoprolol succinate (TOPROL-XL) 25 mg 24 hr tablet, Take 1 tablet (25 mg total) by mouth daily, Disp: 90 tablet, Rfl: 1    metoprolol succinate (TOPROL-XL) 50 mg 24 hr tablet, Take 1 tablet (50 mg total) by mouth daily, Disp: 90 tablet, Rfl: 1    Multiple Vitamin (MULTIVITAMIN) capsule, Take 1 capsule by mouth daily, Disp: , Rfl:     omeprazole (PriLOSEC) 40 MG capsule, Take 1 capsule (40 mg total) by mouth daily, Disp: 90 capsule, Rfl: 1    Review of Systems   Constitutional: Negative for fatigue, fever and unexpected weight change  HENT: Negative for ear pain, hearing loss and sore throat  Eyes: Negative for pain and discharge  Respiratory: Negative for cough, chest tightness and shortness of breath  Cardiovascular: Negative for chest pain and palpitations  Gastrointestinal: Negative for abdominal pain, blood in stool, constipation, diarrhea and nausea  Genitourinary: Negative for dysuria, frequency and hematuria  Musculoskeletal: Negative for arthralgias and joint swelling  Skin: Negative for rash  Allergic/Immunologic: Negative for immunocompromised state  Neurological: Positive for numbness  Negative for dizziness and headaches  Hematological: Negative for adenopathy  Psychiatric/Behavioral: Negative for confusion and sleep disturbance           Objective:  /70 (BP Location: Left arm, Patient Position: Sitting, Cuff Size: Standard)   Pulse 60   Temp 98 3 °F (36 8 °C)   Resp 14   Ht 5' 4" (1 626 m)   Wt 67 6 kg (149 lb)   BMI 25 58 kg/m²      Physical Exam   Constitutional: She appears well-developed and well-nourished  HENT:   Head: Normocephalic and atraumatic  Right Ear: Tympanic membrane normal    Left Ear: Tympanic membrane normal    Nose: Nose normal    Mouth/Throat: Oropharynx is clear and moist  No posterior oropharyngeal edema or posterior oropharyngeal erythema  Eyes: Pupils are equal, round, and reactive to light  Conjunctivae are normal  Right eye exhibits no discharge  Left eye exhibits no discharge  Neck: Normal range of motion  Neck supple  No thyromegaly present  Cardiovascular: Normal rate, regular rhythm, S1 normal, S2 normal and normal heart sounds  PMI is not displaced  No murmur heard  Pulmonary/Chest: Effort normal and breath sounds normal  No accessory muscle usage  No apnea  No respiratory distress  She has no rhonchi  She has no rales  Abdominal: Soft  Normal appearance and bowel sounds are normal  She exhibits no shifting dullness  There is no hepatosplenomegaly  There is no tenderness  There is no rebound and no CVA tenderness  Musculoskeletal: Normal range of motion  She exhibits no edema or tenderness  Lymphadenopathy:     She has no cervical adenopathy  Neurological: She is alert  Skin: Skin is warm and intact  No rash noted  Psychiatric: She has a normal mood and affect  Her speech is normal    Nursing note and vitals reviewed          Recent Results (from the past 1008 hour(s))   CBC and differential    Collection Time: 07/30/19  9:44 AM   Result Value Ref Range    WBC 6 16 4 31 - 10 16 Thousand/uL    RBC 3 99 3 81 - 5 12 Million/uL    Hemoglobin 12 2 11 5 - 15 4 g/dL    Hematocrit 37 8 34 8 - 46 1 %    MCV 95 82 - 98 fL    MCH 30 6 26 8 - 34 3 pg    MCHC 32 3 31 4 - 37 4 g/dL    RDW 12 9 11 6 - 15 1 %    MPV 9 8 8 9 - 12 7 fL    Platelets 607 900 - 197 Thousands/uL    nRBC 0 /100 WBCs    Neutrophils Relative 57 43 - 75 %    Immat GRANS % 0 0 - 2 %    Lymphocytes Relative 31 14 - 44 %    Monocytes Relative 9 4 - 12 %    Eosinophils Relative 2 0 - 6 %    Basophils Relative 1 0 - 1 %    Neutrophils Absolute 3 55 1 85 - 7 62 Thousands/µL    Immature Grans Absolute 0 01 0 00 - 0 20 Thousand/uL    Lymphocytes Absolute 1 93 0 60 - 4 47 Thousands/µL    Monocytes Absolute 0 53 0 17 - 1 22 Thousand/µL    Eosinophils Absolute 0 10 0 00 - 0 61 Thousand/µL    Basophils Absolute 0 04 0 00 - 0 10 Thousands/µL   Comprehensive metabolic panel    Collection Time: 07/30/19  9:44 AM   Result Value Ref Range    Sodium 139 136 - 145 mmol/L    Potassium 4 0 3 5 - 5 3 mmol/L    Chloride 105 100 - 108 mmol/L    CO2 27 21 - 32 mmol/L    ANION GAP 7 4 - 13 mmol/L    BUN 11 5 - 25 mg/dL    Creatinine 0 65 0 60 - 1 30 mg/dL    Glucose 99 65 - 140 mg/dL    Calcium 9 1 8 3 - 10 1 mg/dL    AST 41 5 - 45 U/L    ALT 42 12 - 78 U/L    Alkaline Phosphatase 107 46 - 116 U/L    Total Protein 7 4 6 4 - 8 2 g/dL    Albumin 3 7 3 5 - 5 0 g/dL    Total Bilirubin 0 26 0 20 - 1 00 mg/dL    eGFR 90 ml/min/1 73sq m   ]    Procedure: Ct Head Without Contrast    Result Date: 7/30/2019  Narrative: CT BRAIN - WITHOUT CONTRAST INDICATION:   Left upper lip numbness/left 5th finger numbness/left foot numbness  COMPARISON:  None  TECHNIQUE:  CT examination of the brain was performed  In addition to axial images, coronal 2D reformatted images were created and submitted for interpretation  Radiation dose length product (DLP) for this visit:  921 mGy-cm   This examination, like all CT scans performed in the [facility], was performed utilizing techniques to minimize radiation dose exposure, including the use of iterative reconstruction and automated exposure control  IMAGE QUALITY:  Diagnostic  FINDINGS: PARENCHYMA:  No intracranial mass, mass effect or midline shift  No CT signs of acute infarction  No acute parenchymal hemorrhage  VENTRICLES AND EXTRA-AXIAL SPACES:  Normal for the patient's age  VISUALIZED ORBITS AND PARANASAL SINUSES:  Unremarkable   CALVARIUM AND EXTRACRANIAL SOFT TISSUES:  Normal      Impression: No acute intracranial abnormality  Workstation performed: [id number]     Procedure: Mri Brain Wo Contrast    Result Date: 8/16/2019  Narrative: MRI BRAIN WITHOUT CONTRAST INDICATION: R20 0: Anesthesia of skin R20 2: Paresthesia of skin R20 2: Paresthesia of skin R20 0: Anesthesia of skin  Stroke suspected  Left-sided tingling and numbness  Paresthesias  COMPARISON:   None  TECHNIQUE:  Sagittal T1, axial T2, axial FLAIR, axial T1, axial Matador and axial diffusion imaging  IMAGE QUALITY:  Diagnostic  FINDINGS: BRAIN PARENCHYMA:  There is no discrete mass, mass effect or midline shift  There is no intracranial hemorrhage  There is no evidence of acute infarction and diffusion imaging is unremarkable  Small scattered hyperintensities on T2/FLAIR imaging are noted in the periventricular and subcortical white matter demonstrating an appearance that is statistically most likely to represent mild microangiopathic change  VENTRICLES:  The ventricles are normal in size and contour  SELLA AND PITUITARY GLAND:  Normal  ORBITS:  Normal  PARANASAL SINUSES:  Normal  VASCULATURE:  Evaluation of the major intracranial vasculature demonstrates appropriate flow voids  CALVARIUM AND SKULL BASE:  Normal  EXTRACRANIAL SOFT TISSUES:  Normal      Impression: White matter changes suggestive of chronic microangiopathy  No acute intracranial pathology   Workstation performed: [id number]

## 2019-08-22 ENCOUNTER — HOSPITAL ENCOUNTER (OUTPATIENT)
Dept: NON INVASIVE DIAGNOSTICS | Facility: CLINIC | Age: 74
Discharge: HOME | End: 2019-08-22
Payer: COMMERCIAL

## 2019-08-22 DIAGNOSIS — R20.0 FACIAL NUMBNESS: ICD-10-CM

## 2019-08-22 DIAGNOSIS — R20.2 PARESTHESIA OF LEFT UPPER AND LOWER EXTREMITY: ICD-10-CM

## 2019-08-22 DIAGNOSIS — R20.2 NUMBNESS AND TINGLING OF LEFT UPPER AND LOWER EXTREMITY: ICD-10-CM

## 2019-08-22 DIAGNOSIS — R20.0 NUMBNESS AND TINGLING OF LEFT UPPER AND LOWER EXTREMITY: ICD-10-CM

## 2019-08-22 PROCEDURE — 93880 EXTRACRANIAL BILAT STUDY: CPT | Performed by: SURGERY

## 2019-08-22 PROCEDURE — 93880 EXTRACRANIAL BILAT STUDY: CPT

## 2019-08-28 ENCOUNTER — TELEPHONE (OUTPATIENT)
Dept: INTERNAL MEDICINE CLINIC | Facility: CLINIC | Age: 74
End: 2019-08-28

## 2019-08-28 NOTE — TELEPHONE ENCOUNTER
----- Message from Samson Pettit MD sent at 8/26/2019  8:32 AM EDT -----  No major stenosis on carotid duplex

## 2019-09-05 ENCOUNTER — APPOINTMENT (OUTPATIENT)
Dept: LAB | Facility: HOSPITAL | Age: 74
End: 2019-09-05
Payer: COMMERCIAL

## 2019-09-05 LAB — VIT B12 SERPL-MCNC: 525 PG/ML (ref 100–900)

## 2019-09-05 PROCEDURE — 82607 VITAMIN B-12: CPT | Performed by: INTERNAL MEDICINE

## 2019-09-05 PROCEDURE — 83918 ORGANIC ACIDS TOTAL QUANT: CPT | Performed by: INTERNAL MEDICINE

## 2019-09-05 PROCEDURE — 36415 COLL VENOUS BLD VENIPUNCTURE: CPT | Performed by: INTERNAL MEDICINE

## 2019-09-09 LAB
METHYLMALONATE SERPL-SCNC: 209 NMOL/L (ref 0–378)
SL AMB DISCLAIMER: NORMAL

## 2019-09-12 ENCOUNTER — HOSPITAL ENCOUNTER (OUTPATIENT)
Dept: NON INVASIVE DIAGNOSTICS | Facility: CLINIC | Age: 74
Discharge: HOME | End: 2019-09-12
Payer: COMMERCIAL

## 2019-09-12 DIAGNOSIS — R20.2 PARESTHESIA OF LEFT UPPER AND LOWER EXTREMITY: ICD-10-CM

## 2019-09-12 DIAGNOSIS — R20.2 NUMBNESS AND TINGLING OF LEFT UPPER AND LOWER EXTREMITY: ICD-10-CM

## 2019-09-12 DIAGNOSIS — R20.0 FACIAL NUMBNESS: ICD-10-CM

## 2019-09-12 DIAGNOSIS — R20.0 NUMBNESS AND TINGLING OF LEFT UPPER AND LOWER EXTREMITY: ICD-10-CM

## 2019-09-12 PROCEDURE — 93306 TTE W/DOPPLER COMPLETE: CPT

## 2019-09-12 PROCEDURE — 93306 TTE W/DOPPLER COMPLETE: CPT | Performed by: INTERNAL MEDICINE

## 2019-09-23 ENCOUNTER — TELEPHONE (OUTPATIENT)
Dept: INTERNAL MEDICINE CLINIC | Facility: CLINIC | Age: 74
End: 2019-09-23

## 2019-09-23 NOTE — TELEPHONE ENCOUNTER
Her echo and bloodwork didn't show any significant abnormality to be concerned about that would explain her current symptoms

## 2019-09-23 NOTE — TELEPHONE ENCOUNTER
The patient states she has called twice for her lab results and her echo results   Please review her test and I can call her back

## 2019-09-23 NOTE — TELEPHONE ENCOUNTER
Spoke to patient's daughter, she says her mom is not doing better, she would like her to come in  Due to conflicting schedule she will reach  out to a family member to see when they can bring her in

## 2019-09-24 ENCOUNTER — TELEPHONE (OUTPATIENT)
Dept: INTERNAL MEDICINE CLINIC | Facility: CLINIC | Age: 74
End: 2019-09-24

## 2019-10-10 ENCOUNTER — OFFICE VISIT (OUTPATIENT)
Dept: CARDIOLOGY CLINIC | Facility: CLINIC | Age: 74
End: 2019-10-10
Payer: COMMERCIAL

## 2019-10-10 VITALS
SYSTOLIC BLOOD PRESSURE: 120 MMHG | WEIGHT: 148 LBS | HEIGHT: 64 IN | HEART RATE: 64 BPM | DIASTOLIC BLOOD PRESSURE: 74 MMHG | BODY MASS INDEX: 25.27 KG/M2

## 2019-10-10 DIAGNOSIS — I35.1 NONRHEUMATIC AORTIC VALVE INSUFFICIENCY: ICD-10-CM

## 2019-10-10 DIAGNOSIS — I77.819 AORTIC DILATATION (HCC): Primary | ICD-10-CM

## 2019-10-10 DIAGNOSIS — I10 ESSENTIAL HYPERTENSION: ICD-10-CM

## 2019-10-10 DIAGNOSIS — R00.2 PALPITATION: ICD-10-CM

## 2019-10-10 DIAGNOSIS — R94.31 ABNORMAL EKG: ICD-10-CM

## 2019-10-10 PROCEDURE — 3074F SYST BP LT 130 MM HG: CPT | Performed by: INTERNAL MEDICINE

## 2019-10-10 PROCEDURE — 3078F DIAST BP <80 MM HG: CPT | Performed by: INTERNAL MEDICINE

## 2019-10-10 PROCEDURE — 99213 OFFICE O/P EST LOW 20 MIN: CPT | Performed by: INTERNAL MEDICINE

## 2019-10-10 NOTE — PROGRESS NOTES
Cardiology Follow Up    Maura Radford  1945  [id number]  [facility]  [facility] [address] [facility]   [phone]    1  Aortic dilatation (HCC)     2  Nonrheumatic aortic valve insufficiency     3  Essential hypertension     4  Palpitation     5  Abnormal EKG         Interval History:   Cardiology follow-up  Patient has no cardiac complaints including chest pain or dyspnea  She perseverates in her symptoms of numbness in her fingers and warm feeling in her left feet which appears to be very distressing to her  She actually presented emergency room because of that  Cardiac MRI to suggested no abnormalities carotid duplex was unremarkable as well  She is scheduled to have an EMG  Follow-up with neurology as well, reassurance was given to the patient  Despite she still very anxious about her symptomatology  Patient was [place] [facility] [place]  She is compliant low-sodium diet, blood pressures been well control  Last LDL was 118 with an HDL 54  She is on high-intensity statin therapy  No bleeding issues on aspirin therapy      Patient Active Problem List   Diagnosis    Essential hypertension    Palpitation    Gastroesophageal reflux disease    Primary osteoarthritis of both knees    Osteopenia    Cervical radiculopathy    Aortic dilatation (HCC)    Rotator cuff syndrome of right shoulder    Nonrheumatic aortic valve insufficiency    Abnormal EKG    Pre-diabetes    Numbness and tingling of left arm and leg     Past Medical History:   Diagnosis Date    Diverticulosis     Hypertension      Social History     Socioeconomic History    Marital status: /Civil Union     Spouse name: Not on file    Number of children: Not on file    Years of education: Not on file    Highest education level: Not on file   Occupational History    Not on file   Social Needs    Financial resource strain: Not on file    Food insecurity:     Worry: Not on file     Inability: Not on file    Transportation needs:     Medical: Not on file     Non-medical: Not on file   Tobacco Use    Smoking status: Never Smoker    Smokeless tobacco: Never Used   Substance and Sexual Activity    Alcohol use: No    Drug use: No    Sexual activity: Not on file   Lifestyle    Physical activity:     Days per week: Not on file     Minutes per session: Not on file    Stress: Not on file   Relationships    Social connections:     Talks on phone: Not on file     Gets together: Not on file     Attends Hoahaoism service: Not on file     Active member of club or organization: Not on file     Attends meetings of clubs or organizations: Not on file     Relationship status: Not on file    Intimate partner violence:     Fear of current or ex partner: Not on file     Emotionally abused: Not on file     Physically abused: Not on file     Forced sexual activity: Not on file   Other Topics Concern    Not on file   Social History Narrative    ** Merged History Encounter **           Family History   Problem Relation Age of Onset    Diabetes Mother     Hypertension Mother     Hypertension Father     Stroke Father     No Known Problems Sister     No Known Problems Brother      Past Surgical History:   Procedure Laterality Date    TUBAL LIGATION         Current Outpatient Medications:     acetaminophen (TYLENOL) 325 mg tablet, Take 650 mg by mouth every 6 (six) hours as needed for mild pain, Disp: , Rfl:     aspirin 81 MG tablet, Take 1 tablet (81 mg total) by mouth daily, Disp: 90 tablet, Rfl: 1    atorvastatin (LIPITOR) 40 mg tablet, Take 1 tablet (40 mg total) by mouth daily, Disp: 90 tablet, Rfl: 1    losartan (COZAAR) 25 mg tablet, Take 1 tablet (25 mg total) by mouth daily, Disp: 90 tablet, Rfl: 1    metoprolol succinate (TOPROL-XL) 25 mg 24 hr tablet, Take 1 tablet (25 mg total) by mouth daily, Disp: 90 tablet, Rfl: 1   metoprolol succinate (TOPROL-XL) 50 mg 24 hr tablet, Take 1 tablet (50 mg total) by mouth daily, Disp: 90 tablet, Rfl: 1    Multiple Vitamin (MULTIVITAMIN) capsule, Take 1 capsule by mouth daily, Disp: , Rfl:     omeprazole (PriLOSEC) 40 MG capsule, Take 1 capsule (40 mg total) by mouth daily, Disp: 90 capsule, Rfl: 1    diclofenac sodium (VOLTAREN) 1 %, Apply 2 g topically 4 (four) times a day (Patient not taking: Reported on 10/10/2019), Disp: 1 Tube, Rfl: 2    fluticasone (FLONASE) 50 mcg/act nasal spray, 1 spray into each nostril every 12 (twelve) hours as needed for rhinitis (Patient not taking: Reported on 10/10/2019), Disp: 1 Bottle, Rfl: 0  Allergies   Allergen Reactions    Other      Seafood    Other Nausea Only     SEAFOOD    Shellfish-Derived Products     Lisinopril Cough       Labs:  Office Visit on 08/20/2019   Component Date Value    Vitamin B-12 09/05/2019 525     Methylmalonic Acid, S 09/05/2019 209     Disclaimer: 09/05/2019 Comment      Imaging: No results found  Review of Systems:  Review of Systems   Constitutional: Negative for fatigue  HENT: Negative for nosebleeds  Eyes: Negative for visual disturbance  Respiratory: Negative for apnea, shortness of breath, wheezing and stridor  Cardiovascular: Negative for chest pain, palpitations and leg swelling  Gastrointestinal: Negative for abdominal pain and blood in stool  Genitourinary: Negative for hematuria  Musculoskeletal: Positive for arthralgias  Negative for gait problem and myalgias  Skin: Negative for pallor and rash  Allergic/Immunologic: Negative for immunocompromised state  Neurological: Positive for numbness  Negative for syncope, speech difficulty and weakness  Hematological: Does not bruise/bleed easily  Psychiatric/Behavioral: Positive for sleep disturbance  The patient is nervous/anxious  Physical Exam:  Physical Exam   Constitutional: She appears well-developed and well-nourished   No distress  Neck: No JVD present  Cardiovascular: Normal rate, regular rhythm and intact distal pulses  Exam reveals no gallop and no friction rub  Murmur (Soft decrescendo diastolic murmur at the base) heard  Pulses symmetrical and synchronous   Pulmonary/Chest: Effort normal and breath sounds normal  No stridor  No respiratory distress  She has no wheezes  She has no rales  Musculoskeletal: She exhibits no edema  Neurological: She is alert  Skin: Skin is warm and dry  Capillary refill takes less than 2 seconds  She is not diaphoretic  Psychiatric: Her mood appears anxious  Vitals reviewed  Discussion/Summary:  Palpitations, symptomatic in the setting of premature atrial contractions, well control on beta-blocker therapy  She does have a right bundle-branch block with secondary ST segment changes in the septal leads on her electrocardiogram   Recent echocardiogram revealed normal left systolic function with stage I diastolic dysfunction, mild mitral insufficiency mild-to-moderate insufficiency with ectatic dilated ascending aorta at 43 mm which is unchanged compared with CT scan of the year prior  Continue beta-blocker  Stress test this year she did 5 minutes of Joey protocol  Achieving target heart rate  There was no EKG or echocardiographic criteria for ischemia  Reassured was given to the patient, continue current medications  This note was completed in part utilizing [employer] direct voice recognition software  Grammatical errors, random word insertion, spelling mistakes, and incomplete sentences may be an occasional consequence of the system secondary to software limitations, ambient noise and hardware issues  At the time of dictation, efforts were made to edit, clarify and /or correct errors  Please read the chart carefully and recognize, using context, where substitutions have occurred    If you have any questions or concerns about the context, text or information contained within the body of this dictation, please contact myself, the provider, for further clarification

## 2019-10-24 ENCOUNTER — OFFICE VISIT (OUTPATIENT)
Dept: INTERNAL MEDICINE CLINIC | Facility: CLINIC | Age: 74
End: 2019-10-24
Payer: COMMERCIAL

## 2019-10-24 VITALS
WEIGHT: 150 LBS | DIASTOLIC BLOOD PRESSURE: 80 MMHG | HEIGHT: 64 IN | RESPIRATION RATE: 14 BRPM | HEART RATE: 66 BPM | BODY MASS INDEX: 25.61 KG/M2 | TEMPERATURE: 98.1 F | SYSTOLIC BLOOD PRESSURE: 130 MMHG

## 2019-10-24 DIAGNOSIS — E78.49 OTHER HYPERLIPIDEMIA: ICD-10-CM

## 2019-10-24 DIAGNOSIS — I10 ESSENTIAL HYPERTENSION: Primary | ICD-10-CM

## 2019-10-24 DIAGNOSIS — R20.2 NUMBNESS AND TINGLING OF LEFT ARM AND LEG: ICD-10-CM

## 2019-10-24 DIAGNOSIS — M54.12 CERVICAL RADICULOPATHY: ICD-10-CM

## 2019-10-24 DIAGNOSIS — R20.2 PARESTHESIA OF LEFT UPPER AND LOWER EXTREMITY: ICD-10-CM

## 2019-10-24 DIAGNOSIS — R20.0 NUMBNESS AND TINGLING OF LEFT UPPER AND LOWER EXTREMITY: ICD-10-CM

## 2019-10-24 DIAGNOSIS — R20.2 NUMBNESS AND TINGLING OF LEFT UPPER AND LOWER EXTREMITY: ICD-10-CM

## 2019-10-24 DIAGNOSIS — R20.0 NUMBNESS AND TINGLING OF LEFT ARM AND LEG: ICD-10-CM

## 2019-10-24 DIAGNOSIS — Z23 ENCOUNTER FOR IMMUNIZATION: ICD-10-CM

## 2019-10-24 DIAGNOSIS — M85.80 OSTEOPENIA, UNSPECIFIED LOCATION: ICD-10-CM

## 2019-10-24 PROCEDURE — 90662 IIV NO PRSV INCREASED AG IM: CPT | Performed by: INTERNAL MEDICINE

## 2019-10-24 PROCEDURE — 99214 OFFICE O/P EST MOD 30 MIN: CPT | Performed by: INTERNAL MEDICINE

## 2019-10-24 PROCEDURE — G0008 ADMIN INFLUENZA VIRUS VAC: HCPCS | Performed by: INTERNAL MEDICINE

## 2019-10-24 PROCEDURE — 1101F PT FALLS ASSESS-DOCD LE1/YR: CPT | Performed by: INTERNAL MEDICINE

## 2019-10-24 RX ORDER — GABAPENTIN 100 MG/1
100 CAPSULE ORAL 3 TIMES DAILY
Qty: 90 CAPSULE | Refills: 1 | Status: SHIPPED | OUTPATIENT
Start: 2019-10-24 | End: 2020-07-16

## 2019-10-24 RX ORDER — LOSARTAN POTASSIUM 25 MG/1
25 TABLET ORAL DAILY
Qty: 90 TABLET | Refills: 1 | Status: SHIPPED | OUTPATIENT
Start: 2019-10-24 | End: 2020-04-16 | Stop reason: SDUPTHER

## 2019-10-24 RX ORDER — METOPROLOL SUCCINATE 25 MG/1
25 TABLET, EXTENDED RELEASE ORAL DAILY
Qty: 90 TABLET | Refills: 1 | Status: SHIPPED | OUTPATIENT
Start: 2019-10-24 | End: 2020-02-27

## 2019-10-24 RX ORDER — METOPROLOL SUCCINATE 50 MG/1
50 TABLET, EXTENDED RELEASE ORAL DAILY
Qty: 90 TABLET | Refills: 1 | Status: SHIPPED | OUTPATIENT
Start: 2019-10-24 | End: 2020-04-16 | Stop reason: SDUPTHER

## 2019-10-24 RX ORDER — ATORVASTATIN CALCIUM 40 MG/1
40 TABLET, FILM COATED ORAL DAILY
Qty: 90 TABLET | Refills: 1 | Status: SHIPPED | OUTPATIENT
Start: 2019-10-24 | End: 2020-07-16

## 2019-10-24 NOTE — PROGRESS NOTES
Assessment/Plan:  Reassurance was given to the patient that all the testing including echocardiogram, carotid duplex, MRI, vitamin B12 level, other blood work has been all within normal limits  Will try gabapentin for symptomatic relief  Await EMG in November and follow-up with Neurology  Will try to arrange for an earlier appointment with Neurology if possible  Recheck lipid panel before next appointment to ensure adequate response with current dose of Lipitor  Blood pressure well control, aortic dilatation is stable  Influenza vaccine was given today, Pneumovax at next office visit     Diagnoses and all orders for this visit:    Essential hypertension  -     metoprolol succinate (TOPROL-XL) 50 mg 24 hr tablet; Take 1 tablet (50 mg total) by mouth daily  -     metoprolol succinate (TOPROL-XL) 25 mg 24 hr tablet; Take 1 tablet (25 mg total) by mouth daily  -     losartan (COZAAR) 25 mg tablet; Take 1 tablet (25 mg total) by mouth daily  -     CBC and differential  -     Hemoglobin A1C    Cervical radiculopathy  -     CBC and differential    Other hyperlipidemia  -     atorvastatin (LIPITOR) 40 mg tablet; Take 1 tablet (40 mg total) by mouth daily  -     CBC and differential  -     Comprehensive metabolic panel  -     Lipid Panel with Direct LDL reflex  -     Hemoglobin A1C    Numbness and tingling of left upper and lower extremity  -     aspirin 81 MG tablet; Take 1 tablet (81 mg total) by mouth daily    Paresthesia of left upper and lower extremity  -     aspirin 81 MG tablet; Take 1 tablet (81 mg total) by mouth daily  -     gabapentin (NEURONTIN) 100 mg capsule;  Take 1 capsule (100 mg total) by mouth 3 (three) times a day  -     Comprehensive metabolic panel    Osteopenia, unspecified location  -     Vitamin D 25 hydroxy    Numbness and tingling of left arm and leg    Encounter for immunization  -     FLUZONE HIGH-DOSE: influenza vaccine, high-dose, preservative-free 0 5 mL          Subjective:   Chief Complaint   Patient presents with    Follow-up     3 month        Patient ID: Johana Son is a 69 y o  female  She comes in for follow-up of hypertension, prediabetes, hyperlipidemia, knee pain, acid reflux    She is taking all her medications regularly  She is very frustrated with her tingling and numbness on the left side  No headaches or dizziness  Tolerating cholesterol medication well  Acid reflux is well control, taking omeprazole only as needed      The following portions of the patient's history were reviewed and updated as appropriate: current medications, past medical history, past social history and past surgical history      PHQ-9 Depression Screening    PHQ-9:    Frequency of the following problems over the past two weeks:                Current Outpatient Medications:     acetaminophen (TYLENOL) 325 mg tablet, Take 650 mg by mouth every 6 (six) hours as needed for mild pain, Disp: , Rfl:     aspirin 81 MG tablet, Take 1 tablet (81 mg total) by mouth daily, Disp: 90 tablet, Rfl: 1    atorvastatin (LIPITOR) 40 mg tablet, Take 1 tablet (40 mg total) by mouth daily, Disp: 90 tablet, Rfl: 1    diclofenac sodium (VOLTAREN) 1 %, Apply 2 g topically 4 (four) times a day, Disp: 1 Tube, Rfl: 2    fluticasone (FLONASE) 50 mcg/act nasal spray, 1 spray into each nostril every 12 (twelve) hours as needed for rhinitis, Disp: 1 Bottle, Rfl: 0    losartan (COZAAR) 25 mg tablet, Take 1 tablet (25 mg total) by mouth daily, Disp: 90 tablet, Rfl: 1    metoprolol succinate (TOPROL-XL) 25 mg 24 hr tablet, Take 1 tablet (25 mg total) by mouth daily, Disp: 90 tablet, Rfl: 1    metoprolol succinate (TOPROL-XL) 50 mg 24 hr tablet, Take 1 tablet (50 mg total) by mouth daily, Disp: 90 tablet, Rfl: 1    Multiple Vitamin (MULTIVITAMIN) capsule, Take 1 capsule by mouth daily, Disp: , Rfl:     omeprazole (PriLOSEC) 40 MG capsule, Take 1 capsule (40 mg total) by mouth daily, Disp: 90 capsule, Rfl: 1   gabapentin (NEURONTIN) 100 mg capsule, Take 1 capsule (100 mg total) by mouth 3 (three) times a day, Disp: 90 capsule, Rfl: 1    Review of Systems   Constitutional: Negative for fatigue, fever and unexpected weight change  HENT: Negative for ear pain, hearing loss and sore throat  Eyes: Negative for pain and discharge  Respiratory: Negative for cough, chest tightness and shortness of breath  Cardiovascular: Negative for chest pain and palpitations  Gastrointestinal: Negative for abdominal pain, blood in stool, constipation, diarrhea and nausea  Genitourinary: Negative for dysuria, frequency and hematuria  Musculoskeletal: Negative for arthralgias and joint swelling  Skin: Negative for rash  Allergic/Immunologic: Negative for immunocompromised state  Neurological: Positive for numbness  Negative for dizziness and headaches  Hematological: Negative for adenopathy  Psychiatric/Behavioral: Negative for confusion and sleep disturbance  Objective:  /80 (BP Location: Left arm, Patient Position: Sitting, Cuff Size: Standard)   Pulse 66   Temp 98 1 °F (36 7 °C)   Resp 14   Ht 5' 4" (1 626 m)   Wt 68 kg (150 lb)   BMI 25 75 kg/m²      Physical Exam   Constitutional: She appears well-developed and well-nourished  HENT:   Head: Normocephalic and atraumatic  Right Ear: Tympanic membrane normal    Left Ear: Tympanic membrane normal    Nose: Nose normal    Mouth/Throat: Oropharynx is clear and moist  No posterior oropharyngeal edema or posterior oropharyngeal erythema  Eyes: Pupils are equal, round, and reactive to light  Conjunctivae are normal  Right eye exhibits no discharge  Left eye exhibits no discharge  Neck: Normal range of motion  Neck supple  No thyromegaly present  Cardiovascular: Normal rate, regular rhythm, S1 normal and S2 normal  PMI is not displaced  Murmur heard  Diastolic murmur is present with a grade of 3/6    Pulmonary/Chest: Effort normal and breath sounds normal  No accessory muscle usage  No apnea  No respiratory distress  She has no rhonchi  She has no rales  Abdominal: Soft  Normal appearance and bowel sounds are normal  She exhibits no shifting dullness  There is no hepatosplenomegaly  There is no tenderness  There is no rebound and no CVA tenderness  Musculoskeletal: Normal range of motion  She exhibits no edema or tenderness  Lymphadenopathy:     She has no cervical adenopathy  Neurological: She is alert  Skin: Skin is warm and intact  No rash noted  Psychiatric: She has a normal mood and affect  Her speech is normal    Nursing note and vitals reviewed  No results found for this or any previous visit (from the past 1008 hour(s))  ]    No results found

## 2019-11-06 DIAGNOSIS — I10 ESSENTIAL HYPERTENSION: ICD-10-CM

## 2019-11-18 RX ORDER — METOPROLOL SUCCINATE 50 MG/1
TABLET, EXTENDED RELEASE ORAL
Qty: 90 TABLET | Refills: 5 | Status: SHIPPED | OUTPATIENT
Start: 2019-11-18 | End: 2020-01-06

## 2019-11-20 ENCOUNTER — HOSPITAL ENCOUNTER (OUTPATIENT)
Dept: NEUROLOGY | Facility: CLINIC | Age: 74
Discharge: HOME | End: 2019-11-20
Payer: COMMERCIAL

## 2019-11-20 DIAGNOSIS — R20.2 PARESTHESIA OF LEFT UPPER AND LOWER EXTREMITY: ICD-10-CM

## 2019-11-20 DIAGNOSIS — R20.2 NUMBNESS AND TINGLING OF LEFT ARM AND LEG: ICD-10-CM

## 2019-11-20 DIAGNOSIS — R20.0 NUMBNESS AND TINGLING OF LEFT ARM AND LEG: ICD-10-CM

## 2019-11-20 PROCEDURE — 95911 NRV CNDJ TEST 9-10 STUDIES: CPT | Performed by: PSYCHIATRY & NEUROLOGY

## 2019-11-20 PROCEDURE — 95886 MUSC TEST DONE W/N TEST COMP: CPT | Performed by: PSYCHIATRY & NEUROLOGY

## 2019-11-22 ENCOUNTER — TELEPHONE (OUTPATIENT)
Dept: INTERNAL MEDICINE CLINIC | Facility: CLINIC | Age: 74
End: 2019-11-22

## 2019-11-22 NOTE — TELEPHONE ENCOUNTER
----- Message from Jose Castillo MD sent at 11/21/2019  1:37 PM EST -----  Please let patient know that EMG showed evidence of carpal tunnel syndrome, it cannot explain her lower extremity symptoms, carpal tunnel was also very minimal and not as severe as what she has been experiencing  Unfortunately do not still have a clear-cut answer of her symptoms  Will wait for the neurologist to evaluate her and gives some suggestions as to what to do next

## 2019-12-05 DIAGNOSIS — M17.0 PRIMARY OSTEOARTHRITIS OF BOTH KNEES: ICD-10-CM

## 2019-12-05 RX ORDER — MELOXICAM 7.5 MG/1
TABLET ORAL
Qty: 30 TABLET | Refills: 0 | OUTPATIENT
Start: 2019-12-05

## 2020-01-06 ENCOUNTER — TRANSCRIBE ORDERS (OUTPATIENT)
Dept: ADMINISTRATIVE | Facility: HOSPITAL | Age: 75
End: 2020-01-06

## 2020-01-06 ENCOUNTER — HOSPITAL ENCOUNTER (OUTPATIENT)
Dept: CT IMAGING | Facility: HOSPITAL | Age: 75
Discharge: HOME | End: 2020-01-06
Payer: COMMERCIAL

## 2020-01-06 ENCOUNTER — OFFICE VISIT (OUTPATIENT)
Dept: INTERNAL MEDICINE CLINIC | Facility: CLINIC | Age: 75
End: 2020-01-06
Payer: COMMERCIAL

## 2020-01-06 ENCOUNTER — APPOINTMENT (OUTPATIENT)
Dept: LAB | Facility: HOSPITAL | Age: 75
End: 2020-01-06
Payer: COMMERCIAL

## 2020-01-06 VITALS
RESPIRATION RATE: 15 BRPM | TEMPERATURE: 98.5 F | SYSTOLIC BLOOD PRESSURE: 128 MMHG | HEIGHT: 64 IN | BODY MASS INDEX: 24.34 KG/M2 | HEART RATE: 64 BPM | WEIGHT: 142.6 LBS | DIASTOLIC BLOOD PRESSURE: 78 MMHG

## 2020-01-06 DIAGNOSIS — R10.32 LLQ PAIN: Primary | ICD-10-CM

## 2020-01-06 DIAGNOSIS — R10.32 LLQ PAIN: ICD-10-CM

## 2020-01-06 DIAGNOSIS — I10 ESSENTIAL HYPERTENSION: ICD-10-CM

## 2020-01-06 DIAGNOSIS — R20.2 PARESTHESIA OF LEFT UPPER AND LOWER EXTREMITY: ICD-10-CM

## 2020-01-06 DIAGNOSIS — K57.92 ACUTE DIVERTICULITIS: Primary | ICD-10-CM

## 2020-01-06 DIAGNOSIS — K21.9 GASTROESOPHAGEAL REFLUX DISEASE, ESOPHAGITIS PRESENCE NOT SPECIFIED: ICD-10-CM

## 2020-01-06 DIAGNOSIS — E78.49 OTHER HYPERLIPIDEMIA: Primary | ICD-10-CM

## 2020-01-06 LAB
25(OH)D3 SERPL-MCNC: 20.7 NG/ML (ref 30–100)
ALBUMIN SERPL BCP-MCNC: 4.4 G/DL (ref 3–5.2)
ALP SERPL-CCNC: 218 U/L (ref 43–122)
ALT SERPL W P-5'-P-CCNC: 95 U/L (ref 9–52)
ANION GAP SERPL CALCULATED.3IONS-SCNC: 11 MMOL/L (ref 5–14)
AST SERPL W P-5'-P-CCNC: 71 U/L (ref 14–36)
BASOPHILS # BLD AUTO: 0 THOUSANDS/ΜL (ref 0–0.1)
BASOPHILS NFR BLD AUTO: 1 % (ref 0–1)
BILIRUB SERPL-MCNC: 0.5 MG/DL
BILIRUB UR QL STRIP: NEGATIVE
BUN SERPL-MCNC: 10 MG/DL (ref 5–25)
CALCIUM SERPL-MCNC: 10.1 MG/DL (ref 8.4–10.2)
CHLORIDE SERPL-SCNC: 101 MMOL/L (ref 97–108)
CHOLEST SERPL-MCNC: 178 MG/DL
CLARITY UR: CLEAR
CO2 SERPL-SCNC: 27 MMOL/L (ref 22–30)
COLOR UR: NORMAL
CREAT SERPL-MCNC: 0.61 MG/DL (ref 0.6–1.2)
EOSINOPHIL # BLD AUTO: 0.1 THOUSAND/ΜL (ref 0–0.4)
EOSINOPHIL NFR BLD AUTO: 1 % (ref 0–6)
ERYTHROCYTE [DISTWIDTH] IN BLOOD BY AUTOMATED COUNT: 13.5 %
EST. AVERAGE GLUCOSE BLD GHB EST-MCNC: 120 MG/DL
GFR SERPL CREATININE-BSD FRML MDRD: 91 ML/MIN/1.73SQ M
GLUCOSE P FAST SERPL-MCNC: 99 MG/DL (ref 70–99)
GLUCOSE SERPL-MCNC: 99 MG/DL (ref 70–99)
GLUCOSE UR STRIP-MCNC: NEGATIVE MG/DL
HBA1C MFR BLD: 5.8 % (ref 4.2–6.3)
HCT VFR BLD AUTO: 39.3 % (ref 36–46)
HDLC SERPL-MCNC: 52 MG/DL
HGB BLD-MCNC: 13.1 G/DL (ref 12–16)
HGB UR QL STRIP.AUTO: NEGATIVE
KETONES UR STRIP-MCNC: NEGATIVE MG/DL
LDLC SERPL CALC-MCNC: 101 MG/DL
LEUKOCYTE ESTERASE UR QL STRIP: NEGATIVE
LYMPHOCYTES # BLD AUTO: 1.8 THOUSANDS/ΜL (ref 0.5–4)
LYMPHOCYTES NFR BLD AUTO: 24 % (ref 25–45)
MCH RBC QN AUTO: 31.5 PG (ref 26–34)
MCHC RBC AUTO-ENTMCNC: 33.4 G/DL (ref 31–36)
MCV RBC AUTO: 94 FL (ref 80–100)
MONOCYTES # BLD AUTO: 0.7 THOUSAND/ΜL (ref 0.2–0.9)
MONOCYTES NFR BLD AUTO: 9 % (ref 1–10)
NEUTROPHILS # BLD AUTO: 4.9 THOUSANDS/ΜL (ref 1.8–7.8)
NEUTS SEG NFR BLD AUTO: 65 % (ref 45–65)
NITRITE UR QL STRIP: NEGATIVE
PH UR STRIP.AUTO: 7 [PH]
PLATELET # BLD AUTO: 261 THOUSANDS/UL (ref 150–450)
PMV BLD AUTO: 9.1 FL (ref 8.9–12.7)
POTASSIUM SERPL-SCNC: 4.1 MMOL/L (ref 3.6–5)
PROT SERPL-MCNC: 8.1 G/DL (ref 5.9–8.4)
PROT UR STRIP-MCNC: NEGATIVE MG/DL
RBC # BLD AUTO: 4.16 MILLION/UL (ref 4–5.2)
SODIUM SERPL-SCNC: 139 MMOL/L (ref 137–147)
SP GR UR STRIP.AUTO: 1.01 (ref 1–1.04)
TRIGL SERPL-MCNC: 123 MG/DL
UROBILINOGEN UA: NEGATIVE MG/DL
WBC # BLD AUTO: 7.5 THOUSAND/UL (ref 4.5–11)

## 2020-01-06 PROCEDURE — 81003 URINALYSIS AUTO W/O SCOPE: CPT | Performed by: INTERNAL MEDICINE

## 2020-01-06 PROCEDURE — 80053 COMPREHEN METABOLIC PANEL: CPT | Performed by: INTERNAL MEDICINE

## 2020-01-06 PROCEDURE — 80061 LIPID PANEL: CPT | Performed by: INTERNAL MEDICINE

## 2020-01-06 PROCEDURE — 82306 VITAMIN D 25 HYDROXY: CPT | Performed by: INTERNAL MEDICINE

## 2020-01-06 PROCEDURE — 36415 COLL VENOUS BLD VENIPUNCTURE: CPT | Performed by: INTERNAL MEDICINE

## 2020-01-06 PROCEDURE — 83036 HEMOGLOBIN GLYCOSYLATED A1C: CPT | Performed by: INTERNAL MEDICINE

## 2020-01-06 PROCEDURE — 74177 CT ABD & PELVIS W/CONTRAST: CPT

## 2020-01-06 PROCEDURE — 85025 COMPLETE CBC W/AUTO DIFF WBC: CPT

## 2020-01-06 PROCEDURE — 99214 OFFICE O/P EST MOD 30 MIN: CPT | Performed by: INTERNAL MEDICINE

## 2020-01-06 RX ORDER — METRONIDAZOLE 500 MG/1
500 TABLET ORAL EVERY 8 HOURS SCHEDULED
Qty: 21 TABLET | Refills: 0 | Status: SHIPPED | OUTPATIENT
Start: 2020-01-06 | End: 2020-01-13

## 2020-01-06 RX ORDER — CIPROFLOXACIN 500 MG/1
500 TABLET, FILM COATED ORAL EVERY 12 HOURS SCHEDULED
Qty: 14 TABLET | Refills: 0 | Status: SHIPPED | OUTPATIENT
Start: 2020-01-06 | End: 2020-01-13

## 2020-01-06 RX ADMIN — IOHEXOL 80 ML: 350 INJECTION, SOLUTION INTRAVENOUS at 13:28

## 2020-01-06 NOTE — PROGRESS NOTES
Assessment/Plan:  1  LLQ pain  -     UA w Reflex to Microscopic w Reflex to Culture  -     CT abdomen pelvis w contrast; Future; Expected date: 01/06/2020    2  Gastroesophageal reflux disease, esophagitis presence not specified    3  Essential hypertension    Tenderness with guarding in the left lower quadrant with prior history of diverticulitis, will get a stat CT scan and blood work to evaluate for diverticulitis, treat accordingly  Will also evaluate any other possible causes of weight loss and decreased appetite  She has an appointment with GI in February Luck[place] vitals are stable and she does not appear septic  Continue omeprazole for acid reflux for now  Subjective:   Chief Complaint   Patient presents with    Abdominal Pain        Patient ID: Denilson Mcclellan is a 69 y o  female  Comes in for an acute appointment  For the last 3 days she has had constant pain on the left lower quadrant, sharp, not radiating, not associated with nausea or vomiting, mild constipation but that is chronic  She had a bout of diverticulitis 4 years ago when she was treated with ciprofloxacin  No blood in stool  She notes that for the last several months she has not had a good appetite  The following portions of the patient's history were reviewed and updated as appropriate: current medications, past medical history, past social history and past surgical history      PHQ-9 Depression Screening    PHQ-9:    Frequency of the following problems over the past two weeks:                Current Outpatient Medications:     acetaminophen (TYLENOL) 325 mg tablet, Take 650 mg by mouth every 6 (six) hours as needed for mild pain, Disp: , Rfl:     aspirin 81 MG tablet, Take 1 tablet (81 mg total) by mouth daily, Disp: 90 tablet, Rfl: 1    atorvastatin (LIPITOR) 40 mg tablet, Take 1 tablet (40 mg total) by mouth daily, Disp: 90 tablet, Rfl: 1    diclofenac sodium (VOLTAREN) 1 %, Apply 2 g topically 4 (four) times a day, Disp: 1 Tube, Rfl: 2    fluticasone (FLONASE) 50 mcg/act nasal spray, 1 spray into each nostril every 12 (twelve) hours as needed for rhinitis, Disp: 1 Bottle, Rfl: 0    gabapentin (NEURONTIN) 100 mg capsule, Take 1 capsule (100 mg total) by mouth 3 (three) times a day, Disp: 90 capsule, Rfl: 1    losartan (COZAAR) 25 mg tablet, Take 1 tablet (25 mg total) by mouth daily, Disp: 90 tablet, Rfl: 1    metoprolol succinate (TOPROL-XL) 25 mg 24 hr tablet, Take 1 tablet (25 mg total) by mouth daily, Disp: 90 tablet, Rfl: 1    metoprolol succinate (TOPROL-XL) 50 mg 24 hr tablet, Take 1 tablet (50 mg total) by mouth daily, Disp: 90 tablet, Rfl: 1    Multiple Vitamin (MULTIVITAMIN) capsule, Take 1 capsule by mouth daily, Disp: , Rfl:     omeprazole (PriLOSEC) 40 MG capsule, Take 1 capsule (40 mg total) by mouth daily, Disp: 90 capsule, Rfl: 1    Review of Systems   Constitutional: Negative for fatigue, fever and unexpected weight change  HENT: Negative for ear pain, hearing loss and sore throat  Eyes: Negative for pain and discharge  Respiratory: Negative for cough, chest tightness and shortness of breath  Cardiovascular: Negative for chest pain and palpitations  Gastrointestinal: Positive for abdominal pain and constipation  Negative for blood in stool, diarrhea and nausea  Genitourinary: Negative for dysuria, frequency and hematuria  Musculoskeletal: Negative for arthralgias and joint swelling  Skin: Negative for rash  Allergic/Immunologic: Negative for immunocompromised state  Neurological: Negative for dizziness and headaches  Hematological: Negative for adenopathy  Psychiatric/Behavioral: Negative for confusion and sleep disturbance           Objective:  /78 (BP Location: Left arm, Patient Position: Sitting, Cuff Size: Standard)   Pulse 64   Temp 98 5 °F (36 9 °C)   Resp 15   Ht 5' 4" (1 626 m)   Wt 64 7 kg (142 lb 9 6 oz)   BMI 24 48 kg/m²      Physical Exam Constitutional: She appears well-developed and well-nourished  HENT:   Head: Normocephalic and atraumatic  Right Ear: Tympanic membrane normal    Left Ear: Tympanic membrane normal    Nose: Nose normal    Mouth/Throat: Oropharynx is clear and moist  No posterior oropharyngeal edema or posterior oropharyngeal erythema  Eyes: Pupils are equal, round, and reactive to light  Conjunctivae are normal  Right eye exhibits no discharge  Left eye exhibits no discharge  Neck: Normal range of motion  Neck supple  No thyromegaly present  Cardiovascular: Normal rate, regular rhythm, S1 normal, S2 normal and normal heart sounds  PMI is not displaced  No murmur heard  Pulmonary/Chest: Effort normal and breath sounds normal  No accessory muscle usage  No apnea  No respiratory distress  She has no rhonchi  She has no rales  Abdominal: Soft  Normal appearance and bowel sounds are normal  She exhibits no shifting dullness  There is no hepatosplenomegaly  There is tenderness in the left lower quadrant  There is guarding  There is no rebound and no CVA tenderness  Musculoskeletal: Normal range of motion  She exhibits no edema or tenderness  Lymphadenopathy:     She has no cervical adenopathy  Neurological: She is alert  Skin: Skin is warm and intact  No rash noted  Psychiatric: She has a normal mood and affect  Her speech is normal    Nursing note and vitals reviewed  No results found for this or any previous visit (from the past 1008 hour(s))  ]    No results found

## 2020-01-13 ENCOUNTER — OFFICE VISIT (OUTPATIENT)
Dept: INTERNAL MEDICINE CLINIC | Facility: CLINIC | Age: 75
End: 2020-01-13
Payer: COMMERCIAL

## 2020-01-13 VITALS
RESPIRATION RATE: 16 BRPM | TEMPERATURE: 98.5 F | HEIGHT: 64 IN | BODY MASS INDEX: 24.79 KG/M2 | WEIGHT: 145.2 LBS | DIASTOLIC BLOOD PRESSURE: 77 MMHG | HEART RATE: 65 BPM | SYSTOLIC BLOOD PRESSURE: 132 MMHG

## 2020-01-13 DIAGNOSIS — R20.2 NUMBNESS AND TINGLING OF LEFT ARM AND LEG: ICD-10-CM

## 2020-01-13 DIAGNOSIS — K57.92 ACUTE DIVERTICULITIS: Primary | ICD-10-CM

## 2020-01-13 DIAGNOSIS — E55.9 VITAMIN D DEFICIENCY: ICD-10-CM

## 2020-01-13 DIAGNOSIS — R20.0 NUMBNESS AND TINGLING OF LEFT ARM AND LEG: ICD-10-CM

## 2020-01-13 DIAGNOSIS — K21.9 GASTROESOPHAGEAL REFLUX DISEASE, ESOPHAGITIS PRESENCE NOT SPECIFIED: ICD-10-CM

## 2020-01-13 DIAGNOSIS — M85.80 OSTEOPENIA, UNSPECIFIED LOCATION: ICD-10-CM

## 2020-01-13 DIAGNOSIS — I10 ESSENTIAL HYPERTENSION: ICD-10-CM

## 2020-01-13 DIAGNOSIS — R74.8 ABNORMAL LIVER ENZYMES: ICD-10-CM

## 2020-01-13 PROCEDURE — 3078F DIAST BP <80 MM HG: CPT | Performed by: INTERNAL MEDICINE

## 2020-01-13 PROCEDURE — 3075F SYST BP GE 130 - 139MM HG: CPT | Performed by: INTERNAL MEDICINE

## 2020-01-13 PROCEDURE — 99214 OFFICE O/P EST MOD 30 MIN: CPT | Performed by: INTERNAL MEDICINE

## 2020-01-13 NOTE — ASSESSMENT & PLAN NOTE
Continue omeprazole, seeing GI in February, explained to her that would be better to get a colonoscopy as well to follow-up on the CT scan findings

## 2020-01-13 NOTE — PROGRESS NOTES
Assessment/Plan:  1  Acute diverticulitis    2  Abnormal liver enzymes  -     Comprehensive metabolic panel  -     Gamma GT    3  Essential hypertension  Assessment & Plan:  Well controlled, continue current dose of medications  4  Gastroesophageal reflux disease, esophagitis presence not specified  Assessment & Plan:  Continue omeprazole, seeing GI in February, explained to her that would be better to get a colonoscopy as well to follow-up on the CT scan findings  5  Osteopenia, unspecified location  Assessment & Plan:  Start vitamin-D supplements    Orders:  -     Cholecalciferol (VITAMIN D3) 1 25 MG (42694 UT) TABS; Take 1 tablet (50,000 Units total) by mouth once a week for 12 doses    6  Vitamin D deficiency  -     Cholecalciferol (VITAMIN D3) 1 25 MG (06630 UT) TABS; Take 1 tablet (50,000 Units total) by mouth once a week for 12 doses    7  Numbness and tingling of left arm and leg  Assessment & Plan:  Seeing neurology soon          Subjective:   Chief Complaint   Patient presents with    Follow-up     1 week        Patient ID: Wilton Stephens is a 69 y o  female  She comes in for a 1 week follow-up for diverticulitis  She notes that the pain is improved but not completely gone  Tolerating p o  Diet but does not have much of an appetite  She is wondering if she should take any appetites sin stimulants  No fevers, bowel movements are within normal limits  Tolerating the antibiotics okay except for mild burning sensation after taking Flagyl  The following portions of the patient's history were reviewed and updated as appropriate: current medications, past medical history, past social history and past surgical history      PHQ-9 Depression Screening    PHQ-9:    Frequency of the following problems over the past two weeks:                Current Outpatient Medications:     acetaminophen (TYLENOL) 325 mg tablet, Take 650 mg by mouth every 6 (six) hours as needed for mild pain, Disp: , Rfl:     aspirin 81 MG tablet, Take 1 tablet (81 mg total) by mouth daily, Disp: 90 tablet, Rfl: 1    atorvastatin (LIPITOR) 40 mg tablet, Take 1 tablet (40 mg total) by mouth daily, Disp: 90 tablet, Rfl: 1    ciprofloxacin (CIPRO) 500 mg tablet, Take 1 tablet (500 mg total) by mouth every 12 (twelve) hours for 7 days, Disp: 14 tablet, Rfl: 0    diclofenac sodium (VOLTAREN) 1 %, Apply 2 g topically 4 (four) times a day, Disp: 1 Tube, Rfl: 2    fluticasone (FLONASE) 50 mcg/act nasal spray, 1 spray into each nostril every 12 (twelve) hours as needed for rhinitis, Disp: 1 Bottle, Rfl: 0    gabapentin (NEURONTIN) 100 mg capsule, Take 1 capsule (100 mg total) by mouth 3 (three) times a day, Disp: 90 capsule, Rfl: 1    losartan (COZAAR) 25 mg tablet, Take 1 tablet (25 mg total) by mouth daily, Disp: 90 tablet, Rfl: 1    metoprolol succinate (TOPROL-XL) 25 mg 24 hr tablet, Take 1 tablet (25 mg total) by mouth daily, Disp: 90 tablet, Rfl: 1    metoprolol succinate (TOPROL-XL) 50 mg 24 hr tablet, Take 1 tablet (50 mg total) by mouth daily, Disp: 90 tablet, Rfl: 1    metroNIDAZOLE (FLAGYL) 500 mg tablet, Take 1 tablet (500 mg total) by mouth every 8 (eight) hours for 7 days, Disp: 21 tablet, Rfl: 0    Multiple Vitamin (MULTIVITAMIN) capsule, Take 1 capsule by mouth daily, Disp: , Rfl:     omeprazole (PriLOSEC) 40 MG capsule, Take 1 capsule (40 mg total) by mouth daily, Disp: 90 capsule, Rfl: 1    Cholecalciferol (VITAMIN D3) 1 25 MG (11290 UT) TABS, Take 1 tablet (50,000 Units total) by mouth once a week for 12 doses, Disp: 12 tablet, Rfl: 0    Review of Systems   Constitutional: Negative for fatigue, fever and unexpected weight change  HENT: Negative for ear pain, hearing loss and sore throat  Eyes: Negative for pain and discharge  Respiratory: Negative for cough, chest tightness and shortness of breath  Cardiovascular: Negative for chest pain and palpitations     Gastrointestinal: Positive for abdominal pain  Negative for blood in stool, constipation, diarrhea and nausea  Genitourinary: Negative for dysuria, frequency and hematuria  Musculoskeletal: Negative for arthralgias and joint swelling  Skin: Negative for rash  Allergic/Immunologic: Negative for immunocompromised state  Neurological: Negative for dizziness and headaches  Hematological: Negative for adenopathy  Psychiatric/Behavioral: Negative for confusion and sleep disturbance  Objective:  /77 (BP Location: Left arm, Patient Position: Sitting, Cuff Size: Standard)   Pulse 65   Temp 98 5 °F (36 9 °C)   Resp 16   Ht 5' 4" (1 626 m)   Wt 65 9 kg (145 lb 3 2 oz)   BMI 24 92 kg/m²      Physical Exam   Constitutional: She appears well-developed and well-nourished  HENT:   Head: Normocephalic and atraumatic  Right Ear: Tympanic membrane normal    Left Ear: Tympanic membrane normal    Nose: Nose normal    Mouth/Throat: Oropharynx is clear and moist  No posterior oropharyngeal edema or posterior oropharyngeal erythema  Eyes: Pupils are equal, round, and reactive to light  Conjunctivae are normal  Right eye exhibits no discharge  Left eye exhibits no discharge  Neck: Normal range of motion  Neck supple  No thyromegaly present  Cardiovascular: Normal rate, regular rhythm, S1 normal and S2 normal  PMI is not displaced  Murmur heard  Diastolic murmur is present with a grade of 2/6  Pulmonary/Chest: Effort normal and breath sounds normal  No accessory muscle usage  No apnea  No respiratory distress  She has no rhonchi  She has no rales  Abdominal: Soft  Normal appearance and bowel sounds are normal  She exhibits no shifting dullness  There is no hepatosplenomegaly  There is tenderness  There is no rebound and no CVA tenderness  Musculoskeletal: Normal range of motion  She exhibits no edema or tenderness  Lymphadenopathy:     She has no cervical adenopathy  Neurological: She is alert     Skin: Skin is warm and intact  No rash noted  Psychiatric: She has a normal mood and affect  Her speech is normal    Nursing note and vitals reviewed          Recent Results (from the past 1008 hour(s))   Lipid Panel with Direct LDL reflex    Collection Time: 01/06/20 12:03 PM   Result Value Ref Range    Cholesterol 178 <200 mg/dL    Triglycerides 123 <150 mg/dL    HDL, Direct 52 >=40 mg/dL    LDL Calculated 101 <130 mg/dL   Vitamin D 25 hydroxy    Collection Time: 01/06/20 12:03 PM   Result Value Ref Range    Vit D, 25-Hydroxy 20 7 (L) 30 0 - 100 0 ng/mL   Hemoglobin A1C    Collection Time: 01/06/20 12:03 PM   Result Value Ref Range    Hemoglobin A1C 5 8 4 2 - 6 3 %     mg/dl   UA w Reflex to Microscopic w Reflex to Culture    Collection Time: 01/06/20 12:03 PM   Result Value Ref Range    Color, UA Straw Straw, Yellow, Pale Yellow    Clarity, UA Clear Clear, Other    Specific [place], UA 1 010 1 003 - 1 040    pH, UA 7 0 4 5, 5 0, 5 5, 6 0, 6 5, 7 0, 7 5, 8 0    Leukocytes, UA Negative Negative    Nitrite, UA Negative Negative    Protein, UA Negative Negative mg/dl    Glucose, UA Negative Negative mg/dl    Ketones, UA Negative Negative mg/dl    Bilirubin, UA Negative Negative    Blood, UA Negative Negative    UROBILINOGEN UA Negative 1 0, Negative mg/dL   Comprehensive metabolic panel    Collection Time: 01/06/20 12:03 PM   Result Value Ref Range    Sodium 139 137 - 147 mmol/L    Potassium 4 1 3 6 - 5 0 mmol/L    Chloride 101 97 - 108 mmol/L    CO2 27 22 - 30 mmol/L    ANION GAP 11 5 - 14 mmol/L    BUN 10 5 - 25 mg/dL    Creatinine 0 61 0 60 - 1 20 mg/dL    Glucose 99 70 - 99 mg/dL    Glucose, Fasting 99 70 - 99 mg/dL    Calcium 10 1 8 4 - 10 2 mg/dL    AST 71 (H) 14 - 36 U/L    ALT 95 (H) 9 - 52 U/L    Alkaline Phosphatase 218 (H) 43 - 122 U/L    Total Protein 8 1 5 9 - 8 4 g/dL    Albumin 4 4 3 0 - 5 2 g/dL    Total Bilirubin 0 50 <1 30 mg/dL    eGFR 91 >60 ml/min/1 73sq m   CBC and differential    Collection Time: 01/06/20 12:11 PM   Result Value Ref Range    WBC 7 50 4 50 - 11 00 Thousand/uL    RBC 4 16 4 00 - 5 20 Million/uL    Hemoglobin 13 1 12 0 - 16 0 g/dL    Hematocrit 39 3 36 0 - 46 0 %    MCV 94 80 - 100 fL    MCH 31 5 26 0 - 34 0 pg    MCHC 33 4 31 0 - 36 0 g/dL    RDW 13 5 <15 3 %    MPV 9 1 8 9 - 12 7 fL    Platelets 728 964 - 952 Thousands/uL    Neutrophils Relative 65 45 - 65 %    Lymphocytes Relative 24 (L) 25 - 45 %    Monocytes Relative 9 1 - 10 %    Eosinophils Relative 1 0 - 6 %    Basophils Relative 1 0 - 1 %    Neutrophils Absolute 4 90 1 80 - 7 80 Thousands/µL    Lymphocytes Absolute 1 80 0 50 - 4 00 Thousands/µL    Monocytes Absolute 0 70 0 20 - 0 90 Thousand/µL    Eosinophils Absolute 0 10 0 00 - 0 40 Thousand/µL    Basophils Absolute 0 00 0 00 - 0 10 Thousands/µL   ]    Procedure: Ct Abdomen Pelvis W Contrast    Result Date: 1/6/2020  Narrative: CT ABDOMEN AND PELVIS WITH IV CONTRAST INDICATION:   R10 32: Left lower quadrant pain  COMPARISON:  10/27/2017 TECHNIQUE:  CT examination of the abdomen and pelvis was performed  Axial, sagittal, and coronal 2D reformatted images were created from the source data and submitted for interpretation  Radiation dose length product (DLP) for this visit:  581 mGy-cm   This examination, like all CT scans performed in the [facility], was performed utilizing techniques to minimize radiation dose exposure, including the use of iterative reconstruction and automated exposure control  IV Contrast:  80 mL of iohexol (OMNIPAQUE) Enteric Contrast:  Enteric contrast was not administered  FINDINGS: ABDOMEN LOWER CHEST:  Atelectatic changes both lung bases  LIVER/BILIARY TREE:  Numerous hepatic cysts very similar to prior exam GALLBLADDER:  No calcified gallstones  No pericholecystic inflammatory change  SPLEEN:  Unremarkable   PANCREAS:  Tiny cyst interposed between the pancreatic body and left hepatic lobe measuring 6 mm image 2/20, ADRENAL GLANDS: Unremarkable  KIDNEYS/URETERS:  Left renal cyst  STOMACH AND BOWEL:  Wall thickening and pericolonic inflammatory changes proximal sigmoid colon image 2/52 in the left lower abdomen  Findings most compatible with acute diverticulitis  No eliane perforation, abscess, or obstruction  APPENDIX:  No findings to suggest appendicitis  ABDOMINOPELVIC CAVITY:  No ascites or free intraperitoneal air  No lymphadenopathy  VESSELS:  Calcified atherosclerotic plaque  No aneurysm PELVIS REPRODUCTIVE ORGANS:  Pelvic congestion suspected  Endometrium measures up to 4 mm  URINARY BLADDER:  Unremarkable  ABDOMINAL WALL/INGUINAL REGIONS:  Tiny fat-containing umbilical hernia  OSSEOUS STRUCTURES:  Multilevel degenerative changes  Stable tiny sclerotic lesion right ninth rib  Stable sclerotic lesion in the right lower sacrum     Impression: Findings most compatible with mild acute diverticulitis affecting proximal sigmoid colon  No eliane perforation, abscess, or obstruction  Follow-up recommended in several weeks following treatment to ensure resolution since inflammatory colon carcinoma can have a similar appearance  The study was marked in [facility] for immediate notification  The study was marked in EPIC for significant notification   Workstation performed: [id number]

## 2020-01-31 ENCOUNTER — TELEPHONE (OUTPATIENT)
Dept: NEUROLOGY | Facility: CLINIC | Age: 75
End: 2020-01-31

## 2020-01-31 ENCOUNTER — OFFICE VISIT (OUTPATIENT)
Dept: GASTROENTEROLOGY | Facility: CLINIC | Age: 75
End: 2020-01-31
Payer: COMMERCIAL

## 2020-01-31 VITALS
SYSTOLIC BLOOD PRESSURE: 144 MMHG | TEMPERATURE: 96.7 F | DIASTOLIC BLOOD PRESSURE: 81 MMHG | WEIGHT: 143 LBS | HEART RATE: 65 BPM | BODY MASS INDEX: 24.41 KG/M2 | HEIGHT: 64 IN

## 2020-01-31 DIAGNOSIS — K21.9 GASTROESOPHAGEAL REFLUX DISEASE, ESOPHAGITIS PRESENCE NOT SPECIFIED: ICD-10-CM

## 2020-01-31 DIAGNOSIS — K86.2 CYST OF PANCREAS: ICD-10-CM

## 2020-01-31 DIAGNOSIS — K57.92 DIVERTICULITIS: Primary | ICD-10-CM

## 2020-01-31 PROCEDURE — 4040F PNEUMOC VAC/ADMIN/RCVD: CPT | Performed by: INTERNAL MEDICINE

## 2020-01-31 PROCEDURE — 1160F RVW MEDS BY RX/DR IN RCRD: CPT | Performed by: INTERNAL MEDICINE

## 2020-01-31 PROCEDURE — 99204 OFFICE O/P NEW MOD 45 MIN: CPT | Performed by: INTERNAL MEDICINE

## 2020-01-31 RX ORDER — SODIUM, POTASSIUM,MAG SULFATES 17.5-3.13G
180 SOLUTION, RECONSTITUTED, ORAL ORAL ONCE
Qty: 180 ML | Refills: 0 | Status: SHIPPED | OUTPATIENT
Start: 2020-01-31 | End: 2020-04-16

## 2020-01-31 RX ORDER — FAMOTIDINE 20 MG/1
20 TABLET, FILM COATED ORAL
Qty: 60 TABLET | Refills: 2 | Status: SHIPPED | OUTPATIENT
Start: 2020-01-31 | End: 2020-03-01

## 2020-01-31 NOTE — PATIENT INSTRUCTIONS
EGD/colonoscopy scheduled 3/31/20 with Dr Cj Locke at [facility]  Suprep instructions given to pt by Kandy SANTIAGO during OV     Number for central scheduling given to pt to schedule MRI in 1 year per pt request

## 2020-01-31 NOTE — TELEPHONE ENCOUNTER
1/31/2020 AT 2:22PM SPOKE W/BEATRIS AT [facility]- INS REF IS REQ-CALL REF# [id number]  1/31/2020 AT 2:42 PM SPOKE W/BIANCA AT PCP OFFICE-REQ HUMAN INS REF     2/4/2020 AT 11:52AM SPOKE W/BIANCA AT PCP OFFICE-PER BIANCA-SHE SPOKE W/ESPINOZA AT [facility] ON 1- AT 2:50PM  PER ESPINOZA AT [facility]-PA OFFICE DOES NOT REQ  INS REF  (CALL [id number])

## 2020-01-31 NOTE — PROGRESS NOTES
[facility] - Outpatient Consultation  Melia Lopes 69 y o  female MRN: [id number]  Encounter: [id number]          ASSESSMENT AND PLAN:      1  Diverticulitis  There is increased risk of malignancy in areas of diverticulitis  Patient had colonoscopy 3 years ago but should have re-evaluation for recent bout of diverticulitis to rule out malignancy  - Colonoscopy; Future  - SUPR BOWEL PREP KIT 17 5-3 13-1 6 GM/177ML SOLN; Take 180 mL by mouth once for 1 dose  Dispense: 180 mL; Refill: 0    The rationale for colonoscopy with possible biopsy, possible polypectomy, with IV sedation as well as all risks, benefits, and alternatives were discussed with the patient in detail  Risks including but not limited to perforation, bleeding, need for blood transfusion or emergent surgery, and missed neoplasm were reviewed in detail with the patient  The patient demonstrates full understanding and wishes to proceed  2  Cyst of pancreas  Suggest MR/MRCP in 1 year for surveillance of the cyst   - MRI abdomen w wo contrast and mrcp; Future    3  Gastroesophageal reflux disease, esophagitis presence not specified  Given that she wants to avoid PPI, will prescribe pepcid to take at nightime  Will also proceed with EGD to further evaluate  Discussed lifestyle interventions for reducing reflux including:  a  Loss of a few pounds to help reduce heartburn and regurgitation  b  Avoid late night meals  c  Elevate head of bed by 30 degrees  d  Decrease coffee and chocolate intake, decrease foods with citric acid    - famotidine (PEPCID) 20 mg tablet; Take 1 tablet (20 mg total) by mouth daily at bedtime  Dispense: 60 tablet; Refill: 2  - EGD; Future      ______________________________________________________________    HPI:  Melia Lopes is a 69y o  year old female who presents to the office for evaluation of diverticulitis      She presented to her PCP with LLQ pain and had a CT which revealed thickening of the colon in the sigmoid consistent with diverticulitis  I have personally viewed the images in PACS  She was given antibiotics and sent home  Currently the pain is almost gone and she feels well  She is able to eat now and prior to this she was not eating at all  She does have GERD and the reflux is severe in the evenings when she sleeps  She tried taking omeprazole because of the concern for osteoporosis  She does not take an H2 blocker  They have a past medical history of osteoarthirtis, pre diabetes, GERD, HTN and follows with their PCP Dr Maritza Guevara MD    REVIEW OF SYSTEMS:    CONSTITUTIONAL: Denies any fever, chills, rigors, and weight loss  HEENT: No earache or tinnitus  Denies hearing loss or visual disturbances  CARDIOVASCULAR: No chest pain or palpitations  RESPIRATORY: Denies any cough, hemoptysis, shortness of breath or dyspnea on exertion  GASTROINTESTINAL: As noted in the History of Present Illness  GENITOURINARY: No problems with urination  Denies any hematuria or dysuria  NEUROLOGIC: No dizziness or vertigo, denies headaches  MUSCULOSKELETAL: Denies any muscle or joint pain  SKIN: Denies skin rashes or itching  ENDOCRINE: Denies excessive thirst  Denies intolerance to heat or cold  PSYCHOSOCIAL: Denies depression or anxiety  Denies any recent memory loss         Historical Information   Past Medical History:   Diagnosis Date    Diverticulosis     Hypertension      Past Surgical History:   Procedure Laterality Date    COLONOSCOPY      TUBAL LIGATION      UPPER GASTROINTESTINAL ENDOSCOPY       Social History   Social History     Substance and Sexual Activity   Alcohol Use No     Social History     Substance and Sexual Activity   Drug Use No     Social History     Tobacco Use   Smoking Status Never Smoker   Smokeless Tobacco Never Used     Family History   Problem Relation Age of Onset    Diabetes Mother     Hypertension Mother    [facility] Hypertension Father     Stroke Father     No Known Problems Sister     No Known Problems Brother        Meds/Allergies       Current Outpatient Medications:     acetaminophen (TYLENOL) 325 mg tablet    aspirin 81 MG tablet    atorvastatin (LIPITOR) 40 mg tablet    Cholecalciferol (VITAMIN D3) 1 25 MG (77455 UT) TABS    diclofenac sodium (VOLTAREN) 1 %    fluticasone (FLONASE) 50 mcg/act nasal spray    gabapentin (NEURONTIN) 100 mg capsule    losartan (COZAAR) 25 mg tablet    metoprolol succinate (TOPROL-XL) 25 mg 24 hr tablet    metoprolol succinate (TOPROL-XL) 50 mg 24 hr tablet    Multiple Vitamin (MULTIVITAMIN) capsule    omeprazole (PriLOSEC) 40 MG capsule    Allergies   Allergen Reactions    Other      Seafood    Other Nausea Only     SEAFOOD    Shellfish-Derived Products     Lisinopril Cough           Objective     Blood pressure 144/81, pulse 65, temperature (!) 96 7 °F (35 9 °C), temperature source Tympanic, height 5' 4" (1 626 m), weight 64 9 kg (143 lb)  Body mass index is 24 55 kg/m²  PHYSICAL EXAM:      General Appearance:   Alert, cooperative, no distress   HEENT:   Normocephalic, atraumatic, anicteric  Neck:  Supple, symmetrical, trachea midline   Lungs:   Clear to auscultation bilaterally; no rales, rhonchi or wheezing; respirations unlabored    Heart[de-identified]   Regular rate and rhythm; no murmur, rub, or gallop  Abdomen:   Soft, non-tender, non-distended; normal bowel sounds; no masses, no organomegaly    Genitalia:   Deferred    Rectal:   Deferred    Extremities:  No cyanosis, clubbing or edema    Pulses:  2+ and symmetric    Skin:  No jaundice, rashes, or lesions    Lymph nodes:  No palpable cervical lymphadenopathy        Lab Results:   No visits with results within 1 Day(s) from this visit     Latest known visit with results is:   Hospital Outpatient Visit on 01/06/2020   Component Date Value    Sodium 01/06/2020 139     Potassium 01/06/2020 4 1     Chloride 01/06/2020 101     CO2 01/06/2020 27     ANION GAP 01/06/2020 11     BUN 01/06/2020 10     Creatinine 01/06/2020 0 61     Glucose 01/06/2020 99     Glucose, Fasting 01/06/2020 99     Calcium 01/06/2020 10 1     AST 01/06/2020 71*    ALT 01/06/2020 95*    Alkaline Phosphatase 01/06/2020 218*    Total Protein 01/06/2020 8 1     Albumin 01/06/2020 4 4     Total Bilirubin 01/06/2020 0 50     eGFR 01/06/2020 91          Radiology Results:   Ct Abdomen Pelvis W Contrast    Result Date: 1/6/2020  Narrative: CT ABDOMEN AND PELVIS WITH IV CONTRAST INDICATION:   R10 32: Left lower quadrant pain  COMPARISON:  10/27/2017 TECHNIQUE:  CT examination of the abdomen and pelvis was performed  Axial, sagittal, and coronal 2D reformatted images were created from the source data and submitted for interpretation  Radiation dose length product (DLP) for this visit:  581 mGy-cm   This examination, like all CT scans performed in the [facility], was performed utilizing techniques to minimize radiation dose exposure, including the use of iterative reconstruction and automated exposure control  IV Contrast:  80 mL of iohexol (OMNIPAQUE) Enteric Contrast:  Enteric contrast was not administered  FINDINGS: ABDOMEN LOWER CHEST:  Atelectatic changes both lung bases  LIVER/BILIARY TREE:  Numerous hepatic cysts very similar to prior exam GALLBLADDER:  No calcified gallstones  No pericholecystic inflammatory change  SPLEEN:  Unremarkable  PANCREAS:  Tiny cyst interposed between the pancreatic body and left hepatic lobe measuring 6 mm image 2/20, ADRENAL GLANDS:  Unremarkable  KIDNEYS/URETERS:  Left renal cyst  STOMACH AND BOWEL:  Wall thickening and pericolonic inflammatory changes proximal sigmoid colon image 2/52 in the left lower abdomen  Findings most compatible with acute diverticulitis  No eliane perforation, abscess, or obstruction  APPENDIX:  No findings to suggest appendicitis   ABDOMINOPELVIC CAVITY:  No ascites or free intraperitoneal air  No lymphadenopathy  VESSELS:  Calcified atherosclerotic plaque  No aneurysm PELVIS REPRODUCTIVE ORGANS:  Pelvic congestion suspected  Endometrium measures up to 4 mm  URINARY BLADDER:  Unremarkable  ABDOMINAL WALL/INGUINAL REGIONS:  Tiny fat-containing umbilical hernia  OSSEOUS STRUCTURES:  Multilevel degenerative changes  Stable tiny sclerotic lesion right ninth rib  Stable sclerotic lesion in the right lower sacrum     Impression: Findings most compatible with mild acute diverticulitis affecting proximal sigmoid colon  No eliane perforation, abscess, or obstruction  Follow-up recommended in several weeks following treatment to ensure resolution since inflammatory colon carcinoma can have a similar appearance  The study was marked in [facility] for immediate notification  The study was marked in EPIC for significant notification   Workstation performed: [id number]

## 2020-02-05 ENCOUNTER — CONSULT (OUTPATIENT)
Dept: NEUROLOGY | Facility: CLINIC | Age: 75
End: 2020-02-05
Payer: COMMERCIAL

## 2020-02-05 VITALS
WEIGHT: 145 LBS | DIASTOLIC BLOOD PRESSURE: 84 MMHG | BODY MASS INDEX: 24.75 KG/M2 | SYSTOLIC BLOOD PRESSURE: 140 MMHG | HEIGHT: 64 IN | RESPIRATION RATE: 16 BRPM | HEART RATE: 68 BPM

## 2020-02-05 DIAGNOSIS — R20.2 NUMBNESS AND TINGLING OF LEFT ARM AND LEG: Primary | ICD-10-CM

## 2020-02-05 DIAGNOSIS — F32.89 OTHER DEPRESSION: ICD-10-CM

## 2020-02-05 DIAGNOSIS — R20.0 NUMBNESS AND TINGLING OF LEFT ARM AND LEG: Primary | ICD-10-CM

## 2020-02-05 PROCEDURE — 3077F SYST BP >= 140 MM HG: CPT | Performed by: PSYCHIATRY & NEUROLOGY

## 2020-02-05 PROCEDURE — 99204 OFFICE O/P NEW MOD 45 MIN: CPT | Performed by: PSYCHIATRY & NEUROLOGY

## 2020-02-05 PROCEDURE — 3079F DIAST BP 80-89 MM HG: CPT | Performed by: PSYCHIATRY & NEUROLOGY

## 2020-02-05 PROCEDURE — 1036F TOBACCO NON-USER: CPT | Performed by: PSYCHIATRY & NEUROLOGY

## 2020-02-05 PROCEDURE — 3008F BODY MASS INDEX DOCD: CPT | Performed by: PSYCHIATRY & NEUROLOGY

## 2020-02-05 PROCEDURE — 1160F RVW MEDS BY RX/DR IN RCRD: CPT | Performed by: PSYCHIATRY & NEUROLOGY

## 2020-02-05 PROCEDURE — 4040F PNEUMOC VAC/ADMIN/RCVD: CPT | Performed by: PSYCHIATRY & NEUROLOGY

## 2020-02-05 RX ORDER — CITALOPRAM 10 MG/1
10 TABLET ORAL DAILY
Qty: 30 TABLET | Refills: 2 | Status: SHIPPED | OUTPATIENT
Start: 2020-02-05 | End: 2020-04-21

## 2020-02-05 NOTE — ASSESSMENT & PLAN NOTE
The tingling of the left arm is more predominantly distally and more predominantly involving the 1st three digits  This is suggested of carpal tunnel syndrome which could be aggravated by the great deal of activity she performs at home  I have asked her to utilize left brace  Her clinical symptoms today as well as her examination today does not suggest the presence of a cortical ischemia

## 2020-02-05 NOTE — PROGRESS NOTES
Patient ID: Kim Fisher is a 69 y o  female  Assessment/Plan:    Carpal tunnel syndrome of left wrist  Patient's symptoms are consistent with it  She is to utilize a wrist brace    Numbness and tingling of left arm and leg  The tingling of the left arm is more predominantly distally and more predominantly involving the 1st three digits  This is suggested of carpal tunnel syndrome which could be aggravated by the great deal of activity she performs at home  I have asked her to utilize left brace  Her clinical symptoms today as well as her examination today does not suggest the presence of a cortical ischemia  This is a 43-year-old woman who presents for evaluation of complaints of numbness   however on further questioning it seems to be a different type complaints  The symptoms on face included tugging sensation in her eye as as tingling mouth  This can be seen in patients with stress and anxiety   She did have an MRI which failed to reveal any evidence of an acute ischemic event  There is no evidence on her clinical exam today ischemic event  The symptoms on the left hand are suggestive of carpal tunnel syndrome which was confirmed by EMG testing  I did ask her to utilize  I have also asked her to reduce the amount heavy cleaning she performs  This could help reduce her symptoms  Her examination the left leg failed to reveal any evidence of a neuropathy  Given high arches and a lack this may be more of compression of the foot in the top dorsum region  A podiatry evaluation may be helpful  There is no clinical symptoms or neurological exam is suggestive of a radiculopathy  Plan 1  I have asked the patient to discontinue the gabapentin as this is causing cognitive side effects and was ineffective  She had actually stopped this medicine three weeks previously  2   A vitamin-D level was and was low at 20  She was asked to start on vitamin D which she has not    I have informed her that the vitamin-D deficiency can cause multiple symptoms of fatigue and numbness joint pain  3   I did order and magnesium level  4   I have recommended that she reduce her stress and an due accept some help at home  5   I have started her on Celexa 10 mg a day  She does seem to have a component of depression and anxiety which could be contributing to her symptomatology  If she has any more new symptomatology including hemiparesis hemisensory loss,  loss of vision she is to inform our offices and may need acute medical attention  She is to return to our offices in several months but if she has any other new questions or problems in the interim she is certainly call     Diagnoses and all orders for this visit:    Numbness and tingling of left arm and leg  -     Magnesium; Future    Other depression  -     citalopram (CeleXA) 10 mg tablet; Take 1 tablet (10 mg total) by mouth daily         Subjective:    71 y/o right-handed woman who presents in a new patient appointment  She is accompanied by her niece Kimberly Manuel who also provides history  She is [place]-speaking she presented in July of 2019 with a sudden onset of numbness  She   described at the numbness beginning in her left side of her face , involving the area near her left eye and above her left lip  She describes tugging sensation  She also describes numbness in the bottom of the left foot  It occurs from the toes to the mid section of the left foot  The foot and I symptoms occurred is at the same time however then this was then by numbness and tingling involving in the 1st three digits left hand  She is on further questioning she reports more like a achy sensation in the left foot  She describes that the left foot is  heavy  The left hand involves the entire left hand but more predominantly the 1st three digits from the wrist down to the fingers    She also describes is tugging sensation behind her left eye including twitching sensation of the left muscles  There is no other numbness and noted in the left side of the face or her left upper leg  Due to the symptoms she was seen in the emergency at [facility] and underwent a CT scan of the head that was negative  She does not thought to be a tPA candidate  Echocardiogram showed no evidence of a thrombus and 60% EF  Carotid Doppler showed less than 50% stenosis  A MRI imaging study was performed of the brain on 08/15  It demonstrated no evidence of a stroke but MS demonstrated microangiopathic changes  She was started on aspirin as well Lipitor  She reports in his since her ER visit the symptoms have been increasing  Today she informs me that she stop utilizing the gabapentin three weeks ago  she also stop the Lipitor several weeks ago  She does utilize aspirin daily  She  does also report increase in stressors at home  her  has leukemia     She did undergo vitamin B12 level that was normal she did have a vitamin D level that was abnormal at 20 and she was started on vitamin D several weeks ago  She has not started the prescription yet    She was also evaluated by Ophthalmology who reports they were unable to find any abnormalities  Unfortunately does reports were unavailable at today's visit    Other complaints include dizziness when she puts her head forward  She denies any symptoms on the right  She did have an EMG study performed in November of 219  It demonstrated no evidence of a neuropathy but did demonstrate mild left carpal tunnel syndrome  MRI of brain White matter changes suggestive of chronic microangiopathy  No acute intracranial pathology  08/15/19     She did have laboratory studies performed in January which showed elevation of her LFTs  GGT is pending    She did complain of a swishing sound regarding her hearing                  The following portions of the patient's history were reviewed and updated as appropriate:   She has a past medical history of Diverticulosis and Hypertension  She  has a past surgical history that includes Tubal ligation; Colonoscopy; and Upper gastrointestinal endoscopy  Her family history includes Diabetes in her mother; Hypertension in her father and mother; No Known Problems in her brother and sister; Stroke in her father  She  reports that she has never smoked  She has never used smokeless tobacco  She reports that she does not drink alcohol or use drugs    Current Outpatient Medications   Medication Sig Dispense Refill    acetaminophen (TYLENOL) 325 mg tablet Take 650 mg by mouth every 6 (six) hours as needed for mild pain      aspirin 81 MG tablet Take 1 tablet (81 mg total) by mouth daily 90 tablet 1    atorvastatin (LIPITOR) 40 mg tablet Take 1 tablet (40 mg total) by mouth daily 90 tablet 1    Cholecalciferol (VITAMIN D3) 1 25 MG (19004 UT) TABS Take 1 tablet (50,000 Units total) by mouth once a week for 12 doses 12 tablet 0    diclofenac sodium (VOLTAREN) 1 % Apply 2 g topically 4 (four) times a day 1 Tube 2    famotidine (PEPCID) 20 mg tablet Take 1 tablet (20 mg total) by mouth daily at bedtime 60 tablet 2    fluticasone (FLONASE) 50 mcg/act nasal spray 1 spray into each nostril every 12 (twelve) hours as needed for rhinitis 1 Bottle 0    gabapentin (NEURONTIN) 100 mg capsule Take 1 capsule (100 mg total) by mouth 3 (three) times a day 90 capsule 1    losartan (COZAAR) 25 mg tablet Take 1 tablet (25 mg total) by mouth daily 90 tablet 1    metoprolol succinate (TOPROL-XL) 25 mg 24 hr tablet Take 1 tablet (25 mg total) by mouth daily 90 tablet 1    metoprolol succinate (TOPROL-XL) 50 mg 24 hr tablet Take 1 tablet (50 mg total) by mouth daily 90 tablet 1    Multiple Vitamin (MULTIVITAMIN) capsule Take 1 capsule by mouth daily      omeprazole (PriLOSEC) 40 MG capsule Take 1 capsule (40 mg total) by mouth daily 90 capsule 1    citalopram (CeleXA) 10 mg tablet Take 1 tablet (10 mg total) by mouth daily 30 tablet 2    SUPREP BOWEL PREP KIT 17 5-3 13-1 6 GM/177ML SOLN Take 180 mL by mouth once for 1 dose 180 mL 0     No current facility-administered medications for this visit  She is allergic to other; other; shellfish-derived products; and lisinopril            Objective:    Blood pressure 140/84, pulse 68, resp  rate 16, height 5' 4" (1 626 m), weight 65 8 kg (145 lb)  Physical Exam   Constitutional: She appears well-developed  HENT:   Head: Normocephalic  Eyes: Pupils are equal, round, and reactive to light  Lids are normal    Cardiovascular: Normal rate  Pulmonary/Chest: Effort normal    Abdominal: Soft  Neurological: She has normal strength  Reflex Scores:       Tricep reflexes are 1+ on the right side and 1+ on the left side  Bicep reflexes are 1+ on the right side and 1+ on the left side  Patellar reflexes are 2+ on the right side and 2+ on the left side  Achilles reflexes are 2+ on the right side and 2+ on the left side  Psychiatric: Her speech is normal    Vitals reviewed  Neurological Exam  Mental Status   Oriented to person, place, time and situation  Speech is normal  Language is fluent with no aphasia  She had a flattened affect  She also seemed to some anxiety  Cranial Nerves  CN II: Visual acuity is normal  Visual fields full to confrontation  CN III, IV, VI: Extraocular movements intact bilaterally  Normal lids and orbits bilaterally  Pupils equal round and reactive to light bilaterally  CN V: Facial sensation is normal   CN VII: Full and symmetric facial movement  CN VIII: Hearing is normal   CN IX, X: Palate elevates symmetrically  Normal gag reflex  CN XI: Shoulder shrug strength is normal   CN XII: Tongue midline without atrophy or fasciculations  On examination light touch was symmetric on the left side of the face  This also included above her lip    She did complain of some tugging sensation around left eye but no pain     Motor  Normal muscle bulk throughout  No fasciculations present  Normal muscle tone  Strength is 5/5 throughout all four extremities  Fine  finger movements and toe tapping were normal   She did have high arches  She also had atrophy of various distal muscles  Sensory  Light touch pinprick were normal there is a diminution sensation in the 1st digit of hand  There is no evidence of a Tinel or Phalens sign  No atrophy was noted of the APB  Pinprick, proprioception and vibratory sensation were normal   Vibratory sensation lasted approximately 10 seconds at  Reflexes                                           Right                      Left  Biceps                                 1+                         1+  Triceps                                1+                         1+  Patellar                                2+                         2+  Achilles                                2+                         2+  Plantar                           Downgoing                Downgoing    Right pathological reflexes: Nina's absent  Left pathological reflexes: Nina's absent  Coordination  Right: Finger-to-nose normal  Heel-to-shin normal   Left: Finger-to-nose normal  Heel-to-shin normal     Gait  Casual gait: Able to rise from chair without using arms  She had no evidence of a Romberg sign  FN finger movements and toe tapping are equal   She was able to tandem gait       Review of systems obtained from the medical assistant as below was reviewed with the patient at today's visit    ROS:    Review of Systems   Constitutional: Positive for appetite change  Negative for fever  HENT: Positive for tinnitus  Negative for hearing loss, trouble swallowing and voice change  Eyes: Negative  Negative for photophobia and pain  Double vision     Respiratory: Positive for cough and shortness of breath  Cardiovascular: Positive for palpitations  Gastrointestinal: Negative    Negative for nausea and vomiting  Endocrine: Negative  Negative for cold intolerance and heat intolerance  Hair loss     Genitourinary: Negative  Negative for dysuria, frequency and urgency  Musculoskeletal: Positive for arthralgias, gait problem and myalgias  Negative for neck pain  Pain while walking  Difficulty walking     Skin: Positive for rash  Neurological: Positive for dizziness, numbness and headaches  Negative for tremors, seizures, syncope, facial asymmetry, speech difficulty, weakness and light-headedness  Memory problems  Facial numbness  Blackouts     Hematological: Negative  Does not bruise/bleed easily  Psychiatric/Behavioral: Positive for confusion and sleep disturbance  Negative for hallucinations  The patient is nervous/anxious

## 2020-02-27 DIAGNOSIS — I10 ESSENTIAL HYPERTENSION: ICD-10-CM

## 2020-02-27 RX ORDER — METOPROLOL SUCCINATE 25 MG/1
TABLET, EXTENDED RELEASE ORAL
Qty: 90 TABLET | Refills: 1 | Status: SHIPPED | OUTPATIENT
Start: 2020-02-27 | End: 2020-04-16 | Stop reason: SDUPTHER

## 2020-03-01 RX ORDER — FAMOTIDINE 20 MG/1
TABLET, FILM COATED ORAL
Qty: 90 TABLET | Refills: 0 | Status: SHIPPED | OUTPATIENT
Start: 2020-03-01 | End: 2020-04-16 | Stop reason: SDUPTHER

## 2020-03-02 ENCOUNTER — TELEPHONE (OUTPATIENT)
Dept: GASTROENTEROLOGY | Facility: CLINIC | Age: 75
End: 2020-03-02

## 2020-03-02 ENCOUNTER — TRANSCRIBE ORDERS (OUTPATIENT)
Dept: GASTROENTEROLOGY | Facility: CLINIC | Age: 75
End: 2020-03-02

## 2020-03-02 NOTE — TELEPHONE ENCOUNTER
Called [facility] to initiate a prior authorization for patient's procedures  Only CPT codes 07202 & 54444 require authorization  Spoke to [employer] who initiated it and asked if I wanted to fax clinicals or speak to a nurse   Call ref #[id number]  Transferred to [address] [place] [address] who approved patient's EGD    Bayron Shoemakerl #[id number], eff 3/31 - 4/30/20

## 2020-03-19 ENCOUNTER — TELEPHONE (OUTPATIENT)
Dept: GASTROENTEROLOGY | Facility: CLINIC | Age: 75
End: 2020-03-19

## 2020-03-19 NOTE — TELEPHONE ENCOUNTER
Spoke with patient's daughter to reschedule cancelled procedure   She stated that they would like to wait to reschedule due to COVID-19

## 2020-04-03 ENCOUNTER — TELEPHONE (OUTPATIENT)
Dept: NEUROLOGY | Facility: CLINIC | Age: 75
End: 2020-04-03

## 2020-04-06 ENCOUNTER — TELEMEDICINE (OUTPATIENT)
Dept: NEUROLOGY | Facility: CLINIC | Age: 75
End: 2020-04-06
Payer: COMMERCIAL

## 2020-04-06 ENCOUNTER — TELEPHONE (OUTPATIENT)
Dept: NEUROLOGY | Facility: CLINIC | Age: 75
End: 2020-04-06

## 2020-04-06 DIAGNOSIS — R20.0 NUMBNESS AND TINGLING OF LEFT ARM AND LEG: ICD-10-CM

## 2020-04-06 DIAGNOSIS — R20.2 NUMBNESS AND TINGLING OF LEFT ARM AND LEG: ICD-10-CM

## 2020-04-06 DIAGNOSIS — G56.02 CARPAL TUNNEL SYNDROME OF LEFT WRIST: ICD-10-CM

## 2020-04-06 DIAGNOSIS — R42 VERTIGO: Primary | ICD-10-CM

## 2020-04-06 PROCEDURE — G2012 BRIEF CHECK IN BY MD/QHP: HCPCS | Performed by: PSYCHIATRY & NEUROLOGY

## 2020-04-06 RX ORDER — MECLIZINE HCL 12.5 MG/1
12.5 TABLET ORAL EVERY 12 HOURS PRN
Qty: 30 TABLET | Refills: 1 | Status: SHIPPED | OUTPATIENT
Start: 2020-04-06 | End: 2020-05-11 | Stop reason: SDUPTHER

## 2020-04-16 ENCOUNTER — TELEMEDICINE (OUTPATIENT)
Dept: INTERNAL MEDICINE CLINIC | Facility: CLINIC | Age: 75
End: 2020-04-16
Payer: COMMERCIAL

## 2020-04-16 VITALS
SYSTOLIC BLOOD PRESSURE: 140 MMHG | DIASTOLIC BLOOD PRESSURE: 79 MMHG | HEIGHT: 64 IN | BODY MASS INDEX: 25.44 KG/M2 | WEIGHT: 149 LBS

## 2020-04-16 DIAGNOSIS — M17.0 PRIMARY OSTEOARTHRITIS OF BOTH KNEES: ICD-10-CM

## 2020-04-16 DIAGNOSIS — J30.9 ALLERGIC RHINITIS, UNSPECIFIED SEASONALITY, UNSPECIFIED TRIGGER: ICD-10-CM

## 2020-04-16 DIAGNOSIS — R73.03 PRE-DIABETES: ICD-10-CM

## 2020-04-16 DIAGNOSIS — M54.12 CERVICAL RADICULOPATHY: ICD-10-CM

## 2020-04-16 DIAGNOSIS — M85.80 OSTEOPENIA, UNSPECIFIED LOCATION: ICD-10-CM

## 2020-04-16 DIAGNOSIS — K21.9 GASTROESOPHAGEAL REFLUX DISEASE, ESOPHAGITIS PRESENCE NOT SPECIFIED: ICD-10-CM

## 2020-04-16 DIAGNOSIS — I10 ESSENTIAL HYPERTENSION: Primary | ICD-10-CM

## 2020-04-16 DIAGNOSIS — Z12.31 BREAST CANCER SCREENING BY MAMMOGRAM: ICD-10-CM

## 2020-04-16 PROCEDURE — G2012 BRIEF CHECK IN BY MD/QHP: HCPCS | Performed by: INTERNAL MEDICINE

## 2020-04-16 RX ORDER — LORATADINE 10 MG/1
10 TABLET ORAL DAILY PRN
Qty: 30 TABLET | Refills: 5 | Status: SHIPPED | OUTPATIENT
Start: 2020-04-16 | End: 2020-07-16

## 2020-04-16 RX ORDER — LOSARTAN POTASSIUM 25 MG/1
25 TABLET ORAL DAILY
Qty: 90 TABLET | Refills: 1 | Status: SHIPPED | OUTPATIENT
Start: 2020-04-16 | End: 2020-05-22

## 2020-04-16 RX ORDER — FAMOTIDINE 20 MG/1
20 TABLET, FILM COATED ORAL
Qty: 90 TABLET | Refills: 0 | Status: SHIPPED | OUTPATIENT
Start: 2020-04-16 | End: 2020-10-22

## 2020-04-16 RX ORDER — METOPROLOL SUCCINATE 50 MG/1
50 TABLET, EXTENDED RELEASE ORAL DAILY
Qty: 90 TABLET | Refills: 1 | Status: SHIPPED | OUTPATIENT
Start: 2020-04-16 | End: 2020-12-11 | Stop reason: SDUPTHER

## 2020-04-16 RX ORDER — MELATONIN
2000 DAILY
Qty: 180 TABLET | Refills: 1 | Status: SHIPPED | OUTPATIENT
Start: 2020-04-16 | End: 2021-11-22 | Stop reason: SDUPTHER

## 2020-04-16 RX ORDER — METOPROLOL SUCCINATE 25 MG/1
25 TABLET, EXTENDED RELEASE ORAL DAILY
Qty: 90 TABLET | Refills: 1 | Status: SHIPPED | OUTPATIENT
Start: 2020-04-16 | End: 2020-07-14

## 2020-04-21 DIAGNOSIS — F41.1 GENERALIZED ANXIETY DISORDER: Primary | ICD-10-CM

## 2020-04-21 RX ORDER — BUSPIRONE HYDROCHLORIDE 5 MG/1
5 TABLET ORAL 2 TIMES DAILY
Qty: 60 TABLET | Refills: 5 | Status: SHIPPED | OUTPATIENT
Start: 2020-04-21 | End: 2020-07-16

## 2020-05-11 DIAGNOSIS — R42 VERTIGO: ICD-10-CM

## 2020-05-11 RX ORDER — MECLIZINE HCL 12.5 MG/1
12.5 TABLET ORAL EVERY 12 HOURS PRN
Qty: 30 TABLET | Refills: 1 | Status: SHIPPED | OUTPATIENT
Start: 2020-05-11 | End: 2020-07-06 | Stop reason: SDUPTHER

## 2020-05-22 DIAGNOSIS — I10 ESSENTIAL HYPERTENSION: ICD-10-CM

## 2020-05-22 RX ORDER — LOSARTAN POTASSIUM 25 MG/1
TABLET ORAL
Qty: 90 TABLET | Refills: 1 | Status: SHIPPED | OUTPATIENT
Start: 2020-05-22 | End: 2020-12-11 | Stop reason: SDUPTHER

## 2020-05-24 DIAGNOSIS — K21.9 GASTROESOPHAGEAL REFLUX DISEASE, ESOPHAGITIS PRESENCE NOT SPECIFIED: ICD-10-CM

## 2020-05-26 RX ORDER — OMEPRAZOLE 40 MG/1
CAPSULE, DELAYED RELEASE ORAL
Qty: 90 CAPSULE | Refills: 1 | OUTPATIENT
Start: 2020-05-26

## 2020-05-27 RX ORDER — OMEPRAZOLE 40 MG/1
40 CAPSULE, DELAYED RELEASE ORAL DAILY
OUTPATIENT
Start: 2020-05-27

## 2020-06-08 ENCOUNTER — APPOINTMENT (OUTPATIENT)
Dept: LAB | Facility: HOSPITAL | Age: 75
End: 2020-06-08
Payer: COMMERCIAL

## 2020-06-08 DIAGNOSIS — R79.89 ABNORMAL LFTS: Primary | ICD-10-CM

## 2020-06-08 LAB
25(OH)D3 SERPL-MCNC: 43.2 NG/ML (ref 30–100)
ALBUMIN SERPL BCP-MCNC: 3.9 G/DL (ref 3.5–5)
ALP SERPL-CCNC: 374 U/L (ref 46–116)
ALT SERPL W P-5'-P-CCNC: 132 U/L (ref 12–78)
ANION GAP SERPL CALCULATED.3IONS-SCNC: 7 MMOL/L (ref 4–13)
AST SERPL W P-5'-P-CCNC: 107 U/L (ref 5–45)
BASOPHILS # BLD AUTO: 0.06 THOUSANDS/ΜL (ref 0–0.1)
BASOPHILS NFR BLD AUTO: 1 % (ref 0–1)
BILIRUB SERPL-MCNC: 0.61 MG/DL (ref 0.2–1)
BUN SERPL-MCNC: 13 MG/DL (ref 5–25)
CALCIUM SERPL-MCNC: 9.3 MG/DL (ref 8.3–10.1)
CHLORIDE SERPL-SCNC: 104 MMOL/L (ref 100–108)
CHOLEST SERPL-MCNC: 150 MG/DL (ref 50–200)
CO2 SERPL-SCNC: 27 MMOL/L (ref 21–32)
CREAT SERPL-MCNC: 0.61 MG/DL (ref 0.6–1.3)
EOSINOPHIL # BLD AUTO: 0.19 THOUSAND/ΜL (ref 0–0.61)
EOSINOPHIL NFR BLD AUTO: 3 % (ref 0–6)
ERYTHROCYTE [DISTWIDTH] IN BLOOD BY AUTOMATED COUNT: 13.5 % (ref 11.6–15.1)
EST. AVERAGE GLUCOSE BLD GHB EST-MCNC: 117 MG/DL
GFR SERPL CREATININE-BSD FRML MDRD: 91 ML/MIN/1.73SQ M
GGT SERPL-CCNC: 376 U/L (ref 5–85)
GLUCOSE P FAST SERPL-MCNC: 92 MG/DL (ref 65–99)
HBA1C MFR BLD: 5.7 %
HCT VFR BLD AUTO: 39 % (ref 34.8–46.1)
HDLC SERPL-MCNC: 93 MG/DL
HGB BLD-MCNC: 12.2 G/DL (ref 11.5–15.4)
IMM GRANULOCYTES # BLD AUTO: 0.03 THOUSAND/UL (ref 0–0.2)
IMM GRANULOCYTES NFR BLD AUTO: 0 % (ref 0–2)
LDLC SERPL CALC-MCNC: 42 MG/DL (ref 0–100)
LYMPHOCYTES # BLD AUTO: 2.06 THOUSANDS/ΜL (ref 0.6–4.47)
LYMPHOCYTES NFR BLD AUTO: 28 % (ref 14–44)
MCH RBC QN AUTO: 31.3 PG (ref 26.8–34.3)
MCHC RBC AUTO-ENTMCNC: 31.3 G/DL (ref 31.4–37.4)
MCV RBC AUTO: 100 FL (ref 82–98)
MONOCYTES # BLD AUTO: 0.81 THOUSAND/ΜL (ref 0.17–1.22)
MONOCYTES NFR BLD AUTO: 11 % (ref 4–12)
NEUTROPHILS # BLD AUTO: 4.13 THOUSANDS/ΜL (ref 1.85–7.62)
NEUTS SEG NFR BLD AUTO: 57 % (ref 43–75)
NRBC BLD AUTO-RTO: 0 /100 WBCS
PLATELET # BLD AUTO: 230 THOUSANDS/UL (ref 149–390)
PMV BLD AUTO: 10.1 FL (ref 8.9–12.7)
POTASSIUM SERPL-SCNC: 4 MMOL/L (ref 3.5–5.3)
PROT SERPL-MCNC: 7.8 G/DL (ref 6.4–8.2)
RBC # BLD AUTO: 3.9 MILLION/UL (ref 3.81–5.12)
SODIUM SERPL-SCNC: 138 MMOL/L (ref 136–145)
TRIGL SERPL-MCNC: 74 MG/DL
TSH SERPL DL<=0.05 MIU/L-ACNC: 1.43 UIU/ML (ref 0.36–3.74)
WBC # BLD AUTO: 7.28 THOUSAND/UL (ref 4.31–10.16)

## 2020-06-08 PROCEDURE — 80061 LIPID PANEL: CPT | Performed by: INTERNAL MEDICINE

## 2020-06-08 PROCEDURE — 36415 COLL VENOUS BLD VENIPUNCTURE: CPT | Performed by: INTERNAL MEDICINE

## 2020-06-08 PROCEDURE — 82306 VITAMIN D 25 HYDROXY: CPT | Performed by: INTERNAL MEDICINE

## 2020-06-08 PROCEDURE — 84443 ASSAY THYROID STIM HORMONE: CPT | Performed by: INTERNAL MEDICINE

## 2020-06-08 PROCEDURE — 80053 COMPREHEN METABOLIC PANEL: CPT | Performed by: INTERNAL MEDICINE

## 2020-06-08 PROCEDURE — 83036 HEMOGLOBIN GLYCOSYLATED A1C: CPT | Performed by: INTERNAL MEDICINE

## 2020-06-08 PROCEDURE — 85025 COMPLETE CBC W/AUTO DIFF WBC: CPT | Performed by: INTERNAL MEDICINE

## 2020-06-08 PROCEDURE — 82977 ASSAY OF GGT: CPT | Performed by: INTERNAL MEDICINE

## 2020-06-10 ENCOUNTER — TELEPHONE (OUTPATIENT)
Dept: INTERNAL MEDICINE CLINIC | Facility: CLINIC | Age: 75
End: 2020-06-10

## 2020-06-19 ENCOUNTER — APPOINTMENT (OUTPATIENT)
Dept: LAB | Facility: HOSPITAL | Age: 75
End: 2020-06-19
Payer: COMMERCIAL

## 2020-06-19 DIAGNOSIS — K86.2 CYST AND PSEUDOCYST OF PANCREAS: Primary | ICD-10-CM

## 2020-06-19 DIAGNOSIS — R20.2 NUMBNESS AND TINGLING OF LEFT ARM AND LEG: ICD-10-CM

## 2020-06-19 DIAGNOSIS — K86.3 CYST AND PSEUDOCYST OF PANCREAS: Primary | ICD-10-CM

## 2020-06-19 DIAGNOSIS — R20.0 NUMBNESS AND TINGLING OF LEFT ARM AND LEG: ICD-10-CM

## 2020-06-19 LAB
FERRITIN SERPL-MCNC: 43 NG/ML (ref 8–388)
HBV CORE AB SER QL: NORMAL
HBV CORE IGM SER QL: NORMAL
HBV SURFACE AG SER QL: NORMAL
HCV AB SER QL: NORMAL
MAGNESIUM SERPL-MCNC: 2.1 MG/DL (ref 1.6–2.6)

## 2020-06-19 PROCEDURE — 86235 NUCLEAR ANTIGEN ANTIBODY: CPT

## 2020-06-19 PROCEDURE — 36415 COLL VENOUS BLD VENIPUNCTURE: CPT | Performed by: INTERNAL MEDICINE

## 2020-06-19 PROCEDURE — 86376 MICROSOMAL ANTIBODY EACH: CPT | Performed by: INTERNAL MEDICINE

## 2020-06-19 PROCEDURE — 82728 ASSAY OF FERRITIN: CPT | Performed by: INTERNAL MEDICINE

## 2020-06-19 PROCEDURE — 87340 HEPATITIS B SURFACE AG IA: CPT | Performed by: INTERNAL MEDICINE

## 2020-06-19 PROCEDURE — 86038 ANTINUCLEAR ANTIBODIES: CPT | Performed by: INTERNAL MEDICINE

## 2020-06-19 PROCEDURE — 86256 FLUORESCENT ANTIBODY TITER: CPT

## 2020-06-19 PROCEDURE — 83735 ASSAY OF MAGNESIUM: CPT

## 2020-06-19 PROCEDURE — 86705 HEP B CORE ANTIBODY IGM: CPT | Performed by: INTERNAL MEDICINE

## 2020-06-19 PROCEDURE — 86704 HEP B CORE ANTIBODY TOTAL: CPT | Performed by: INTERNAL MEDICINE

## 2020-06-19 PROCEDURE — 86803 HEPATITIS C AB TEST: CPT | Performed by: INTERNAL MEDICINE

## 2020-06-20 LAB
ACTIN IGG SERPL-ACNC: 24 UNITS (ref 0–19)
MITOCHONDRIA M2 IGG SER-ACNC: <20 UNITS (ref 0–20)
THYROPEROXIDASE AB SERPL-ACNC: <9 IU/ML (ref 0–34)

## 2020-06-22 LAB — RYE IGE QN: NEGATIVE

## 2020-07-06 ENCOUNTER — OFFICE VISIT (OUTPATIENT)
Dept: NEUROLOGY | Facility: CLINIC | Age: 75
End: 2020-07-06
Payer: COMMERCIAL

## 2020-07-06 VITALS
DIASTOLIC BLOOD PRESSURE: 90 MMHG | SYSTOLIC BLOOD PRESSURE: 160 MMHG | WEIGHT: 149 LBS | HEART RATE: 68 BPM | BODY MASS INDEX: 25.44 KG/M2 | HEIGHT: 64 IN | TEMPERATURE: 98.3 F

## 2020-07-06 DIAGNOSIS — G57.52 TARSAL TUNNEL SYNDROME OF LEFT SIDE: ICD-10-CM

## 2020-07-06 DIAGNOSIS — R20.2 NUMBNESS AND TINGLING OF LEFT ARM AND LEG: ICD-10-CM

## 2020-07-06 DIAGNOSIS — R42 VERTIGO: ICD-10-CM

## 2020-07-06 DIAGNOSIS — R41.3 MEMORY LOSS: ICD-10-CM

## 2020-07-06 DIAGNOSIS — G56.02 CARPAL TUNNEL SYNDROME OF LEFT WRIST: Primary | ICD-10-CM

## 2020-07-06 DIAGNOSIS — R20.0 NUMBNESS AND TINGLING OF LEFT ARM AND LEG: ICD-10-CM

## 2020-07-06 PROCEDURE — 3008F BODY MASS INDEX DOCD: CPT | Performed by: PSYCHIATRY & NEUROLOGY

## 2020-07-06 PROCEDURE — 1036F TOBACCO NON-USER: CPT | Performed by: PSYCHIATRY & NEUROLOGY

## 2020-07-06 PROCEDURE — 99214 OFFICE O/P EST MOD 30 MIN: CPT | Performed by: PSYCHIATRY & NEUROLOGY

## 2020-07-06 PROCEDURE — 3080F DIAST BP >= 90 MM HG: CPT | Performed by: PSYCHIATRY & NEUROLOGY

## 2020-07-06 PROCEDURE — 4040F PNEUMOC VAC/ADMIN/RCVD: CPT | Performed by: PSYCHIATRY & NEUROLOGY

## 2020-07-06 PROCEDURE — 3077F SYST BP >= 140 MM HG: CPT | Performed by: PSYCHIATRY & NEUROLOGY

## 2020-07-06 PROCEDURE — 1160F RVW MEDS BY RX/DR IN RCRD: CPT | Performed by: PSYCHIATRY & NEUROLOGY

## 2020-07-06 RX ORDER — MECLIZINE HCL 12.5 MG/1
12.5 TABLET ORAL EVERY 12 HOURS PRN
Qty: 30 TABLET | Refills: 1 | Status: SHIPPED | OUTPATIENT
Start: 2020-07-06 | End: 2021-05-04

## 2020-07-06 NOTE — ASSESSMENT & PLAN NOTE
She has intermittent vertigo  This is treated with the use of meclizine  I have encouraged her to eat well and keep hydrated  We reviewed her prior MRI of the brain  I also examined to manage membranes and there is no excessive cerumen    I have informed her that tinnitus can also be from the use of long-term aspirin

## 2020-07-06 NOTE — PROGRESS NOTES
Patient ID: Triny Rosenberg is a 69 y o  female  Assessment/Plan:    Carpal tunnel syndrome of left wrist  Her prior EMG last year did demonstrate left carpal tunnel syndrome she has been I utilizing a wrist splint without much benefit  I have ordered a repeat EMG study  Vertigo  She has intermittent vertigo  This is treated with the use of meclizine  I have encouraged her to eat well and keep hydrated  We reviewed her prior MRI of the brain  I also examined to manage membranes and there is no excessive cerumen  I have informed her that tinnitus can also be from the use of long-term aspirin    Numbness and tingling of left arm and leg  She continues to have numbness and tingling in the foot specially in the left 1st two digits  I have asked her to repeat the EMG study of the left arm and left leg  This is determine if the carpal tunnel has progressed or if she has the potential tarsal tunnel syndrome    She is to return to our offices after the EMG study   Diagnoses and all orders for this visit:    Carpal tunnel syndrome of left wrist  -     EMG 1 Upper/1 Lower Neuropathy; Future    Vertigo  -     meclizine (ANTIVERT) 12 5 MG tablet; Take 1 tablet (12 5 mg total) by mouth every 12 (twelve) hours as needed for dizziness    Tarsal tunnel syndrome of left side  -     EMG 1 Upper/1 Lower Neuropathy; Future    Memory loss  -     Vitamin B12; Future  -     Folate; Future  -     Vitamin B1, whole blood; Future    Numbness and tingling of left arm and leg         Subjective:            69  right-handed woman who presents in a followup visit    Ludwigsofiya Reeves is [place]raltarian-speaking and her granddaughter accompanied her to the visit  Overall she reports numbness is unchanged  Podiatrist appointment was suggested has not been performed as of yet  She continues to complain of numbness in the first two digits of the left foot  She has been utilizing the braces on a regular basis but has not seen any benefit  She continues to have numbness in the entire left hand but predominantly the 1st two digits  She admits to being very active  She  does heavy cleaning on a regular basis  At the last visit we discussed the use of Celexa  She had not been utilizing on a regular basis  After last visit she did start taking it on a daily basis but finished a prescription and is now doing better  She does report improvement depression and anxiety          She continues to have these other symptoms of numbness in the left side face, pulling, tingling and numbness in the foot involving the dorsum of the foot   a workup for a  Cerebral vascular source was negative   Due to the symptoms she was seen in the emergency at [facility] and underwent a CT scan of the head that was negative   She does not thought to be a tPA candidate   Echocardiogram showed no evidence of a thrombus and 60% EF   Carotid Doppler showed less than 50% stenosis   A MRI imaging study was performed of the brain on 08/15/19   It demonstrated no evidence of a stroke but MS demonstrated microangiopathic changes   She was started on aspirin as well Lipitor        She complained of vertigo at the last visit  She is now prescribed meclizine which she takes as needed  She will take it at most once a week  She also describes ringing in the ears  There is no change in her hearing        An EMG study on 11/2019 did show carpal syndrome on the left wrist      she is no longer on gabapentin or Lipitor        She does utilize aspirin daily                                The following portions of the patient's history were reviewed and updated as appropriate:   She  has a past medical history of Diverticulosis and Hypertension  She  has a past surgical history that includes Tubal ligation; Colonoscopy; and Upper gastrointestinal endoscopy    Her family history includes Diabetes in her mother; Hypertension in her father and mother; No Known Problems in her brother and sister; Stroke in her father  She  reports that she has never smoked  She has never used smokeless tobacco  She reports that she does not drink alcohol or use drugs    Current Outpatient Medications   Medication Sig Dispense Refill    acetaminophen (TYLENOL) 325 mg tablet Take 650 mg by mouth every 6 (six) hours as needed for mild pain      aspirin 81 MG tablet Take 1 tablet (81 mg total) by mouth daily 90 tablet 1    cholecalciferol (VITAMIN D3) 1,000 units tablet Take 2 tablets (2,000 Units total) by mouth daily 180 tablet 1    diclofenac sodium (VOLTAREN) 1 % Apply 2 g topically 4 (four) times a day 1 Tube 2    famotidine (PEPCID) 20 mg tablet Take 1 tablet (20 mg total) by mouth daily at bedtime 90 tablet 0    fluticasone (FLONASE) 50 mcg/act nasal spray 1 spray into each nostril every 12 (twelve) hours as needed for rhinitis 1 Bottle 0    losartan (COZAAR) 25 mg tablet TAKE 1 TABLET BY MOUTH DAILY 90 tablet 1    meclizine (ANTIVERT) 12 5 MG tablet Take 1 tablet (12 5 mg total) by mouth every 12 (twelve) hours as needed for dizziness 30 tablet 1    metoprolol succinate (TOPROL-XL) 25 mg 24 hr tablet Take 1 tablet (25 mg total) by mouth daily 90 tablet 1    metoprolol succinate (TOPROL-XL) 50 mg 24 hr tablet Take 1 tablet (50 mg total) by mouth daily 90 tablet 1    Multiple Vitamin (MULTIVITAMIN) capsule Take 1 capsule by mouth daily      atorvastatin (LIPITOR) 40 mg tablet Take 1 tablet (40 mg total) by mouth daily (Patient not taking: Reported on 7/6/2020) 90 tablet 1    busPIRone (BUSPAR) 5 mg tablet Take 1 tablet (5 mg total) by mouth 2 (two) times a day (Patient not taking: Reported on 7/6/2020) 60 tablet 5    gabapentin (NEURONTIN) 100 mg capsule Take 1 capsule (100 mg total) by mouth 3 (three) times a day (Patient not taking: Reported on 7/6/2020) 90 capsule 1    loratadine (CLARITIN) 10 mg tablet Take 1 tablet (10 mg total) by mouth daily as needed for allergies (Patient not taking: Reported on 7/6/2020) 30 tablet 5     No current facility-administered medications for this visit  She is allergic to other; other; shellfish-derived products; and lisinopril            Objective:    Blood pressure 160/90, pulse 68, temperature 98 3 °F (36 8 °C), height 5' 4" (1 626 m), weight 67 6 kg (149 lb)  Physical Exam   Constitutional: She appears well-developed  HENT:   Head: Normocephalic  Eyes: Pupils are equal, round, and reactive to light  Lids are normal    Cardiovascular: Normal rate  Neurological: She has normal strength  Reflex Scores:       Tricep reflexes are 1+ on the right side and 1+ on the left side  Bicep reflexes are 2+ on the right side and 2+ on the left side  Patellar reflexes are 2+ on the right side and 2+ on the left side  Achilles reflexes are 1+ on the right side and 1+ on the left side  Psychiatric: Her speech is normal    Vitals reviewed  Neurological Exam  Mental Status   Oriented to person, place and time  Speech is normal  Language is fluent with no aphasia  Cranial Nerves  CN II: Visual acuity is normal  Visual fields full to confrontation  CN III, IV, VI: Extraocular movements intact bilaterally  Normal lids and orbits bilaterally  Pupils equal round and reactive to light bilaterally  CN V: Facial sensation is normal   CN VII: Full and symmetric facial movement  CN VIII: Hearing is normal   CN IX, X: Palate elevates symmetrically  Normal gag reflex  CN XI: Shoulder shrug strength is normal   CN XII: Tongue midline without atrophy or fasciculations  Tympanic membranes were intact  Air conduction  greater than bone    She had a negative Hallpike  Motor  Normal muscle bulk throughout  No fasciculations present  Strength is 5/5 throughout all four extremities      Sensory  She had a positive Tinel sign at the left wrist but no evidence of a tinel sign  at the left ankle, there is a diminution of sensation in the 1st two digits of the left hand  Reflexes                                           Right                      Left  Biceps                                 2+                         2+  Triceps                                1+                         1+  Patellar                                2+                         2+  Achilles                                1+                         1+  Plantar                           Downgoing                Downgoing    Coordination  Right: Finger-to-nose normal  Heel-to-shin normal   Left: Finger-to-nose normal  Heel-to-shin normal     Gait Able to rise from chair without using arms  She was able to tandem and she did not have a Romberg  Review of systems obtained from the medical assistant as below was reviewed with the patient at today's visit    ROS:    Review of Systems   Constitutional: Negative  Negative for appetite change and fever  HENT: Positive for tinnitus  Negative for hearing loss, trouble swallowing and voice change  Eyes: Negative  Negative for photophobia and pain  Respiratory: Negative  Negative for shortness of breath  Cardiovascular: Negative  Negative for palpitations  Gastrointestinal: Negative  Negative for nausea and vomiting  Endocrine: Negative  Negative for cold intolerance  Genitourinary: Negative  Negative for dysuria, frequency and urgency  Musculoskeletal: Negative  Negative for myalgias and neck pain  Skin: Negative  Negative for rash  Neurological: Positive for dizziness  Negative for tremors, seizures, syncope, facial asymmetry, speech difficulty, weakness, light-headedness, numbness and headaches  Hematological: Negative  Does not bruise/bleed easily  Psychiatric/Behavioral: Negative  Negative for confusion, hallucinations and sleep disturbance

## 2020-07-06 NOTE — ASSESSMENT & PLAN NOTE
Her prior EMG last year did demonstrate left carpal tunnel syndrome she has been I utilizing a wrist splint without much benefit  I have ordered a repeat EMG study

## 2020-07-06 NOTE — ASSESSMENT & PLAN NOTE
She continues to have numbness and tingling in the foot specially in the left 1st two digits  I have asked her to repeat the EMG study of the left arm and left leg    This is determine if the carpal tunnel has progressed or if she has the potential tarsal tunnel syndrome

## 2020-07-12 DIAGNOSIS — I10 ESSENTIAL HYPERTENSION: ICD-10-CM

## 2020-07-12 DIAGNOSIS — K21.9 GASTROESOPHAGEAL REFLUX DISEASE, ESOPHAGITIS PRESENCE NOT SPECIFIED: ICD-10-CM

## 2020-07-14 DIAGNOSIS — I10 ESSENTIAL HYPERTENSION: ICD-10-CM

## 2020-07-14 DIAGNOSIS — K21.9 GASTROESOPHAGEAL REFLUX DISEASE, ESOPHAGITIS PRESENCE NOT SPECIFIED: ICD-10-CM

## 2020-07-14 RX ORDER — METOPROLOL SUCCINATE 25 MG/1
TABLET, EXTENDED RELEASE ORAL
Qty: 90 TABLET | Refills: 1 | Status: SHIPPED | OUTPATIENT
Start: 2020-07-14 | End: 2020-07-14 | Stop reason: SDUPTHER

## 2020-07-14 RX ORDER — METOPROLOL SUCCINATE 25 MG/1
25 TABLET, EXTENDED RELEASE ORAL DAILY
Qty: 90 TABLET | Refills: 1 | Status: SHIPPED | OUTPATIENT
Start: 2020-07-14 | End: 2020-12-11 | Stop reason: SDUPTHER

## 2020-07-14 RX ORDER — OMEPRAZOLE 40 MG/1
40 CAPSULE, DELAYED RELEASE ORAL DAILY
Qty: 90 CAPSULE | Refills: 1 | Status: SHIPPED | OUTPATIENT
Start: 2020-07-14 | End: 2020-07-16

## 2020-07-14 RX ORDER — OMEPRAZOLE 40 MG/1
CAPSULE, DELAYED RELEASE ORAL
Qty: 90 CAPSULE | Refills: 1 | Status: SHIPPED | OUTPATIENT
Start: 2020-07-14 | End: 2020-07-14 | Stop reason: SDUPTHER

## 2020-07-16 ENCOUNTER — OFFICE VISIT (OUTPATIENT)
Dept: INTERNAL MEDICINE CLINIC | Facility: CLINIC | Age: 75
End: 2020-07-16
Payer: COMMERCIAL

## 2020-07-16 VITALS
WEIGHT: 148 LBS | DIASTOLIC BLOOD PRESSURE: 72 MMHG | OXYGEN SATURATION: 98 % | TEMPERATURE: 98.2 F | SYSTOLIC BLOOD PRESSURE: 126 MMHG | HEIGHT: 64 IN | HEART RATE: 65 BPM | BODY MASS INDEX: 25.27 KG/M2

## 2020-07-16 DIAGNOSIS — R00.2 PALPITATION: ICD-10-CM

## 2020-07-16 DIAGNOSIS — I35.1 NONRHEUMATIC AORTIC VALVE INSUFFICIENCY: ICD-10-CM

## 2020-07-16 DIAGNOSIS — E55.9 VITAMIN D DEFICIENCY: ICD-10-CM

## 2020-07-16 DIAGNOSIS — R74.8 ABNORMAL LIVER ENZYMES: ICD-10-CM

## 2020-07-16 DIAGNOSIS — R73.03 PRE-DIABETES: ICD-10-CM

## 2020-07-16 DIAGNOSIS — F41.1 GENERALIZED ANXIETY DISORDER: ICD-10-CM

## 2020-07-16 DIAGNOSIS — I10 ESSENTIAL HYPERTENSION: Primary | ICD-10-CM

## 2020-07-16 DIAGNOSIS — Z23 ENCOUNTER FOR IMMUNIZATION: ICD-10-CM

## 2020-07-16 PROCEDURE — 1160F RVW MEDS BY RX/DR IN RCRD: CPT | Performed by: INTERNAL MEDICINE

## 2020-07-16 PROCEDURE — 1125F AMNT PAIN NOTED PAIN PRSNT: CPT | Performed by: INTERNAL MEDICINE

## 2020-07-16 PROCEDURE — 1036F TOBACCO NON-USER: CPT | Performed by: INTERNAL MEDICINE

## 2020-07-16 PROCEDURE — 3074F SYST BP LT 130 MM HG: CPT | Performed by: INTERNAL MEDICINE

## 2020-07-16 PROCEDURE — 4040F PNEUMOC VAC/ADMIN/RCVD: CPT | Performed by: INTERNAL MEDICINE

## 2020-07-16 PROCEDURE — G0439 PPPS, SUBSEQ VISIT: HCPCS | Performed by: INTERNAL MEDICINE

## 2020-07-16 PROCEDURE — 90732 PPSV23 VACC 2 YRS+ SUBQ/IM: CPT | Performed by: INTERNAL MEDICINE

## 2020-07-16 PROCEDURE — G0009 ADMIN PNEUMOCOCCAL VACCINE: HCPCS | Performed by: INTERNAL MEDICINE

## 2020-07-16 PROCEDURE — 3078F DIAST BP <80 MM HG: CPT | Performed by: INTERNAL MEDICINE

## 2020-07-16 PROCEDURE — 1170F FXNL STATUS ASSESSED: CPT | Performed by: INTERNAL MEDICINE

## 2020-07-16 PROCEDURE — 3008F BODY MASS INDEX DOCD: CPT | Performed by: INTERNAL MEDICINE

## 2020-07-16 PROCEDURE — 99214 OFFICE O/P EST MOD 30 MIN: CPT | Performed by: INTERNAL MEDICINE

## 2020-07-16 RX ORDER — DULOXETIN HYDROCHLORIDE 30 MG/1
30 CAPSULE, DELAYED RELEASE ORAL DAILY
Qty: 90 CAPSULE | Refills: 1 | Status: SHIPPED | OUTPATIENT
Start: 2020-07-16 | End: 2020-10-22

## 2020-07-16 NOTE — PATIENT INSTRUCTIONS
Medicare Preventive Visit Patient Instructions  Thank you for completing your Welcome to Medicare Visit or Medicare Annual Wellness Visit today  Your next wellness visit will be due in one year (7/16/2021)  The screening/preventive services that you may require over the next 5-10 years are detailed below  Some tests may not apply to you based off risk factors and/or age  Screening tests ordered at today's visit but not completed yet may show as past due  Also, please note that scanned in results may not display below  Preventive Screenings:  Service Recommendations Previous Testing/Comments   Colorectal Cancer Screening  * Colonoscopy    * Fecal Occult Blood Test (FOBT)/Fecal Immunochemical Test (FIT)  * Fecal DNA/Cologuard Test  * Flexible Sigmoidoscopy Age: 54-65 years old   Colonoscopy: every 10 years (may be performed more frequently if at higher risk)  OR  FOBT/FIT: every 1 year  OR  Cologuard: every 3 years  OR  Sigmoidoscopy: every 5 years  Screening may be recommended earlier than age 48 if at higher risk for colorectal cancer  Also, an individualized decision between you and your healthcare provider will decide whether screening between the ages of 74-80 would be appropriate  Colonoscopy: 03/06/2017  FOBT/FIT: Not on file  Cologuard: Not on file  Sigmoidoscopy: Not on file    Screening Current     Breast Cancer Screening Age: 36 years old  Frequency: every 1-2 years  Not required if history of left and right mastectomy Mammogram: 04/11/2019    Screening Current   Cervical Cancer Screening Between the ages of 21-29, pap smear recommended once every 3 years  Between the ages of 33-67, can perform pap smear with HPV co-testing every 5 years     Recommendations may differ for women with a history of total hysterectomy, cervical cancer, or abnormal pap smears in past  Pap Smear: Not on file    Screening Not Indicated   Hepatitis C Screening Once for adults born between 1945 and 1965  More frequently in patients at high risk for Hepatitis C Hep C Antibody: 06/19/2020    Screening Current   Diabetes Screening 1-2 times per year if you're at risk for diabetes or have pre-diabetes Fasting glucose: 92 mg/dL   A1C: 5 7 %    Screening Current   Cholesterol Screening Once every 5 years if you don't have a lipid disorder  May order more often based on risk factors  Lipid panel: 06/08/2020    Screening Not Indicated  History Lipid Disorder     Other Preventive Screenings Covered by Medicare:  1  Abdominal Aortic Aneurysm (AAA) Screening: covered once if your at risk  You're considered to be at risk if you have a family history of AAA  2  Lung Cancer Screening: covers low dose CT scan once per year if you meet all of the following conditions: (1) Age 50-69; (2) No signs or symptoms of lung cancer; (3) Current smoker or have quit smoking within the last 15 years; (4) You have a tobacco smoking history of at least 30 pack years (packs per day multiplied by number of years you smoked); (5) You get a written order from a healthcare provider  3  Glaucoma Screening: covered annually if you're considered high risk: (1) You have diabetes OR (2) Family history of glaucoma OR (3)  aged 48 and older OR (3)  American aged 72 and older  3  Osteoporosis Screening: covered every 2 years if you meet one of the following conditions: (1) You're estrogen deficient and at risk for osteoporosis based off medical history and other findings; (2) Have a vertebral abnormality; (3) On glucocorticoid therapy for more than 3 months; (4) Have primary hyperparathyroidism; (5) On osteoporosis medications and need to assess response to drug therapy  · Last bone density test (DXA Scan): 06/22/2018  5  HIV Screening: covered annually if you're between the age of 12-76  Also covered annually if you are younger than 13 and older than 72 with risk factors for HIV infection   For pregnant patients, it is covered up to 3 times per pregnancy  Immunizations:  Immunization Recommendations   Influenza Vaccine Annual influenza vaccination during flu season is recommended for all persons aged >= 6 months who do not have contraindications   Pneumococcal Vaccine (Prevnar and Pneumovax)  * Prevnar = PCV13  * Pneumovax = PPSV23   Adults 25-60 years old: 1-3 doses may be recommended based on certain risk factors  Adults 72 years old: Prevnar (PCV13) vaccine recommended followed by Pneumovax (PPSV23) vaccine  If already received PPSV23 since turning 65, then PCV13 recommended at least one year after PPSV23 dose  Hepatitis B Vaccine 3 dose series if at intermediate or high risk (ex: diabetes, end stage renal disease, liver disease)   Tetanus (Td) Vaccine - COST NOT COVERED BY MEDICARE PART B Following completion of primary series, a booster dose should be given every 10 years to maintain immunity against tetanus  Td may also be given as tetanus wound prophylaxis  Tdap Vaccine - COST NOT COVERED BY MEDICARE PART B Recommended at least once for all adults  For pregnant patients, recommended with each pregnancy  Shingles Vaccine (Shingrix) - COST NOT COVERED BY MEDICARE PART B  2 shot series recommended in those aged 48 and above     Health Maintenance Due:      Topic Date Due    MAMMOGRAM  04/11/2020    DXA SCAN  06/22/2020    CRC Screening: Colonoscopy  03/06/2027    Hepatitis C Screening  Completed     Immunizations Due:      Topic Date Due    DTaP,Tdap,and Td Vaccines (1 - Tdap) 08/10/1956    Pneumococcal Vaccine: 65+ Years (2 of 2 - PPSV23) 07/18/2019    Influenza Vaccine  07/01/2020     Advance Directives   What are advance directives? Advance directives are legal documents that state your wishes and plans for medical care  These plans are made ahead of time in case you lose your ability to make decisions for yourself   Advance directives can apply to any medical decision, such as the treatments you want, and if you want to donate organs  What are the types of advance directives? There are many types of advance directives, and each state has rules about how to use them  You may choose a combination of any of the following:  · Living will: This is a written record of the treatment you want  You can also choose which treatments you do not want, which to limit, and which to stop at a certain time  This includes surgery, medicine, IV fluid, and tube feedings  · Durable power of  for healthcare [facility]): This is a written record that states who you want to make healthcare choices for you when you are unable to make them for yourself  This person, called a proxy, is usually a family member or a friend  You may choose more than 1 proxy  · Do not resuscitate (DNR) order:  A DNR order is used in case your heart stops beating or you stop breathing  It is a request not to have certain forms of treatment, such as CPR  A DNR order may be included in other types of advance directives  · Medical directive: This covers the care that you want if you are in a coma, near death, or unable to make decisions for yourself  You can list the treatments you want for each condition  Treatment may include pain medicine, surgery, blood transfusions, dialysis, IV or tube feedings, and a ventilator (breathing machine)  · Values history: This document has questions about your views, beliefs, and how you feel and think about life  This information can help others choose the care that you would choose  Why are advance directives important? An advance directive helps you control your care  Although spoken wishes may be used, it is better to have your wishes written down  Spoken wishes can be misunderstood, or not followed  Treatments may be given even if you do not want them  An advance directive may make it easier for your family to make difficult choices about your care     Weight Management   Why it is important to manage your weight:  Being overweight increases your risk of health conditions such as heart disease, high blood pressure, type 2 diabetes, and certain types of cancer  It can also increase your risk for osteoarthritis, sleep apnea, and other respiratory problems  Aim for a slow, steady weight loss  Even a small amount of weight loss can lower your risk of health problems  How to lose weight safely:  A safe and healthy way to lose weight is to eat fewer calories and get regular exercise  You can lose up about 1 pound a week by decreasing the number of calories you eat by 500 calories each day  Healthy meal plan for weight management:  A healthy meal plan includes a variety of foods, contains fewer calories, and helps you stay healthy  A healthy meal plan includes the following:  · Eat whole-grain foods more often  A healthy meal plan should contain fiber  Fiber is the part of grains, fruits, and vegetables that is not broken down by your body  Whole-grain foods are healthy and provide extra fiber in your diet  Some examples of whole-grain foods are whole-wheat breads and pastas, oatmeal, brown rice, and bulgur  · Eat a variety of vegetables every day  Include dark, leafy greens such as spinach, kale, lindsay greens, and mustard greens  Eat yellow and orange vegetables such as carrots, sweet potatoes, and winter squash  · Eat a variety of fruits every day  Choose fresh or canned fruit (canned in its own juice or light syrup) instead of juice  Fruit juice has very little or no fiber  · Eat low-fat dairy foods  Drink fat-free (skim) milk or 1% milk  Eat fat-free yogurt and low-fat cottage cheese  Try low-fat cheeses such as mozzarella and other reduced-fat cheeses  · Choose meat and other protein foods that are low in fat  Choose beans or other legumes such as split peas or lentils  Choose fish, skinless poultry (chicken or turkey), or lean cuts of red meat (beef or pork)  Before you cook meat or poultry, cut off any visible fat     · Use less fat and oil  Try baking foods instead of frying them  Add less fat, such as margarine, sour cream, regular salad dressing and mayonnaise to foods  Eat fewer high-fat foods  Some examples of high-fat foods include french fries, doughnuts, ice cream, and cakes  · Eat fewer sweets  Limit foods and drinks that are high in sugar  This includes candy, cookies, regular soda, and sweetened drinks  Exercise:  Exercise at least 30 minutes per day on most days of the week  Some examples of exercise include walking, biking, dancing, and swimming  You can also fit in more physical activity by taking the stairs instead of the elevator or parking farther away from stores  Ask your healthcare provider about the best exercise plan for you  © Copyright [employer] 2018 Information is for End User's use only and may not be sold, redistributed or otherwise used for commercial purposes   All illustrations and images included in CareNotes® are the copyrighted property of A D A M , Inc  or [address] [place] [address]

## 2020-07-16 NOTE — PROGRESS NOTES
Assessment/Plan:  1  Essential hypertension    2  Palpitation    3  Pre-diabetes    4  Nonrheumatic aortic valve insufficiency    5  Abnormal liver enzymes    6  Vitamin D deficiency    7  Generalized anxiety disorder  -     DULoxetine (CYMBALTA) 30 mg delayed release capsule; Take 1 capsule (30 mg total) by mouth daily    8  Encounter for immunization  -     PNEUMOCOCCAL POLYSACCHARIDE VACCINE 23-VALENT =>3YO SQ IM     Blood pressure is well controlled  He has worsening tingling symptoms along with palpitations, daughter admits that she is quite anxious and stressed out all the time, not sleeping well because of that  Did not take the Celexa or the BuSpar worried that she will not like the side effects  She took Celexa only for couple of days and did not like it  Discussed in detail the possibility of anxiety causing all of her symptoms  Advised to start Cymbalta  She will follow-up with cardiologist   Stable aortic dilatation and aortic wall insufficiency with no symptoms suggestive of heart failure  Not taking statin, no evidence of CVA will stop it given elevated LFTs as well  LDL is well below 100  Continue baby aspirin    Acid reflux is well controlled on Pepcid  Elevated LFTs, going for MRI soon and then will see GI  Pneumovax was given today  Subjective:   Chief Complaint   Patient presents with   [facility] Wellness Visit     Subsequent    Palpitations     worsening        Patient ID: Padmini Gomez is a 69 y o  female  She comes in for follow-up of hypertension hyperlipidemia, acid reflux, elevated LFTs, carpal tunnel  The following portions of the patient's history were reviewed and updated as appropriate: current medications, past medical history, past social history and past surgical history      PHQ-9 Depression Screening    PHQ-9:    Frequency of the following problems over the past two weeks:       Little interest or pleasure in doing things:  0 - not at all  Feeling down, depressed, or hopeless:  1 - several days  Trouble falling or staying asleep, or sleeping too much:  2 - more than half the days  Feeling tired or having little energy:  2 - more than half the days  Poor appetite or overeatin - more than half the days  Feeling bad about yourself - or that you are a failure or have let yourself or your family down:  1 - several days  Trouble concentrating on things, such as reading the newspaper or watching television:  0 - not at all  Moving or speaking so slowly that other people could have noticed   Or the opposite - being so fidgety or restless that you have been moving around a lot more than usual:  0 - not at all  Thoughts that you would be better off dead, or of hurting yourself in some way:  0 - not at all  PHQ-2 Score:  1  PHQ-9 Score:  8           Current Outpatient Medications:     acetaminophen (TYLENOL) 325 mg tablet, Take 650 mg by mouth every 6 (six) hours as needed for mild pain, Disp: , Rfl:     aspirin 81 MG tablet, Take 1 tablet (81 mg total) by mouth daily, Disp: 90 tablet, Rfl: 1    cholecalciferol (VITAMIN D3) 1,000 units tablet, Take 2 tablets (2,000 Units total) by mouth daily, Disp: 180 tablet, Rfl: 1    diclofenac sodium (VOLTAREN) 1 %, Apply 2 g topically 4 (four) times a day, Disp: 1 Tube, Rfl: 2    famotidine (PEPCID) 20 mg tablet, Take 1 tablet (20 mg total) by mouth daily at bedtime, Disp: 90 tablet, Rfl: 0    fluticasone (FLONASE) 50 mcg/act nasal spray, 1 spray into each nostril every 12 (twelve) hours as needed for rhinitis, Disp: 1 Bottle, Rfl: 0    losartan (COZAAR) 25 mg tablet, TAKE 1 TABLET BY MOUTH DAILY, Disp: 90 tablet, Rfl: 1    meclizine (ANTIVERT) 12 5 MG tablet, Take 1 tablet (12 5 mg total) by mouth every 12 (twelve) hours as needed for dizziness, Disp: 30 tablet, Rfl: 1    metoprolol succinate (TOPROL-XL) 25 mg 24 hr tablet, Take 1 tablet (25 mg total) by mouth daily, Disp: 90 tablet, Rfl: 1   metoprolol succinate (TOPROL-XL) 50 mg 24 hr tablet, Take 1 tablet (50 mg total) by mouth daily, Disp: 90 tablet, Rfl: 1    Multiple Vitamin (MULTIVITAMIN) capsule, Take 1 capsule by mouth daily, Disp: , Rfl:     DULoxetine (CYMBALTA) 30 mg delayed release capsule, Take 1 capsule (30 mg total) by mouth daily, Disp: 90 capsule, Rfl: 1    Review of Systems   Constitutional: Positive for fatigue  Negative for fever and unexpected weight change  HENT: Negative for ear pain, hearing loss and sore throat  Eyes: Negative for pain and discharge  Respiratory: Negative for cough, chest tightness and shortness of breath  Cardiovascular: Positive for palpitations  Negative for chest pain  Gastrointestinal: Negative for abdominal pain, blood in stool, constipation, diarrhea and nausea  Genitourinary: Negative for dysuria, frequency and hematuria  Musculoskeletal: Negative for arthralgias and joint swelling  Skin: Negative for rash  Allergic/Immunologic: Negative for immunocompromised state  Neurological: Positive for numbness  Negative for dizziness and headaches  Hematological: Negative for adenopathy  Psychiatric/Behavioral: Positive for dysphoric mood and sleep disturbance  Negative for confusion  The patient is nervous/anxious  Objective:  /72   Pulse 65   Temp 98 2 °F (36 8 °C) (Tympanic)   Ht 5' 4" (1 626 m)   Wt 67 1 kg (148 lb)   SpO2 98%   BMI 25 40 kg/m²      Physical Exam   Constitutional: She appears well-developed and well-nourished  HENT:   Head: Normocephalic and atraumatic  Right Ear: Tympanic membrane normal    Left Ear: Tympanic membrane normal    Nose: Nose normal    Eyes: Pupils are equal, round, and reactive to light  Conjunctivae are normal  Right eye exhibits no discharge  Left eye exhibits no discharge  Neck: Normal range of motion  Neck supple  No thyromegaly present     Cardiovascular: Normal rate, regular rhythm, S1 normal, S2 normal and normal heart sounds  PMI is not displaced  No murmur heard  Pulmonary/Chest: Effort normal and breath sounds normal  No accessory muscle usage  No apnea  No respiratory distress  She has no rhonchi  She has no rales  Abdominal: Soft  Normal appearance and bowel sounds are normal  She exhibits no shifting dullness  There is no hepatosplenomegaly  There is no tenderness  There is no rebound and no CVA tenderness  Musculoskeletal: Normal range of motion  She exhibits no edema or tenderness  Lymphadenopathy:     She has no cervical adenopathy  Neurological: She is alert  Skin: Skin is warm and intact  No rash noted  Psychiatric: Her speech is normal  Her mood appears anxious  Nursing note and vitals reviewed          Recent Results (from the past 1008 hour(s))   Comprehensive metabolic panel    Collection Time: 06/08/20  7:17 AM   Result Value Ref Range    Sodium 138 136 - 145 mmol/L    Potassium 4 0 3 5 - 5 3 mmol/L    Chloride 104 100 - 108 mmol/L    CO2 27 21 - 32 mmol/L    ANION GAP 7 4 - 13 mmol/L    BUN 13 5 - 25 mg/dL    Creatinine 0 61 0 60 - 1 30 mg/dL    Glucose, Fasting 92 65 - 99 mg/dL    Calcium 9 3 8 3 - 10 1 mg/dL     (H) 5 - 45 U/L     (H) 12 - 78 U/L    Alkaline Phosphatase 374 (H) 46 - 116 U/L    Total Protein 7 8 6 4 - 8 2 g/dL    Albumin 3 9 3 5 - 5 0 g/dL    Total Bilirubin 0 61 0 20 - 1 00 mg/dL    eGFR 91 ml/min/1 73sq m   Gamma GT    Collection Time: 06/08/20  7:17 AM   Result Value Ref Range     (H) 5 - 85 U/L   CBC and differential    Collection Time: 06/08/20  7:17 AM   Result Value Ref Range    WBC 7 28 4 31 - 10 16 Thousand/uL    RBC 3 90 3 81 - 5 12 Million/uL    Hemoglobin 12 2 11 5 - 15 4 g/dL    Hematocrit 39 0 34 8 - 46 1 %     (H) 82 - 98 fL    MCH 31 3 26 8 - 34 3 pg    MCHC 31 3 (L) 31 4 - 37 4 g/dL    RDW 13 5 11 6 - 15 1 %    MPV 10 1 8 9 - 12 7 fL    Platelets 698 411 - 436 Thousands/uL    nRBC 0 /100 WBCs    Neutrophils Relative 57 43 - 75 % Immat GRANS % 0 0 - 2 %    Lymphocytes Relative 28 14 - 44 %    Monocytes Relative 11 4 - 12 %    Eosinophils Relative 3 0 - 6 %    Basophils Relative 1 0 - 1 %    Neutrophils Absolute 4 13 1 85 - 7 62 Thousands/µL    Immature Grans Absolute 0 03 0 00 - 0 20 Thousand/uL    Lymphocytes Absolute 2 06 0 60 - 4 47 Thousands/µL    Monocytes Absolute 0 81 0 17 - 1 22 Thousand/µL    Eosinophils Absolute 0 19 0 00 - 0 61 Thousand/µL    Basophils Absolute 0 06 0 00 - 0 10 Thousands/µL   Hemoglobin A1C    Collection Time: 06/08/20  7:17 AM   Result Value Ref Range    Hemoglobin A1C 5 7 (H) Normal 3 8-5 6%; PreDiabetic 5 7-6 4%;  Diabetic >=6 5%; Glycemic control for adults with diabetes <7 0% %     mg/dl   Lipid Panel with Direct LDL reflex    Collection Time: 06/08/20  7:17 AM   Result Value Ref Range    Cholesterol 150 50 - 200 mg/dL    Triglycerides 74 <=150 mg/dL    HDL, Direct 93 >=40 mg/dL    LDL Calculated 42 0 - 100 mg/dL   TSH, 3rd generation with Free T4 reflex    Collection Time: 06/08/20  7:17 AM   Result Value Ref Range    TSH 3RD GENERATON 1 432 0 358 - 3 740 uIU/mL   Vitamin D 25 hydroxy    Collection Time: 06/08/20  7:17 AM   Result Value Ref Range    Vit D, 25-Hydroxy 43 2 30 0 - 100 0 ng/mL   Ferritin    Collection Time: 06/19/20  7:39 AM   Result Value Ref Range    Ferritin 43 8 - 388 ng/mL   GABRIELA Screen w/ Reflex to Titer/Pattern    Collection Time: 06/19/20  7:39 AM   Result Value Ref Range    GABRIELA Negative Negative   Anti-microsomal antibody    Collection Time: 06/19/20  7:39 AM   Result Value Ref Range    THYROID MICROSOMAL ANTIBODY <9 0 - 34 IU/mL   Chronic Hepatitis Panel    Collection Time: 06/19/20  7:39 AM   Result Value Ref Range    Hepatitis B Surface Ag Non-reactive Non-reactive, NonReactive - Confirmed    Hepatitis C Ab Non-reactive Non-reactive    Hep B C IgM Non-reactive Non-reactive    Hep B Core Total Ab Non-reactive Non-reactive   Magnesium    Collection Time: 06/19/20  7:39 AM Result Value Ref Range    Magnesium 2 1 1 6 - 2 6 mg/dL   Anti-smooth muscle antibody, IgG    Collection Time: 06/19/20  7:39 AM   Result Value Ref Range    Smooth Muscle Ab 24 (H) 0 - 19 Units   Antimitochondrial antibody    Collection Time: 06/19/20  7:39 AM   Result Value Ref Range    Mitochondrial Ab <20 0 0 0 - 20 0 Units   ]    No results found

## 2020-07-16 NOTE — PROGRESS NOTES
Assessment and Plan:     Problem List Items Addressed This Visit        Cardiovascular and Mediastinum    Essential hypertension - Primary    Nonrheumatic aortic valve insufficiency       Other    Palpitation    Pre-diabetes    Abnormal liver enzymes    Vitamin D deficiency        BMI Counseling: Body mass index is 25 4 kg/m²  The BMI is above normal  Nutrition recommendations include encouraging healthy choices of fruits and vegetables  Exercise recommendations include exercising 3-5 times per week  Preventive health issues were discussed with patient, and age appropriate screening tests were ordered as noted in patient's After Visit Summary  Personalized health advice and appropriate referrals for health education or preventive services given if needed, as noted in patient's After Visit Summary       History of Present Illness:     Patient presents for Medicare Annual Wellness visit    Patient Care Team:  Gil Wren MD as PCP - General (Internal Medicine)     Problem List:     Patient Active Problem List   Diagnosis    Essential hypertension    Palpitation    Gastroesophageal reflux disease    Primary osteoarthritis of both knees    Osteopenia    Cervical radiculopathy    Aortic dilatation (HCC)    Rotator cuff syndrome of right shoulder    Nonrheumatic aortic valve insufficiency    Abnormal EKG    Pre-diabetes    Numbness and tingling of left arm and leg    Carpal tunnel syndrome of left wrist    Vertigo    Abnormal liver enzymes    Vitamin D deficiency      Past Medical and Surgical History:     Past Medical History:   Diagnosis Date    Diverticulosis     Hypertension      Past Surgical History:   Procedure Laterality Date    COLONOSCOPY      TUBAL LIGATION      UPPER GASTROINTESTINAL ENDOSCOPY        Family History:     Family History   Problem Relation Age of Onset    Diabetes Mother     Hypertension Mother     Hypertension Father     Stroke Father     No Known Problems Sister     No Known Problems Brother       Social History:        Social History     Socioeconomic History    Marital status: /Civil Union     Spouse name: None    Number of children: None    Years of education: None    Highest education level: None   Occupational History    None   Social Needs    Financial resource strain: None    Food insecurity:     Worry: None     Inability: None    Transportation needs:     Medical: None     Non-medical: None   Tobacco Use    Smoking status: Never Smoker    Smokeless tobacco: Never Used   Substance and Sexual Activity    Alcohol use: No    Drug use: No    Sexual activity: None   Lifestyle    Physical activity:     Days per week: None     Minutes per session: None    Stress: None   Relationships    Social connections:     Talks on phone: None     Gets together: None     Attends Church service: None     Active member of club or organization: None     Attends meetings of clubs or organizations: None     Relationship status: None    Intimate partner violence:     Fear of current or ex partner: None     Emotionally abused: None     Physically abused: None     Forced sexual activity: None   Other Topics Concern    None   Social History Narrative    ** Merged History Encounter **           Medications and Allergies:     Current Outpatient Medications   Medication Sig Dispense Refill    acetaminophen (TYLENOL) 325 mg tablet Take 650 mg by mouth every 6 (six) hours as needed for mild pain      aspirin 81 MG tablet Take 1 tablet (81 mg total) by mouth daily 90 tablet 1    cholecalciferol (VITAMIN D3) 1,000 units tablet Take 2 tablets (2,000 Units total) by mouth daily 180 tablet 1    diclofenac sodium (VOLTAREN) 1 % Apply 2 g topically 4 (four) times a day 1 Tube 2    famotidine (PEPCID) 20 mg tablet Take 1 tablet (20 mg total) by mouth daily at bedtime 90 tablet 0    fluticasone (FLONASE) 50 mcg/act nasal spray 1 spray into each nostril every 12 (twelve) hours as needed for rhinitis 1 Bottle 0    losartan (COZAAR) 25 mg tablet TAKE 1 TABLET BY MOUTH DAILY 90 tablet 1    meclizine (ANTIVERT) 12 5 MG tablet Take 1 tablet (12 5 mg total) by mouth every 12 (twelve) hours as needed for dizziness 30 tablet 1    metoprolol succinate (TOPROL-XL) 25 mg 24 hr tablet Take 1 tablet (25 mg total) by mouth daily 90 tablet 1    metoprolol succinate (TOPROL-XL) 50 mg 24 hr tablet Take 1 tablet (50 mg total) by mouth daily 90 tablet 1    Multiple Vitamin (MULTIVITAMIN) capsule Take 1 capsule by mouth daily       No current facility-administered medications for this visit  Allergies   Allergen Reactions    Other      Seafood    Other Nausea Only     SEAFOOD    Shellfish-Derived Products     Lisinopril Cough      Immunizations:     Immunization History   Administered Date(s) Administered    INFLUENZA 08/29/2018    Influenza, high dose seasonal 0 5 mL 08/29/2018, 10/24/2019    Pneumococcal Conjugate 13-Valent 07/18/2018      Health Maintenance:         Topic Date Due    MAMMOGRAM  04/11/2020    DXA SCAN  06/22/2020    CRC Screening: Colonoscopy  03/06/2027    Hepatitis C Screening  Completed         Topic Date Due    DTaP,Tdap,and Td Vaccines (1 - Tdap) 08/10/1956    Pneumococcal Vaccine: 65+ Years (2 of 2 - PPSV23) 07/18/2019    Influenza Vaccine  07/01/2020      Medicare Health Risk Assessment:     /72   Pulse 65   Temp 98 2 °F (36 8 °C) (Tympanic)   Ht 5' 4" (1 626 m)   Wt 67 1 kg (148 lb)   SpO2 98%   BMI 25 40 kg/m²      Noemy Snider is here for her Subsequent Wellness visit  Last Medicare Wellness visit information reviewed, patient interviewed and updates made to the record today  Health Risk Assessment:   Patient rates overall health as fair  Patient feels that their physical health rating is slightly worse  Eyesight was rated as slightly worse  Hearing was rated as slightly worse   Patient feels that their emotional and mental health rating is slightly worse  Pain experienced in the last 7 days has been a lot  Patient's pain rating has been 8/10  Patient states that she has experienced no weight loss or gain in last 6 months  Depression Screening:   PHQ-2 Score: 1  PHQ-9 Score: 8      Fall Risk Screening: In the past year, patient has experienced: no history of falling in past year      Urinary Incontinence Screening:   Patient has not leaked urine accidently in the last six months  Home Safety:  Patient has trouble with stairs inside or outside of their home  Patient has working smoke alarms and has working carbon monoxide detector  Home safety hazards include: none  Nutrition:   Current diet is Regular  Medications:   Patient is currently taking over-the-counter supplements  OTC medications include: see medication list  Patient is able to manage medications  Activities of Daily Living (ADLs)/Instrumental Activities of Daily Living (IADLs):   Walk and transfer into and out of bed and chair?: Yes  Dress and groom yourself?: Yes    Bathe or shower yourself?: Yes    Feed yourself? Yes  Do your laundry/housekeeping?: Yes  Manage your money, pay your bills and track your expenses?: Yes  Make your own meals?: Yes    Do your own shopping?: Yes    Previous Hospitalizations:   Any hospitalizations or ED visits within the last 12 months?: No      Advance Care Planning:   Living will: No    Durable POA for healthcare:  Yes    Advanced directive: No    Advanced directive counseling given: Yes    Five wishes given: Yes    End of Life Decisions reviewed with patient: Yes    Provider agrees with end of life decisions: Yes      PREVENTIVE SCREENINGS      Cardiovascular Screening:    General: Screening Not Indicated and History Lipid Disorder      Diabetes Screening:     General: Screening Current      Colorectal Cancer Screening:     General: Screening Current      Breast Cancer Screening:     General: Screening Current    Due for: Mammogram        Cervical Cancer Screening:    General: Screening Not Indicated      Osteoporosis Screening:    General: Screening Current      Abdominal Aortic Aneurysm (AAA) Screening:        General: Screening Not Indicated      Lung Cancer Screening:     General: Screening Not Indicated      Hepatitis C Screening:    General: Screening Current      Kit Sanchez MD

## 2020-07-25 ENCOUNTER — HOSPITAL ENCOUNTER (OUTPATIENT)
Dept: MRI IMAGING | Facility: HOSPITAL | Age: 75
Discharge: HOME | End: 2020-07-25
Attending: INTERNAL MEDICINE
Payer: COMMERCIAL

## 2020-07-25 DIAGNOSIS — K86.2 CYST OF PANCREAS: ICD-10-CM

## 2020-07-25 PROCEDURE — 74183 MRI ABD W/O CNTR FLWD CNTR: CPT

## 2020-07-25 PROCEDURE — A9585 GADOBUTROL INJECTION: HCPCS | Performed by: INTERNAL MEDICINE

## 2020-07-25 RX ADMIN — GADOBUTROL 7 ML: 604.72 INJECTION INTRAVENOUS at 10:08

## 2020-07-28 ENCOUNTER — HOSPITAL ENCOUNTER (OUTPATIENT)
Dept: NEUROLOGY | Facility: CLINIC | Age: 75
Discharge: HOME | End: 2020-07-28
Payer: COMMERCIAL

## 2020-07-28 ENCOUNTER — DOCUMENTATION (OUTPATIENT)
Dept: NEUROLOGY | Facility: CLINIC | Age: 75
End: 2020-07-28

## 2020-07-28 DIAGNOSIS — G56.02 CARPAL TUNNEL SYNDROME ON LEFT: ICD-10-CM

## 2020-07-28 DIAGNOSIS — G57.52 TARSAL TUNNEL SYNDROME OF LEFT SIDE: ICD-10-CM

## 2020-07-28 DIAGNOSIS — G57.52 TARSAL TUNNEL SYNDROME OF LEFT SIDE: Primary | ICD-10-CM

## 2020-07-28 DIAGNOSIS — G56.02 CARPAL TUNNEL SYNDROME OF LEFT WRIST: ICD-10-CM

## 2020-07-28 PROCEDURE — 95886 MUSC TEST DONE W/N TEST COMP: CPT | Performed by: PSYCHIATRY & NEUROLOGY

## 2020-07-28 PROCEDURE — 95912 NRV CNDJ TEST 11-12 STUDIES: CPT | Performed by: PSYCHIATRY & NEUROLOGY

## 2020-07-28 NOTE — PROGRESS NOTES
This is a 74-year-old patient originally presented to our offices with complaints numbness and tingling  On for on further questioning she describes numbness and tingling involving the two digits of her left hand with sometimes can be the entire left hand  She also describes pulling sensation the left side of the face  This is more predominantly associated twitching  She does not to numbness and tingling in the left foot predominantly involving the toes  Her examination today did demonstrated Tinel sign at the left wrist   She also hammertoes on the left side but her face was symmetric  She ambulated independently  Prior EMG study in 2019 demonstrated a mild left carpal tunnel syndrome    The patient was asked to utilize a wrist splint on a regular basis which she has not been utilizing  She was also asked to see a podiatrist which she has not and laboratory studies were suggested which have not yet to be performed  Today the patient was accompanied by her granddaughter Frederick Molina    I did compare the prior EMG study to today's study  Today's EMG  study was abnormal       1  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   When compared to the prior study in 2019 there is not much of a difference  2  There is also evidence of a mild entrapment neuropathy of the tarsal tunnel in either division  There is no evidence of a neuropathy or radiculopathy  Plan 1  I will refer her to Orthopedics for her left mild carpal tunnel syndrome  I have encouraged her to utilize wrist splint on a regular basis  I have also encouraged her to obtain laboratory studies that were previously ordered 2  I will place an order for podiatry  She does have hammertoes  Given these abnormality on this testing, a podiatry evaluation may be helpful  I have informed her that there is no evidence that this is due to a central process    3  We discussed the twitching of the left eye in the left face may be due to myokymia   This can be seen with stress excessive caffeine use  The stress  has improved at home but she does drink coffee on a regular basis  I have asked her to reduce the amount of caffeine usage  4  She did try of duloxetine 30 mg  She informed me that after one dose 1 hour later she had severe nausea vomiting and diarrhea  She is no longer taking this dose  The patient and her granddaughter were agreeable to this plan  I will place orders  She does report an improvement of stress    She does have techniques to reduce stress such as praying with her friends and family     She can return to our offices as previously scheduled in January 2021

## 2020-07-29 ENCOUNTER — OFFICE VISIT (OUTPATIENT)
Dept: OBGYN CLINIC | Facility: MEDICAL CENTER | Age: 75
End: 2020-07-29
Payer: COMMERCIAL

## 2020-07-29 VITALS
BODY MASS INDEX: 26.22 KG/M2 | WEIGHT: 148 LBS | HEIGHT: 63 IN | DIASTOLIC BLOOD PRESSURE: 70 MMHG | TEMPERATURE: 96.7 F | SYSTOLIC BLOOD PRESSURE: 130 MMHG

## 2020-07-29 DIAGNOSIS — N90.7 INCLUSION CYST OF VULVA: Primary | ICD-10-CM

## 2020-07-29 DIAGNOSIS — N89.8 VAGINAL DRYNESS: ICD-10-CM

## 2020-07-29 PROCEDURE — 99203 OFFICE O/P NEW LOW 30 MIN: CPT | Performed by: OBSTETRICS & GYNECOLOGY

## 2020-07-29 PROCEDURE — 1036F TOBACCO NON-USER: CPT | Performed by: OBSTETRICS & GYNECOLOGY

## 2020-07-29 PROCEDURE — 3075F SYST BP GE 130 - 139MM HG: CPT | Performed by: OBSTETRICS & GYNECOLOGY

## 2020-07-29 PROCEDURE — 1170F FXNL STATUS ASSESSED: CPT | Performed by: OBSTETRICS & GYNECOLOGY

## 2020-07-29 PROCEDURE — 3008F BODY MASS INDEX DOCD: CPT | Performed by: OBSTETRICS & GYNECOLOGY

## 2020-07-29 PROCEDURE — 4040F PNEUMOC VAC/ADMIN/RCVD: CPT | Performed by: OBSTETRICS & GYNECOLOGY

## 2020-07-29 PROCEDURE — 3078F DIAST BP <80 MM HG: CPT | Performed by: OBSTETRICS & GYNECOLOGY

## 2020-07-29 PROCEDURE — 1160F RVW MEDS BY RX/DR IN RCRD: CPT | Performed by: OBSTETRICS & GYNECOLOGY

## 2020-07-29 NOTE — PROGRESS NOTES
Assessment Diagnoses and all orders for this visit:    Inclusion cyst of vulva    Vaginal dryness         Plan  Discussed management of inclusion cyst, ready to drain  Recommend vaginal Estrace, oils, or vaginal estrogen to  help with atrophy   Javon Stovall is a 69 y o  female here for a problem visit  Patient is complaining of lump on right labia  Sx's started 1 month ago  Denies pain or discomfort in area  Denies discharge or drainage from the area  also reporting increased vaginal and vulvar dryness  Patient Active Problem List   Diagnosis    Essential hypertension    Palpitation    Gastroesophageal reflux disease    Primary osteoarthritis of both knees    Osteopenia    Cervical radiculopathy    Aortic dilatation (HCC)    Rotator cuff syndrome of right shoulder    Nonrheumatic aortic valve insufficiency    Abnormal EKG    Pre-diabetes    Numbness and tingling of left arm and leg    Carpal tunnel syndrome of left wrist    Vertigo    Abnormal liver enzymes    Vitamin D deficiency    Tarsal tunnel syndrome of left side       Gynecologic History  No LMP recorded  Patient is postmenopausal   The current method of family planning is post menopausal status      Past Medical History:   Diagnosis Date    Diverticulosis     Hypertension      Past Surgical History:   Procedure Laterality Date    COLONOSCOPY      TUBAL LIGATION      UPPER GASTROINTESTINAL ENDOSCOPY       Family History   Problem Relation Age of Onset    Diabetes Mother     Hypertension Mother     Hypertension Father     Stroke Father     No Known Problems Sister     No Known Problems Brother      Social History     Socioeconomic History    Marital status: /Civil Union     Spouse name: Not on file    Number of children: Not on file    Years of education: Not on file    Highest education level: Not on file   Occupational History    Not on file   Social Needs    Financial resource strain: Not on file    Food insecurity:     Worry: Not on file     Inability: Not on file    Transportation needs:     Medical: Not on file     Non-medical: Not on file   Tobacco Use    Smoking status: Never Smoker    Smokeless tobacco: Never Used   Substance and Sexual Activity    Alcohol use: No    Drug use: No    Sexual activity: Yes     Partners: Male   Lifestyle    Physical activity:     Days per week: Not on file     Minutes per session: Not on file    Stress: Not on file   Relationships    Social connections:     Talks on phone: Not on file     Gets together: Not on file     Attends Tenriism service: Not on file     Active member of club or organization: Not on file     Attends meetings of clubs or organizations: Not on file     Relationship status: Not on file    Intimate partner violence:     Fear of current or ex partner: Not on file     Emotionally abused: Not on file     Physically abused: Not on file     Forced sexual activity: Not on file   Other Topics Concern    Not on file   Social History Narrative    ** Merged History Encounter **          Allergies   Allergen Reactions    Other      Seafood    Other Nausea Only     SEAFOOD    Shellfish-Derived Products     Lisinopril Cough       Current Outpatient Medications:     acetaminophen (TYLENOL) 325 mg tablet, Take 650 mg by mouth every 6 (six) hours as needed for mild pain, Disp: , Rfl:     aspirin 81 MG tablet, Take 1 tablet (81 mg total) by mouth daily, Disp: 90 tablet, Rfl: 1    cholecalciferol (VITAMIN D3) 1,000 units tablet, Take 2 tablets (2,000 Units total) by mouth daily, Disp: 180 tablet, Rfl: 1    diclofenac sodium (VOLTAREN) 1 %, Apply 2 g topically 4 (four) times a day, Disp: 1 Tube, Rfl: 2    famotidine (PEPCID) 20 mg tablet, Take 1 tablet (20 mg total) by mouth daily at bedtime, Disp: 90 tablet, Rfl: 0    fluticasone (FLONASE) 50 mcg/act nasal spray, 1 spray into each nostril every 12 (twelve) hours as needed for rhinitis, Disp: 1 Bottle, Rfl: 0    losartan (COZAAR) 25 mg tablet, TAKE 1 TABLET BY MOUTH DAILY, Disp: 90 tablet, Rfl: 1    meclizine (ANTIVERT) 12 5 MG tablet, Take 1 tablet (12 5 mg total) by mouth every 12 (twelve) hours as needed for dizziness, Disp: 30 tablet, Rfl: 1    metoprolol succinate (TOPROL-XL) 25 mg 24 hr tablet, Take 1 tablet (25 mg total) by mouth daily, Disp: 90 tablet, Rfl: 1    metoprolol succinate (TOPROL-XL) 50 mg 24 hr tablet, Take 1 tablet (50 mg total) by mouth daily, Disp: 90 tablet, Rfl: 1    Multiple Vitamin (MULTIVITAMIN) capsule, Take 1 capsule by mouth daily, Disp: , Rfl:     DULoxetine (CYMBALTA) 30 mg delayed release capsule, Take 1 capsule (30 mg total) by mouth daily (Patient not taking: Reported on 7/29/2020), Disp: 90 capsule, Rfl: 1    Review of Systems  Constitutional :no fever, feels well, no tiredness, no recent weight gain or loss  ENT: no ear ache, no loss of hearing, no nosebleeds or nasal discharge, no sore throat or hoarseness  Cardiovascular: no complaints of slow or fast heart beat, no chest pain, no palpitations, no leg claudication or lower extremity edema  Respiratory: no complaints of shortness of shortness of breath, no HARDY  Breasts:no complaints of breast pain, breast lump, or nipple discharge  Gastrointestinal: no complaints of abdominal pain, constipation, nausea, vomiting, or diarrhea or bloody stools  Genitourinary : no complaints of dysuria, incontinence, pelvic pain, no dysmenorrhea, vaginal discharge or abnormal vaginal bleeding and as noted in HPI  Musculoskeletal: no complaints of arthralgia, no myalgia, no joint swelling or stiffness, no limb pain or swelling    Integumentary: no complaints of skin rash or lesion, itching or dry skin  Neurological: no complaints of headache, no confusion, no numbness or tingling, no dizziness or fainting     Objective     /70   Temp (!) 96 7 °F (35 9 °C) (Temporal)   Ht 5' 2 5" (1 588 m)   Wt 67 1 kg (148 lb)   BMI 26 64 kg/m²     General:   appears stated age, cooperative, alert normal mood and affect   Neck: normal, supple,trachea midline, no masses   Heart: regular rate and rhythm, S1, S2 normal, no murmur, click, rub or gallop   Lungs: clear to auscultation bilaterally   Abdomen: soft, non-tender, without masses or organomegaly   Vulva: Inclusion cyst noted on right labia majora, near clitoris   Vagina: not evaluated   Urethra: normal   Cervix: not evaluated   Uterus: not examined   Adnexa: not evaluated   Lymphatic palpation of lymph nodes in neck, axilla, groin and/or other locations: no lymphadenopathy or masses noted   Skin normal skin turgor and no rashes     Psychiatric orientation to person, place, and time: normal  mood and affect: normal

## 2020-08-06 ENCOUNTER — TELEPHONE (OUTPATIENT)
Dept: GASTROENTEROLOGY | Facility: CLINIC | Age: 75
End: 2020-08-06

## 2020-08-06 NOTE — TELEPHONE ENCOUNTER
[employer]        Result Notes       Shon Tillmankins   8/6/2020 10:46 AM       Called patient and went over results, I spoke to patients daughter Jimbo Carrillo who will speak to patient regarding results  She stated to please call her to schedule procedure after 1:15 when she is finished with work  She demonstraited [id number] and answered questions for her        Thank you      Nupur Ramos MD   8/5/2020  9:21 AM       Ms Prince Tierney does have cyst in her pancreas on the MRI she had done last week  There is an increase in number of cysts and size of the cyst in the pancreas  Given the increase in size, we should perform an EUS and sample the cyst  Can we add her on for an EUS in the next couple weeks?

## 2020-08-10 ENCOUNTER — PREP FOR PROCEDURE (OUTPATIENT)
Dept: GASTROENTEROLOGY | Facility: CLINIC | Age: 75
End: 2020-08-10

## 2020-08-10 DIAGNOSIS — K86.2 PANCREATIC CYST: Primary | ICD-10-CM

## 2020-08-10 NOTE — TELEPHONE ENCOUNTER
EUS scheduled on 9/2/20 with Dr Pope in [place]  I gave Ebony Jimenes (daughter) verbal instructions/mailed

## 2020-08-20 ENCOUNTER — TELEPHONE (OUTPATIENT)
Dept: GASTROENTEROLOGY | Facility: CLINIC | Age: 75
End: 2020-08-20

## 2020-08-20 NOTE — TELEPHONE ENCOUNTER
Called [facility] and spoke to Cole SANTIAGO to initiate prior authorization  Procedure approved    Amaya Grullon #[id number], eff 8/20 - 9/18/20

## 2020-08-25 ENCOUNTER — HOSPITAL ENCOUNTER (OUTPATIENT)
Dept: MAMMOGRAPHY | Facility: MEDICAL CENTER | Age: 75
Discharge: HOME | End: 2020-08-25
Payer: COMMERCIAL

## 2020-08-25 VITALS — WEIGHT: 148 LBS | BODY MASS INDEX: 26.22 KG/M2 | HEIGHT: 63 IN

## 2020-08-25 DIAGNOSIS — Z12.31 BREAST CANCER SCREENING BY MAMMOGRAM: ICD-10-CM

## 2020-08-25 PROCEDURE — 77067 SCR MAMMO BI INCL CAD: CPT

## 2020-08-25 PROCEDURE — 77063 BREAST TOMOSYNTHESIS BI: CPT

## 2020-09-02 ENCOUNTER — HOSPITAL ENCOUNTER (OUTPATIENT)
Dept: GASTROENTEROLOGY | Facility: HOSPITAL | Age: 75
Setting detail: OUTPATIENT SURGERY
Discharge: HOME | End: 2020-09-02
Attending: INTERNAL MEDICINE | Admitting: INTERNAL MEDICINE
Payer: COMMERCIAL

## 2020-09-02 ENCOUNTER — ANESTHESIA (OUTPATIENT)
Dept: GASTROENTEROLOGY | Facility: HOSPITAL | Age: 75
End: 2020-09-02

## 2020-09-02 ENCOUNTER — ANESTHESIA EVENT (OUTPATIENT)
Dept: GASTROENTEROLOGY | Facility: HOSPITAL | Age: 75
End: 2020-09-02

## 2020-09-02 VITALS
BODY MASS INDEX: 26.22 KG/M2 | TEMPERATURE: 97.7 F | SYSTOLIC BLOOD PRESSURE: 138 MMHG | WEIGHT: 148 LBS | RESPIRATION RATE: 18 BRPM | OXYGEN SATURATION: 97 % | HEIGHT: 63 IN | DIASTOLIC BLOOD PRESSURE: 81 MMHG | HEART RATE: 65 BPM

## 2020-09-02 DIAGNOSIS — K86.2 PANCREATIC CYST: ICD-10-CM

## 2020-09-02 PROCEDURE — 43238 EGD US FINE NEEDLE BX/ASPIR: CPT | Performed by: INTERNAL MEDICINE

## 2020-09-02 PROCEDURE — 88305 TISSUE EXAM BY PATHOLOGIST: CPT | Performed by: PATHOLOGY

## 2020-09-02 PROCEDURE — 43239 EGD BIOPSY SINGLE/MULTIPLE: CPT | Performed by: INTERNAL MEDICINE

## 2020-09-02 RX ORDER — PROPOFOL 10 MG/ML
INJECTION, EMULSION INTRAVENOUS AS NEEDED
Status: DISCONTINUED | OUTPATIENT
Start: 2020-09-02 | End: 2020-09-02

## 2020-09-02 RX ORDER — LIDOCAINE HYDROCHLORIDE 10 MG/ML
INJECTION, SOLUTION EPIDURAL; INFILTRATION; INTRACAUDAL; PERINEURAL AS NEEDED
Status: DISCONTINUED | OUTPATIENT
Start: 2020-09-02 | End: 2020-09-02

## 2020-09-02 RX ORDER — SODIUM CHLORIDE 9 MG/ML
INJECTION, SOLUTION INTRAVENOUS CONTINUOUS PRN
Status: DISCONTINUED | OUTPATIENT
Start: 2020-09-02 | End: 2020-09-02

## 2020-09-02 RX ORDER — FENTANYL CITRATE 50 UG/ML
INJECTION, SOLUTION INTRAMUSCULAR; INTRAVENOUS AS NEEDED
Status: DISCONTINUED | OUTPATIENT
Start: 2020-09-02 | End: 2020-09-02

## 2020-09-02 RX ORDER — PROPOFOL 10 MG/ML
INJECTION, EMULSION INTRAVENOUS CONTINUOUS PRN
Status: DISCONTINUED | OUTPATIENT
Start: 2020-09-02 | End: 2020-09-02

## 2020-09-02 RX ADMIN — FENTANYL CITRATE 25 MCG: 50 INJECTION, SOLUTION INTRAMUSCULAR; INTRAVENOUS at 08:38

## 2020-09-02 RX ADMIN — FENTANYL CITRATE 25 MCG: 50 INJECTION, SOLUTION INTRAMUSCULAR; INTRAVENOUS at 08:41

## 2020-09-02 RX ADMIN — FENTANYL CITRATE 25 MCG: 50 INJECTION, SOLUTION INTRAMUSCULAR; INTRAVENOUS at 08:45

## 2020-09-02 RX ADMIN — PROPOFOL 100 MCG/KG/MIN: 10 INJECTION, EMULSION INTRAVENOUS at 08:42

## 2020-09-02 RX ADMIN — LIDOCAINE HYDROCHLORIDE 60 MG: 10 INJECTION, SOLUTION EPIDURAL; INFILTRATION; INTRACAUDAL; PERINEURAL at 08:42

## 2020-09-02 RX ADMIN — FENTANYL CITRATE 25 MCG: 50 INJECTION, SOLUTION INTRAMUSCULAR; INTRAVENOUS at 09:03

## 2020-09-02 RX ADMIN — PROPOFOL 20 MG: 10 INJECTION, EMULSION INTRAVENOUS at 08:45

## 2020-09-02 RX ADMIN — SODIUM CHLORIDE: 0.9 INJECTION, SOLUTION INTRAVENOUS at 08:34

## 2020-09-02 RX ADMIN — PROPOFOL 40 MG: 10 INJECTION, EMULSION INTRAVENOUS at 08:42

## 2020-09-02 NOTE — ANESTHESIA PREPROCEDURE EVALUATION
Procedure:  ENDOSCOPIC ULTRASOUND (UPPER)    Relevant Problems   CARDIO   (+) Aortic dilatation (HCC)   (+) Essential hypertension   (+) Nonrheumatic aortic valve insufficiency      GI/HEPATIC   (+) Gastroesophageal reflux disease      NEURO/PSYCH   (+) Numbness and tingling of left arm and leg        Physical Exam    Airway    Mallampati score: II  TM Distance: >3 FB  Neck ROM: full     Dental   upper dentures,     Cardiovascular      Pulmonary      Other Findings        Anesthesia Plan  ASA Score- 2     Anesthesia Type- IV sedation with anesthesia with ASA Monitors  Additional Monitors:   Airway Plan:     Comment: 3/2019 Stress test negative, EF 60%  Plan Factors-Exercise tolerance (METS): >4 METS  Chart reviewed  EKG reviewed  Existing labs reviewed  Patient summary reviewed  Patient is not a current smoker  Induction-     Postoperative Plan-     Informed Consent- Anesthetic plan and risks discussed with patient  I personally reviewed this patient with the CRNA  Discussed and agreed on the Anesthesia Plan with the CRNA  Bernice Arroyo

## 2020-09-02 NOTE — ANESTHESIA POSTPROCEDURE EVALUATION
Post-Op Assessment Note    CV Status:  Stable    Pain management: adequate     Mental Status:  Lethargic   Hydration Status:  Stable   PONV Controlled:  None   Airway Patency:  Patent      Post Op Vitals Reviewed: Yes      Staff: CRNA         No complications documented      BP      Temp      Pulse     Resp      SpO2

## 2020-09-08 ENCOUNTER — TELEPHONE (OUTPATIENT)
Dept: GASTROENTEROLOGY | Facility: CLINIC | Age: 75
End: 2020-09-08

## 2020-09-08 NOTE — TELEPHONE ENCOUNTER
Patient's daughter called to reschedule her procedure  She is now scheduled for 11/05/20 with Dr Sharla Savage   Patient still has instructions

## 2020-09-22 ENCOUNTER — TELEPHONE (OUTPATIENT)
Dept: GASTROENTEROLOGY | Facility: CLINIC | Age: 75
End: 2020-09-22

## 2020-09-22 NOTE — TELEPHONE ENCOUNTER
Patients GI provider:  Dr Deborah Paris    Number to return call: [phone]    Reason for call: Pt's granddaughter called looking for pt's results from her EUS of 09/02/20    Scheduled procedure/appointment date if applicable: NA

## 2020-09-23 NOTE — TELEPHONE ENCOUNTER
I called patient, went over results with patients daughter  Can you please schedule her for a follow up with Dr Arenas   Thank you!     Will

## 2020-09-23 NOTE — TELEPHONE ENCOUNTER
Please let her know the biopsy of stomach and small bowel were normal  The biopsy of cyst in pancreas suggests it is a non-mucinous cyst and likely a pseudocyst  She should plan for repeat imaging as planned by Dr Albina Bright and follow up with him in the office   Thank you    Dr Jimbo Ramos

## 2020-09-24 ENCOUNTER — TELEPHONE (OUTPATIENT)
Dept: GASTROENTEROLOGY | Facility: CLINIC | Age: 75
End: 2020-09-24

## 2020-10-22 ENCOUNTER — TRANSCRIBE ORDERS (OUTPATIENT)
Dept: GASTROENTEROLOGY | Facility: CLINIC | Age: 75
End: 2020-10-22

## 2020-10-22 ENCOUNTER — ANESTHESIA (OUTPATIENT)
Dept: ANESTHESIOLOGY | Facility: HOSPITAL | Age: 75
End: 2020-10-22

## 2020-10-22 ENCOUNTER — ANESTHESIA EVENT (OUTPATIENT)
Dept: ANESTHESIOLOGY | Facility: HOSPITAL | Age: 75
End: 2020-10-22

## 2020-10-22 ENCOUNTER — OFFICE VISIT (OUTPATIENT)
Dept: INTERNAL MEDICINE CLINIC | Facility: CLINIC | Age: 75
End: 2020-10-22
Payer: COMMERCIAL

## 2020-10-22 VITALS
SYSTOLIC BLOOD PRESSURE: 143 MMHG | HEIGHT: 63 IN | HEART RATE: 74 BPM | RESPIRATION RATE: 12 BRPM | WEIGHT: 150.2 LBS | TEMPERATURE: 96.9 F | DIASTOLIC BLOOD PRESSURE: 82 MMHG | BODY MASS INDEX: 26.61 KG/M2

## 2020-10-22 DIAGNOSIS — G56.02 CARPAL TUNNEL SYNDROME OF LEFT WRIST: ICD-10-CM

## 2020-10-22 DIAGNOSIS — I10 ESSENTIAL HYPERTENSION: Primary | ICD-10-CM

## 2020-10-22 DIAGNOSIS — K21.9 GASTROESOPHAGEAL REFLUX DISEASE, UNSPECIFIED WHETHER ESOPHAGITIS PRESENT: ICD-10-CM

## 2020-10-22 DIAGNOSIS — R20.0 NUMBNESS AND TINGLING OF LEFT ARM AND LEG: ICD-10-CM

## 2020-10-22 DIAGNOSIS — Z23 ENCOUNTER FOR IMMUNIZATION: ICD-10-CM

## 2020-10-22 DIAGNOSIS — G57.52 TARSAL TUNNEL SYNDROME OF LEFT SIDE: ICD-10-CM

## 2020-10-22 DIAGNOSIS — R73.03 PRE-DIABETES: ICD-10-CM

## 2020-10-22 DIAGNOSIS — I77.819 AORTIC DILATATION (HCC): ICD-10-CM

## 2020-10-22 DIAGNOSIS — H54.7 VISUAL PROBLEMS: ICD-10-CM

## 2020-10-22 DIAGNOSIS — M85.80 OSTEOPENIA, UNSPECIFIED LOCATION: ICD-10-CM

## 2020-10-22 DIAGNOSIS — E55.9 VITAMIN D DEFICIENCY: ICD-10-CM

## 2020-10-22 DIAGNOSIS — R74.8 ABNORMAL LIVER ENZYMES: ICD-10-CM

## 2020-10-22 DIAGNOSIS — R20.2 NUMBNESS AND TINGLING OF LEFT ARM AND LEG: ICD-10-CM

## 2020-10-22 PROCEDURE — 1036F TOBACCO NON-USER: CPT | Performed by: INTERNAL MEDICINE

## 2020-10-22 PROCEDURE — 90662 IIV NO PRSV INCREASED AG IM: CPT | Performed by: INTERNAL MEDICINE

## 2020-10-22 PROCEDURE — 99214 OFFICE O/P EST MOD 30 MIN: CPT | Performed by: INTERNAL MEDICINE

## 2020-10-22 PROCEDURE — 3079F DIAST BP 80-89 MM HG: CPT | Performed by: INTERNAL MEDICINE

## 2020-10-22 PROCEDURE — G0008 ADMIN INFLUENZA VIRUS VAC: HCPCS | Performed by: INTERNAL MEDICINE

## 2020-10-22 PROCEDURE — 1160F RVW MEDS BY RX/DR IN RCRD: CPT | Performed by: INTERNAL MEDICINE

## 2020-10-22 RX ORDER — OMEPRAZOLE 20 MG/1
20 CAPSULE, DELAYED RELEASE ORAL DAILY
Qty: 30 CAPSULE | Refills: 1 | Status: SHIPPED | OUTPATIENT
Start: 2020-10-22 | End: 2020-10-23 | Stop reason: SDUPTHER

## 2020-10-23 DIAGNOSIS — K21.9 GASTROESOPHAGEAL REFLUX DISEASE, UNSPECIFIED WHETHER ESOPHAGITIS PRESENT: ICD-10-CM

## 2020-10-23 RX ORDER — OMEPRAZOLE 20 MG/1
20 CAPSULE, DELAYED RELEASE ORAL DAILY
Qty: 90 CAPSULE | Refills: 1 | Status: SHIPPED | OUTPATIENT
Start: 2020-10-23 | End: 2020-11-16 | Stop reason: SDUPTHER

## 2020-11-04 ENCOUNTER — ANESTHESIA EVENT (OUTPATIENT)
Dept: GASTROENTEROLOGY | Facility: AMBULARY SURGERY CENTER | Age: 75
End: 2020-11-04

## 2020-11-04 RX ORDER — SODIUM CHLORIDE, SODIUM LACTATE, POTASSIUM CHLORIDE, CALCIUM CHLORIDE 600; 310; 30; 20 MG/100ML; MG/100ML; MG/100ML; MG/100ML
100 INJECTION, SOLUTION INTRAVENOUS CONTINUOUS
Status: CANCELLED | OUTPATIENT
Start: 2020-11-04

## 2020-11-05 ENCOUNTER — ANESTHESIA (OUTPATIENT)
Dept: GASTROENTEROLOGY | Facility: AMBULARY SURGERY CENTER | Age: 75
End: 2020-11-05

## 2020-11-05 ENCOUNTER — HOSPITAL ENCOUNTER (OUTPATIENT)
Dept: GASTROENTEROLOGY | Facility: AMBULARY SURGERY CENTER | Age: 75
Setting detail: OUTPATIENT SURGERY
Discharge: HOME | End: 2020-11-05
Attending: INTERNAL MEDICINE | Admitting: INTERNAL MEDICINE
Payer: COMMERCIAL

## 2020-11-05 VITALS
OXYGEN SATURATION: 96 % | BODY MASS INDEX: 26.68 KG/M2 | SYSTOLIC BLOOD PRESSURE: 120 MMHG | RESPIRATION RATE: 18 BRPM | HEIGHT: 62 IN | WEIGHT: 145 LBS | DIASTOLIC BLOOD PRESSURE: 77 MMHG | TEMPERATURE: 97.6 F | HEART RATE: 65 BPM

## 2020-11-05 VITALS — HEART RATE: 63 BPM

## 2020-11-05 DIAGNOSIS — K57.92 DIVERTICULITIS: ICD-10-CM

## 2020-11-05 DIAGNOSIS — K21.9 GASTROESOPHAGEAL REFLUX DISEASE: ICD-10-CM

## 2020-11-05 PROCEDURE — 45378 DIAGNOSTIC COLONOSCOPY: CPT | Performed by: INTERNAL MEDICINE

## 2020-11-05 RX ORDER — PROPOFOL 10 MG/ML
INJECTION, EMULSION INTRAVENOUS AS NEEDED
Status: DISCONTINUED | OUTPATIENT
Start: 2020-11-05 | End: 2020-11-05

## 2020-11-05 RX ORDER — LIDOCAINE HYDROCHLORIDE 10 MG/ML
INJECTION, SOLUTION EPIDURAL; INFILTRATION; INTRACAUDAL; PERINEURAL AS NEEDED
Status: DISCONTINUED | OUTPATIENT
Start: 2020-11-05 | End: 2020-11-05

## 2020-11-05 RX ORDER — SODIUM CHLORIDE, SODIUM LACTATE, POTASSIUM CHLORIDE, CALCIUM CHLORIDE 600; 310; 30; 20 MG/100ML; MG/100ML; MG/100ML; MG/100ML
INJECTION, SOLUTION INTRAVENOUS CONTINUOUS PRN
Status: DISCONTINUED | OUTPATIENT
Start: 2020-11-05 | End: 2020-11-05

## 2020-11-05 RX ADMIN — PROPOFOL 20 MG: 10 INJECTION, EMULSION INTRAVENOUS at 08:36

## 2020-11-05 RX ADMIN — PROPOFOL 20 MG: 10 INJECTION, EMULSION INTRAVENOUS at 08:30

## 2020-11-05 RX ADMIN — PROPOFOL 20 MG: 10 INJECTION, EMULSION INTRAVENOUS at 08:27

## 2020-11-05 RX ADMIN — LIDOCAINE HYDROCHLORIDE 30 MG: 10 INJECTION, SOLUTION EPIDURAL; INFILTRATION; INTRACAUDAL at 08:21

## 2020-11-05 RX ADMIN — SODIUM CHLORIDE, SODIUM LACTATE, POTASSIUM CHLORIDE, AND CALCIUM CHLORIDE: .6; .31; .03; .02 INJECTION, SOLUTION INTRAVENOUS at 08:00

## 2020-11-05 RX ADMIN — PROPOFOL 20 MG: 10 INJECTION, EMULSION INTRAVENOUS at 08:33

## 2020-11-05 RX ADMIN — PROPOFOL 100 MG: 10 INJECTION, EMULSION INTRAVENOUS at 08:21

## 2020-11-05 RX ADMIN — PROPOFOL 20 MG: 10 INJECTION, EMULSION INTRAVENOUS at 08:24

## 2020-11-16 ENCOUNTER — TELEMEDICINE (OUTPATIENT)
Dept: GASTROENTEROLOGY | Facility: CLINIC | Age: 75
End: 2020-11-16
Payer: COMMERCIAL

## 2020-11-16 DIAGNOSIS — K21.9 GASTROESOPHAGEAL REFLUX DISEASE, UNSPECIFIED WHETHER ESOPHAGITIS PRESENT: Primary | ICD-10-CM

## 2020-11-16 DIAGNOSIS — K86.2 PANCREATIC CYST: ICD-10-CM

## 2020-11-16 PROCEDURE — 1036F TOBACCO NON-USER: CPT | Performed by: INTERNAL MEDICINE

## 2020-11-16 PROCEDURE — 99214 OFFICE O/P EST MOD 30 MIN: CPT | Performed by: INTERNAL MEDICINE

## 2020-11-16 RX ORDER — OMEPRAZOLE 20 MG/1
20 CAPSULE, DELAYED RELEASE ORAL DAILY
Qty: 90 CAPSULE | Refills: 1 | Status: SHIPPED | OUTPATIENT
Start: 2020-11-16 | End: 2021-11-22 | Stop reason: SDUPTHER

## 2020-11-16 RX ORDER — OMEPRAZOLE 20 MG/1
20 CAPSULE, DELAYED RELEASE ORAL DAILY
Qty: 60 CAPSULE | Refills: 2 | Status: SHIPPED | OUTPATIENT
Start: 2020-11-16 | End: 2021-05-04 | Stop reason: SDUPTHER

## 2020-12-11 DIAGNOSIS — I10 ESSENTIAL HYPERTENSION: ICD-10-CM

## 2020-12-11 RX ORDER — METOPROLOL SUCCINATE 50 MG/1
50 TABLET, EXTENDED RELEASE ORAL DAILY
Qty: 90 TABLET | Refills: 1 | Status: SHIPPED | OUTPATIENT
Start: 2020-12-11 | End: 2021-05-04 | Stop reason: SDUPTHER

## 2020-12-11 RX ORDER — METOPROLOL SUCCINATE 25 MG/1
25 TABLET, EXTENDED RELEASE ORAL DAILY
Qty: 90 TABLET | Refills: 1 | Status: SHIPPED | OUTPATIENT
Start: 2020-12-11 | End: 2021-05-04 | Stop reason: SDUPTHER

## 2020-12-11 RX ORDER — LOSARTAN POTASSIUM 25 MG/1
25 TABLET ORAL DAILY
Qty: 90 TABLET | Refills: 1 | Status: SHIPPED | OUTPATIENT
Start: 2020-12-11 | End: 2021-05-04 | Stop reason: SDUPTHER

## 2021-04-10 ENCOUNTER — TELEPHONE (OUTPATIENT)
Dept: GASTROENTEROLOGY | Facility: CLINIC | Age: 76
End: 2021-04-10

## 2021-04-10 DIAGNOSIS — R74.8 ABNORMAL LIVER ENZYMES: Primary | ICD-10-CM

## 2021-04-15 DIAGNOSIS — Z23 ENCOUNTER FOR IMMUNIZATION: ICD-10-CM

## 2021-04-16 ENCOUNTER — VBI (OUTPATIENT)
Dept: ADMINISTRATIVE | Facility: OTHER | Age: 76
End: 2021-04-16

## 2021-04-26 ENCOUNTER — APPOINTMENT (OUTPATIENT)
Dept: LAB | Facility: HOSPITAL | Age: 76
End: 2021-04-26
Attending: INTERNAL MEDICINE
Payer: COMMERCIAL

## 2021-04-26 DIAGNOSIS — R74.8 ABNORMAL LIVER ENZYMES: ICD-10-CM

## 2021-04-26 LAB
25(OH)D3 SERPL-MCNC: 29.4 NG/ML (ref 30–100)
ALBUMIN SERPL BCP-MCNC: 3.6 G/DL (ref 3.5–5)
ALP SERPL-CCNC: 115 U/L (ref 46–116)
ALT SERPL W P-5'-P-CCNC: 39 U/L (ref 12–78)
ANION GAP SERPL CALCULATED.3IONS-SCNC: 8 MMOL/L (ref 4–13)
AST SERPL W P-5'-P-CCNC: 32 U/L (ref 5–45)
BASOPHILS # BLD AUTO: 0.03 THOUSANDS/ΜL (ref 0–0.1)
BASOPHILS NFR BLD AUTO: 1 % (ref 0–1)
BILIRUB DIRECT SERPL-MCNC: 0.11 MG/DL (ref 0–0.2)
BILIRUB SERPL-MCNC: 0.28 MG/DL (ref 0.2–1)
BUN SERPL-MCNC: 12 MG/DL (ref 5–25)
CALCIUM SERPL-MCNC: 9.2 MG/DL (ref 8.3–10.1)
CHLORIDE SERPL-SCNC: 104 MMOL/L (ref 100–108)
CHOLEST SERPL-MCNC: 179 MG/DL (ref 50–200)
CO2 SERPL-SCNC: 29 MMOL/L (ref 21–32)
CREAT SERPL-MCNC: 0.63 MG/DL (ref 0.6–1.3)
EOSINOPHIL # BLD AUTO: 0.09 THOUSAND/ΜL (ref 0–0.61)
EOSINOPHIL NFR BLD AUTO: 2 % (ref 0–6)
ERYTHROCYTE [DISTWIDTH] IN BLOOD BY AUTOMATED COUNT: 12.5 % (ref 11.6–15.1)
EST. AVERAGE GLUCOSE BLD GHB EST-MCNC: 117 MG/DL
GFR SERPL CREATININE-BSD FRML MDRD: 89 ML/MIN/1.73SQ M
GLUCOSE P FAST SERPL-MCNC: 91 MG/DL (ref 65–99)
HBA1C MFR BLD: 5.7 %
HCT VFR BLD AUTO: 39.6 % (ref 34.8–46.1)
HDLC SERPL-MCNC: 57 MG/DL
HGB BLD-MCNC: 12.6 G/DL (ref 11.5–15.4)
IMM GRANULOCYTES # BLD AUTO: 0.02 THOUSAND/UL (ref 0–0.2)
IMM GRANULOCYTES NFR BLD AUTO: 0 % (ref 0–2)
INR PPP: 1.1 (ref 0.84–1.19)
LDLC SERPL CALC-MCNC: 102 MG/DL (ref 0–100)
LYMPHOCYTES # BLD AUTO: 2.37 THOUSANDS/ΜL (ref 0.6–4.47)
LYMPHOCYTES NFR BLD AUTO: 39 % (ref 14–44)
MCH RBC QN AUTO: 30.8 PG (ref 26.8–34.3)
MCHC RBC AUTO-ENTMCNC: 31.8 G/DL (ref 31.4–37.4)
MCV RBC AUTO: 97 FL (ref 82–98)
MONOCYTES # BLD AUTO: 0.64 THOUSAND/ΜL (ref 0.17–1.22)
MONOCYTES NFR BLD AUTO: 11 % (ref 4–12)
NEUTROPHILS # BLD AUTO: 2.87 THOUSANDS/ΜL (ref 1.85–7.62)
NEUTS SEG NFR BLD AUTO: 47 % (ref 43–75)
NRBC BLD AUTO-RTO: 0 /100 WBCS
PLATELET # BLD AUTO: 243 THOUSANDS/UL (ref 149–390)
PMV BLD AUTO: 10.6 FL (ref 8.9–12.7)
POTASSIUM SERPL-SCNC: 4.1 MMOL/L (ref 3.5–5.3)
PROT SERPL-MCNC: 7.3 G/DL (ref 6.4–8.2)
PROTHROMBIN TIME: 14 SECONDS (ref 11.6–14.5)
PTH-INTACT SERPL-MCNC: 52.4 PG/ML (ref 18.4–80.1)
RBC # BLD AUTO: 4.09 MILLION/UL (ref 3.81–5.12)
SODIUM SERPL-SCNC: 141 MMOL/L (ref 136–145)
TRIGL SERPL-MCNC: 99 MG/DL
WBC # BLD AUTO: 6.02 THOUSAND/UL (ref 4.31–10.16)

## 2021-04-26 PROCEDURE — 82306 VITAMIN D 25 HYDROXY: CPT | Performed by: INTERNAL MEDICINE

## 2021-04-26 PROCEDURE — 85610 PROTHROMBIN TIME: CPT

## 2021-04-26 PROCEDURE — 80061 LIPID PANEL: CPT | Performed by: INTERNAL MEDICINE

## 2021-04-26 PROCEDURE — 85025 COMPLETE CBC W/AUTO DIFF WBC: CPT | Performed by: INTERNAL MEDICINE

## 2021-04-26 PROCEDURE — 83970 ASSAY OF PARATHORMONE: CPT | Performed by: INTERNAL MEDICINE

## 2021-04-26 PROCEDURE — 36415 COLL VENOUS BLD VENIPUNCTURE: CPT | Performed by: INTERNAL MEDICINE

## 2021-04-26 PROCEDURE — 80053 COMPREHEN METABOLIC PANEL: CPT | Performed by: INTERNAL MEDICINE

## 2021-04-26 PROCEDURE — 82248 BILIRUBIN DIRECT: CPT

## 2021-04-26 PROCEDURE — 83036 HEMOGLOBIN GLYCOSYLATED A1C: CPT | Performed by: INTERNAL MEDICINE

## 2021-04-28 ENCOUNTER — RA CDI HCC (OUTPATIENT)
Dept: OTHER | Facility: HOSPITAL | Age: 76
End: 2021-04-28

## 2021-04-28 NOTE — PROGRESS NOTES
[facility]  coding opportunities             Chart reviewed, (number of) suggestions sent to provider: 1           Patients insurance company: [employer] (Medicare Advantage only)        DX: [phone] Aortic ectasia, unspecified site     Provider never responded to [place] [address]  coding request, and DX: [phone] was not used

## 2021-04-30 ENCOUNTER — IMMUNIZATIONS (OUTPATIENT)
Dept: FAMILY MEDICINE CLINIC | Facility: HOSPITAL | Age: 76
End: 2021-04-30

## 2021-04-30 DIAGNOSIS — Z23 ENCOUNTER FOR IMMUNIZATION: Primary | ICD-10-CM

## 2021-04-30 PROCEDURE — 0001A SARS-COV-2 / COVID-19 MRNA VACCINE (PFIZER-BIONTECH) 30 MCG: CPT

## 2021-04-30 PROCEDURE — 91300 SARS-COV-2 / COVID-19 MRNA VACCINE (PFIZER-BIONTECH) 30 MCG: CPT

## 2021-05-04 ENCOUNTER — HOSPITAL ENCOUNTER (OUTPATIENT)
Dept: ULTRASOUND IMAGING | Facility: HOSPITAL | Age: 76
Discharge: HOME | End: 2021-05-04
Payer: COMMERCIAL

## 2021-05-04 ENCOUNTER — HOSPITAL ENCOUNTER (OUTPATIENT)
Dept: RADIOLOGY | Facility: HOSPITAL | Age: 76
Discharge: HOME | End: 2021-05-04
Payer: COMMERCIAL

## 2021-05-04 ENCOUNTER — TELEPHONE (OUTPATIENT)
Dept: INTERNAL MEDICINE CLINIC | Facility: CLINIC | Age: 76
End: 2021-05-04

## 2021-05-04 ENCOUNTER — OFFICE VISIT (OUTPATIENT)
Dept: INTERNAL MEDICINE CLINIC | Facility: CLINIC | Age: 76
End: 2021-05-04
Payer: COMMERCIAL

## 2021-05-04 ENCOUNTER — APPOINTMENT (OUTPATIENT)
Dept: LAB | Facility: HOSPITAL | Age: 76
End: 2021-05-04
Payer: COMMERCIAL

## 2021-05-04 VITALS
DIASTOLIC BLOOD PRESSURE: 80 MMHG | HEIGHT: 62 IN | TEMPERATURE: 96.6 F | RESPIRATION RATE: 18 BRPM | WEIGHT: 145 LBS | SYSTOLIC BLOOD PRESSURE: 106 MMHG | HEART RATE: 72 BPM | OXYGEN SATURATION: 97 % | BODY MASS INDEX: 26.68 KG/M2

## 2021-05-04 DIAGNOSIS — R10.11 RUQ PAIN: Primary | ICD-10-CM

## 2021-05-04 DIAGNOSIS — I10 ESSENTIAL HYPERTENSION: ICD-10-CM

## 2021-05-04 DIAGNOSIS — M54.50 CHRONIC RIGHT-SIDED LOW BACK PAIN WITHOUT SCIATICA: ICD-10-CM

## 2021-05-04 DIAGNOSIS — R10.11 RUQ PAIN: ICD-10-CM

## 2021-05-04 DIAGNOSIS — G89.29 CHRONIC RIGHT-SIDED LOW BACK PAIN WITHOUT SCIATICA: ICD-10-CM

## 2021-05-04 DIAGNOSIS — K80.20 GALLSTONES: ICD-10-CM

## 2021-05-04 DIAGNOSIS — M25.559 HIP PAIN: ICD-10-CM

## 2021-05-04 DIAGNOSIS — M17.0 PRIMARY OSTEOARTHRITIS OF BOTH KNEES: ICD-10-CM

## 2021-05-04 DIAGNOSIS — K21.9 GASTROESOPHAGEAL REFLUX DISEASE, UNSPECIFIED WHETHER ESOPHAGITIS PRESENT: ICD-10-CM

## 2021-05-04 LAB
ALBUMIN SERPL BCP-MCNC: 3.7 G/DL (ref 3.5–5)
ALP SERPL-CCNC: 108 U/L (ref 46–116)
ALT SERPL W P-5'-P-CCNC: 38 U/L (ref 12–78)
AMYLASE SERPL-CCNC: 36 IU/L (ref 25–115)
ANION GAP SERPL CALCULATED.3IONS-SCNC: 8 MMOL/L (ref 4–13)
AST SERPL W P-5'-P-CCNC: 30 U/L (ref 5–45)
BASOPHILS # BLD AUTO: 0.04 THOUSANDS/ΜL (ref 0–0.1)
BASOPHILS NFR BLD AUTO: 0 % (ref 0–1)
BILIRUB SERPL-MCNC: 0.41 MG/DL (ref 0.2–1)
BUN SERPL-MCNC: 15 MG/DL (ref 5–25)
CALCIUM SERPL-MCNC: 9.2 MG/DL (ref 8.3–10.1)
CHLORIDE SERPL-SCNC: 102 MMOL/L (ref 100–108)
CO2 SERPL-SCNC: 27 MMOL/L (ref 21–32)
CREAT SERPL-MCNC: 0.63 MG/DL (ref 0.6–1.3)
EOSINOPHIL # BLD AUTO: 0.1 THOUSAND/ΜL (ref 0–0.61)
EOSINOPHIL NFR BLD AUTO: 1 % (ref 0–6)
ERYTHROCYTE [DISTWIDTH] IN BLOOD BY AUTOMATED COUNT: 12.6 % (ref 11.6–15.1)
GFR SERPL CREATININE-BSD FRML MDRD: 89 ML/MIN/1.73SQ M
GGT SERPL-CCNC: 31 U/L (ref 5–85)
GLUCOSE SERPL-MCNC: 97 MG/DL (ref 65–140)
HCT VFR BLD AUTO: 39.3 % (ref 34.8–46.1)
HGB BLD-MCNC: 12.5 G/DL (ref 11.5–15.4)
IMM GRANULOCYTES # BLD AUTO: 0.03 THOUSAND/UL (ref 0–0.2)
IMM GRANULOCYTES NFR BLD AUTO: 0 % (ref 0–2)
LYMPHOCYTES # BLD AUTO: 1.96 THOUSANDS/ΜL (ref 0.6–4.47)
LYMPHOCYTES NFR BLD AUTO: 21 % (ref 14–44)
MCH RBC QN AUTO: 30.5 PG (ref 26.8–34.3)
MCHC RBC AUTO-ENTMCNC: 31.8 G/DL (ref 31.4–37.4)
MCV RBC AUTO: 96 FL (ref 82–98)
MONOCYTES # BLD AUTO: 0.85 THOUSAND/ΜL (ref 0.17–1.22)
MONOCYTES NFR BLD AUTO: 9 % (ref 4–12)
NEUTROPHILS # BLD AUTO: 6.16 THOUSANDS/ΜL (ref 1.85–7.62)
NEUTS SEG NFR BLD AUTO: 69 % (ref 43–75)
NRBC BLD AUTO-RTO: 0 /100 WBCS
PLATELET # BLD AUTO: 244 THOUSANDS/UL (ref 149–390)
PMV BLD AUTO: 9.7 FL (ref 8.9–12.7)
POTASSIUM SERPL-SCNC: 3.8 MMOL/L (ref 3.5–5.3)
PROT SERPL-MCNC: 7.6 G/DL (ref 6.4–8.2)
RBC # BLD AUTO: 4.1 MILLION/UL (ref 3.81–5.12)
SODIUM SERPL-SCNC: 137 MMOL/L (ref 136–145)
WBC # BLD AUTO: 9.14 THOUSAND/UL (ref 4.31–10.16)

## 2021-05-04 PROCEDURE — 80053 COMPREHEN METABOLIC PANEL: CPT | Performed by: INTERNAL MEDICINE

## 2021-05-04 PROCEDURE — 82977 ASSAY OF GGT: CPT | Performed by: INTERNAL MEDICINE

## 2021-05-04 PROCEDURE — 73502 X-RAY EXAM HIP UNI 2-3 VIEWS: CPT

## 2021-05-04 PROCEDURE — 76705 ECHO EXAM OF ABDOMEN: CPT

## 2021-05-04 PROCEDURE — 82150 ASSAY OF AMYLASE: CPT

## 2021-05-04 PROCEDURE — 72110 X-RAY EXAM L-2 SPINE 4/>VWS: CPT

## 2021-05-04 PROCEDURE — 36415 COLL VENOUS BLD VENIPUNCTURE: CPT | Performed by: INTERNAL MEDICINE

## 2021-05-04 PROCEDURE — 85025 COMPLETE CBC W/AUTO DIFF WBC: CPT | Performed by: INTERNAL MEDICINE

## 2021-05-04 PROCEDURE — 99214 OFFICE O/P EST MOD 30 MIN: CPT | Performed by: INTERNAL MEDICINE

## 2021-05-04 RX ORDER — LOSARTAN POTASSIUM 25 MG/1
25 TABLET ORAL DAILY
Qty: 90 TABLET | Refills: 1 | Status: SHIPPED | OUTPATIENT
Start: 2021-05-04 | End: 2021-11-22 | Stop reason: SDUPTHER

## 2021-05-04 RX ORDER — TRAMADOL HYDROCHLORIDE 50 MG/1
50 TABLET ORAL EVERY 8 HOURS PRN
Qty: 10 TABLET | Refills: 0 | Status: SHIPPED | OUTPATIENT
Start: 2021-05-04 | End: 2022-01-21 | Stop reason: ALTCHOICE

## 2021-05-04 RX ORDER — OMEPRAZOLE 20 MG/1
20 CAPSULE, DELAYED RELEASE ORAL DAILY
Qty: 60 CAPSULE | Refills: 2 | Status: SHIPPED | OUTPATIENT
Start: 2021-05-04 | End: 2021-05-06 | Stop reason: SDUPTHER

## 2021-05-04 RX ORDER — METOPROLOL SUCCINATE 25 MG/1
25 TABLET, EXTENDED RELEASE ORAL DAILY
Qty: 90 TABLET | Refills: 1 | Status: SHIPPED | OUTPATIENT
Start: 2021-05-04 | End: 2021-11-22

## 2021-05-04 RX ORDER — METOPROLOL SUCCINATE 50 MG/1
50 TABLET, EXTENDED RELEASE ORAL DAILY
Qty: 90 TABLET | Refills: 1 | Status: SHIPPED | OUTPATIENT
Start: 2021-05-04 | End: 2021-11-22

## 2021-05-04 NOTE — PROGRESS NOTES
Assessment/Plan:  1  RUQ pain  -     US abdomen limited; Future  -     CBC and differential  -     Comprehensive metabolic panel  -     Gamma GT  -     Amylase; Future    2  Gastroesophageal reflux disease, unspecified whether esophagitis present  -     omeprazole (PriLOSEC) 20 mg delayed release capsule; Take 1 capsule (20 mg total) by mouth daily    3  Essential hypertension  -     metoprolol succinate (TOPROL-XL) 25 mg 24 hr tablet; Take 1 tablet (25 mg total) by mouth daily  -     metoprolol succinate (TOPROL-XL) 50 mg 24 hr tablet; Take 1 tablet (50 mg total) by mouth daily  -     losartan (COZAAR) 25 mg tablet; Take 1 tablet (25 mg total) by mouth daily    4  Primary osteoarthritis of both knees  -     Diclofenac Sodium (VOLTAREN) 1 %; Apply 2 g topically 4 (four) times a day    5  Hip pain  -     XR hip/pelv 2-3 vws right if performed; Future; Expected date: 05/04/2021  -     XR spine lumbar minimum 4 views non injury; Future; Expected date: 05/04/2021    6  Chronic right-sided low back pain without sciatica  -     XR hip/pelv 2-3 vws right if performed; Future; Expected date: 05/04/2021  -     XR spine lumbar minimum 4 views non injury; Future; Expected date: 05/04/2021     abdominal pain for the last 2 days with loss for thigh it  Tenderness on right upper quadrant with guarding  Given the patient's his that her pain is very similar to prior diverticulitis episode, colonoscopy had shown most of her diverticuli in the sigmoid colon, will get a right upper quadrant ultrasound to evaluate for biliary origin of the pain  If the ultrasound is normal, LFTs are normal will treat empirically for diverticulitis  She is also having some acid reflux and her omeprazole was restarted  Blood pressure is well controlled, continue current dose of metoprolol and losartan     Discussed her blood work with prediabetes and hyperlipidemia    ASCVD risk is borderline, encouraged dietary improvements    Hip, back and knee pain, explained due to osteoarthritis  Advised a combination of Tylenol and topical Voltaren  Advised to refrain from daily use of NSAIDs given her acid reflux  Has a follow-up appointment with GI for follow-up for pancreatic cyst        Subjective:   Chief Complaint   Patient presents with    Follow-up        Patient ID: Power Sky is a 70 y o  female  She comes in for follow-up of hypertension hyperlipidemia, acid reflux, elevated LFTs, carpal tunnel  Has been having heartburn  Having pain in abdomen for 2 days, feels like diverticulitis,  Not much of an appetite  No nausea vomiting, has not seen any blood in stool or diarrhea  Has been taking her blood pressure medicine  Has been having a lot of pain in the knees and hip and back  The following portions of the patient's history were reviewed and updated as appropriate: current medications, past medical history, past social history and past surgical history      PHQ-9 Depression Screening    PHQ-9:   Frequency of the following problems over the past two weeks:               Current Outpatient Medications:     acetaminophen (TYLENOL) 325 mg tablet, Take 650 mg by mouth every 6 (six) hours as needed for mild pain, Disp: , Rfl:     aspirin 81 MG tablet, Take 1 tablet (81 mg total) by mouth daily, Disp: 90 tablet, Rfl: 1    cholecalciferol (VITAMIN D3) 1,000 units tablet, Take 2 tablets (2,000 Units total) by mouth daily, Disp: 180 tablet, Rfl: 1    diclofenac sodium (VOLTAREN) 1 %, Apply 2 g topically 4 (four) times a day, Disp: 1 Tube, Rfl: 2    losartan (COZAAR) 25 mg tablet, Take 1 tablet (25 mg total) by mouth daily, Disp: 90 tablet, Rfl: 1    metoprolol succinate (TOPROL-XL) 25 mg 24 hr tablet, Take 1 tablet (25 mg total) by mouth daily, Disp: 90 tablet, Rfl: 1    metoprolol succinate (TOPROL-XL) 50 mg 24 hr tablet, Take 1 tablet (50 mg total) by mouth daily, Disp: 90 tablet, Rfl: 1    Multiple Vitamin (MULTIVITAMIN) capsule, Take 1 capsule by mouth daily, Disp: , Rfl:     Diclofenac Sodium (VOLTAREN) 1 %, Apply 2 g topically 4 (four) times a day, Disp: 350 g, Rfl: 1    omeprazole (PriLOSEC) 20 mg delayed release capsule, Take 1 capsule (20 mg total) by mouth daily (Patient not taking: Reported on 5/4/2021), Disp: 90 capsule, Rfl: 1    omeprazole (PriLOSEC) 20 mg delayed release capsule, Take 1 capsule (20 mg total) by mouth daily, Disp: 60 capsule, Rfl: 2    Review of Systems   Constitutional: Negative for fatigue, fever and unexpected weight change  HENT: Negative for ear pain, hearing loss and sore throat  Eyes: Negative for pain and discharge  Respiratory: Negative for cough, chest tightness and shortness of breath  Cardiovascular: Negative for chest pain and palpitations  Gastrointestinal: Positive for abdominal pain  Negative for blood in stool, constipation, diarrhea and nausea  Genitourinary: Negative for dysuria, frequency and hematuria  Musculoskeletal: Positive for arthralgias and back pain  Negative for joint swelling  Skin: Negative for rash  Allergic/Immunologic: Negative for immunocompromised state  Neurological: Negative for dizziness and headaches  Hematological: Negative for adenopathy  Psychiatric/Behavioral: Negative for confusion and sleep disturbance  Objective:  /80 (BP Location: Left arm, Patient Position: Sitting, Cuff Size: Standard)   Pulse 72   Temp (!) 96 6 °F (35 9 °C)   Resp 18   Ht 5' 2" (1 575 m)   Wt 65 8 kg (145 lb)   SpO2 97%   BMI 26 52 kg/m²      Physical Exam  Vitals signs and nursing note reviewed  Constitutional:       Appearance: Normal appearance  She is well-developed  HENT:      Head: Normocephalic and atraumatic  Right Ear: Tympanic membrane normal       Left Ear: Tympanic membrane normal       Nose: Nose normal    Eyes:      General:         Right eye: No discharge  Left eye: No discharge        Conjunctiva/sclera: Conjunctivae normal       Pupils: Pupils are equal, round, and reactive to light  Neck:      Musculoskeletal: Normal range of motion and neck supple  Thyroid: No thyromegaly  Cardiovascular:      Rate and Rhythm: Normal rate and regular rhythm  Chest Wall: PMI is not displaced  Heart sounds: Normal heart sounds, S1 normal and S2 normal  No murmur  Pulmonary:      Effort: Pulmonary effort is normal  No accessory muscle usage or respiratory distress  Breath sounds: Normal breath sounds  No rhonchi or rales  Abdominal:      General: Bowel sounds are normal       Palpations: Abdomen is soft  There is no shifting dullness  Tenderness: There is abdominal tenderness in the right upper quadrant  There is guarding  There is no rebound  Musculoskeletal: Normal range of motion  General: No tenderness  Lymphadenopathy:      Cervical: No cervical adenopathy  Skin:     General: Skin is warm  Findings: No rash  Neurological:      Mental Status: She is alert     Psychiatric:         Speech: Speech normal            Recent Results (from the past 1008 hour(s))   CBC and differential    Collection Time: 04/26/21  7:33 AM   Result Value Ref Range    WBC 6 02 4 31 - 10 16 Thousand/uL    RBC 4 09 3 81 - 5 12 Million/uL    Hemoglobin 12 6 11 5 - 15 4 g/dL    Hematocrit 39 6 34 8 - 46 1 %    MCV 97 82 - 98 fL    MCH 30 8 26 8 - 34 3 pg    MCHC 31 8 31 4 - 37 4 g/dL    RDW 12 5 11 6 - 15 1 %    MPV 10 6 8 9 - 12 7 fL    Platelets 253 590 - 509 Thousands/uL    nRBC 0 /100 WBCs    Neutrophils Relative 47 43 - 75 %    Immat GRANS % 0 0 - 2 %    Lymphocytes Relative 39 14 - 44 %    Monocytes Relative 11 4 - 12 %    Eosinophils Relative 2 0 - 6 %    Basophils Relative 1 0 - 1 %    Neutrophils Absolute 2 87 1 85 - 7 62 Thousands/µL    Immature Grans Absolute 0 02 0 00 - 0 20 Thousand/uL    Lymphocytes Absolute 2 37 0 60 - 4 47 Thousands/µL    Monocytes Absolute 0 64 0 17 - 1 22 Thousand/µL Eosinophils Absolute 0 09 0 00 - 0 61 Thousand/µL    Basophils Absolute 0 03 0 00 - 0 10 Thousands/µL   Comprehensive metabolic panel    Collection Time: 04/26/21  7:33 AM   Result Value Ref Range    Sodium 141 136 - 145 mmol/L    Potassium 4 1 3 5 - 5 3 mmol/L    Chloride 104 100 - 108 mmol/L    CO2 29 21 - 32 mmol/L    ANION GAP 8 4 - 13 mmol/L    BUN 12 5 - 25 mg/dL    Creatinine 0 63 0 60 - 1 30 mg/dL    Glucose, Fasting 91 65 - 99 mg/dL    Calcium 9 2 8 3 - 10 1 mg/dL    AST 32 5 - 45 U/L    ALT 39 12 - 78 U/L    Alkaline Phosphatase 115 46 - 116 U/L    Total Protein 7 3 6 4 - 8 2 g/dL    Albumin 3 6 3 5 - 5 0 g/dL    Total Bilirubin 0 28 0 20 - 1 00 mg/dL    eGFR 89 ml/min/1 73sq m   Hemoglobin A1C    Collection Time: 04/26/21  7:33 AM   Result Value Ref Range    Hemoglobin A1C 5 7 (H) Normal 3 8-5 6%; PreDiabetic 5 7-6 4%; Diabetic >=6 5%; Glycemic control for adults with diabetes <7 0% %     mg/dl   Lipid Panel with Direct LDL reflex    Collection Time: 04/26/21  7:33 AM   Result Value Ref Range    Cholesterol 179 50 - 200 mg/dL    Triglycerides 99 <=150 mg/dL    HDL, Direct 57 >=40 mg/dL    LDL Calculated 102 (H) 0 - 100 mg/dL   Vitamin D 25 hydroxy    Collection Time: 04/26/21  7:33 AM   Result Value Ref Range    Vit D, 25-Hydroxy 29 4 (L) 30 0 - 100 0 ng/mL   PTH, intact    Collection Time: 04/26/21  7:33 AM   Result Value Ref Range    PTH 52 4 18 4 - 80 1 pg/mL   Protime-INR    Collection Time: 04/26/21  7:33 AM   Result Value Ref Range    Protime 14 0 11 6 - 14 5 seconds    INR 1 10 0 84 - 1 19   Bilirubin, direct    Collection Time: 04/26/21  7:33 AM   Result Value Ref Range    Bilirubin, Direct 0 11 0 00 - 0 20 mg/dL   ]    No results found

## 2021-05-04 NOTE — TELEPHONE ENCOUNTER
----- Message from Jayne Jefferson MD sent at 5/4/2021  4:19 PM EDT -----  Please call the patient regarding her abnormal result  Liver US showed gallstones which is the cause of her pain  I will refer her to see a surgeon to discuss gall bladder surgery, sending in some tramadol for pain to help it cool down a bit  If pain is not very severe, use tylenol  Tramadol for severe pain  if pain gets worse or she is developing fever or throwing up, let me know or go to ER  Continue omeprazole for acid reflux

## 2021-05-06 ENCOUNTER — OFFICE VISIT (OUTPATIENT)
Dept: GASTROENTEROLOGY | Facility: CLINIC | Age: 76
End: 2021-05-06
Payer: COMMERCIAL

## 2021-05-06 VITALS
DIASTOLIC BLOOD PRESSURE: 72 MMHG | SYSTOLIC BLOOD PRESSURE: 126 MMHG | HEIGHT: 62 IN | WEIGHT: 143.6 LBS | HEART RATE: 64 BPM | TEMPERATURE: 96.8 F | BODY MASS INDEX: 26.43 KG/M2

## 2021-05-06 DIAGNOSIS — K86.2 PANCREATIC CYST: ICD-10-CM

## 2021-05-06 DIAGNOSIS — K80.20 CALCULUS OF GALLBLADDER WITHOUT CHOLECYSTITIS WITHOUT OBSTRUCTION: ICD-10-CM

## 2021-05-06 DIAGNOSIS — K21.9 GASTROESOPHAGEAL REFLUX DISEASE, UNSPECIFIED WHETHER ESOPHAGITIS PRESENT: Primary | ICD-10-CM

## 2021-05-06 DIAGNOSIS — R74.8 ABNORMAL LIVER ENZYMES: ICD-10-CM

## 2021-05-06 DIAGNOSIS — K21.9 GASTROESOPHAGEAL REFLUX DISEASE, UNSPECIFIED WHETHER ESOPHAGITIS PRESENT: ICD-10-CM

## 2021-05-06 PROCEDURE — 99214 OFFICE O/P EST MOD 30 MIN: CPT | Performed by: INTERNAL MEDICINE

## 2021-05-06 NOTE — PROGRESS NOTES
[facility] - Outpatient Follow-up Note  Pearl Carlos 70 y o  female MRN: [id number]  Encounter: [id number]          ASSESSMENT AND PLAN:      1  Gastroesophageal reflux disease, unspecified whether esophagitis present     2  Pancreatic cyst  MRI abdomen w wo contrast and mrcp   3  Abnormal liver enzymes     4  Calculus of gallbladder without cholecystitis without obstruction         1  Gastroesophageal reflux disease  Patient is not having reflux at this time and everything appears to be stable  She is on omeprazole 20 mg daily but not taking it regularly  Told her to take it half hour before her larger meal of the day  She had upper endoscopy in September 2020 which was within normal limits  2  Pancreatic cyst   Patient has 2 subcentimeter pancreatic cyst Will plan for repeat MRI in September 2021 for surveillance purposes  3  Elevated transaminases  Her liver enzymes have normalized and she has not had elevation on most recent blood work  Last elevation of transaminases was in June 2020, this was likely medication/viral related and has since resolved  This could also be related to choledocholithiasis with passed stone as she has transient elevation of LFTs in 2020  4  Cholelithiasis  Will follow up with general surgery, referral in place by Dr Eduardo Clark     ______________________________________________________________________    SUBJECTIVE:  Patient seen for elevated liver enzymes in January and June of 2020  Her enzymes have improved and she does not have any elevation of liver transaminases or bilirubin  She also has a history of pancreatic cysts  She had cyst evaluated by endoscopic ultrasound in September 2020  FNA was completed however very little fluid was extracted  Given the size of the cysts are subcentimeter and small, they are low risk  During that exam, upper endoscopy was performed and was within normal limits   biopsies for H pylori and celiac were within normal limits  She is complaining of mild discomfort in the abdomen with bloating  Does not have overt esophageal reflux at this time  She is taking omeprazole 20 mg daily  REVIEW OF SYSTEMS IS OTHERWISE NEGATIVE        Historical Information   Past Medical History:   Diagnosis Date    Diverticulosis     GERD (gastroesophageal reflux disease)     Hypertension      Past Surgical History:   Procedure Laterality Date    COLONOSCOPY  11/2020    TUBAL LIGATION      Ectopic pregnancy 1979    UPPER GASTROINTESTINAL ENDOSCOPY  11/2020     Social History   Social History     Substance and Sexual Activity   Alcohol Use No     Social History     Substance and Sexual Activity   Drug Use No     Social History     Tobacco Use   Smoking Status Never Smoker   Smokeless Tobacco Never Used     Family History   Problem Relation Age of Onset    Diabetes Mother     Hypertension Mother     Hypertension Father     Stroke Father     No Known Problems Sister     No Known Problems Brother     No Known Problems Daughter     No Known Problems Maternal Grandmother     No Known Problems Maternal Grandfather     No Known Problems Paternal Grandmother     No Known Problems Paternal Grandfather     No Known Problems Sister     No Known Problems Sister     No Known Problems Sister     No Known Problems Sister     No Known Problems Daughter     No Known Problems Maternal Aunt     No Known Problems Maternal Aunt     No Known Problems Maternal Aunt     No Known Problems Paternal Aunt     No Known Problems Paternal Aunt     No Known Problems Paternal Aunt     No Known Problems Paternal Aunt     No Known Problems Paternal Aunt        Meds/Allergies       Current Outpatient Medications:     acetaminophen (TYLENOL) 325 mg tablet    aspirin 81 MG tablet    cholecalciferol (VITAMIN D3) 1,000 units tablet    diclofenac sodium (VOLTAREN) 1 %    Diclofenac Sodium (VOLTAREN) 1 %    losartan (COZAAR) 25 mg tablet    metoprolol succinate (TOPROL-XL) 25 mg 24 hr tablet    metoprolol succinate (TOPROL-XL) 50 mg 24 hr tablet    Multiple Vitamin (MULTIVITAMIN) capsule    omeprazole (PriLOSEC) 20 mg delayed release capsule    traMADol (ULTRAM) 50 mg tablet    omeprazole (PriLOSEC) 20 mg delayed release capsule    Allergies   Allergen Reactions    Shellfish-Derived Products - Food Allergy Nausea Only    Lisinopril Cough           Objective     Blood pressure 126/72, pulse 64, temperature (!) 96 8 °F (36 °C), temperature source Tympanic, height 5' 2" (1 575 m), weight 65 1 kg (143 lb 9 6 oz)  Body mass index is 26 26 kg/m²  PHYSICAL EXAM:      General Appearance:   Alert, cooperative, no distress   HEENT:   Normocephalic, atraumatic, anicteric  Lungs:   Equal chest rise and unlabored breathing, normal cough   Heart:   No visualized JVD   Abdomen:   Soft, non-tender, non-distended; no masses, no organomegaly    Genitalia:   Deferred    Rectal:   Deferred    Extremities:  No cyanosis, clubbing or edema    Pulses:  Musculoskeletal:  2+ and symmetric  Normal range of motion visualized    Skin:  Neuro:  No jaundice, rashes, or lesions   Alert and appropriate           Lab Results:   No visits with results within 1 Day(s) from this visit  Latest known visit with results is:   Appointment on 05/04/2021   Component Date Value    Amylase 05/04/2021 36          Radiology Results:   Us Abdomen Limited    Result Date: 5/4/2021  Narrative: RIGHT UPPER QUADRANT ULTRASOUND INDICATION:    R10 11: Right upper quadrant pain  COMPARISON:  Reference is made with abdominal MRI dated 7/25/2020  TECHNIQUE:   Real-time ultrasound of the right upper quadrant was performed with a curvilinear transducer with both volumetric sweeps and still imaging techniques  FINDINGS: PANCREAS:  Portions of the pancreas are obscured by bowel gas    There is a 5 x 5 x 8 mm cystic lesion in the pancreas which likely correlates with one of the cystic lesions reported on abdominal MRI dated 7/25/2020  AORTA AND IVC:  Visualized portions are normal for patient age  LIVER: Size:  Within normal range  The liver measures 13 9 cm in the midclavicular line  Contour:  Surface contour is smooth  Parenchyma:  Echogenicity and echotexture are within normal limits  No evidence of suspicious mass  There are multiple liver cysts, the largest of which measures 5 9 x 3 8 x 5 9 cm  Limited imaging of the main portal vein shows it to be patent and hepatopetal   BILIARY: The gallbladder is distended  Small sludge/stones are seen in the gallbladder  There is no gallbladder wall thickening or pericholecystic fluid  No sonographic Solis's sign elicited  No intrahepatic biliary dilatation  CBD measures 2 mm  No choledocholithiasis  KIDNEY: Right kidney measures 9 7 cm  Within normal limits  ASCITES:   None  Impression: Distended gallbladder with small sludge/stones  No gallbladder wall thickening, pericholecystic fluid, or sonographic Solis's sign elicited to suggest acute cholecystitis  Multiple hepatic cysts  Subcentimeter cystic lesion in the pancreas which likely correlates with one of the cystic lesions reported on abdominal MRI dated 7/25/2020  Continued follow-up should be performed per recommendations given at that time   Workstation performed: [id number]

## 2021-05-09 RX ORDER — OMEPRAZOLE 20 MG/1
20 CAPSULE, DELAYED RELEASE ORAL DAILY
Qty: 60 CAPSULE | Refills: 2 | Status: SHIPPED | OUTPATIENT
Start: 2021-05-09 | End: 2021-11-22 | Stop reason: ALTCHOICE

## 2021-05-10 ENCOUNTER — TELEPHONE (OUTPATIENT)
Dept: SURGERY | Facility: CLINIC | Age: 76
End: 2021-05-10

## 2021-05-10 NOTE — TELEPHONE ENCOUNTER
Hi all,    Patient had EGD done on 11/2020  However, the report is not available and still states in process  Gen   Surgery is requesting a completed report, patient is scheduled to see them tomorrow 5/11 please advise,        Thank you

## 2021-05-10 NOTE — TELEPHONE ENCOUNTER
Melecio Mayberry,     I followed up with the provider regarding this pt's EGD  Dr Albina Bright states patient EGD report is included in the EUS report done in 9/2020  Please let me know if this helps, or if anything else is needed          Thank you

## 2021-05-10 NOTE — TELEPHONE ENCOUNTER
Patient had Colono & EGD done on 11/5/2020 by Dr Tanna Herrera  There is a colono report but not EGD report  Did patient have it done? Please advise  Thank you   Stevo Lopez

## 2021-05-10 NOTE — TELEPHONE ENCOUNTER
Pt in from home today with daughter. Per patient she fell down about 8 steps at home today. Pt states she was walking up the stairs carrying ceral box and missed the step. Pt denies any dizziness before or after fall. Pt states she did hit her head but denies LOC. Pt has contusion noted to right orbit and laceration noted where the right ear attached to side of head. Pt denies any CP or SOB. Pt alert and oriented upon arrival to bed, states no further needs, placed on cycling b/p and pulse ox, will continue to monitor.       Cheri Bustamante, VJ  09/15/20 2065 Thank you   Ken Patel

## 2021-05-11 ENCOUNTER — CONSULT (OUTPATIENT)
Dept: SURGERY | Facility: CLINIC | Age: 76
End: 2021-05-11
Payer: COMMERCIAL

## 2021-05-11 VITALS
DIASTOLIC BLOOD PRESSURE: 60 MMHG | HEART RATE: 65 BPM | WEIGHT: 147.2 LBS | HEIGHT: 62 IN | SYSTOLIC BLOOD PRESSURE: 120 MMHG | BODY MASS INDEX: 27.09 KG/M2 | TEMPERATURE: 97.9 F

## 2021-05-11 DIAGNOSIS — K80.20 GALLSTONES: ICD-10-CM

## 2021-05-11 DIAGNOSIS — R10.11 RUQ PAIN: ICD-10-CM

## 2021-05-11 PROCEDURE — 1036F TOBACCO NON-USER: CPT | Performed by: PHYSICIAN ASSISTANT

## 2021-05-11 PROCEDURE — 3078F DIAST BP <80 MM HG: CPT | Performed by: PHYSICIAN ASSISTANT

## 2021-05-11 PROCEDURE — 3008F BODY MASS INDEX DOCD: CPT | Performed by: PHYSICIAN ASSISTANT

## 2021-05-11 PROCEDURE — 1160F RVW MEDS BY RX/DR IN RCRD: CPT | Performed by: PHYSICIAN ASSISTANT

## 2021-05-11 PROCEDURE — 3074F SYST BP LT 130 MM HG: CPT | Performed by: PHYSICIAN ASSISTANT

## 2021-05-11 PROCEDURE — 99203 OFFICE O/P NEW LOW 30 MIN: CPT | Performed by: PHYSICIAN ASSISTANT

## 2021-05-11 NOTE — PROGRESS NOTES
Assessment/Plan:   Rossi Biggs is a 70 y  o female who is here for Gallbladder disease      72-year-old female with history of GERD, pancreatic cyst, hypertension, and arthritis presents to the office due to right upper quadrant abdominal pain associated with nausea vomiting which lasted 3-4 days  Patient was evaluated by GI and felt it was not related to her GERD  She had a previous upper endoscopy and EUS with drainage of pancreatic cyst  August of 2020  The pancreas is monitored and followed by CT scan and MRI yearly without any changes      an outpatient ultrasound was performed which showed multiple small stones but no thick wall or pericholecystic fluid     she did have ever remote history of elevated LFTs in June 2020 which seemed to have resolved and normalized    Upon evaluation today patient has: Bilary colic and Gallstones on Ultrasound       Plan:  Would recommend Laparoscopic Cholecystectomy with possible Intraoperative Cholangiogram  Possible Robotic assisted   patient is unsure if she is ready to proceed with surgical intervention at this time  She has 7 kids that she would like to discuss her options with prior to making a final decision  She is aware that if she continues to have upper abdominal pain that is intermittent and persistent that she should follow up sooner and proceed with surgical intervention    Post Op Pain Management: [facility]      Ultrasound findings:  Distended gallbladder with sludge and stones  There is no gallbladder wall thickening or pericholecystic fluid  The common bile duct measures 2 mm   multiple hepatic cysts  subcentimeter cystic lesions in the pancreas which was present on MRI dated 07/25/2020      Preoperative Clearance: Medical          Images:         ____________________________________________________    HPI:  Rossi Biggs is a 70 y  o female who was referred for evaluation of Diagnoses of RUQ pain and Gallstones were pertinent to this visit  Abdominal pain Location: in the RUQ with radiation to back and chest  Quality: pressure-like, sharp and shooting  Quantity: 7/10 in intensity  Chronicity: Onset 1 week ago, completely resolved since   Alleviating factors: none  Associated symptoms: belching and nausea, which is Intermittent      nausea and no vomiting,     regular bowel movement  Duration of pain or symptoms: Intermittent and for 1 week      Prior Colonoscopy : 1 Years Ago  Prior Abdominal Surgery: tubal ligation    Anticoagulation: none    Imaging:   No orders to display           LABS:    Lab Results   Component Value Date    K 3 8 05/04/2021     05/04/2021    CO2 27 05/04/2021    BUN 15 05/04/2021    CREATININE 0 63 05/04/2021    GLUF 91 04/26/2021    CALCIUM 9 2 05/04/2021    AST 30 05/04/2021    ALT 38 05/04/2021    ALKPHOS 108 05/04/2021    EGFR 89 05/04/2021       Lab Results   Component Value Date    WBC 9 14 05/04/2021    HGB 12 5 05/04/2021    HCT 39 3 05/04/2021    MCV 96 05/04/2021     05/04/2021     Lab Results   Component Value Date    INR 1 10 04/26/2021    PROTIME 14 0 04/26/2021     Lab Results   Component Value Date    HGBA1C 5 7 (H) 04/26/2021           ROS:  General ROS: negative for - chills, fatigue, fever or night sweats, weight loss  Respiratory ROS: no cough, shortness of breath, or wheezing  Cardiovascular ROS: no chest pain or dyspnea on exertion  Genito-Urinary ROS: no dysuria, trouble voiding, or hematuria  Musculoskeletal ROS: negative for - gait disturbance, joint pain or muscle pain  Neurological ROS: no TIA or stroke symptoms  Gastrointestinal ROS: See HPI   GI ROS: see HPI  Skin ROS: no new rashes or lesions   Lymphatic ROS: no new adenopathy noted by pt     GYN ROS: see HPI, no new GYN history or bleeding noted  Psy ROS: no new mental or behavioral disturbances       Patient Active Problem List   Diagnosis    Essential hypertension    Palpitation    Gastroesophageal reflux disease    Primary osteoarthritis of both knees    Osteopenia    Cervical radiculopathy    Aortic dilatation (HCC)    Rotator cuff syndrome of right shoulder    Nonrheumatic aortic valve insufficiency    Abnormal EKG    Pre-diabetes    Numbness and tingling of left arm and leg    Carpal tunnel syndrome of left wrist    Vertigo    Abnormal liver enzymes    Vitamin D deficiency    Tarsal tunnel syndrome of left side         Allergies:  Shellfish-derived products - food allergy and Lisinopril      Current Outpatient Medications:     acetaminophen (TYLENOL) 325 mg tablet, Take 650 mg by mouth every 6 (six) hours as needed for mild pain, Disp: , Rfl:     aspirin 81 MG tablet, Take 1 tablet (81 mg total) by mouth daily, Disp: 90 tablet, Rfl: 1    cholecalciferol (VITAMIN D3) 1,000 units tablet, Take 2 tablets (2,000 Units total) by mouth daily, Disp: 180 tablet, Rfl: 1    diclofenac sodium (VOLTAREN) 1 %, Apply 2 g topically 4 (four) times a day, Disp: 1 Tube, Rfl: 2    Diclofenac Sodium (VOLTAREN) 1 %, Apply 2 g topically 4 (four) times a day, Disp: 350 g, Rfl: 1    losartan (COZAAR) 25 mg tablet, Take 1 tablet (25 mg total) by mouth daily, Disp: 90 tablet, Rfl: 1    metoprolol succinate (TOPROL-XL) 25 mg 24 hr tablet, Take 1 tablet (25 mg total) by mouth daily, Disp: 90 tablet, Rfl: 1    metoprolol succinate (TOPROL-XL) 50 mg 24 hr tablet, Take 1 tablet (50 mg total) by mouth daily, Disp: 90 tablet, Rfl: 1    Multiple Vitamin (MULTIVITAMIN) capsule, Take 1 capsule by mouth daily, Disp: , Rfl:     omeprazole (PriLOSEC) 20 mg delayed release capsule, Take 1 capsule (20 mg total) by mouth daily, Disp: 90 capsule, Rfl: 1    omeprazole (PriLOSEC) 20 mg delayed release capsule, Take 1 capsule (20 mg total) by mouth daily, Disp: 60 capsule, Rfl: 2    traMADol (ULTRAM) 50 mg tablet, Take 1 tablet (50 mg total) by mouth every 8 (eight) hours as needed for severe pain, Disp: 10 tablet, Rfl: 0    Past Medical History:   Diagnosis Date    Diverticulosis     GERD (gastroesophageal reflux disease)     Hypertension        Past Surgical History:   Procedure Laterality Date    COLONOSCOPY  11/2020    TUBAL LIGATION      Ectopic pregnancy 1979    UPPER GASTROINTESTINAL ENDOSCOPY  11/2020       Family History   Problem Relation Age of Onset    Diabetes Mother     Hypertension Mother     Hypertension Father     Stroke Father     No Known Problems Sister     No Known Problems Brother     No Known Problems Daughter     No Known Problems Maternal Grandmother     No Known Problems Maternal Grandfather     No Known Problems Paternal Grandmother     No Known Problems Paternal Grandfather     No Known Problems Sister     No Known Problems Sister     No Known Problems Sister     No Known Problems Sister     No Known Problems Daughter     No Known Problems Maternal Aunt     No Known Problems Maternal Aunt     No Known Problems Maternal Aunt     No Known Problems Paternal Aunt     No Known Problems Paternal Aunt     No Known Problems Paternal Aunt     No Known Problems Paternal Aunt     No Known Problems Paternal Aunt         reports that she has never smoked  She has never used smokeless tobacco  She reports that she does not drink alcohol or use drugs        Exam:   Vitals:    05/11/21 1151   BP: 120/60   Pulse: 65   Temp: 97 9 °F (36 6 °C)       PHYSICAL EXAM  General Appearance:    Alert, cooperative, no distress, healthy   Head:    Normocephalic without obvious abnormality   Eyes:    PERRL, conjunctiva/corneas clear,       Neck:   Supple, no adenopathy, no JVD   Back:     Symmetric, no spinal or CVA tenderness   Lungs:     Clear to auscultation bilaterally, no wheezing or rhonchi   Heart:    Regular rate and rhythm, S1 and S2 normal, no murmur   Abdomen:     abdomen is soft without significant tenderness, masses, organomegaly or guarding Bowel sounds are normal      Extremities:   Extremities normal  No clubbing, cyanosis or edema   Psych:   Normal Affect, AOx3  Neurologic:  Skin:   CNII-XII intact  Strength symmetric, speech intact    Warm, dry, intact, no visible rashes or lesions      Informed consent for procedure was personally discussed, reviewed, and signed by Dr Aleida Reyes  Discussion by Dr Aleida Reyes was carried out regarding risks, benefits, and alternatives with the patient  Risks include but are not limited to:  bleeding, infection, and delayed wound healing or an open wound, pulmonary embolus, leaks from bowel or bile ducts or other viscus, transfusions, death  Discussed in further detail the more common complications and their rates of occurrence   was used if necessary  Patient expressed understanding of the issues discussed and wished/consented to proceed  All questions were answered by Dr Aleida Reyes  Discussion performed between patient and the provider signing below  Signature:   Wing Corey PA-C    Date: 5/11/2021 Time: 12:12 PM                          Some portions of this record may have been generated with voice recognition software  There may be translation, syntax,  or grammatical errors  Occasional wrong word or "sound-a-like" substitutions may have occurred due to the inherent limitations of the voice recognition software  Read the chart carefully and recognize, using context, where substitutions may have occurred  If you have any questions, please contact the dictating provider for clarification or correction, as needed  This encounter has been coded by a non-certified coder

## 2021-05-13 ENCOUNTER — TELEPHONE (OUTPATIENT)
Dept: SURGERY | Facility: CLINIC | Age: 76
End: 2021-05-13

## 2021-05-19 ENCOUNTER — TELEPHONE (OUTPATIENT)
Dept: INTERNAL MEDICINE CLINIC | Facility: CLINIC | Age: 76
End: 2021-05-19

## 2021-05-19 DIAGNOSIS — M54.9 BILATERAL BACK PAIN, UNSPECIFIED BACK LOCATION, UNSPECIFIED CHRONICITY: Primary | ICD-10-CM

## 2021-05-19 NOTE — TELEPHONE ENCOUNTER
----- Message from Leandro Vasquez MD sent at 5/12/2021 11:19 AM EDT -----  Please call the patient regarding her abnormal result   Both xrays show arthritic changes, she would benefit from PT

## 2021-05-20 ENCOUNTER — IMMUNIZATIONS (OUTPATIENT)
Dept: FAMILY MEDICINE CLINIC | Facility: HOSPITAL | Age: 76
End: 2021-05-20

## 2021-05-20 DIAGNOSIS — Z23 ENCOUNTER FOR IMMUNIZATION: Primary | ICD-10-CM

## 2021-05-20 PROCEDURE — 0002A SARS-COV-2 / COVID-19 MRNA VACCINE (PFIZER-BIONTECH) 30 MCG: CPT

## 2021-05-20 PROCEDURE — 91300 SARS-COV-2 / COVID-19 MRNA VACCINE (PFIZER-BIONTECH) 30 MCG: CPT

## 2021-06-09 ENCOUNTER — TELEPHONE (OUTPATIENT)
Dept: INTERNAL MEDICINE CLINIC | Facility: CLINIC | Age: 76
End: 2021-06-09

## 2021-06-09 DIAGNOSIS — S16.1XXA STRAIN OF NECK MUSCLE, INITIAL ENCOUNTER: Primary | ICD-10-CM

## 2021-06-09 NOTE — TELEPHONE ENCOUNTER
Complaints of left side arm pain from neck into hand since last office   Patient is requesting a referral to PT

## 2021-06-22 ENCOUNTER — EVALUATION (OUTPATIENT)
Dept: PHYSICAL THERAPY | Facility: REHABILITATION | Age: 76
End: 2021-06-22
Payer: COMMERCIAL

## 2021-06-22 DIAGNOSIS — M54.12 CERVICAL RADICULOPATHY: Primary | ICD-10-CM

## 2021-06-22 DIAGNOSIS — S16.1XXA STRAIN OF NECK MUSCLE, INITIAL ENCOUNTER: ICD-10-CM

## 2021-06-22 DIAGNOSIS — G89.29 CHRONIC RIGHT SHOULDER PAIN: ICD-10-CM

## 2021-06-22 DIAGNOSIS — M25.511 CHRONIC RIGHT SHOULDER PAIN: ICD-10-CM

## 2021-06-22 DIAGNOSIS — M54.9 BILATERAL BACK PAIN, UNSPECIFIED BACK LOCATION, UNSPECIFIED CHRONICITY: ICD-10-CM

## 2021-06-22 DIAGNOSIS — G89.29 CHRONIC LEFT SHOULDER PAIN: ICD-10-CM

## 2021-06-22 DIAGNOSIS — M25.512 CHRONIC LEFT SHOULDER PAIN: ICD-10-CM

## 2021-06-22 PROCEDURE — 97163 PT EVAL HIGH COMPLEX 45 MIN: CPT | Performed by: PHYSICAL THERAPIST

## 2021-06-22 PROCEDURE — 97110 THERAPEUTIC EXERCISES: CPT | Performed by: PHYSICAL THERAPIST

## 2021-06-22 NOTE — PROGRESS NOTES
PT Evaluation     Today's date: 2021  Patient name: Rita Reyes  :   MRN: [id number]  Referring provider: Luis Mendoza MD  Dx:   Encounter Diagnosis     ICD-10-CM    1  Cervical radiculopathy  M54 12    2  Chronic left shoulder pain  M25 512     G89 29    3  Chronic right shoulder pain  M25 511     G89 29    4  Bilateral back pain, unspecified back location, unspecified chronicity  M54 9 Ambulatory referral to Physical Therapy                  Assessment  Assessment details: Rita Reyes is a 70 y o  female presenting with cervical pain with pain radiating through posterior LUE to first two fingers as well as LBP  Primary impairments include impaired cervical AROM, decreased shoulder AROM and strength, and increased pain intensity  Will benefit from skilled PT interventions for ADL independence/management, community reintegration, and symptom modulation  Impairments: abnormal coordination, abnormal gait, abnormal muscle firing, abnormal muscle tone, abnormal or restricted ROM, activity intolerance, impaired balance, impaired physical strength, lacks appropriate home exercise program, pain with function, weight-bearing intolerance, poor posture  and poor body mechanics  Functional limitations: lifting, bending, ADL independence (washing hair), reaching  Symptom irritability: highBarriers to therapy: Panamanian speaking  Understanding of Dx/Px/POC: good   Prognosis: good    Goals    Short Term Goals: In 4 weeks, the patient will:  1  Improve shoulder flexion and abduction AROM > 140 degrees  2  Improve cervical extension AROM >/= 75%  3  Supervision with HEP for self care    Long Term Goals: In 8 weeks, the patient will:  1  Improve global shoulder MMT >/= 4+/5  2  FOTO to greater than predicted value  3   Independent with HEP for selfcare      Plan  Patient would benefit from: skilled physical therapy  Planned modality interventions: manual electrical stimulation  Planned therapy interventions: abdominal trunk stabilization, activity modification, balance, balance/weight bearing training, behavior modification, body mechanics training, community reintegration, coordination, fine motor coordination training, flexibility, functional ROM exercises, gait training, graded activity, graded exercise, graded motor, home exercise program, work reintegration, therapeutic training, therapeutic exercise, therapeutic activities, stretching, strengthening, self care, postural training, patient education, neuromuscular re-education, motor coordination training, massage, manual therapy, joint mobilization and ADL training  Frequency: 2x week  Duration in weeks: 8  Plan of Care beginning date: 6/22/2021  Plan of Care expiration date: 8/17/2021  Treatment plan discussed with: patient and family        Subjective    Pain Location: L c/s pain with radicular symtoms, and LBP / complaints of headaches  Pain Intensity: constant, "compression"  YUE: started with L shoulder pain then it transitioned to cervical   DOI: "a few months"  Provoked by: lifting, reaching, ADLs (dressing, showering), bending, lifting  Eased Positions: Tylenol  (did not help), "lay down"  Living Situation:  Lives with   Constitutional S/S: numbness in first two fingers   Dominant Hand: R  Goals: "wants to be free of pain" had pain for long time and decided to try it out because  started PT services  PLOF: hx of carpal tunnel      Objective       Postural Findings:              Head Position x Protracted  Neutral  Retracted   Scapular Position x Protracted  Neutral  Retracted   Thoracic Spine x Inc Kyphosis  Neutral     Lumbar Spine  Inc Lordosis  Neutral x Dec Lordosis   Pelvis  Anterior Tilt x Neutral  Posterior Tilt   Iliac Crest  L elevated x Neutral  R elevated   Scoliotic Curvature  "C" Curve  "S" Curve     Lateral Shift  Right  Left x None       Strength and ROM evaluated B from a regional biomechanical perspective and values relevant to this episode recorded in tables below    Range of Motion measurements for the Cervical and Lumbar Spine (in degrees)     Joint Motion Right:  Left:    C/s Flexion 90%    C/s Extension 75%    C/s Rotation 75% 75%*   C/s Lateral Flexion 50%* 50%*   C/s Protraction 90%    C/s Retraction 50%*    L/s flexion 75%    L/s extension 25%    L/s lateral flexion 50%* 50%*   L/s rotation 50%* 50%*     * indicates increase in pain    UE Myotomes:  Nerve Root Test Action RIGHT  LEFT    C3 Neck side flexion intact intact   C4 Shoulder elevation intact intact   C5 Shoulder abduction intact intact   C6 Elbow Flexion and wrist extension intact intact   C7 Elbow extension and wrist flexion intact intact   C8 Thumb extension and ulnar deviation intact intact   T1 Hand intrinsics intact intact       Strength and ROM evaluated B from a regional biomechanical perspective and values relevant to this episode recorded in table below    ROM: Goniometric measurement revealed the following findings  Shoulder ROM Right Left   Flexion 110* 90*   Abduction 90* 70*   ER WFL WFL   IR WFL WFL        * indicates increase in pain    Strength: MMT revealed the following findings    Joint Motion Right Left   Sh  Flexion 3+/5* 2+/5*   Sh  Abduction 3+/5* 2+/5*   Sh  ER 4/5 4/5   Sh  IR 4/5 4/5        * indicates increase in pain      Palpation: TTP at bilateral cervical musculature, lateral humerus    Joint Mobility: co-contraction of shoulder musculature during PROM, pain limited in flexion, abduction, and ER    Cervical Radiculopathy Test Item Cluster Aurelia Pastor RS et al  Shyla Kramer, 2003)  Any 3 above + = + LR of 6 1  Any 4 above + = + LR of 30 3  Test / Measure  6/22/2021   Spurling's A P   Distraction P   C-Rotation < 60 ipsilateral side N       OUTCOME MEAUSURES:     FOTO: 47           Precautions: HTN    Asterisk signs and Outcome Measures: Test / Measure  6/22/2021      FOTO 47      C/S ROM lateral flexion 50%      Shoulder flexion AROM R 110; L 90      Shoulder abduction AROM R 90; L 70      Shoulder flexion/abduction MMT R 3+/5; L 2+/5          Manuals 6/22            SOR MM            Sh PROM                                       Neuro Re-Ed             SH iso             C/s iso             Cerv retr                                                                 Ther Ex             HEP review 5'                                                                                                       Ther Activity                                       Gait Training                                       Modalities

## 2021-06-24 ENCOUNTER — OFFICE VISIT (OUTPATIENT)
Dept: PHYSICAL THERAPY | Facility: REHABILITATION | Age: 76
End: 2021-06-24
Payer: COMMERCIAL

## 2021-06-24 DIAGNOSIS — G89.29 CHRONIC LEFT SHOULDER PAIN: ICD-10-CM

## 2021-06-24 DIAGNOSIS — G89.29 CHRONIC RIGHT SHOULDER PAIN: ICD-10-CM

## 2021-06-24 DIAGNOSIS — M54.12 CERVICAL RADICULOPATHY: Primary | ICD-10-CM

## 2021-06-24 DIAGNOSIS — M25.512 CHRONIC LEFT SHOULDER PAIN: ICD-10-CM

## 2021-06-24 DIAGNOSIS — M54.9 BILATERAL BACK PAIN, UNSPECIFIED BACK LOCATION, UNSPECIFIED CHRONICITY: ICD-10-CM

## 2021-06-24 DIAGNOSIS — M25.511 CHRONIC RIGHT SHOULDER PAIN: ICD-10-CM

## 2021-06-24 PROCEDURE — 97140 MANUAL THERAPY 1/> REGIONS: CPT | Performed by: PHYSICAL THERAPIST

## 2021-06-24 PROCEDURE — 97110 THERAPEUTIC EXERCISES: CPT | Performed by: PHYSICAL THERAPIST

## 2021-06-24 NOTE — PROGRESS NOTES
Daily Note     Today's date: 2021  Patient name: Kristian Mendiola  : [id number]  MRN: [id number]  Referring provider: Ruddy Garvey MD  Dx:   Encounter Diagnosis     ICD-10-CM    1  Cervical radiculopathy  M54 12    2  Chronic left shoulder pain  M25 512     G89 29    3  Chronic right shoulder pain  M25 511     G89 29    4  Bilateral back pain, unspecified back location, unspecified chronicity  M54 9        Start Time: 1425  Stop Time: 1510  Total time in clinic (min): 45 minutes    Subjective: Pt reports that both shoulders are still bothersome, and she continues to have back pain  Objective: See treatment diary below      Assessment: Tolerated treatment well although shoulder movement in all directions aggravated pain  Pt main concern is bilateral shoulder pain, so treatment focuses that concern  L shoulder more pain limited in comparison to R shoulder  Patient would benefit from continued PT   1:1 with SUZETTE Jaimes under direct supervision of Michael Shrestha DPT for 30 minutes of tx  Plan: Progress treatment as tolerated         Precautions: HTN    Asterisk signs and Outcome Measures:                                                                                                                                                                                 Test / Measure  2021      FOTO 47      C/S ROM lateral flexion 50%      Shoulder flexion AROM R 110; L 90      Shoulder abduction AROM R 90; L 70      Shoulder flexion/abduction MMT R 3+/5; L 2+/5          Manuals            SOR MM MM           Sh PROM B/L  MM           UT stretch B/L  MM                        Neuro Re-Ed             SH iso             C/s iso             Cerv retr             TB row  otb 2x15           UT stretch  2x30" ea           Pallof press                          Ther Ex             HEP review 5'            Pulleys  6'           Cane AAROM flexion  x20           Marilu ER/IR walkout  x10 liliya b/l                                                               Ther Activity                                       Gait Training                                       Modalities

## 2021-06-29 ENCOUNTER — APPOINTMENT (OUTPATIENT)
Dept: PHYSICAL THERAPY | Facility: REHABILITATION | Age: 76
End: 2021-06-29
Payer: COMMERCIAL

## 2021-11-19 ENCOUNTER — HOSPITAL ENCOUNTER (OUTPATIENT)
Dept: RADIOLOGY | Facility: HOSPITAL | Age: 76
Discharge: HOME | End: 2021-11-19
Attending: INTERNAL MEDICINE
Payer: COMMERCIAL

## 2021-11-19 DIAGNOSIS — K86.2 PANCREATIC CYST: ICD-10-CM

## 2021-11-19 PROCEDURE — 74183 MRI ABD W/O CNTR FLWD CNTR: CPT

## 2021-11-19 PROCEDURE — G1004 CDSM NDSC: HCPCS

## 2021-11-19 PROCEDURE — A9585 GADOBUTROL INJECTION: HCPCS | Performed by: INTERNAL MEDICINE

## 2021-11-19 RX ADMIN — GADOBUTROL 7 ML: 604.72 INJECTION INTRAVENOUS at 18:42

## 2021-11-22 ENCOUNTER — OFFICE VISIT (OUTPATIENT)
Dept: INTERNAL MEDICINE CLINIC | Facility: CLINIC | Age: 76
End: 2021-11-22
Payer: COMMERCIAL

## 2021-11-22 VITALS
HEIGHT: 62 IN | DIASTOLIC BLOOD PRESSURE: 76 MMHG | BODY MASS INDEX: 27.23 KG/M2 | WEIGHT: 148 LBS | OXYGEN SATURATION: 97 % | SYSTOLIC BLOOD PRESSURE: 136 MMHG | HEART RATE: 79 BPM | RESPIRATION RATE: 16 BRPM | TEMPERATURE: 97.2 F

## 2021-11-22 DIAGNOSIS — M85.80 OSTEOPENIA, UNSPECIFIED LOCATION: ICD-10-CM

## 2021-11-22 DIAGNOSIS — I49.3 SYMPTOMATIC PVCS: ICD-10-CM

## 2021-11-22 DIAGNOSIS — I49.1 PREMATURE ATRIAL BEATS: ICD-10-CM

## 2021-11-22 DIAGNOSIS — R20.2 PARESTHESIA OF LEFT UPPER AND LOWER EXTREMITY: ICD-10-CM

## 2021-11-22 DIAGNOSIS — I10 ESSENTIAL HYPERTENSION: ICD-10-CM

## 2021-11-22 DIAGNOSIS — K21.9 GASTROESOPHAGEAL REFLUX DISEASE, UNSPECIFIED WHETHER ESOPHAGITIS PRESENT: ICD-10-CM

## 2021-11-22 DIAGNOSIS — R20.0 NUMBNESS AND TINGLING OF LEFT UPPER AND LOWER EXTREMITY: ICD-10-CM

## 2021-11-22 DIAGNOSIS — Z12.31 ENCOUNTER FOR SCREENING MAMMOGRAM FOR MALIGNANT NEOPLASM OF BREAST: ICD-10-CM

## 2021-11-22 DIAGNOSIS — I45.10 RIGHT BUNDLE BRANCH BLOCK: ICD-10-CM

## 2021-11-22 DIAGNOSIS — Z23 ENCOUNTER FOR IMMUNIZATION: Primary | ICD-10-CM

## 2021-11-22 DIAGNOSIS — G89.29 CHRONIC PAIN OF BOTH KNEES: ICD-10-CM

## 2021-11-22 DIAGNOSIS — M25.562 CHRONIC PAIN OF BOTH KNEES: ICD-10-CM

## 2021-11-22 DIAGNOSIS — M25.561 CHRONIC PAIN OF BOTH KNEES: ICD-10-CM

## 2021-11-22 DIAGNOSIS — R20.2 NUMBNESS AND TINGLING OF LEFT UPPER AND LOWER EXTREMITY: ICD-10-CM

## 2021-11-22 PROCEDURE — 90662 IIV NO PRSV INCREASED AG IM: CPT | Performed by: INTERNAL MEDICINE

## 2021-11-22 PROCEDURE — 3075F SYST BP GE 130 - 139MM HG: CPT | Performed by: INTERNAL MEDICINE

## 2021-11-22 PROCEDURE — 1160F RVW MEDS BY RX/DR IN RCRD: CPT | Performed by: INTERNAL MEDICINE

## 2021-11-22 PROCEDURE — 1036F TOBACCO NON-USER: CPT | Performed by: INTERNAL MEDICINE

## 2021-11-22 PROCEDURE — 3725F SCREEN DEPRESSION PERFORMED: CPT | Performed by: INTERNAL MEDICINE

## 2021-11-22 PROCEDURE — G0008 ADMIN INFLUENZA VIRUS VAC: HCPCS | Performed by: INTERNAL MEDICINE

## 2021-11-22 PROCEDURE — 3008F BODY MASS INDEX DOCD: CPT | Performed by: INTERNAL MEDICINE

## 2021-11-22 PROCEDURE — 3078F DIAST BP <80 MM HG: CPT | Performed by: INTERNAL MEDICINE

## 2021-11-22 PROCEDURE — 99214 OFFICE O/P EST MOD 30 MIN: CPT | Performed by: INTERNAL MEDICINE

## 2021-11-22 RX ORDER — LOSARTAN POTASSIUM 25 MG/1
25 TABLET ORAL DAILY
Qty: 90 TABLET | Refills: 0 | Status: SHIPPED | OUTPATIENT
Start: 2021-11-22 | End: 2022-01-21 | Stop reason: SDUPTHER

## 2021-11-22 RX ORDER — OMEPRAZOLE 20 MG/1
20 CAPSULE, DELAYED RELEASE ORAL DAILY
Qty: 90 CAPSULE | Refills: 0 | Status: SHIPPED | OUTPATIENT
Start: 2021-11-22 | End: 2022-01-21 | Stop reason: SDUPTHER

## 2021-11-22 RX ORDER — METOPROLOL SUCCINATE 50 MG/1
TABLET, EXTENDED RELEASE ORAL
Qty: 180 TABLET | Refills: 1 | Status: SHIPPED | OUTPATIENT
Start: 2021-11-22 | End: 2022-01-05

## 2021-11-22 RX ORDER — MELATONIN
2000 DAILY
Qty: 180 TABLET | Refills: 1 | Status: SHIPPED | OUTPATIENT
Start: 2021-11-22 | End: 2022-07-21 | Stop reason: SDUPTHER

## 2021-11-22 RX ORDER — METOPROLOL SUCCINATE 50 MG/1
100 TABLET, EXTENDED RELEASE ORAL DAILY
Qty: 60 TABLET | Refills: 0 | Status: SHIPPED | OUTPATIENT
Start: 2021-11-22 | End: 2021-11-22

## 2021-11-26 PROCEDURE — 93000 ELECTROCARDIOGRAM COMPLETE: CPT | Performed by: INTERNAL MEDICINE

## 2021-12-01 ENCOUNTER — TELEMEDICINE (OUTPATIENT)
Dept: INTERNAL MEDICINE CLINIC | Facility: CLINIC | Age: 76
End: 2021-12-01
Payer: COMMERCIAL

## 2021-12-01 DIAGNOSIS — B34.9 ACUTE VIRAL SYNDROME: Primary | ICD-10-CM

## 2021-12-01 PROCEDURE — 0241U HB NFCT DS VIR RESP RNA 4 TRGT: CPT | Performed by: INTERNAL MEDICINE

## 2021-12-01 PROCEDURE — 99213 OFFICE O/P EST LOW 20 MIN: CPT | Performed by: INTERNAL MEDICINE

## 2021-12-02 ENCOUNTER — TELEMEDICINE (OUTPATIENT)
Dept: INTERNAL MEDICINE CLINIC | Facility: CLINIC | Age: 76
End: 2021-12-02
Payer: COMMERCIAL

## 2021-12-02 DIAGNOSIS — U07.1 COVID-19: Primary | ICD-10-CM

## 2021-12-02 PROCEDURE — 99214 OFFICE O/P EST MOD 30 MIN: CPT | Performed by: INTERNAL MEDICINE

## 2021-12-02 RX ORDER — ACETAMINOPHEN 325 MG/1
650 TABLET ORAL ONCE AS NEEDED
Status: CANCELLED | OUTPATIENT
Start: 2021-12-06

## 2021-12-02 RX ORDER — ONDANSETRON 2 MG/ML
4 INJECTION INTRAMUSCULAR; INTRAVENOUS ONCE AS NEEDED
Status: CANCELLED | OUTPATIENT
Start: 2021-12-06

## 2021-12-02 RX ORDER — SODIUM CHLORIDE 9 MG/ML
20 INJECTION, SOLUTION INTRAVENOUS ONCE
Status: CANCELLED | OUTPATIENT
Start: 2021-12-06

## 2021-12-02 RX ORDER — ALBUTEROL SULFATE 90 UG/1
3 AEROSOL, METERED RESPIRATORY (INHALATION) ONCE AS NEEDED
Status: CANCELLED | OUTPATIENT
Start: 2021-12-06

## 2021-12-03 ENCOUNTER — TELEPHONE (OUTPATIENT)
Dept: GASTROENTEROLOGY | Facility: CLINIC | Age: 76
End: 2021-12-03

## 2021-12-06 ENCOUNTER — HOSPITAL ENCOUNTER (OUTPATIENT)
Dept: INFUSION CENTER | Facility: HOSPITAL | Age: 76
Discharge: HOME | End: 2021-12-06
Payer: COMMERCIAL

## 2021-12-06 VITALS
DIASTOLIC BLOOD PRESSURE: 63 MMHG | OXYGEN SATURATION: 94 % | HEART RATE: 99 BPM | SYSTOLIC BLOOD PRESSURE: 116 MMHG | RESPIRATION RATE: 16 BRPM

## 2021-12-06 DIAGNOSIS — U07.1 COVID-19: Primary | ICD-10-CM

## 2021-12-06 PROCEDURE — M0245 HB BAMLAN AND ETESEV INF ADMIN: HCPCS | Performed by: INTERNAL MEDICINE

## 2021-12-06 RX ORDER — ONDANSETRON 2 MG/ML
4 INJECTION INTRAMUSCULAR; INTRAVENOUS ONCE AS NEEDED
Status: CANCELLED | OUTPATIENT
Start: 2021-12-06

## 2021-12-06 RX ORDER — ACETAMINOPHEN 325 MG/1
650 TABLET ORAL ONCE AS NEEDED
Status: CANCELLED | OUTPATIENT
Start: 2021-12-06

## 2021-12-06 RX ORDER — ALBUTEROL SULFATE 90 UG/1
3 AEROSOL, METERED RESPIRATORY (INHALATION) ONCE AS NEEDED
Status: CANCELLED | OUTPATIENT
Start: 2021-12-06

## 2021-12-06 RX ORDER — ALBUTEROL SULFATE 90 UG/1
3 AEROSOL, METERED RESPIRATORY (INHALATION) ONCE AS NEEDED
Status: DISCONTINUED | OUTPATIENT
Start: 2021-12-06 | End: 2021-12-09 | Stop reason: HOSPADM

## 2021-12-06 RX ORDER — ACETAMINOPHEN 325 MG/1
650 TABLET ORAL ONCE AS NEEDED
Status: DISCONTINUED | OUTPATIENT
Start: 2021-12-06 | End: 2021-12-09 | Stop reason: HOSPADM

## 2021-12-06 RX ORDER — ONDANSETRON 2 MG/ML
4 INJECTION INTRAMUSCULAR; INTRAVENOUS ONCE AS NEEDED
Status: DISCONTINUED | OUTPATIENT
Start: 2021-12-06 | End: 2021-12-09 | Stop reason: HOSPADM

## 2021-12-06 RX ORDER — SODIUM CHLORIDE 9 MG/ML
20 INJECTION, SOLUTION INTRAVENOUS ONCE
Status: CANCELLED | OUTPATIENT
Start: 2021-12-06

## 2021-12-06 RX ORDER — SODIUM CHLORIDE 9 MG/ML
20 INJECTION, SOLUTION INTRAVENOUS ONCE
Status: COMPLETED | OUTPATIENT
Start: 2021-12-06 | End: 2021-12-06

## 2021-12-06 RX ADMIN — SODIUM CHLORIDE 2100 MG COMBINED: 9 INJECTION, SOLUTION INTRAVENOUS at 08:50

## 2021-12-06 RX ADMIN — SODIUM CHLORIDE 20 ML/HR: 9 INJECTION, SOLUTION INTRAVENOUS at 08:35

## 2021-12-07 ENCOUNTER — TELEMEDICINE (OUTPATIENT)
Dept: INTERNAL MEDICINE CLINIC | Facility: CLINIC | Age: 76
End: 2021-12-07
Payer: COMMERCIAL

## 2021-12-07 DIAGNOSIS — U07.1 COVID-19: Primary | ICD-10-CM

## 2021-12-07 PROCEDURE — 99213 OFFICE O/P EST LOW 20 MIN: CPT | Performed by: INTERNAL MEDICINE

## 2022-01-05 DIAGNOSIS — I49.1 PREMATURE ATRIAL BEATS: ICD-10-CM

## 2022-01-05 DIAGNOSIS — I10 ESSENTIAL HYPERTENSION: ICD-10-CM

## 2022-01-05 RX ORDER — METOPROLOL SUCCINATE 50 MG/1
TABLET, EXTENDED RELEASE ORAL
Qty: 60 TABLET | Refills: 1 | Status: SHIPPED | OUTPATIENT
Start: 2022-01-05 | End: 2022-01-07

## 2022-01-07 DIAGNOSIS — I49.1 PREMATURE ATRIAL BEATS: ICD-10-CM

## 2022-01-07 DIAGNOSIS — I10 ESSENTIAL HYPERTENSION: ICD-10-CM

## 2022-01-07 RX ORDER — METOPROLOL SUCCINATE 50 MG/1
TABLET, EXTENDED RELEASE ORAL
Qty: 180 TABLET | Refills: 0 | Status: SHIPPED | OUTPATIENT
Start: 2022-01-07 | End: 2022-01-21

## 2022-01-11 ENCOUNTER — VBI (OUTPATIENT)
Dept: ADMINISTRATIVE | Facility: OTHER | Age: 77
End: 2022-01-11

## 2022-01-21 ENCOUNTER — OFFICE VISIT (OUTPATIENT)
Dept: INTERNAL MEDICINE CLINIC | Facility: CLINIC | Age: 77
End: 2022-01-21
Payer: COMMERCIAL

## 2022-01-21 VITALS
OXYGEN SATURATION: 98 % | SYSTOLIC BLOOD PRESSURE: 114 MMHG | BODY MASS INDEX: 28.16 KG/M2 | WEIGHT: 153 LBS | TEMPERATURE: 96.5 F | RESPIRATION RATE: 16 BRPM | HEIGHT: 62 IN | DIASTOLIC BLOOD PRESSURE: 72 MMHG | HEART RATE: 77 BPM

## 2022-01-21 DIAGNOSIS — K21.9 GASTROESOPHAGEAL REFLUX DISEASE, UNSPECIFIED WHETHER ESOPHAGITIS PRESENT: ICD-10-CM

## 2022-01-21 DIAGNOSIS — I10 ESSENTIAL HYPERTENSION: ICD-10-CM

## 2022-01-21 DIAGNOSIS — J34.89 RHINORRHEA: ICD-10-CM

## 2022-01-21 DIAGNOSIS — H53.8 BLURRY VISION: ICD-10-CM

## 2022-01-21 DIAGNOSIS — I49.3 SYMPTOMATIC PVCS: ICD-10-CM

## 2022-01-21 DIAGNOSIS — I49.1 PREMATURE ATRIAL BEATS: ICD-10-CM

## 2022-01-21 DIAGNOSIS — Z98.49 HISTORY OF CATARACT SURGERY, UNSPECIFIED LATERALITY: ICD-10-CM

## 2022-01-21 DIAGNOSIS — F32.A DEPRESSION, UNSPECIFIED DEPRESSION TYPE: ICD-10-CM

## 2022-01-21 DIAGNOSIS — R73.03 PRE-DIABETES: Primary | ICD-10-CM

## 2022-01-21 DIAGNOSIS — M21.612 BUNION OF LEFT FOOT: ICD-10-CM

## 2022-01-21 DIAGNOSIS — R05.9 COUGH: ICD-10-CM

## 2022-01-21 DIAGNOSIS — Z01.20 DENTAL EXAMINATION: ICD-10-CM

## 2022-01-21 DIAGNOSIS — E55.9 VITAMIN D DEFICIENCY: ICD-10-CM

## 2022-01-21 PROCEDURE — 99214 OFFICE O/P EST MOD 30 MIN: CPT | Performed by: INTERNAL MEDICINE

## 2022-01-21 PROCEDURE — 1125F AMNT PAIN NOTED PAIN PRSNT: CPT | Performed by: INTERNAL MEDICINE

## 2022-01-21 PROCEDURE — 1036F TOBACCO NON-USER: CPT | Performed by: INTERNAL MEDICINE

## 2022-01-21 PROCEDURE — 3008F BODY MASS INDEX DOCD: CPT | Performed by: INTERNAL MEDICINE

## 2022-01-21 PROCEDURE — 1160F RVW MEDS BY RX/DR IN RCRD: CPT | Performed by: INTERNAL MEDICINE

## 2022-01-21 PROCEDURE — 3288F FALL RISK ASSESSMENT DOCD: CPT | Performed by: INTERNAL MEDICINE

## 2022-01-21 PROCEDURE — 3074F SYST BP LT 130 MM HG: CPT | Performed by: INTERNAL MEDICINE

## 2022-01-21 PROCEDURE — 1170F FXNL STATUS ASSESSED: CPT | Performed by: INTERNAL MEDICINE

## 2022-01-21 PROCEDURE — 3725F SCREEN DEPRESSION PERFORMED: CPT | Performed by: INTERNAL MEDICINE

## 2022-01-21 PROCEDURE — G0439 PPPS, SUBSEQ VISIT: HCPCS | Performed by: INTERNAL MEDICINE

## 2022-01-21 PROCEDURE — 3078F DIAST BP <80 MM HG: CPT | Performed by: INTERNAL MEDICINE

## 2022-01-21 RX ORDER — OMEPRAZOLE 20 MG/1
20 CAPSULE, DELAYED RELEASE ORAL DAILY
Qty: 90 CAPSULE | Refills: 1 | Status: SHIPPED | OUTPATIENT
Start: 2022-01-21 | End: 2022-07-21

## 2022-01-21 RX ORDER — BENZONATATE 100 MG/1
100 CAPSULE ORAL 3 TIMES DAILY PRN
Qty: 20 CAPSULE | Refills: 0 | Status: SHIPPED | OUTPATIENT
Start: 2022-01-21 | End: 2022-04-07

## 2022-01-21 RX ORDER — LOSARTAN POTASSIUM 25 MG/1
25 TABLET ORAL DAILY
Qty: 90 TABLET | Refills: 0 | Status: SHIPPED | OUTPATIENT
Start: 2022-01-21 | End: 2022-06-01 | Stop reason: SDUPTHER

## 2022-01-21 RX ORDER — IPRATROPIUM BROMIDE 21 UG/1
2 SPRAY, METERED NASAL EVERY 12 HOURS
Qty: 30 ML | Refills: 0 | Status: SHIPPED | OUTPATIENT
Start: 2022-01-21 | End: 2022-01-21

## 2022-01-21 RX ORDER — IPRATROPIUM BROMIDE 21 UG/1
SPRAY, METERED NASAL
Qty: 60 ML | Refills: 1 | Status: SHIPPED | OUTPATIENT
Start: 2022-01-21 | End: 2022-07-21

## 2022-01-21 RX ORDER — METOPROLOL SUCCINATE 25 MG/1
25 TABLET, EXTENDED RELEASE ORAL DAILY
Qty: 90 TABLET | Refills: 0 | Status: SHIPPED | OUTPATIENT
Start: 2022-01-21 | End: 2022-04-25

## 2022-01-21 RX ORDER — METOPROLOL SUCCINATE 50 MG/1
50 TABLET, EXTENDED RELEASE ORAL DAILY
Qty: 90 TABLET | Refills: 0 | Status: SHIPPED | OUTPATIENT
Start: 2022-01-21 | End: 2022-06-01 | Stop reason: SDUPTHER

## 2022-01-21 NOTE — PATIENT INSTRUCTIONS
Medicare Preventive Visit Patient Instructions  Thank you for completing your Welcome to Medicare Visit or Medicare Annual Wellness Visit today  Your next wellness visit will be due in one year (1/22/2023)  The screening/preventive services that you may require over the next 5-10 years are detailed below  Some tests may not apply to you based off risk factors and/or age  Screening tests ordered at today's visit but not completed yet may show as past due  Also, please note that scanned in results may not display below  Preventive Screenings:  Service Recommendations Previous Testing/Comments   Colorectal Cancer Screening  * Colonoscopy    * Fecal Occult Blood Test (FOBT)/Fecal Immunochemical Test (FIT)  * Fecal DNA/Cologuard Test  * Flexible Sigmoidoscopy Age: 54-65 years old   Colonoscopy: every 10 years (may be performed more frequently if at higher risk)  OR  FOBT/FIT: every 1 year  OR  Cologuard: every 3 years  OR  Sigmoidoscopy: every 5 years  Screening may be recommended earlier than age 48 if at higher risk for colorectal cancer  Also, an individualized decision between you and your healthcare provider will decide whether screening between the ages of 74-80 would be appropriate  Colonoscopy: 11/05/2020  FOBT/FIT: Not on file  Cologuard: Not on file  Sigmoidoscopy: Not on file    Screening Current     Breast Cancer Screening Age: 36 years old  Frequency: every 1-2 years  Not required if history of left and right mastectomy Mammogram: 08/25/2020    Screening Current   Cervical Cancer Screening Between the ages of 21-29, pap smear recommended once every 3 years  Between the ages of 33-67, can perform pap smear with HPV co-testing every 5 years     Recommendations may differ for women with a history of total hysterectomy, cervical cancer, or abnormal pap smears in past  Pap Smear: Not on file    Screening Not Indicated   Hepatitis C Screening Once for adults born between 1945 and 1965  More frequently in patients at high risk for Hepatitis C Hep C Antibody: 01/23/2019    Screening Current   Diabetes Screening 1-2 times per year if you're at risk for diabetes or have pre-diabetes Fasting glucose: 91 mg/dL   A1C: 5 7 %    Screening Current   Cholesterol Screening Once every 5 years if you don't have a lipid disorder  May order more often based on risk factors  Lipid panel: 04/26/2021    Screening Current     Other Preventive Screenings Covered by Medicare:  1  Abdominal Aortic Aneurysm (AAA) Screening: covered once if your at risk  You're considered to be at risk if you have a family history of AAA  2  Lung Cancer Screening: covers low dose CT scan once per year if you meet all of the following conditions: (1) Age 50-69; (2) No signs or symptoms of lung cancer; (3) Current smoker or have quit smoking within the last 15 years; (4) You have a tobacco smoking history of at least 30 pack years (packs per day multiplied by number of years you smoked); (5) You get a written order from a healthcare provider  3  Glaucoma Screening: covered annually if you're considered high risk: (1) You have diabetes OR (2) Family history of glaucoma OR (3)  aged 48 and older OR (3)  American aged 72 and older  3  Osteoporosis Screening: covered every 2 years if you meet one of the following conditions: (1) You're estrogen deficient and at risk for osteoporosis based off medical history and other findings; (2) Have a vertebral abnormality; (3) On glucocorticoid therapy for more than 3 months; (4) Have primary hyperparathyroidism; (5) On osteoporosis medications and need to assess response to drug therapy  · Last bone density test (DXA Scan): 06/22/2018  5  HIV Screening: covered annually if you're between the age of 12-76  Also covered annually if you are younger than 13 and older than 72 with risk factors for HIV infection   For pregnant patients, it is covered up to 3 times per pregnancy  Immunizations:  Immunization Recommendations   Influenza Vaccine Annual influenza vaccination during flu season is recommended for all persons aged >= 6 months who do not have contraindications   Pneumococcal Vaccine (Prevnar and Pneumovax)  * Prevnar = PCV13  * Pneumovax = PPSV23   Adults 25-60 years old: 1-3 doses may be recommended based on certain risk factors  Adults 72 years old: Prevnar (PCV13) vaccine recommended followed by Pneumovax (PPSV23) vaccine  If already received PPSV23 since turning 65, then PCV13 recommended at least one year after PPSV23 dose  Hepatitis B Vaccine 3 dose series if at intermediate or high risk (ex: diabetes, end stage renal disease, liver disease)   Tetanus (Td) Vaccine - COST NOT COVERED BY MEDICARE PART B Following completion of primary series, a booster dose should be given every 10 years to maintain immunity against tetanus  Td may also be given as tetanus wound prophylaxis  Tdap Vaccine - COST NOT COVERED BY MEDICARE PART B Recommended at least once for all adults  For pregnant patients, recommended with each pregnancy  Shingles Vaccine (Shingrix) - COST NOT COVERED BY MEDICARE PART B  2 shot series recommended in those aged 48 and above     Health Maintenance Due:      Topic Date Due    DXA SCAN  06/22/2020    Breast Cancer Screening: Mammogram  08/25/2021    Colorectal Cancer Screening  11/05/2030    Hepatitis C Screening  Completed     Immunizations Due:      Topic Date Due    DTaP,Tdap,and Td Vaccines (1 - Tdap) Never done    COVID-19 Vaccine (3 - Booster for Girard Peter series) 11/20/2021     Advance Directives   What are advance directives? Advance directives are legal documents that state your wishes and plans for medical care  These plans are made ahead of time in case you lose your ability to make decisions for yourself  Advance directives can apply to any medical decision, such as the treatments you want, and if you want to donate organs  What are the types of advance directives? There are many types of advance directives, and each state has rules about how to use them  You may choose a combination of any of the following:  · Living will: This is a written record of the treatment you want  You can also choose which treatments you do not want, which to limit, and which to stop at a certain time  This includes surgery, medicine, IV fluid, and tube feedings  · Durable power of  for healthcare [facility] [place]): This is a written record that states who you want to make healthcare choices for you when you are unable to make them for yourself  This person, called a proxy, is usually a family member or a friend  You may choose more than 1 proxy  · Do not resuscitate (DNR) order:  A DNR order is used in case your heart stops beating or you stop breathing  It is a request not to have certain forms of treatment, such as CPR  A DNR order may be included in other types of advance directives  · Medical directive: This covers the care that you want if you are in a coma, near death, or unable to make decisions for yourself  You can list the treatments you want for each condition  Treatment may include pain medicine, surgery, blood transfusions, dialysis, IV or tube feedings, and a ventilator (breathing machine)  · Values history: This document has questions about your views, beliefs, and how you feel and think about life  This information can help others choose the care that you would choose  Why are advance directives important? An advance directive helps you control your care  Although spoken wishes may be used, it is better to have your wishes written down  Spoken wishes can be misunderstood, or not followed  Treatments may be given even if you do not want them  An advance directive may make it easier for your family to make difficult choices about your care     Weight Management   Why it is important to manage your weight:  Being overweight increases your risk of health conditions such as heart disease, high blood pressure, type 2 diabetes, and certain types of cancer  It can also increase your risk for osteoarthritis, sleep apnea, and other respiratory problems  Aim for a slow, steady weight loss  Even a small amount of weight loss can lower your risk of health problems  How to lose weight safely:  A safe and healthy way to lose weight is to eat fewer calories and get regular exercise  You can lose up about 1 pound a week by decreasing the number of calories you eat by 500 calories each day  Healthy meal plan for weight management:  A healthy meal plan includes a variety of foods, contains fewer calories, and helps you stay healthy  A healthy meal plan includes the following:  · Eat whole-grain foods more often  A healthy meal plan should contain fiber  Fiber is the part of grains, fruits, and vegetables that is not broken down by your body  Whole-grain foods are healthy and provide extra fiber in your diet  Some examples of whole-grain foods are whole-wheat breads and pastas, oatmeal, brown rice, and bulgur  · Eat a variety of vegetables every day  Include dark, leafy greens such as spinach, kale, lindsay greens, and mustard greens  Eat yellow and orange vegetables such as carrots, sweet potatoes, and winter squash  · Eat a variety of fruits every day  Choose fresh or canned fruit (canned in its own juice or light syrup) instead of juice  Fruit juice has very little or no fiber  · Eat low-fat dairy foods  Drink fat-free (skim) milk or 1% milk  Eat fat-free yogurt and low-fat cottage cheese  Try low-fat cheeses such as mozzarella and other reduced-fat cheeses  · Choose meat and other protein foods that are low in fat  Choose beans or other legumes such as split peas or lentils  Choose fish, skinless poultry (chicken or turkey), or lean cuts of red meat (beef or pork)  Before you cook meat or poultry, cut off any visible fat     · Use less fat and oil   Try baking foods instead of frying them  Add less fat, such as margarine, sour cream, regular salad dressing and mayonnaise to foods  Eat fewer high-fat foods  Some examples of high-fat foods include french fries, doughnuts, ice cream, and cakes  · Eat fewer sweets  Limit foods and drinks that are high in sugar  This includes candy, cookies, regular soda, and sweetened drinks  Exercise:  Exercise at least 30 minutes per day on most days of the week  Some examples of exercise include walking, biking, dancing, and swimming  You can also fit in more physical activity by taking the stairs instead of the elevator or parking farther away from stores  Ask your healthcare provider about the best exercise plan for you  © Copyright [employer] 2018 Information is for End User's use only and may not be sold, redistributed or otherwise used for commercial purposes   All illustrations and images included in CareNotes® are the copyrighted property of A D A M , Inc  or [address] [place] [address]

## 2022-01-21 NOTE — PROGRESS NOTES
Assessment and Plan:     Problem List Items Addressed This Visit        Digestive    Gastroesophageal reflux disease    Relevant Medications    omeprazole (PriLOSEC) 20 mg delayed release capsule       Cardiovascular and Mediastinum    Essential hypertension    Relevant Medications    metoprolol succinate (TOPROL-XL) 50 mg 24 hr tablet    metoprolol succinate (Toprol XL) 25 mg 24 hr tablet    losartan (COZAAR) 25 mg tablet       Other    Pre-diabetes - Primary    Relevant Orders    CBC and differential    Comprehensive metabolic panel    Hemoglobin A1C    Lipid panel    TSH, 3rd generation with Free T4 reflex    Vitamin D deficiency    Relevant Orders    Vitamin D 25 hydroxy      Other Visit Diagnoses     Premature atrial beats        Relevant Medications    metoprolol succinate (TOPROL-XL) 50 mg 24 hr tablet    metoprolol succinate (Toprol XL) 25 mg 24 hr tablet    Depression, unspecified depression type        Relevant Orders    Ambulatory Referral to [address] [place] [address] of left foot        Relevant Orders    Ambulatory Referral to Podiatry    Blurry vision        Relevant Orders    Ambulatory Referral to Ophthalmology    History of cataract surgery, unspecified laterality        Relevant Orders    Ambulatory Referral to Ophthalmology    Dental examination        Relevant Orders    Ambulatory Referral to Dentistry    Symptomatic PVCs        Relevant Medications    metoprolol succinate (TOPROL-XL) 50 mg 24 hr tablet    metoprolol succinate (Toprol XL) 25 mg 24 hr tablet    Cough        Relevant Medications    benzonatate (TESSALON PERLES) 100 mg capsule    Rhinorrhea               Preventive health issues were discussed with patient, and age appropriate screening tests were ordered as noted in patient's After Visit Summary  Personalized health advice and appropriate referrals for health education or preventive services given if needed, as noted in patient's After Visit Summary       History of Present Illness:     Patient presents for Medicare Annual Wellness visit    Patient Care Team:  Denis Ellison DO as PCP - General (Internal Medicine)     Problem List:     Patient Active Problem List   Diagnosis    Essential hypertension    Palpitation    Gastroesophageal reflux disease    Primary osteoarthritis of both knees    Osteopenia    Cervical radiculopathy    Aortic dilatation (HCC)    Rotator cuff syndrome of right shoulder    Nonrheumatic aortic valve insufficiency    Abnormal EKG    Pre-diabetes    Numbness and tingling of left arm and leg    Carpal tunnel syndrome of left wrist    Vertigo    Abnormal liver enzymes    Vitamin D deficiency    Tarsal tunnel syndrome of left side    COVID-19      Past Medical and Surgical History:     Past Medical History:   Diagnosis Date    Diverticulosis     GERD (gastroesophageal reflux disease)     Hypertension      Past Surgical History:   Procedure Laterality Date    COLONOSCOPY  11/2020    TUBAL LIGATION      Ectopic pregnancy 1979    UPPER GASTROINTESTINAL ENDOSCOPY  11/2020      Family History:     Family History   Problem Relation Age of Onset    Diabetes Mother     Hypertension Mother     Hypertension Father     Stroke Father     No Known Problems Sister     No Known Problems Brother     No Known Problems Daughter     No Known Problems Maternal Grandmother     No Known Problems Maternal Grandfather     No Known Problems Paternal Grandmother     No Known Problems Paternal Grandfather     No Known Problems Sister     No Known Problems Sister     No Known Problems Sister     No Known Problems Sister     No Known Problems Daughter     No Known Problems Maternal Aunt     No Known Problems Maternal Aunt     No Known Problems Maternal Aunt     No Known Problems Paternal Aunt     No Known Problems Paternal Aunt     No Known Problems Paternal Aunt     No Known Problems Paternal Aunt     No Known Problems Paternal Aunt Social History:     Social History     Socioeconomic History    Marital status: /Civil Union     Spouse name: None    Number of children: None    Years of education: None    Highest education level: None   Occupational History    None   Tobacco Use    Smoking status: Never Smoker    Smokeless tobacco: Never Used   Vaping Use    Vaping Use: Never used   Substance and Sexual Activity    Alcohol use: No    Drug use: No    Sexual activity: Yes     Partners: Male   Other Topics Concern    None   Social History Narrative    ** Merged History Encounter **          Social Determinants of Health     Financial Resource Strain: Not on file   Food Insecurity: Not on file   Transportation Needs: Not on file   Physical Activity: Not on file   Stress: Not on file   Social Connections: Not on file   Intimate Partner Violence: Not on file   Housing Stability: Not on file      Medications and Allergies:     Current Outpatient Medications   Medication Sig Dispense Refill    acetaminophen (TYLENOL) 325 mg tablet Take 650 mg by mouth every 6 (six) hours as needed for mild pain      Aspirin 81 MG CAPS Take 81 mg by mouth daily 90 capsule 0    cholecalciferol (VITAMIN D3) 1,000 units tablet Take 2 tablets (2,000 Units total) by mouth daily 180 tablet 1    Diclofenac Sodium (VOLTAREN) 1 % Apply 2 g topically 4 (four) times a day 100 g 0    losartan (COZAAR) 25 mg tablet Take 1 tablet (25 mg total) by mouth daily 90 tablet 0    metoprolol succinate (TOPROL-XL) 50 mg 24 hr tablet Take 1 tablet (50 mg total) by mouth daily 90 tablet 0    Multiple Vitamin (MULTIVITAMIN) capsule Take 1 capsule by mouth daily      omeprazole (PriLOSEC) 20 mg delayed release capsule Take 1 capsule (20 mg total) by mouth daily 90 capsule 1    benzonatate (TESSALON PERLES) 100 mg capsule Take 1 capsule (100 mg total) by mouth 3 (three) times a day as needed for cough 20 capsule 0    ipratropium (ATROVENT) 0 03 % nasal spray USE 2 SPRAYS IN EACH NOSTRIL EVERY 12 HOURS 60 mL 1    metoprolol succinate (Toprol XL) 25 mg 24 hr tablet Take 1 tablet (25 mg total) by mouth daily Take in addition to 50 mg for total of 75 mg total by mouth daily 90 tablet 0     No current facility-administered medications for this visit  Allergies   Allergen Reactions    Shellfish-Derived Products - Food Allergy Nausea Only    Lisinopril Cough      Immunizations:     Immunization History   Administered Date(s) Administered    COVID-19 PFIZER VACCINE 0 3 ML IM 04/30/2021, 05/20/2021    INFLUENZA 08/29/2018    Influenza, high dose seasonal 0 7 mL 08/29/2018, 10/24/2019, 10/22/2020, 11/22/2021    Pneumococcal Conjugate 13-Valent 07/18/2018    Pneumococcal Polysaccharide PPV23 07/16/2020      Health Maintenance:         Topic Date Due    DXA SCAN  06/22/2020    Breast Cancer Screening: Mammogram  08/25/2021    Colorectal Cancer Screening  11/05/2030    Hepatitis C Screening  Completed         Topic Date Due    DTaP,Tdap,and Td Vaccines (1 - Tdap) Never done    COVID-19 Vaccine (3 - Booster for Pfizer series) 11/20/2021      Medicare Health Risk Assessment:     /72 (BP Location: Left arm, Patient Position: Sitting, Cuff Size: Standard)   Pulse 77   Temp (!) 96 5 °F (35 8 °C)   Resp 16   Ht 5' 2" (1 575 m)   Wt 69 4 kg (153 lb)   SpO2 98%   BMI 27 98 kg/m²      Gladis Matos is here for her Subsequent Wellness visit  Health Risk Assessment:   Patient rates overall health as fair  Patient feels that their physical health rating is slightly better  Patient is satisfied with their life  Eyesight was rated as slightly worse  Hearing was rated as same  Patient feels that their emotional and mental health rating is slightly worse  Patients states they are always angry  Patient states they are sometimes unusually tired/fatigued  Pain experienced in the last 7 days has been some  Patient's pain rating has been 5/10   Patient states that she has experienced no weight loss or gain in last 6 months  Depression Screening:   PHQ-2 Score: 2      Fall Risk Screening: In the past year, patient has experienced: no history of falling in past year      Urinary Incontinence Screening:   Patient has not leaked urine accidently in the last six months  Home Safety:  Patient does not have trouble with stairs inside or outside of their home  Patient has working smoke alarms and has working carbon monoxide detector  Home safety hazards include: none  Nutrition:   Current diet is Regular and Limited junk food  Medications:   Patient is currently taking over-the-counter supplements  OTC medications include: see medication list  Patient is able to manage medications  Activities of Daily Living (ADLs)/Instrumental Activities of Daily Living (IADLs):   Walk and transfer into and out of bed and chair?: Yes  Dress and groom yourself?: Yes    Bathe or shower yourself?: Yes    Feed yourself? Yes  Do your laundry/housekeeping?: Yes  Manage your money, pay your bills and track your expenses?: Yes  Make your own meals?: Yes    Do your own shopping?: Yes    Previous Hospitalizations:   Any hospitalizations or ED visits within the last 12 months?: No      Advance Care Planning:   Living will: Yes    Durable POA for healthcare:  Yes    Advanced directive: Yes      PREVENTIVE SCREENINGS      Cardiovascular Screening:    General: Screening Current      Diabetes Screening:     General: Screening Current      Colorectal Cancer Screening:     General: Screening Current      Breast Cancer Screening:     General: Screening Current      Cervical Cancer Screening:    General: Screening Not Indicated      Lung Cancer Screening:     General: Screening Not Indicated      Hepatitis C Screening:    General: Screening Current    Screening, Brief Intervention, and Referral to Treatment (SBIRT)    Screening  Typical number of drinks in a day: 0  Typical number of drinks in a week: 0  Interpretation: Low risk drinking behavior      Single Item Drug Screening:  How often have you used an illegal drug (including marijuana) or a prescription medication for non-medical reasons in the past year? never    Single Item Drug Screen Score: 0  Interpretation: Negative screen for possible drug use disorder      Denis Chacko, DO

## 2022-01-21 NOTE — PROGRESS NOTES
INTERNAL MEDICINE OFFICE VISIT  [facility] Internal Medicine- [place]    NAME: Margaret Duarte  AGE: 78 y o  SEX: female    DATE OF ENCOUNTER: 1/21/2022    Assessment and Plan     1  Premature atrial beats  -this was discussed at our last appointment on 11/22  Symptoms appear to be relatively stable  She wishes to go back to 75 mg dose of Toprol-XL  -follow-up is arranged with Cardiology for next month    - metoprolol succinate (TOPROL-XL) 50 mg 24 hr tablet; Take 1 tablet (50 mg total) by mouth daily  Dispense: 90 tablet; Refill: 0  - metoprolol succinate (Toprol XL) 25 mg 24 hr tablet; Take 1 tablet (25 mg total) by mouth daily Take in addition to 50 mg for total of 75 mg total by mouth daily  Dispense: 90 tablet; Refill: 0    2  Essential hypertension  -well controlled      3  Gastroesophageal reflux disease, unspecified whether esophagitis present  - omeprazole (PriLOSEC) 20 mg delayed release capsule; Take 1 capsule (20 mg total) by mouth daily  Dispense: 90 capsule; Refill: 1    4  Pre-diabetes  -last A1c 5 7 in April  Will recheck  - CBC and differential; Future  - Comprehensive metabolic panel; Future  - Hemoglobin A1C; Future  - Lipid panel; Future  - TSH, 3rd generation with Free T4 reflex; Future    5  Vitamin D deficiency  - Vitamin D 25 hydroxy; Future    6  Depression, unspecified depression type  -she requests referral to behavioral health  - Ambulatory Referral to [facility]; Future    7  Bunion of left foot  -appears to have bunion deformity of the left foot  Will refer to Podiatry   - Ambulatory Referral to Podiatry; Future    8  Blurry vision  -she appears to have had a prior eye operation in [place], unclear what for, possibly cataract surgery  -she requests referral to Ophthalmology  Has had chronic worsening blurry vision in her left eye    - Ambulatory Referral to Ophthalmology; Future    9   History of cataract surgery, unspecified laterality  - Ambulatory Referral to Ophthalmology; Future    10  Dental examination  -she requests referral for Dentistry  - Ambulatory Referral to Dentistry; Future    11  Symptomatic PVCs    12  Cough  -ongoing dry cough  Prior COVID diagnosis in early December  -Tessalon Perles as needed  - benzonatate (TESSALON PERLES) 100 mg capsule; Take 1 capsule (100 mg total) by mouth 3 (three) times a day as needed for cough  Dispense: 20 capsule; Refill: 0    13   Rhinorrhea           Orders Placed This Encounter   Procedures    CBC and differential    Comprehensive metabolic panel    Hemoglobin A1C    Lipid panel    TSH, 3rd generation with Free T4 reflex    Vitamin D 25 hydroxy    [facility] to [facility] Ambulatory Referral to Dentistry    Ambulatory Referral to Ophthalmology    Ambulatory Referral to Podiatry       Chief Complaint     Chief Complaint   Patient presents with   [facility] Wellness Visit     coughat night runny nose, she forgets alot of things,can't smell,shocks up     Follow-up     loss of appetite, she would like lab work, refills on meds,tdap vac, needs a dentis, podiatry , eye specislist       History of Present Illness     [employer] comes in today for follow-up    Medical history of GERD, hypertension, pancreatic cyst, symptomatic cholelithiasis    The following portions of the patient's history were reviewed and updated as appropriate: allergies, current medications, past family history, past medical history, past social history, past surgical history and problem list     Review of Systems     10 point ROS negative except per HPI    Active Problem List     Patient Active Problem List   Diagnosis    Essential hypertension    Palpitation    Gastroesophageal reflux disease    Primary osteoarthritis of both knees    Osteopenia    Cervical radiculopathy    Aortic dilatation (HCC)    Rotator cuff syndrome of right shoulder    Nonrheumatic aortic valve insufficiency    Abnormal EKG    Pre-diabetes    Numbness and tingling of left arm and leg    Carpal tunnel syndrome of left wrist    Vertigo    Abnormal liver enzymes    Vitamin D deficiency    Tarsal tunnel syndrome of left side    COVID-19       Objective     /72 (BP Location: Left arm, Patient Position: Sitting, Cuff Size: Standard)   Pulse 77   Temp (!) 96 5 °F (35 8 °C)   Resp 16   Ht 5' 2" (1 575 m)   Wt 69 4 kg (153 lb)   SpO2 98%   BMI 27 98 kg/m²     Physical Exam  Constitutional:       Appearance: Normal appearance  She is not ill-appearing  HENT:      Head: Normocephalic and atraumatic  Eyes:      General: No scleral icterus  Right eye: No discharge  Left eye: No discharge  Cardiovascular:      Rate and Rhythm: Normal rate and regular rhythm  Heart sounds: No murmur heard  No friction rub  Pulmonary:      Effort: Pulmonary effort is normal       Breath sounds: Normal breath sounds  No wheezing or rales  Abdominal:      General: Abdomen is flat  There is no distension  Palpations: Abdomen is soft  Tenderness: There is no abdominal tenderness  Musculoskeletal:         General: No swelling or tenderness  Skin:     General: Skin is warm and dry  Findings: No erythema  Neurological:      Mental Status: She is alert  Mental status is at baseline  Motor: No weakness  Psychiatric:         Mood and Affect: Mood normal          Behavior: Behavior normal          Pertinent Laboratory/Diagnostic Studies:  No results found      Images and diagnostics reviewed     Current Medications     Current Outpatient Medications:     acetaminophen (TYLENOL) 325 mg tablet, Take 650 mg by mouth every 6 (six) hours as needed for mild pain, Disp: , Rfl:     Aspirin 81 MG CAPS, Take 81 mg by mouth daily, Disp: 90 capsule, Rfl: 0    cholecalciferol (VITAMIN D3) 1,000 units tablet, Take 2 tablets (2,000 Units total) by mouth daily, Disp: 180 tablet, Rfl: 1    Diclofenac Sodium (VOLTAREN) 1 %, Apply 2 g topically 4 (four) times a day, Disp: 100 g, Rfl: 0    losartan (COZAAR) 25 mg tablet, Take 1 tablet (25 mg total) by mouth daily, Disp: 90 tablet, Rfl: 0    metoprolol succinate (TOPROL-XL) 50 mg 24 hr tablet, Take 1 tablet (50 mg total) by mouth daily, Disp: 90 tablet, Rfl: 0    Multiple Vitamin (MULTIVITAMIN) capsule, Take 1 capsule by mouth daily, Disp: , Rfl:     omeprazole (PriLOSEC) 20 mg delayed release capsule, Take 1 capsule (20 mg total) by mouth daily, Disp: 90 capsule, Rfl: 1    benzonatate (TESSALON PERLES) 100 mg capsule, Take 1 capsule (100 mg total) by mouth 3 (three) times a day as needed for cough, Disp: 20 capsule, Rfl: 0    ipratropium (ATROVENT) 0 03 % nasal spray, USE 2 SPRAYS IN EACH NOSTRIL EVERY 12 HOURS, Disp: 60 mL, Rfl: 1    metoprolol succinate (Toprol XL) 25 mg 24 hr tablet, Take 1 tablet (25 mg total) by mouth daily Take in addition to 50 mg for total of 75 mg total by mouth daily, Disp: 90 tablet, Rfl: 0    Health Maintenance     Health Maintenance   Topic Date Due    DTaP,Tdap,and Td Vaccines (1 - Tdap) Never done    DXA SCAN  06/22/2020    Breast Cancer Screening: Mammogram  08/25/2021    COVID-19 Vaccine (3 - Booster for Pfizer series) 11/20/2021    Fall Risk  06/22/2022    BMI: Followup Plan  11/22/2022    Depression Screening  01/21/2023    Medicare Annual Wellness Visit (AWV)  01/21/2023    BMI: Adult  01/21/2023    Colorectal Cancer Screening  11/05/2030    Hepatitis C Screening  Completed    Osteoporosis Screening  Completed    Pneumococcal Vaccine: 65+ Years  Completed    Influenza Vaccine  Completed    HIB Vaccine  Aged Out    Hepatitis B Vaccine  Aged Out    IPV Vaccine  Aged Out    Hepatitis A Vaccine  Aged Out    Meningococcal ACWY Vaccine  Aged Out    HPV Vaccine  Aged Out     Immunization History   Administered Date(s) Administered    COVID-19 PFIZER VACCINE 0 3 ML IM 04/30/2021, 05/20/2021    INFLUENZA 08/29/2018    Influenza, high dose seasonal 0 7 mL 08/29/2018, 10/24/2019, 10/22/2020, 11/22/2021    Pneumococcal Conjugate 13-Valent 07/18/2018    Pneumococcal Polysaccharide PPV23 07/16/2020       RADHA Garcia  Internal Medicine [place] [address] [address], [facility] [address]  [place], [address] [facility]  Office: [phone]

## 2022-01-24 ENCOUNTER — HOSPITAL ENCOUNTER (OUTPATIENT)
Dept: MAMMOGRAPHY | Facility: CLINIC | Age: 77
Discharge: HOME | End: 2022-01-24
Payer: COMMERCIAL

## 2022-01-24 ENCOUNTER — HOSPITAL ENCOUNTER (OUTPATIENT)
Dept: BONE DENSITY | Facility: CLINIC | Age: 77
Discharge: HOME | End: 2022-01-24
Payer: COMMERCIAL

## 2022-01-24 VITALS — WEIGHT: 153 LBS | BODY MASS INDEX: 28.16 KG/M2 | HEIGHT: 62 IN

## 2022-01-24 DIAGNOSIS — Z12.31 ENCOUNTER FOR SCREENING MAMMOGRAM FOR MALIGNANT NEOPLASM OF BREAST: ICD-10-CM

## 2022-01-24 DIAGNOSIS — Z13.820 SCREENING FOR OSTEOPOROSIS: ICD-10-CM

## 2022-01-24 DIAGNOSIS — M85.80 OSTEOPENIA, UNSPECIFIED LOCATION: ICD-10-CM

## 2022-01-24 PROCEDURE — 77063 BREAST TOMOSYNTHESIS BI: CPT

## 2022-01-24 PROCEDURE — 77080 DXA BONE DENSITY AXIAL: CPT

## 2022-01-24 PROCEDURE — 77067 SCR MAMMO BI INCL CAD: CPT

## 2022-01-31 ENCOUNTER — TELEPHONE (OUTPATIENT)
Dept: INTERNAL MEDICINE CLINIC | Facility: CLINIC | Age: 77
End: 2022-01-31

## 2022-01-31 NOTE — TELEPHONE ENCOUNTER
----- Message from [facility] [place] sent at 1/30/2022  8:25 PM EST -----  DEXA scan shows low bone density but not in the osteoporosis range thankfully    She should continue with her vitamin-D supplement and weight-bearing exercise as tolerated

## 2022-02-10 ENCOUNTER — HOSPITAL ENCOUNTER (OUTPATIENT)
Dept: RADIOLOGY | Facility: HOSPITAL | Age: 77
Discharge: HOME | End: 2022-02-10
Payer: COMMERCIAL

## 2022-02-10 ENCOUNTER — OFFICE VISIT (OUTPATIENT)
Dept: CARDIOLOGY CLINIC | Facility: CLINIC | Age: 77
End: 2022-02-10
Payer: COMMERCIAL

## 2022-02-10 ENCOUNTER — APPOINTMENT (OUTPATIENT)
Dept: LAB | Facility: HOSPITAL | Age: 77
End: 2022-02-10
Payer: COMMERCIAL

## 2022-02-10 VITALS
WEIGHT: 151.2 LBS | BODY MASS INDEX: 27.82 KG/M2 | SYSTOLIC BLOOD PRESSURE: 134 MMHG | HEART RATE: 58 BPM | RESPIRATION RATE: 16 BRPM | HEIGHT: 62 IN | DIASTOLIC BLOOD PRESSURE: 76 MMHG

## 2022-02-10 DIAGNOSIS — I77.819 AORTIC DILATATION (HCC): ICD-10-CM

## 2022-02-10 DIAGNOSIS — M79.675 PAIN IN TOE OF LEFT FOOT: ICD-10-CM

## 2022-02-10 DIAGNOSIS — M20.12 ACQUIRED HALLUX VALGUS OF LEFT FOOT: ICD-10-CM

## 2022-02-10 DIAGNOSIS — I10 ESSENTIAL HYPERTENSION: Primary | ICD-10-CM

## 2022-02-10 DIAGNOSIS — R00.2 PALPITATION: ICD-10-CM

## 2022-02-10 DIAGNOSIS — I35.1 NONRHEUMATIC AORTIC VALVE INSUFFICIENCY: ICD-10-CM

## 2022-02-10 DIAGNOSIS — E78.00 PRIMARY HYPERCHOLESTEROLEMIA: ICD-10-CM

## 2022-02-10 PROCEDURE — 99214 OFFICE O/P EST MOD 30 MIN: CPT | Performed by: INTERNAL MEDICINE

## 2022-02-10 PROCEDURE — 73630 X-RAY EXAM OF FOOT: CPT

## 2022-02-10 PROCEDURE — 1036F TOBACCO NON-USER: CPT | Performed by: INTERNAL MEDICINE

## 2022-02-10 PROCEDURE — 36415 COLL VENOUS BLD VENIPUNCTURE: CPT

## 2022-02-10 PROCEDURE — 3075F SYST BP GE 130 - 139MM HG: CPT | Performed by: INTERNAL MEDICINE

## 2022-02-10 PROCEDURE — 3008F BODY MASS INDEX DOCD: CPT | Performed by: INTERNAL MEDICINE

## 2022-02-10 PROCEDURE — 1160F RVW MEDS BY RX/DR IN RCRD: CPT | Performed by: INTERNAL MEDICINE

## 2022-02-10 PROCEDURE — 3078F DIAST BP <80 MM HG: CPT | Performed by: INTERNAL MEDICINE

## 2022-02-10 PROCEDURE — 86430 RHEUMATOID FACTOR TEST QUAL: CPT

## 2022-02-10 PROCEDURE — 93000 ELECTROCARDIOGRAM COMPLETE: CPT | Performed by: INTERNAL MEDICINE

## 2022-02-10 NOTE — PROGRESS NOTES
[facility]  [address], [place]   [address]  [id number]  Phone#  [phone]   Fax#  [phone]  *-*-*-*-*-*-*-*-*-*-*-*-*-*-*-*-*-*-*-*-*-*-*-*-*-*-*-*-*-*-*-*-*-*-*-*-*-*-*-*-*-*-*-*-*-*-*-*-*-*-*-*-*-*                                   Cardiology Follow Up      ENCOUNTER DATE: 02/10/22 3:02 PM  PATIENT NAME: Herman Jones   : 1945    MRN: [id number]  AGE:76 y o  SEX: female  [address] Rose[address] Giraldo MD     PRIMARY CARE PHYSICIAN: Denis Schumacher DO    ACTIVE DIAGNOSIS THIS VISIT  1  Essential hypertension  POCT ECG   2  Nonrheumatic aortic valve insufficiency     3  Aortic dilatation (HCC)     4  Palpitation     5  Primary hypercholesterolemia       ACTIVE PROBLEM LIST  Patient Active Problem List   Diagnosis    Essential hypertension    Palpitation    Gastroesophageal reflux disease    Primary osteoarthritis of both knees    Osteopenia    Cervical radiculopathy    Aortic dilatation (HCC)    Rotator cuff syndrome of right shoulder    Nonrheumatic aortic valve insufficiency    Abnormal EKG    Pre-diabetes    Numbness and tingling of left arm and leg    Carpal tunnel syndrome of left wrist    Vertigo    Abnormal liver enzymes    Vitamin D deficiency    Tarsal tunnel syndrome of left side    COVID-19    Primary hypercholesterolemia       CARDIOLOGY SPECIALTY COMMENTS  Patient transferring from [place] [address]   Diagnoses:  1  Aortic dilatation (HCC)  4 3 cm  2  Nonrheumatic aortic valve insufficiency, mild-to-moderate   3  Essential hypertension    4  Palpitations   5  Abnormal EKG    2019 echocardiogram  LEFT VENTRICLE:  Systolic function was normal  Ejection fraction was estimated to be 60 %  There were no regional wall motion abnormalities  Doppler parameters were consistent with abnormal left ventricular relaxation (grade 1 diastolic dysfunction)    AORTIC VALVE:  There was mild to moderate regurgitation     AORTA:  There was dilatation of the ascending aorta, with a maximum measured diameter of 4 3 cm  INTERVAL HISTORY:         patient was Tajik-speaking  Translation was performed with our translation phone service  There were numerous problems with a language barrier and the patient was very interested in her condition and seeing what could be done to reduce progression in the future  Patient with a history of hypertension, aortic regurgitation and dilatation of the ascending aorta was is switching physicians to the [facility] office  She is very interested in her health and understanding her problem and what can be done to minimize it  She states that her blood pressure at home usually runs between 110 and 120  DISCUSSION/PLAN:           1  Obtain echocardiogram  2  Return in 3 months  3  Consider repeating her fasting lipid profile and beginning cholesterol lowering medications      Lab Studies:    Lab Results   Component Value Date    CHOLESTEROL 179 04/26/2021    CHOLESTEROL 150 06/08/2020    CHOLESTEROL 178 01/06/2020     Lab Results   Component Value Date    TRIG 99 04/26/2021    TRIG 74 06/08/2020    TRIG 123 01/06/2020     Lab Results   Component Value Date    HDL 57 04/26/2021    HDL 93 06/08/2020    HDL 52 01/06/2020     Lab Results   Component Value Date    LDLCALC 102 (H) 04/26/2021    LDLCALC 42 06/08/2020    LDLCALC 101 01/06/2020       Lab Results   Component Value Date    HGBA1C 5 7 (H) 04/26/2021      Lab Results   Component Value Date    EGFR 89 05/04/2021    EGFR 89 04/26/2021    EGFR 91 06/08/2020    SODIUM 137 05/04/2021    SODIUM 141 04/26/2021    SODIUM 138 06/08/2020    K 3 8 05/04/2021    K 4 1 04/26/2021    K 4 0 06/08/2020     05/04/2021     04/26/2021     06/08/2020    CO2 27 05/04/2021    CO2 29 04/26/2021    CO2 27 06/08/2020    BUN 15 05/04/2021    BUN 12 04/26/2021    BUN 13 06/08/2020    CREATININE 0 63 05/04/2021    CREATININE 0 63 04/26/2021    CREATININE 0 61 06/08/2020     Lab Results Component Value Date    WBC 9 14 05/04/2021    WBC 6 02 04/26/2021    WBC 7 28 06/08/2020    HGB 12 5 05/04/2021    HGB 12 6 04/26/2021    HGB 12 2 06/08/2020    HCT 39 3 05/04/2021    HCT 39 6 04/26/2021    HCT 39 0 06/08/2020    MCV 96 05/04/2021    MCV 97 04/26/2021     (H) 06/08/2020    MCH 30 5 05/04/2021    MCH 30 8 04/26/2021    MCH 31 3 06/08/2020    MCHC 31 8 05/04/2021    MCHC 31 8 04/26/2021    MCHC 31 3 (L) 06/08/2020     05/04/2021     04/26/2021     06/08/2020      Lab Results   Component Value Date    CALCIUM 9 2 05/04/2021    CALCIUM 9 2 04/26/2021    CALCIUM 9 3 06/08/2020    AST 30 05/04/2021    AST 32 04/26/2021     (H) 06/08/2020    ALT 38 05/04/2021    ALT 39 04/26/2021     (H) 06/08/2020    ALKPHOS 108 05/04/2021    ALKPHOS 115 04/26/2021    ALKPHOS 374 (H) 06/08/2020    MG 2 1 06/19/2020       Lab Results   Component Value Date    TROPONINI 0 02 01/14/2019       Lab Results   Component Value Date    FERRITIN 43 06/19/2020       Results for orders placed or performed in visit on 02/10/22   POCT ECG    Narrative     Sinus bradycardia rate of 58 beats per minute  Right bundle branch block pattern  Abnormal EKG           Current Outpatient Medications:     acetaminophen (TYLENOL) 325 mg tablet, Take 650 mg by mouth every 6 (six) hours as needed for mild pain, Disp: , Rfl:     Aspirin 81 MG CAPS, Take 81 mg by mouth daily, Disp: 90 capsule, Rfl: 0    cholecalciferol (VITAMIN D3) 1,000 units tablet, Take 2 tablets (2,000 Units total) by mouth daily, Disp: 180 tablet, Rfl: 1    Diclofenac Sodium (VOLTAREN) 1 %, Apply 2 g topically 4 (four) times a day, Disp: 100 g, Rfl: 0    ipratropium (ATROVENT) 0 03 % nasal spray, USE 2 SPRAYS IN EACH NOSTRIL EVERY 12 HOURS, Disp: 60 mL, Rfl: 1    losartan (COZAAR) 25 mg tablet, Take 1 tablet (25 mg total) by mouth daily, Disp: 90 tablet, Rfl: 0    metoprolol succinate (Toprol XL) 25 mg 24 hr tablet, Take 1 tablet (25 mg total) by mouth daily Take in addition to 50 mg for total of 75 mg total by mouth daily, Disp: 90 tablet, Rfl: 0    metoprolol succinate (TOPROL-XL) 50 mg 24 hr tablet, Take 1 tablet (50 mg total) by mouth daily, Disp: 90 tablet, Rfl: 0    Multiple Vitamin (MULTIVITAMIN) capsule, Take 1 capsule by mouth daily, Disp: , Rfl:     omeprazole (PriLOSEC) 20 mg delayed release capsule, Take 1 capsule (20 mg total) by mouth daily, Disp: 90 capsule, Rfl: 1    benzonatate (TESSALON PERLES) 100 mg capsule, Take 1 capsule (100 mg total) by mouth 3 (three) times a day as needed for cough, Disp: 20 capsule, Rfl: 0  Allergies   Allergen Reactions    Shellfish-Derived Products - Food Allergy Nausea Only    Lisinopril Cough       Past Medical History:   Diagnosis Date    Diverticulosis     GERD (gastroesophageal reflux disease)     Hypertension      Social History     Socioeconomic History    Marital status: /Civil Union     Spouse name: Not on file    Number of children: Not on file    Years of education: Not on file    Highest education level: Not on file   Occupational History    Not on file   Tobacco Use    Smoking status: Never Smoker    Smokeless tobacco: Never Used   Vaping Use    Vaping Use: Never used   Substance and Sexual Activity    Alcohol use: No    Drug use: No    Sexual activity: Yes     Partners: Male   Other Topics Concern    Not on file   Social History Narrative    ** Merged History Encounter **          Social Determinants of Health     Financial Resource Strain: Not on file   Food Insecurity: Not on file   Transportation Needs: Not on file   Physical Activity: Not on file   Stress: Not on file   Social Connections: Not on file   Intimate Partner Violence: Not on file   Housing Stability: Not on file      Family History   Problem Relation Age of Onset    Diabetes Mother     Hypertension Mother     Hypertension Father     Stroke Father     No Known Problems Sister  No Known Problems Brother     No Known Problems Daughter     No Known Problems Maternal Grandmother     No Known Problems Maternal Grandfather     No Known Problems Paternal Grandmother     No Known Problems Paternal Grandfather     No Known Problems Sister     No Known Problems Sister     No Known Problems Sister     No Known Problems Sister     No Known Problems Daughter     No Known Problems Maternal Aunt     No Known Problems Maternal Aunt     No Known Problems Maternal Aunt     No Known Problems Paternal Aunt     No Known Problems Paternal Aunt     No Known Problems Paternal Aunt     No Known Problems Paternal Aunt     No Known Problems Paternal Aunt      Past Surgical History:   Procedure Laterality Date    COLONOSCOPY  11/2020    TUBAL LIGATION      Ectopic pregnancy 1979    UPPER GASTROINTESTINAL ENDOSCOPY  11/2020       PREVIOUS WEIGHTS:   Wt Readings from Last 10 Encounters:   02/10/22 68 6 kg (151 lb 3 2 oz)   01/24/22 69 4 kg (153 lb)   01/21/22 69 4 kg (153 lb)   11/22/21 67 1 kg (148 lb)   05/11/21 66 8 kg (147 lb 3 2 oz)   05/06/21 65 1 kg (143 lb 9 6 oz)   05/04/21 65 8 kg (145 lb)   11/05/20 65 8 kg (145 lb)   10/22/20 68 1 kg (150 lb 3 2 oz)   09/02/20 67 1 kg (148 lb)        Review of Systems:  Review of Systems   Respiratory: Negative for cough, choking, chest tightness, shortness of breath and wheezing  Cardiovascular: Negative for chest pain, palpitations and leg swelling  Musculoskeletal: Negative for gait problem  Skin: Negative for rash  Neurological: Negative for dizziness, tremors, syncope, weakness, light-headedness, numbness and headaches  Psychiatric/Behavioral: Negative for agitation and behavioral problems  The patient is not hyperactive  Physical Exam:  /76   Pulse 58   Resp 16   Ht 5' 2" (1 575 m)   Wt 68 6 kg (151 lb 3 2 oz)   BMI 27 65 kg/m²     Physical Exam  Constitutional:       General: She is not in acute distress  Appearance: She is well-developed  HENT:      Head: Normocephalic and atraumatic  Neck:      Thyroid: No thyromegaly  Vascular: No carotid bruit or JVD  Trachea: No tracheal deviation  Cardiovascular:      Rate and Rhythm: Normal rate and regular rhythm  Pulses: Normal pulses  Heart sounds: Murmur heard  No friction rub  No gallop  Comments:   Short grade 1/6 early peaking systolic ejection murmur radiating to the base of the neck in the supraclavicular fossa  Pulmonary:      Effort: Pulmonary effort is normal  No respiratory distress  Breath sounds: Normal breath sounds  No wheezing, rhonchi or rales  Chest:      Chest wall: No tenderness  Musculoskeletal:         General: Normal range of motion  Cervical back: Normal range of motion and neck supple  Right lower leg: No edema  Left lower leg: No edema  Skin:     General: Skin is warm and dry  Neurological:      General: No focal deficit present  Mental Status: She is alert and oriented to person, place, and time  Psychiatric:         Mood and Affect: Mood normal          Behavior: Behavior normal          Thought Content: Thought content normal          Judgment: Judgment normal          -======================================================  Imaging:   I have personally reviewed pertinent reports  I spent 60 minutes on the patient's office visit  This time was spent on the day of the visit  I had direct contact with the patient in the office on the day of the visit  Greater than 50% of the total time was spent obtaining a history, examining patient, answering all patient questions, arranging and discussing plan of care with patient as well as directly providing instructions  Additional time then spent on orders and office chart  Portions of the record may have been created with voice recognition software   Occasional wrong word or "sound a like" substitutions may have occurred due to the inherent limitations of voice recognition software  Read the chart carefully and recognize, using context, where substitutions have occurred      SIGNATURES:   Bonita Gale MD

## 2022-02-11 LAB — RHEUMATOID FACT SER QL LA: NEGATIVE

## 2022-02-16 ENCOUNTER — APPOINTMENT (OUTPATIENT)
Dept: LAB | Facility: HOSPITAL | Age: 77
End: 2022-02-16
Payer: COMMERCIAL

## 2022-02-16 DIAGNOSIS — M20.12 HALLUX VALGUS, ACQUIRED, LEFT: ICD-10-CM

## 2022-02-16 DIAGNOSIS — M79.675 PAIN OF GREAT TOE, LEFT: ICD-10-CM

## 2022-02-16 LAB
CRP SERPL QL: <3 MG/L
URATE SERPL-MCNC: 3.4 MG/DL (ref 2–6.8)

## 2022-02-16 PROCEDURE — 86140 C-REACTIVE PROTEIN: CPT

## 2022-02-16 PROCEDURE — 36415 COLL VENOUS BLD VENIPUNCTURE: CPT

## 2022-02-16 PROCEDURE — 84550 ASSAY OF BLOOD/URIC ACID: CPT

## 2022-03-23 ENCOUNTER — HOSPITAL ENCOUNTER (OUTPATIENT)
Dept: NON INVASIVE DIAGNOSTICS | Facility: CLINIC | Age: 77
Discharge: HOME | End: 2022-03-23
Payer: COMMERCIAL

## 2022-03-23 VITALS
DIASTOLIC BLOOD PRESSURE: 76 MMHG | BODY MASS INDEX: 27.79 KG/M2 | WEIGHT: 151 LBS | HEART RATE: 68 BPM | HEIGHT: 62 IN | SYSTOLIC BLOOD PRESSURE: 134 MMHG

## 2022-03-23 DIAGNOSIS — I77.819 AORTIC DILATATION (HCC): ICD-10-CM

## 2022-03-23 DIAGNOSIS — I35.1 NONRHEUMATIC AORTIC VALVE INSUFFICIENCY: ICD-10-CM

## 2022-03-23 DIAGNOSIS — I10 ESSENTIAL HYPERTENSION: ICD-10-CM

## 2022-03-23 LAB
AORTIC ROOT: 3.6 CM
APICAL FOUR CHAMBER EJECTION FRACTION: 64 %
ASCENDING AORTA: 4.3 CM (ref 1.9–2.84)
AV REGURGITATION PRESSURE HALF TIME: 533 MS
E WAVE DECELERATION TIME: 162 MS
FRACTIONAL SHORTENING: 33 % (ref 28–44)
INTERVENTRICULAR SEPTUM IN DIASTOLE (PARASTERNAL SHORT AXIS VIEW): 1.1 CM
INTERVENTRICULAR SEPTUM: 1.1 CM (ref 0.51–0.95)
LAAS-AP2: 20.4 CM2
LAAS-AP4: 15.8 CM2
LEFT ATRIUM SIZE: 4.2 CM
LEFT INTERNAL DIMENSION IN SYSTOLE: 2.9 CM (ref 2.44–3.69)
LEFT VENTRICULAR INTERNAL DIMENSION IN DIASTOLE: 4.3 CM (ref 3.97–5.91)
LEFT VENTRICULAR POSTERIOR WALL IN END DIASTOLE: 0.8 CM (ref 0.49–0.93)
LEFT VENTRICULAR STROKE VOLUME: 49 ML
LVSV (TEICH): 49 ML
MV E'TISSUE VEL-SEP: 7 CM/S
MV PEAK A VEL: 0.71 M/S
MV PEAK E VEL: 38 CM/S
MV STENOSIS PRESSURE HALF TIME: 47 MS
MV VALVE AREA P 1/2 METHOD: 4.68 CM2
RIGHT ATRIUM AREA SYSTOLE A4C: 13 CM2
RIGHT VENTRICLE ID DIMENSION: 3.3 CM
SL CV AV DECELERATION TIME RETROGRADE: 1836 MS
SL CV AV PEAK GRADIENT RETROGRADE: 63 MMHG
SL CV LEFT ATRIUM LENGTH A2C: 5.7 CM
SL CV LV EF: 60
SL CV PED ECHO LEFT VENTRICLE DIASTOLIC VOLUME (MOD BIPLANE) 2D: 82 ML
SL CV PED ECHO LEFT VENTRICLE SYSTOLIC VOLUME (MOD BIPLANE) 2D: 33 ML
TR MAX PG: 20 MMHG
TR PEAK VELOCITY: 2.2 M/S
TRICUSPID VALVE PEAK REGURGITATION VELOCITY: 2.23 M/S
Z-SCORE OF ASCENDING AORTA: 8.11
Z-SCORE OF INTERVENTRICULAR SEPTUM IN END DIASTOLE: 3.33
Z-SCORE OF LEFT VENTRICULAR DIMENSION IN END DIASTOLE: -1.19
Z-SCORE OF LEFT VENTRICULAR DIMENSION IN END SYSTOLE: -0.26
Z-SCORE OF LEFT VENTRICULAR POSTERIOR WALL IN END DIASTOLE: 0.77

## 2022-03-23 PROCEDURE — 93306 TTE W/DOPPLER COMPLETE: CPT

## 2022-03-23 PROCEDURE — 93306 TTE W/DOPPLER COMPLETE: CPT | Performed by: INTERNAL MEDICINE

## 2022-03-28 ENCOUNTER — TELEPHONE (OUTPATIENT)
Dept: SURGERY | Facility: CLINIC | Age: 77
End: 2022-03-28

## 2022-03-28 ENCOUNTER — OFFICE VISIT (OUTPATIENT)
Dept: SURGERY | Facility: CLINIC | Age: 77
End: 2022-03-28
Payer: COMMERCIAL

## 2022-03-28 VITALS
WEIGHT: 153.8 LBS | BODY MASS INDEX: 28.3 KG/M2 | TEMPERATURE: 97.1 F | SYSTOLIC BLOOD PRESSURE: 138 MMHG | HEIGHT: 62 IN | HEART RATE: 74 BPM | DIASTOLIC BLOOD PRESSURE: 84 MMHG

## 2022-03-28 DIAGNOSIS — K80.50 BILIARY COLIC: Primary | ICD-10-CM

## 2022-03-28 DIAGNOSIS — K80.20 GALLSTONES: Primary | ICD-10-CM

## 2022-03-28 PROCEDURE — 3075F SYST BP GE 130 - 139MM HG: CPT | Performed by: PHYSICIAN ASSISTANT

## 2022-03-28 PROCEDURE — 99213 OFFICE O/P EST LOW 20 MIN: CPT | Performed by: PHYSICIAN ASSISTANT

## 2022-03-28 PROCEDURE — 1160F RVW MEDS BY RX/DR IN RCRD: CPT | Performed by: PHYSICIAN ASSISTANT

## 2022-03-28 PROCEDURE — 3079F DIAST BP 80-89 MM HG: CPT | Performed by: PHYSICIAN ASSISTANT

## 2022-03-28 PROCEDURE — 1036F TOBACCO NON-USER: CPT | Performed by: PHYSICIAN ASSISTANT

## 2022-03-28 NOTE — PROGRESS NOTES
Assessment/Plan:   Usama Nguyễn is a 78 y  o female who is here for Gallbladder disease      70-year-old female with history of GERD, pancreatic cyst, hypertension, and arthritis presents to the office due to right upper quadrant abdominal pain associated with no nausea vomiting  Patient was evaluated by GI and felt it was not related to her GERD about a year ago  She had a previous upper endoscopy and EUS with drainage of pancreatic cyst  August of 2020  Last MRI 11/2021 was normal     5/2021: an outpatient ultrasound was performed which showed multiple small stones but no thick wall or pericholecystic fluid     she did have ever remote history of elevated LFTs in June 2020 which seemed to have resolved and normalized    Upon evaluation today patient has: Bilary colic and Gallstones on Ultrasound       Plan:    1  Follow up US  2  Robotic Laparoscopic Cholecystectomy with possible Intraoperative Cholangiogram    Patient is still hesitant to commit to surgery but she will move forward if we get another US  Post Op Pain Management: Norco      Ultrasound findings:  Distended gallbladder with sludge and stones  There is no gallbladder wall thickening or pericholecystic fluid  The common bile duct measures 2 mm   multiple hepatic cysts  Echo 3/23/22:      Left Ventricle: Left ventricular cavity size is normal  Wall thickness is normal  The left ventricular ejection fraction is 60%  Systolic function is normal  Wall motion is normal  Diastolic function is mildly abnormal, consistent with grade I (abnormal) relaxation    Right Ventricle: Right ventricular cavity size is normal  Systolic function is normal     Left Atrium: The atrium is mildly dilated    Aortic Valve: There is mild to moderate regurgitation    Tricuspid Valve: There is mild regurgitation  The right ventricular systolic pressure is normal     Aorta: The aortic root is normal in size   The ascending aorta is moderately dilated 4  3 cm  MRI 11/2021:    PANCREAS:  Similar appearance of small cysts favoring side branch IPMN, largest 1 3 cm (13/114)  Pancreatic duct is otherwise normal in caliber and morphology  Preoperative Clearance: Medical and cardiac  ____________________________________________________    HPI:  Rossi Biggs is a 78 y  o female who was referred for evaluation of The encounter diagnosis was Biliary colic  Abdominal pain Location: in the RUQ with radiation to back   Quality: pressure-like, sharp and shooting  Quantity: 7/10 in intensity  Chronicity: Onset 1 week ago, completely resolved since   Alleviating factors: none  Associated symptoms: belching and nausea, which is Intermittent      nausea and no vomiting,     regular bowel movement       Duration of pain or symptoms: Intermittent and for 1 week      Prior Colonoscopy : 2 years ago    Prior Abdominal Surgery: tubal ligation    Anticoagulation: ASA 81 mg    Imaging:   No orders to display           LABS:    Lab Results   Component Value Date    K 3 8 05/04/2021     05/04/2021    CO2 27 05/04/2021    BUN 15 05/04/2021    CREATININE 0 63 05/04/2021    GLUF 91 04/26/2021    CALCIUM 9 2 05/04/2021    AST 30 05/04/2021    ALT 38 05/04/2021    ALKPHOS 108 05/04/2021    EGFR 89 05/04/2021       Lab Results   Component Value Date    WBC 9 14 05/04/2021    HGB 12 5 05/04/2021    HCT 39 3 05/04/2021    MCV 96 05/04/2021     05/04/2021     Lab Results   Component Value Date    INR 1 10 04/26/2021    PROTIME 14 0 04/26/2021     Lab Results   Component Value Date    HGBA1C 5 7 (H) 04/26/2021           ROS:  General ROS: negative for - chills, fatigue, fever or night sweats, weight loss  Respiratory ROS: no cough, shortness of breath, or wheezing  Cardiovascular ROS: no chest pain or dyspnea on exertion  Genito-Urinary ROS: no dysuria, trouble voiding, or hematuria  Musculoskeletal ROS: negative for - gait disturbance, joint pain or muscle pain  Neurological ROS: no TIA or stroke symptoms  Gastrointestinal ROS: See HPI   GI ROS: see HPI  Skin ROS: no new rashes or lesions   Lymphatic ROS: no new adenopathy noted by pt     GYN ROS: see HPI, no new GYN history or bleeding noted  Psy ROS: no new mental or behavioral disturbances       Patient Active Problem List   Diagnosis    Essential hypertension    Palpitation    Gastroesophageal reflux disease    Primary osteoarthritis of both knees    Osteopenia    Cervical radiculopathy    Aortic dilatation (HCC)    Rotator cuff syndrome of right shoulder    Nonrheumatic aortic valve insufficiency    Abnormal EKG    Pre-diabetes    Numbness and tingling of left arm and leg    Carpal tunnel syndrome of left wrist    Vertigo    Abnormal liver enzymes    Vitamin D deficiency    Tarsal tunnel syndrome of left side    COVID-19    Primary hypercholesterolemia         Allergies:  Shellfish-derived products - food allergy and Lisinopril      Current Outpatient Medications:     acetaminophen (TYLENOL) 325 mg tablet, Take 650 mg by mouth every 6 (six) hours as needed for mild pain, Disp: , Rfl:     Aspirin 81 MG CAPS, Take 81 mg by mouth daily, Disp: 90 capsule, Rfl: 0    benzonatate (TESSALON PERLES) 100 mg capsule, Take 1 capsule (100 mg total) by mouth 3 (three) times a day as needed for cough, Disp: 20 capsule, Rfl: 0    cholecalciferol (VITAMIN D3) 1,000 units tablet, Take 2 tablets (2,000 Units total) by mouth daily, Disp: 180 tablet, Rfl: 1    Diclofenac Sodium (VOLTAREN) 1 %, Apply 2 g topically 4 (four) times a day, Disp: 100 g, Rfl: 0    ipratropium (ATROVENT) 0 03 % nasal spray, USE 2 SPRAYS IN EACH NOSTRIL EVERY 12 HOURS, Disp: 60 mL, Rfl: 1    losartan (COZAAR) 25 mg tablet, Take 1 tablet (25 mg total) by mouth daily, Disp: 90 tablet, Rfl: 0    metoprolol succinate (Toprol XL) 25 mg 24 hr tablet, Take 1 tablet (25 mg total) by mouth daily Take in addition to 50 mg for total of 75 mg total by mouth daily, Disp: 90 tablet, Rfl: 0    metoprolol succinate (TOPROL-XL) 50 mg 24 hr tablet, Take 1 tablet (50 mg total) by mouth daily, Disp: 90 tablet, Rfl: 0    Multiple Vitamin (MULTIVITAMIN) capsule, Take 1 capsule by mouth daily, Disp: , Rfl:     mupirocin (BACTROBAN) 2 % ointment, APPLY SMALL AMOUNT TOPICALLY TO THE AFFECTED AREA EVERY DAY, Disp: , Rfl:     omeprazole (PriLOSEC) 20 mg delayed release capsule, Take 1 capsule (20 mg total) by mouth daily, Disp: 90 capsule, Rfl: 1    Past Medical History:   Diagnosis Date    Diverticulosis     GERD (gastroesophageal reflux disease)     Hypertension        Past Surgical History:   Procedure Laterality Date    COLONOSCOPY  11/2020    TUBAL LIGATION      Ectopic pregnancy 1979    UPPER GASTROINTESTINAL ENDOSCOPY  11/2020       Family History   Problem Relation Age of Onset    Diabetes Mother     Hypertension Mother     Hypertension Father     Stroke Father     No Known Problems Sister     No Known Problems Brother     No Known Problems Daughter     No Known Problems Maternal Grandmother     No Known Problems Maternal Grandfather     No Known Problems Paternal Grandmother     No Known Problems Paternal Grandfather     No Known Problems Sister     No Known Problems Sister     No Known Problems Sister     No Known Problems Sister     No Known Problems Daughter     No Known Problems Maternal Aunt     No Known Problems Maternal Aunt     No Known Problems Maternal Aunt     No Known Problems Paternal Aunt     No Known Problems Paternal Aunt     No Known Problems Paternal Aunt     No Known Problems Paternal Aunt     No Known Problems Paternal Aunt         reports that she has never smoked  She has never used smokeless tobacco  She reports that she does not drink alcohol and does not use drugs        Exam:   Vitals:    03/28/22 0911   BP: 138/84   Pulse: 74   Temp: (!) 97 1 °F (36 2 °C)       PHYSICAL EXAM  General Appearance: Alert, cooperative, no distress, healthy   Head:    Normocephalic without obvious abnormality   Eyes:    PERRL, conjunctiva/corneas clear,       Neck:   Supple, no adenopathy, no JVD   Back:     Symmetric, no spinal or CVA tenderness   Lungs:     Clear to auscultation bilaterally, no wheezing or rhonchi   Heart:    Regular rate and rhythm, S1 and S2 normal, no murmur   Abdomen:     abdomen is soft without significant tenderness, masses, organomegaly or guarding Bowel sounds are normal      Extremities:   Extremities normal  No clubbing, cyanosis or edema   Psych:   Normal Affect, AOx3  Neurologic:  Skin:   CNII-XII intact  Strength symmetric, speech intact    Warm, dry, intact, no visible rashes or lesions      Informed consent for procedure was personally discussed, reviewed, and signed by Dr Scot Rodriguez  Discussion by Dr Scot Rodriguez was carried out regarding risks, benefits, and alternatives with the patient  Risks include but are not limited to:  bleeding, infection, and delayed wound healing or an open wound, pulmonary embolus, leaks from bowel or bile ducts or other viscus, transfusions, death  Discussed in further detail the more common complications and their rates of occurrence   was used if necessary  Patient expressed understanding of the issues discussed and wished/consented to proceed  All questions were answered by Dr Scot Rodriguez  Discussion performed between patient and the provider signing below       Signature:   Darcie Senior PA-C  Date: 3/28/2022 Time: 9:50 AM

## 2022-03-28 NOTE — H&P (VIEW-ONLY)
Assessment/Plan:   Moni Roman is a 78 y  o female who is here for Gallbladder disease      62-year-old female with history of GERD, pancreatic cyst, hypertension, and arthritis presents to the office due to right upper quadrant abdominal pain associated with no nausea vomiting  Patient was evaluated by GI and felt it was not related to her GERD about a year ago  She had a previous upper endoscopy and EUS with drainage of pancreatic cyst  August of 2020  Last MRI 11/2021 was normal     5/2021: an outpatient ultrasound was performed which showed multiple small stones but no thick wall or pericholecystic fluid     she did have ever remote history of elevated LFTs in June 2020 which seemed to have resolved and normalized    Upon evaluation today patient has: Bilary colic and Gallstones on Ultrasound       Plan:    1  Follow up US  2  Robotic Laparoscopic Cholecystectomy with possible Intraoperative Cholangiogram    Patient is still hesitant to commit to surgery but she will move forward if we get another US  Post Op Pain Management: Norco      Ultrasound findings:  Distended gallbladder with sludge and stones  There is no gallbladder wall thickening or pericholecystic fluid  The common bile duct measures 2 mm   multiple hepatic cysts  Echo 3/23/22:      Left Ventricle: Left ventricular cavity size is normal  Wall thickness is normal  The left ventricular ejection fraction is 60%  Systolic function is normal  Wall motion is normal  Diastolic function is mildly abnormal, consistent with grade I (abnormal) relaxation    Right Ventricle: Right ventricular cavity size is normal  Systolic function is normal     Left Atrium: The atrium is mildly dilated    Aortic Valve: There is mild to moderate regurgitation    Tricuspid Valve: There is mild regurgitation  The right ventricular systolic pressure is normal     Aorta: The aortic root is normal in size   The ascending aorta is moderately dilated 4  3 cm  MRI 11/2021:    PANCREAS:  Similar appearance of small cysts favoring side branch IPMN, largest 1 3 cm (13/114)  Pancreatic duct is otherwise normal in caliber and morphology  Preoperative Clearance: Medical and cardiac  ____________________________________________________    HPI:  Jeremy Herring is a 78 y  o female who was referred for evaluation of The encounter diagnosis was Biliary colic  Abdominal pain Location: in the RUQ with radiation to back   Quality: pressure-like, sharp and shooting  Quantity: 7/10 in intensity  Chronicity: Onset 1 week ago, completely resolved since   Alleviating factors: none  Associated symptoms: belching and nausea, which is Intermittent      nausea and no vomiting,     regular bowel movement       Duration of pain or symptoms: Intermittent and for 1 week      Prior Colonoscopy : 2 years ago    Prior Abdominal Surgery: tubal ligation    Anticoagulation: ASA 81 mg    Imaging:   No orders to display           LABS:    Lab Results   Component Value Date    K 3 8 05/04/2021     05/04/2021    CO2 27 05/04/2021    BUN 15 05/04/2021    CREATININE 0 63 05/04/2021    GLUF 91 04/26/2021    CALCIUM 9 2 05/04/2021    AST 30 05/04/2021    ALT 38 05/04/2021    ALKPHOS 108 05/04/2021    EGFR 89 05/04/2021       Lab Results   Component Value Date    WBC 9 14 05/04/2021    HGB 12 5 05/04/2021    HCT 39 3 05/04/2021    MCV 96 05/04/2021     05/04/2021     Lab Results   Component Value Date    INR 1 10 04/26/2021    PROTIME 14 0 04/26/2021     Lab Results   Component Value Date    HGBA1C 5 7 (H) 04/26/2021           ROS:  General ROS: negative for - chills, fatigue, fever or night sweats, weight loss  Respiratory ROS: no cough, shortness of breath, or wheezing  Cardiovascular ROS: no chest pain or dyspnea on exertion  Genito-Urinary ROS: no dysuria, trouble voiding, or hematuria  Musculoskeletal ROS: negative for - gait disturbance, joint pain or muscle pain  Neurological ROS: no TIA or stroke symptoms  Gastrointestinal ROS: See HPI   GI ROS: see HPI  Skin ROS: no new rashes or lesions   Lymphatic ROS: no new adenopathy noted by pt     GYN ROS: see HPI, no new GYN history or bleeding noted  Psy ROS: no new mental or behavioral disturbances       Patient Active Problem List   Diagnosis    Essential hypertension    Palpitation    Gastroesophageal reflux disease    Primary osteoarthritis of both knees    Osteopenia    Cervical radiculopathy    Aortic dilatation (HCC)    Rotator cuff syndrome of right shoulder    Nonrheumatic aortic valve insufficiency    Abnormal EKG    Pre-diabetes    Numbness and tingling of left arm and leg    Carpal tunnel syndrome of left wrist    Vertigo    Abnormal liver enzymes    Vitamin D deficiency    Tarsal tunnel syndrome of left side    COVID-19    Primary hypercholesterolemia         Allergies:  Shellfish-derived products - food allergy and Lisinopril      Current Outpatient Medications:     acetaminophen (TYLENOL) 325 mg tablet, Take 650 mg by mouth every 6 (six) hours as needed for mild pain, Disp: , Rfl:     Aspirin 81 MG CAPS, Take 81 mg by mouth daily, Disp: 90 capsule, Rfl: 0    benzonatate (TESSALON PERLES) 100 mg capsule, Take 1 capsule (100 mg total) by mouth 3 (three) times a day as needed for cough, Disp: 20 capsule, Rfl: 0    cholecalciferol (VITAMIN D3) 1,000 units tablet, Take 2 tablets (2,000 Units total) by mouth daily, Disp: 180 tablet, Rfl: 1    Diclofenac Sodium (VOLTAREN) 1 %, Apply 2 g topically 4 (four) times a day, Disp: 100 g, Rfl: 0    ipratropium (ATROVENT) 0 03 % nasal spray, USE 2 SPRAYS IN EACH NOSTRIL EVERY 12 HOURS, Disp: 60 mL, Rfl: 1    losartan (COZAAR) 25 mg tablet, Take 1 tablet (25 mg total) by mouth daily, Disp: 90 tablet, Rfl: 0    metoprolol succinate (Toprol XL) 25 mg 24 hr tablet, Take 1 tablet (25 mg total) by mouth daily Take in addition to 50 mg for total of 75 mg total by mouth daily, Disp: 90 tablet, Rfl: 0    metoprolol succinate (TOPROL-XL) 50 mg 24 hr tablet, Take 1 tablet (50 mg total) by mouth daily, Disp: 90 tablet, Rfl: 0    Multiple Vitamin (MULTIVITAMIN) capsule, Take 1 capsule by mouth daily, Disp: , Rfl:     mupirocin (BACTROBAN) 2 % ointment, APPLY SMALL AMOUNT TOPICALLY TO THE AFFECTED AREA EVERY DAY, Disp: , Rfl:     omeprazole (PriLOSEC) 20 mg delayed release capsule, Take 1 capsule (20 mg total) by mouth daily, Disp: 90 capsule, Rfl: 1    Past Medical History:   Diagnosis Date    Diverticulosis     GERD (gastroesophageal reflux disease)     Hypertension        Past Surgical History:   Procedure Laterality Date    COLONOSCOPY  11/2020    TUBAL LIGATION      Ectopic pregnancy 1979    UPPER GASTROINTESTINAL ENDOSCOPY  11/2020       Family History   Problem Relation Age of Onset    Diabetes Mother     Hypertension Mother     Hypertension Father     Stroke Father     No Known Problems Sister     No Known Problems Brother     No Known Problems Daughter     No Known Problems Maternal Grandmother     No Known Problems Maternal Grandfather     No Known Problems Paternal Grandmother     No Known Problems Paternal Grandfather     No Known Problems Sister     No Known Problems Sister     No Known Problems Sister     No Known Problems Sister     No Known Problems Daughter     No Known Problems Maternal Aunt     No Known Problems Maternal Aunt     No Known Problems Maternal Aunt     No Known Problems Paternal Aunt     No Known Problems Paternal Aunt     No Known Problems Paternal Aunt     No Known Problems Paternal Aunt     No Known Problems Paternal Aunt         reports that she has never smoked  She has never used smokeless tobacco  She reports that she does not drink alcohol and does not use drugs        Exam:   Vitals:    03/28/22 0911   BP: 138/84   Pulse: 74   Temp: (!) 97 1 °F (36 2 °C)       PHYSICAL EXAM  General Appearance: Alert, cooperative, no distress, healthy   Head:    Normocephalic without obvious abnormality   Eyes:    PERRL, conjunctiva/corneas clear,       Neck:   Supple, no adenopathy, no JVD   Back:     Symmetric, no spinal or CVA tenderness   Lungs:     Clear to auscultation bilaterally, no wheezing or rhonchi   Heart:    Regular rate and rhythm, S1 and S2 normal, no murmur   Abdomen:     abdomen is soft without significant tenderness, masses, organomegaly or guarding Bowel sounds are normal      Extremities:   Extremities normal  No clubbing, cyanosis or edema   Psych:   Normal Affect, AOx3  Neurologic:  Skin:   CNII-XII intact  Strength symmetric, speech intact    Warm, dry, intact, no visible rashes or lesions      Informed consent for procedure was personally discussed, reviewed, and signed by Dr Alondra Whaley  Discussion by Dr Alondra Whaley was carried out regarding risks, benefits, and alternatives with the patient  Risks include but are not limited to:  bleeding, infection, and delayed wound healing or an open wound, pulmonary embolus, leaks from bowel or bile ducts or other viscus, transfusions, death  Discussed in further detail the more common complications and their rates of occurrence   was used if necessary  Patient expressed understanding of the issues discussed and wished/consented to proceed  All questions were answered by Dr Alondra Whaley  Discussion performed between patient and the provider signing below       Signature:   Dona Hui PA-C  Date: 3/28/2022 Time: 9:50 AM

## 2022-03-28 NOTE — TELEPHONE ENCOUNTER
MEDICAL CLEARANCE     Sent to Dr Audra Paulson @ [phone]    CARDIAC CLEARANCE     Sent to Dr Ronald Reyes @ [phone]    PENDING

## 2022-03-28 NOTE — RESULT ENCOUNTER NOTE
Call patient and tell her that the size of her ascending aortic aneurysm has not changed on her recent echocardiogram from the previous echocardiogram in 2019 and from the previous CTA of the chest in 2018  This is very good in suggests that everything is remaining stable and not getting any worse  It may be worthwhile to repeat the CTA of the chest that she had in 2018 in the future

## 2022-03-28 NOTE — TELEPHONE ENCOUNTER
PC to patient via  leaving message on answering machine with Echo and CTA results as per Dr Mendez Axon  Tests unchanged since 2018 and another CTA is suggested in the future

## 2022-03-29 ENCOUNTER — HOSPITAL ENCOUNTER (OUTPATIENT)
Dept: ULTRASOUND IMAGING | Facility: HOSPITAL | Age: 77
Discharge: HOME | End: 2022-03-29
Attending: SURGERY
Payer: COMMERCIAL

## 2022-03-29 DIAGNOSIS — K80.20 GALLSTONES: ICD-10-CM

## 2022-03-29 PROCEDURE — 76705 ECHO EXAM OF ABDOMEN: CPT

## 2022-04-01 ENCOUNTER — RA CDI HCC (OUTPATIENT)
Dept: OTHER | Facility: HOSPITAL | Age: 77
End: 2022-04-01

## 2022-04-01 NOTE — PROGRESS NOTES
[facility]  coding opportunities       Chart reviewed, no opportunity found:   [address]        Patients Insurance     Medicare Insurance: [facility]

## 2022-04-04 ENCOUNTER — ANESTHESIA EVENT (OUTPATIENT)
Dept: PERIOP | Facility: HOSPITAL | Age: 77
End: 2022-04-04
Payer: COMMERCIAL

## 2022-04-05 ENCOUNTER — APPOINTMENT (OUTPATIENT)
Dept: LAB | Facility: CLINIC | Age: 77
End: 2022-04-05
Payer: COMMERCIAL

## 2022-04-05 DIAGNOSIS — E55.9 VITAMIN D DEFICIENCY: ICD-10-CM

## 2022-04-05 DIAGNOSIS — R73.03 PRE-DIABETES: ICD-10-CM

## 2022-04-05 LAB
25(OH)D3 SERPL-MCNC: 57 NG/ML (ref 30–100)
ALBUMIN SERPL BCP-MCNC: 3.8 G/DL (ref 3.5–5)
ALP SERPL-CCNC: 111 U/L (ref 46–116)
ALT SERPL W P-5'-P-CCNC: 40 U/L (ref 12–78)
ANION GAP SERPL CALCULATED.3IONS-SCNC: 2 MMOL/L (ref 4–13)
AST SERPL W P-5'-P-CCNC: 31 U/L (ref 5–45)
BASOPHILS # BLD AUTO: 0.04 THOUSANDS/ΜL (ref 0–0.1)
BASOPHILS NFR BLD AUTO: 1 % (ref 0–1)
BILIRUB SERPL-MCNC: 0.43 MG/DL (ref 0.2–1)
BUN SERPL-MCNC: 12 MG/DL (ref 5–25)
CALCIUM SERPL-MCNC: 9.3 MG/DL (ref 8.3–10.1)
CHLORIDE SERPL-SCNC: 106 MMOL/L (ref 100–108)
CHOLEST SERPL-MCNC: 181 MG/DL
CO2 SERPL-SCNC: 30 MMOL/L (ref 21–32)
CREAT SERPL-MCNC: 0.64 MG/DL (ref 0.6–1.3)
EOSINOPHIL # BLD AUTO: 0.12 THOUSAND/ΜL (ref 0–0.61)
EOSINOPHIL NFR BLD AUTO: 2 % (ref 0–6)
ERYTHROCYTE [DISTWIDTH] IN BLOOD BY AUTOMATED COUNT: 13.1 % (ref 11.6–15.1)
EST. AVERAGE GLUCOSE BLD GHB EST-MCNC: 120 MG/DL
GFR SERPL CREATININE-BSD FRML MDRD: 88 ML/MIN/1.73SQ M
GLUCOSE P FAST SERPL-MCNC: 92 MG/DL (ref 65–99)
HBA1C MFR BLD: 5.8 %
HCT VFR BLD AUTO: 38.9 % (ref 34.8–46.1)
HDLC SERPL-MCNC: 59 MG/DL
HGB BLD-MCNC: 12.2 G/DL (ref 11.5–15.4)
IMM GRANULOCYTES # BLD AUTO: 0.01 THOUSAND/UL (ref 0–0.2)
IMM GRANULOCYTES NFR BLD AUTO: 0 % (ref 0–2)
LDLC SERPL CALC-MCNC: 104 MG/DL (ref 0–100)
LYMPHOCYTES # BLD AUTO: 2.21 THOUSANDS/ΜL (ref 0.6–4.47)
LYMPHOCYTES NFR BLD AUTO: 38 % (ref 14–44)
MCH RBC QN AUTO: 29.9 PG (ref 26.8–34.3)
MCHC RBC AUTO-ENTMCNC: 31.4 G/DL (ref 31.4–37.4)
MCV RBC AUTO: 95 FL (ref 82–98)
MONOCYTES # BLD AUTO: 0.61 THOUSAND/ΜL (ref 0.17–1.22)
MONOCYTES NFR BLD AUTO: 10 % (ref 4–12)
NEUTROPHILS # BLD AUTO: 2.86 THOUSANDS/ΜL (ref 1.85–7.62)
NEUTS SEG NFR BLD AUTO: 49 % (ref 43–75)
NONHDLC SERPL-MCNC: 122 MG/DL
NRBC BLD AUTO-RTO: 0 /100 WBCS
PLATELET # BLD AUTO: 259 THOUSANDS/UL (ref 149–390)
PMV BLD AUTO: 10.6 FL (ref 8.9–12.7)
POTASSIUM SERPL-SCNC: 4 MMOL/L (ref 3.5–5.3)
PROT SERPL-MCNC: 7.6 G/DL (ref 6.4–8.2)
RBC # BLD AUTO: 4.08 MILLION/UL (ref 3.81–5.12)
SODIUM SERPL-SCNC: 138 MMOL/L (ref 136–145)
TRIGL SERPL-MCNC: 90 MG/DL
TSH SERPL DL<=0.05 MIU/L-ACNC: 1.59 UIU/ML (ref 0.45–4.5)
WBC # BLD AUTO: 5.85 THOUSAND/UL (ref 4.31–10.16)

## 2022-04-05 PROCEDURE — 80061 LIPID PANEL: CPT

## 2022-04-05 PROCEDURE — 83036 HEMOGLOBIN GLYCOSYLATED A1C: CPT

## 2022-04-05 PROCEDURE — 36415 COLL VENOUS BLD VENIPUNCTURE: CPT

## 2022-04-05 PROCEDURE — 84443 ASSAY THYROID STIM HORMONE: CPT

## 2022-04-05 PROCEDURE — 82306 VITAMIN D 25 HYDROXY: CPT

## 2022-04-05 PROCEDURE — 80053 COMPREHEN METABOLIC PANEL: CPT

## 2022-04-05 PROCEDURE — 85025 COMPLETE CBC W/AUTO DIFF WBC: CPT

## 2022-04-07 ENCOUNTER — OFFICE VISIT (OUTPATIENT)
Dept: INTERNAL MEDICINE CLINIC | Facility: CLINIC | Age: 77
End: 2022-04-07
Payer: COMMERCIAL

## 2022-04-07 ENCOUNTER — TELEPHONE (OUTPATIENT)
Dept: SURGERY | Facility: CLINIC | Age: 77
End: 2022-04-07

## 2022-04-07 VITALS
TEMPERATURE: 97.5 F | OXYGEN SATURATION: 96 % | SYSTOLIC BLOOD PRESSURE: 134 MMHG | BODY MASS INDEX: 27.75 KG/M2 | WEIGHT: 150.8 LBS | HEART RATE: 67 BPM | RESPIRATION RATE: 18 BRPM | DIASTOLIC BLOOD PRESSURE: 72 MMHG | HEIGHT: 62 IN

## 2022-04-07 DIAGNOSIS — K80.20 GALLSTONES: Primary | ICD-10-CM

## 2022-04-07 DIAGNOSIS — R73.03 PRE-DIABETES: ICD-10-CM

## 2022-04-07 DIAGNOSIS — I77.819 AORTIC DILATATION (HCC): ICD-10-CM

## 2022-04-07 DIAGNOSIS — E78.00 PRIMARY HYPERCHOLESTEROLEMIA: ICD-10-CM

## 2022-04-07 DIAGNOSIS — I10 ESSENTIAL HYPERTENSION: ICD-10-CM

## 2022-04-07 DIAGNOSIS — F41.9 ANXIETY: ICD-10-CM

## 2022-04-07 PROCEDURE — 1160F RVW MEDS BY RX/DR IN RCRD: CPT | Performed by: INTERNAL MEDICINE

## 2022-04-07 PROCEDURE — 99213 OFFICE O/P EST LOW 20 MIN: CPT | Performed by: INTERNAL MEDICINE

## 2022-04-07 PROCEDURE — 1036F TOBACCO NON-USER: CPT | Performed by: INTERNAL MEDICINE

## 2022-04-07 NOTE — PRE-PROCEDURE INSTRUCTIONS
Pre-Surgery Instructions:   Medication Instructions    acetaminophen (TYLENOL) 325 mg tablet Instructed patient per Anesthesia Guidelines   Aspirin 81 MG CAPS Patient was instructed by Physician and understands   cholecalciferol (VITAMIN D3) 1,000 units tablet Patient was instructed by Physician and understands   Diclofenac Sodium (VOLTAREN) 1 % Patient was instructed by Physician and understands   ipratropium (ATROVENT) 0 03 % nasal spray Instructed patient per Anesthesia Guidelines   losartan (COZAAR) 25 mg tablet Instructed patient per Anesthesia Guidelines   metoprolol succinate (Toprol XL) 25 mg 24 hr tablet Instructed patient per Anesthesia Guidelines   metoprolol succinate (TOPROL-XL) 50 mg 24 hr tablet Instructed patient per Anesthesia Guidelines   Multiple Vitamin (MULTIVITAMIN) capsule Patient was instructed by Physician and understands   omeprazole (PriLOSEC) 20 mg delayed release capsule Instructed patient per Anesthesia Guidelines  Pre op and bathing instructions reviewed  Pt has hibiclens  Pt  Verbalized understanding of current visitor restrictions  Pt  Verbalized an understanding of all instructions reviewed and offers no concerns at this time  Instructed to avoid all /NSAIDs Voltaren cream and OTC Vit/Supp from now until after surgery per anesthesia guidelines   Tylenol ok prn  Last dose ASA 4-4-22 per MD  Instructed to take per normal schedule except DOS  DOS instructed to take Metoprolol with a few sips of H20 and Prilosec PRN and Atrovent inhaler PRN

## 2022-04-07 NOTE — PROGRESS NOTES
INTERNAL MEDICINE OFFICE VISIT  [facility] Internal Medicine- [place]    NAME: Sharona Garcia  AGE: 78 y o  SEX: female    DATE OF ENCOUNTER: 4/7/2022    Assessment and Plan     1  Gallstones  - Patient with history of gallstones   - Most recent abdominal ultrasound revealed- "Gallbladder sludge/calculi  Pancreatic cysts  Multiple hepatic cysts"  - Plan for cholecystectomy on 4/13/22  - Per surgery team patient is stop aspirin for 10 days   - She appears to be a relatively low risk from a cardiovascular standpoint and may proceed with surgery without any further lab or diagnostic testing    2  Aortic dilatation (HCC)  - Follows with cardiology   - Up to date - echo is stable when compared to echo in 2019 with no change in size of ascending aorta    3  Essential hypertension  - Continues losartan and metoprolol; stable     4  Pre-diabetes  - Hemoglobin A1C 5 8   - Continue to manage with diet      5  Primary hypercholesterolemia  - Not on statin therapy,    - Encouraged healthy dietary and lifestyle changes     6  Anxiety  - Reports occasional episodes of anxiety with palpitations, shortness of breath, and insomnia               No orders of the defined types were placed in this encounter  Chief Complaint     Chief Complaint   Patient presents with    Pre-op Exam     surgery on 4/13/2022rdavid patton by Bobbi       History of Present Illness     Elinor Jennings comes in today for preop exam   Medical history of GERD, hypertension, pancreatic cysts, symptomatic cholelithiasis, PACs, prediabetes, depression    She does not endorse any recent chest pain, dyspnea on exertion, sustained palpitations, peripheral edema  Functional status appears to be adequate  She is a nonsmoker    No known history of asthma, COPD, ischemic heart disease, CVA, renal dysfunction      The following portions of the patient's history were reviewed and updated as appropriate: allergies, current medications, past family history, past medical history, past social history, past surgical history and problem list     Review of Systems     10 point ROS negative except per HPI    Active Problem List     Patient Active Problem List   Diagnosis    Essential hypertension    Palpitation    Gastroesophageal reflux disease    Primary osteoarthritis of both knees    Osteopenia    Cervical radiculopathy    Aortic dilatation (HCC)    Rotator cuff syndrome of right shoulder    Nonrheumatic aortic valve insufficiency    Abnormal EKG    Pre-diabetes    Numbness and tingling of left arm and leg    Carpal tunnel syndrome of left wrist    Vertigo    Abnormal liver enzymes    Vitamin D deficiency    Tarsal tunnel syndrome of left side    COVID-19    Primary hypercholesterolemia       Objective     /72 (BP Location: Left arm, Patient Position: Sitting, Cuff Size: Standard)   Pulse 67   Temp 97 5 °F (36 4 °C)   Resp 18   Ht 5' 2" (1 575 m)   Wt 68 4 kg (150 lb 12 8 oz)   SpO2 96%   BMI 27 58 kg/m²     Physical Exam  Constitutional:       General: She is not in acute distress  Appearance: Normal appearance  She is not ill-appearing  HENT:      Head: Normocephalic and atraumatic  Right Ear: External ear normal       Left Ear: External ear normal    Eyes:      General: No scleral icterus  Right eye: No discharge  Left eye: No discharge  Cardiovascular:      Rate and Rhythm: Normal rate and regular rhythm  Heart sounds: Normal heart sounds  No murmur heard  Pulmonary:      Effort: Pulmonary effort is normal  No respiratory distress  Breath sounds: Normal breath sounds  No wheezing  Abdominal:      General: Bowel sounds are normal  There is no distension  Palpations: Abdomen is soft  Tenderness: There is no abdominal tenderness  There is no guarding  Musculoskeletal:         General: No swelling or tenderness  Normal range of motion     Skin:     General: Skin is warm and dry       Coloration: Skin is not pale  Findings: No erythema  Neurological:      Mental Status: She is alert and oriented to person, place, and time  Mental status is at baseline  Motor: No weakness  Psychiatric:         Mood and Affect: Mood normal          Behavior: Behavior normal          Pertinent Laboratory/Diagnostic Studies:  US right upper quadrant    Result Date: 4/2/2022  Impression: Gallbladder sludge/calculi Pancreatic cysts previously evaluated with MRI  Multiple hepatic cysts   Workstation performed: [id number]       Images and diagnostics reviewed     Current Medications     Current Outpatient Medications:     acetaminophen (TYLENOL) 325 mg tablet, Take 650 mg by mouth every 6 (six) hours as needed for mild pain, Disp: , Rfl:     Aspirin 81 MG CAPS, Take 81 mg by mouth daily, Disp: 90 capsule, Rfl: 0    benzonatate (TESSALON PERLES) 100 mg capsule, Take 1 capsule (100 mg total) by mouth 3 (three) times a day as needed for cough, Disp: 20 capsule, Rfl: 0    cholecalciferol (VITAMIN D3) 1,000 units tablet, Take 2 tablets (2,000 Units total) by mouth daily, Disp: 180 tablet, Rfl: 1    Diclofenac Sodium (VOLTAREN) 1 %, Apply 2 g topically 4 (four) times a day, Disp: 100 g, Rfl: 0    ipratropium (ATROVENT) 0 03 % nasal spray, USE 2 SPRAYS IN EACH NOSTRIL EVERY 12 HOURS, Disp: 60 mL, Rfl: 1    losartan (COZAAR) 25 mg tablet, Take 1 tablet (25 mg total) by mouth daily, Disp: 90 tablet, Rfl: 0    metoprolol succinate (Toprol XL) 25 mg 24 hr tablet, Take 1 tablet (25 mg total) by mouth daily Take in addition to 50 mg for total of 75 mg total by mouth daily, Disp: 90 tablet, Rfl: 0    metoprolol succinate (TOPROL-XL) 50 mg 24 hr tablet, Take 1 tablet (50 mg total) by mouth daily, Disp: 90 tablet, Rfl: 0    Multiple Vitamin (MULTIVITAMIN) capsule, Take 1 capsule by mouth daily, Disp: , Rfl:     mupirocin (BACTROBAN) 2 % ointment, APPLY SMALL AMOUNT TOPICALLY TO THE AFFECTED AREA EVERY DAY, Disp: , Rfl:     omeprazole (PriLOSEC) 20 mg delayed release capsule, Take 1 capsule (20 mg total) by mouth daily, Disp: 90 capsule, Rfl: 1    Health Maintenance     Health Maintenance   Topic Date Due    DTaP,Tdap,and Td Vaccines (1 - Tdap) Never done    COVID-19 Vaccine (3 - Booster for Pfizer series) 10/20/2021    BMI: Followup Plan  11/22/2022    Fall Risk  01/21/2023    Depression Screening  01/21/2023    Medicare Annual Wellness Visit (AWV)  01/21/2023    Breast Cancer Screening: Mammogram  01/24/2023    BMI: Adult  04/07/2023    DXA SCAN  01/24/2024    Colorectal Cancer Screening  11/05/2030    Hepatitis C Screening  Completed    Osteoporosis Screening  Completed    Pneumococcal Vaccine: 65+ Years  Completed    Influenza Vaccine  Completed    HIB Vaccine  Aged Out    Hepatitis B Vaccine  Aged Out    IPV Vaccine  Aged Out    Hepatitis A Vaccine  Aged Out    Meningococcal ACWY Vaccine  Aged Out    HPV Vaccine  Aged Out     Immunization History   Administered Date(s) Administered    COVID-19 PFIZER VACCINE 0 3 ML IM 04/30/2021, 05/20/2021    INFLUENZA 08/29/2018    Influenza, high dose seasonal 0 7 mL 08/29/2018, 10/24/2019, 10/22/2020, 11/22/2021    Pneumococcal Conjugate 13-Valent 07/18/2018    Pneumococcal Polysaccharide PPV23 07/16/2020       RADHA Barry  Internal Medicine [facility]  [address], [facility] [id number]  [place], [address] [facility]  Office: [phone]

## 2022-04-11 ENCOUNTER — TELEPHONE (OUTPATIENT)
Dept: CARDIOLOGY CLINIC | Facility: CLINIC | Age: 77
End: 2022-04-11

## 2022-04-11 NOTE — TELEPHONE ENCOUNTER
Re:  Rochelle Domi    : 1945    I have been seeing Buddy Zendejas for a ascending aortic aneurysm and hypertension which has remained stable since 2018  At the time of her last visit, her blood pressure was 134/76 and patient reported that her blood pressure at home usually is between 923 and 269 systolic  She has no symptoms  Patient denies chest discomfort or shortness of breath  Patient has no palpitations  Patient denies symptoms of dizziness, lightheadedness or near-syncope/syncope  Patient denies leg edema  Patient denies symptoms of orthopnea or paroxysmal nocturnal dyspnea  1  I would recommend proceeding with robotic laparoscopic cholecystectomy as planned  2  I would try to avoid significant hypertension  3  I would place her at a mild to  moderate cardiac risk  Sandhya Dave MD  2022    I have no method to scan into Epic from home but will be to the office by 1 PM tomorrow and can send your form which says the same as above

## 2022-04-13 ENCOUNTER — ANESTHESIA (OUTPATIENT)
Dept: PERIOP | Facility: HOSPITAL | Age: 77
End: 2022-04-13
Payer: COMMERCIAL

## 2022-04-13 ENCOUNTER — HOSPITAL ENCOUNTER (OUTPATIENT)
Facility: HOSPITAL | Age: 77
Setting detail: OUTPATIENT SURGERY
Discharge: HOME | End: 2022-04-13
Attending: SURGERY | Admitting: SURGERY
Payer: COMMERCIAL

## 2022-04-13 ENCOUNTER — APPOINTMENT (OUTPATIENT)
Dept: RADIOLOGY | Facility: HOSPITAL | Age: 77
End: 2022-04-13
Payer: COMMERCIAL

## 2022-04-13 VITALS
BODY MASS INDEX: 27.22 KG/M2 | DIASTOLIC BLOOD PRESSURE: 72 MMHG | HEART RATE: 60 BPM | TEMPERATURE: 98 F | HEIGHT: 62 IN | WEIGHT: 147.93 LBS | SYSTOLIC BLOOD PRESSURE: 136 MMHG | RESPIRATION RATE: 16 BRPM | OXYGEN SATURATION: 96 %

## 2022-04-13 DIAGNOSIS — K80.20 GALLSTONES: ICD-10-CM

## 2022-04-13 DIAGNOSIS — K80.20 CALCULUS OF GALLBLADDER WITHOUT CHOLECYSTITIS WITHOUT OBSTRUCTION: Primary | ICD-10-CM

## 2022-04-13 DIAGNOSIS — K80.50 BILIARY COLIC: ICD-10-CM

## 2022-04-13 DIAGNOSIS — Z01.818 ENCOUNTER FOR PREADMISSION TESTING: ICD-10-CM

## 2022-04-13 PROCEDURE — 47564 LAPARO CHOLECYSTECTOMY/EXPLR: CPT | Performed by: PHYSICIAN ASSISTANT

## 2022-04-13 PROCEDURE — S2900 ROBOTIC SURGICAL SYSTEM: HCPCS | Performed by: PHYSICIAN ASSISTANT

## 2022-04-13 PROCEDURE — S2900 ROBOTIC SURGICAL SYSTEM: HCPCS | Performed by: SURGERY

## 2022-04-13 PROCEDURE — 47564 LAPARO CHOLECYSTECTOMY/EXPLR: CPT | Performed by: SURGERY

## 2022-04-13 PROCEDURE — 88304 TISSUE EXAM BY PATHOLOGIST: CPT | Performed by: PATHOLOGY

## 2022-04-13 RX ORDER — NEOSTIGMINE METHYLSULFATE 1 MG/ML
INJECTION INTRAVENOUS AS NEEDED
Status: DISCONTINUED | OUTPATIENT
Start: 2022-04-13 | End: 2022-04-13

## 2022-04-13 RX ORDER — DOCUSATE SODIUM 100 MG/1
100 CAPSULE, LIQUID FILLED ORAL 2 TIMES DAILY
Qty: 14 CAPSULE | Refills: 0 | Status: SHIPPED | OUTPATIENT
Start: 2022-04-13 | End: 2022-07-21

## 2022-04-13 RX ORDER — DEXAMETHASONE SODIUM PHOSPHATE 10 MG/ML
INJECTION, SOLUTION INTRAMUSCULAR; INTRAVENOUS AS NEEDED
Status: DISCONTINUED | OUTPATIENT
Start: 2022-04-13 | End: 2022-04-13

## 2022-04-13 RX ORDER — HYDROMORPHONE HCL/PF 1 MG/ML
0.5 SYRINGE (ML) INJECTION
Status: DISCONTINUED | OUTPATIENT
Start: 2022-04-13 | End: 2022-04-13 | Stop reason: HOSPADM

## 2022-04-13 RX ORDER — FENTANYL CITRATE/PF 50 MCG/ML
25 SYRINGE (ML) INJECTION
Status: DISCONTINUED | OUTPATIENT
Start: 2022-04-13 | End: 2022-04-13 | Stop reason: HOSPADM

## 2022-04-13 RX ORDER — HYDROCODONE BITARTRATE AND ACETAMINOPHEN 5; 325 MG/1; MG/1
1 TABLET ORAL EVERY 6 HOURS PRN
Status: DISCONTINUED | OUTPATIENT
Start: 2022-04-13 | End: 2022-04-13 | Stop reason: HOSPADM

## 2022-04-13 RX ORDER — ONDANSETRON 2 MG/ML
4 INJECTION INTRAMUSCULAR; INTRAVENOUS EVERY 8 HOURS PRN
Status: DISCONTINUED | OUTPATIENT
Start: 2022-04-13 | End: 2022-04-13 | Stop reason: HOSPADM

## 2022-04-13 RX ORDER — LIDOCAINE HYDROCHLORIDE 20 MG/ML
INJECTION, SOLUTION EPIDURAL; INFILTRATION; INTRACAUDAL; PERINEURAL AS NEEDED
Status: DISCONTINUED | OUTPATIENT
Start: 2022-04-13 | End: 2022-04-13

## 2022-04-13 RX ORDER — ONDANSETRON 4 MG/1
4 TABLET, ORALLY DISINTEGRATING ORAL EVERY 6 HOURS PRN
Qty: 20 TABLET | Refills: 0 | Status: SHIPPED | OUTPATIENT
Start: 2022-04-13 | End: 2022-07-21

## 2022-04-13 RX ORDER — VECURONIUM BROMIDE 1 MG/ML
INJECTION, POWDER, LYOPHILIZED, FOR SOLUTION INTRAVENOUS AS NEEDED
Status: DISCONTINUED | OUTPATIENT
Start: 2022-04-13 | End: 2022-04-13

## 2022-04-13 RX ORDER — INDOCYANINE GREEN AND WATER 25 MG
25 KIT INJECTION ONCE
Status: COMPLETED | OUTPATIENT
Start: 2022-04-13 | End: 2022-04-13

## 2022-04-13 RX ORDER — CEFAZOLIN SODIUM 1 G/50ML
1000 SOLUTION INTRAVENOUS ONCE
Status: COMPLETED | OUTPATIENT
Start: 2022-04-13 | End: 2022-04-13

## 2022-04-13 RX ORDER — ONDANSETRON 2 MG/ML
INJECTION INTRAMUSCULAR; INTRAVENOUS AS NEEDED
Status: DISCONTINUED | OUTPATIENT
Start: 2022-04-13 | End: 2022-04-13

## 2022-04-13 RX ORDER — SODIUM CHLORIDE, SODIUM LACTATE, POTASSIUM CHLORIDE, CALCIUM CHLORIDE 600; 310; 30; 20 MG/100ML; MG/100ML; MG/100ML; MG/100ML
125 INJECTION, SOLUTION INTRAVENOUS CONTINUOUS
Status: DISCONTINUED | OUTPATIENT
Start: 2022-04-13 | End: 2022-04-13 | Stop reason: HOSPADM

## 2022-04-13 RX ORDER — FENTANYL CITRATE 50 UG/ML
INJECTION, SOLUTION INTRAMUSCULAR; INTRAVENOUS AS NEEDED
Status: DISCONTINUED | OUTPATIENT
Start: 2022-04-13 | End: 2022-04-13

## 2022-04-13 RX ORDER — MAGNESIUM HYDROXIDE 1200 MG/15ML
LIQUID ORAL AS NEEDED
Status: DISCONTINUED | OUTPATIENT
Start: 2022-04-13 | End: 2022-04-13 | Stop reason: HOSPADM

## 2022-04-13 RX ORDER — GLYCOPYRROLATE 0.2 MG/ML
INJECTION INTRAMUSCULAR; INTRAVENOUS AS NEEDED
Status: DISCONTINUED | OUTPATIENT
Start: 2022-04-13 | End: 2022-04-13

## 2022-04-13 RX ORDER — PROPOFOL 10 MG/ML
INJECTION, EMULSION INTRAVENOUS AS NEEDED
Status: DISCONTINUED | OUTPATIENT
Start: 2022-04-13 | End: 2022-04-13

## 2022-04-13 RX ORDER — ACETAMINOPHEN 325 MG/1
650 TABLET ORAL EVERY 6 HOURS PRN
Status: DISCONTINUED | OUTPATIENT
Start: 2022-04-13 | End: 2022-04-13 | Stop reason: HOSPADM

## 2022-04-13 RX ORDER — EPHEDRINE SULFATE 50 MG/ML
INJECTION INTRAVENOUS AS NEEDED
Status: DISCONTINUED | OUTPATIENT
Start: 2022-04-13 | End: 2022-04-13

## 2022-04-13 RX ORDER — MIDAZOLAM HYDROCHLORIDE 2 MG/2ML
INJECTION, SOLUTION INTRAMUSCULAR; INTRAVENOUS AS NEEDED
Status: DISCONTINUED | OUTPATIENT
Start: 2022-04-13 | End: 2022-04-13

## 2022-04-13 RX ORDER — ONDANSETRON 2 MG/ML
4 INJECTION INTRAMUSCULAR; INTRAVENOUS ONCE AS NEEDED
Status: COMPLETED | OUTPATIENT
Start: 2022-04-13 | End: 2022-04-13

## 2022-04-13 RX ORDER — HYDROCODONE BITARTRATE AND ACETAMINOPHEN 5; 325 MG/1; MG/1
1 TABLET ORAL EVERY 6 HOURS PRN
Qty: 40 TABLET | Refills: 0 | Status: SHIPPED | OUTPATIENT
Start: 2022-04-13 | End: 2022-04-23

## 2022-04-13 RX ORDER — DEXTROSE AND SODIUM CHLORIDE 5; .45 G/100ML; G/100ML
80 INJECTION, SOLUTION INTRAVENOUS CONTINUOUS
Status: DISCONTINUED | OUTPATIENT
Start: 2022-04-13 | End: 2022-04-13 | Stop reason: HOSPADM

## 2022-04-13 RX ORDER — ONDANSETRON 4 MG/1
4 TABLET, ORALLY DISINTEGRATING ORAL EVERY 8 HOURS PRN
Status: DISCONTINUED | OUTPATIENT
Start: 2022-04-13 | End: 2022-04-13 | Stop reason: HOSPADM

## 2022-04-13 RX ADMIN — MIDAZOLAM 2 MG: 1 INJECTION INTRAMUSCULAR; INTRAVENOUS at 07:27

## 2022-04-13 RX ADMIN — GLYCOPYRROLATE 0.4 MG: 0.2 INJECTION, SOLUTION INTRAMUSCULAR; INTRAVENOUS at 08:44

## 2022-04-13 RX ADMIN — FENTANYL CITRATE 100 MCG: 50 INJECTION INTRAMUSCULAR; INTRAVENOUS at 07:33

## 2022-04-13 RX ADMIN — DEXAMETHASONE SODIUM PHOSPHATE 10 MG: 10 INJECTION INTRAMUSCULAR; INTRAVENOUS at 07:33

## 2022-04-13 RX ADMIN — FENTANYL CITRATE 25 MCG: 50 INJECTION INTRAMUSCULAR; INTRAVENOUS at 09:51

## 2022-04-13 RX ADMIN — ONDANSETRON 4 MG: 2 INJECTION INTRAMUSCULAR; INTRAVENOUS at 08:43

## 2022-04-13 RX ADMIN — NEOSTIGMINE METHYLSULFATE 3 MG: 1 INJECTION INTRAVENOUS at 08:44

## 2022-04-13 RX ADMIN — FENTANYL CITRATE 25 MCG: 50 INJECTION INTRAMUSCULAR; INTRAVENOUS at 09:24

## 2022-04-13 RX ADMIN — LIDOCAINE HYDROCHLORIDE 100 MG: 20 INJECTION, SOLUTION EPIDURAL; INFILTRATION; INTRACAUDAL; PERINEURAL at 07:33

## 2022-04-13 RX ADMIN — ONDANSETRON 4 MG: 2 INJECTION INTRAMUSCULAR; INTRAVENOUS at 09:13

## 2022-04-13 RX ADMIN — FENTANYL CITRATE 25 MCG: 50 INJECTION INTRAMUSCULAR; INTRAVENOUS at 09:34

## 2022-04-13 RX ADMIN — INDOCYANINE GREEN AND WATER 25 MG: KIT at 07:02

## 2022-04-13 RX ADMIN — EPHEDRINE SULFATE 7.5 MG: 50 INJECTION, SOLUTION INTRAVENOUS at 08:31

## 2022-04-13 RX ADMIN — VECURONIUM BROMIDE 6 MG: 1 INJECTION, POWDER, LYOPHILIZED, FOR SOLUTION INTRAVENOUS at 07:33

## 2022-04-13 RX ADMIN — SODIUM CHLORIDE, SODIUM LACTATE, POTASSIUM CHLORIDE, AND CALCIUM CHLORIDE 125 ML/HR: .6; .31; .03; .02 INJECTION, SOLUTION INTRAVENOUS at 06:03

## 2022-04-13 RX ADMIN — SODIUM CHLORIDE, SODIUM LACTATE, POTASSIUM CHLORIDE, AND CALCIUM CHLORIDE: .6; .31; .03; .02 INJECTION, SOLUTION INTRAVENOUS at 07:49

## 2022-04-13 RX ADMIN — SODIUM CHLORIDE, SODIUM LACTATE, POTASSIUM CHLORIDE, AND CALCIUM CHLORIDE 125 ML/HR: .6; .31; .03; .02 INJECTION, SOLUTION INTRAVENOUS at 09:35

## 2022-04-13 RX ADMIN — PROPOFOL 170 MG: 10 INJECTION, EMULSION INTRAVENOUS at 07:33

## 2022-04-13 RX ADMIN — CEFAZOLIN SODIUM 1000 MG: 1 SOLUTION INTRAVENOUS at 07:25

## 2022-04-13 NOTE — ANESTHESIA PREPROCEDURE EVALUATION
Procedure:  ROBOTIC LAP JACQUE (N/A Abdomen)    Relevant Problems   CARDIO   (+) Aortic dilatation (HCC)   (+) Essential hypertension   (+) Nonrheumatic aortic valve insufficiency   (+) Primary hypercholesterolemia      GI/HEPATIC   (+) Gastroesophageal reflux disease      MUSCULOSKELETAL   (+) Primary osteoarthritis of both knees      NEURO/PSYCH   (+) Numbness and tingling of left arm and leg        Physical Exam    Airway    Mallampati score: II  TM Distance: >3 FB  Neck ROM: full     Dental       Cardiovascular  Rhythm: regular, Rate: normal, Cardiovascular exam normal    Pulmonary  Pulmonary exam normal Breath sounds clear to auscultation,     Other Findings        Anesthesia Plan  ASA Score- 3     Anesthesia Type- general with ASA Monitors  Additional Monitors:   Airway Plan: ETT  Plan Factors-Exercise tolerance (METS): >4 METS  Chart reviewed  Existing labs reviewed  Patient is not a current smoker  Obstructive sleep apnea risk education given perioperatively  Induction- intravenous  Postoperative Plan-     Informed Consent- Anesthetic plan and risks discussed with patient

## 2022-04-13 NOTE — OP NOTE
ROBOTIC LAP JACQUE  Postoperative Note  PATIENT NAME: Abran Jones  : 1945  MRN: [id number]  AL OR ROOM 08    Surgery Date: 2022    Pre operative diagnosis:  Biliary colic [Q07 18]  Gallstones [K80 20]    Operative Indications:  Symptomatic gallbladder disease    Consent:  The risks, benefits, and alternatives to the surgery were discussed with the patient and with the family prior to surgery if present, personally by Dr Carlos Sweet  If the consent was obtained by the physician assistant or other representative, the consent was reviewed once again personally by the operating physician  Common complications particular for this procedure as well as unusual complications were discussed, including but not limited to:  bleeding, wound infection, prolonged wound healing, open wounds, reoperation, leak from the bowel or viscus, leak from the bile duct or injury to adjacent or other organs or blood vessels in the abdomen  The significance of bile duct reconstruction was discussed  If the surgery was laparoscopic, it was discussed that possible open surgery could also occur during that same surgery and is always an option in laparoscopic surgery and/or reoperation  A  was used if necessary  The patient expressed understanding of the issues discussed and wished and consented to the procedure to proceed  All questions were answered  Dr Carlos Sweet personally discussed the informed consent with this patient  Operative Findings:  Excellent critical view  Excellent ICG view    Post operative diagnosis:  Post-Op Diagnosis Codes:     * Biliary colic [Q82 65]     * Gallstones [K80 20]    Procedure:   Procedure(s):  ROBOTIC LAP JACQUE    Interpretation of fluorescein dye by surgeon              Surgeon(s) and Role:     * Anup Burnham MD - Primary     * Roslyn Colletta Ricks, DO - resident learner      * Maude Lepe PA-C - Assisting first         The First  assistant/PA was medically necessary for surgical safety the case including suturing, retraction, and hemostasis  A qualified resident was not available for first assistance  I provided direct and immediate supervision  I was present for the entire procedure  Drains:  * No LDAs found *    Specimens:  ID Type Source Tests Collected by Time Destination   1 : Gallbladder Tissue Gallbladder TISSUE EXAM Karely Bloom MD 4/13/2022 0827        Estimated Blood Loss:   5 mL    Anesthesia Type:   Choice     Procedure: The patient was seen again in the Holding Room  The risks, benefits, complications, treatment options, and expected outcomes were discussed with the patient  The possibilities of reaction to medication, pulmonary aspiration, perforation of viscus, bleeding, recurrent infection, finding a normal gallbladder, the need for additional procedures, failure to diagnose a condition, the possible need to convert to an open procedure, and creating a complication requiring transfusion or operation were discussed with the patient  The patient and/or family concurred with the proposed plan, giving informed consent  The site of surgery properly noted/marked  The patient was taken to Operating Room, identified as Melia Lopes and the procedure verified as Laparoscopic Cholecystectomy with Possible Intraoperative Cholangiogram  A Time Out was held and the above information confirmed  Prior to the induction of general anesthesia, antibiotic prophylaxis was administered  General endotracheal anesthesia was then administered and tolerated well  After the induction, the abdomen was prepped in the usual sterile fashion  The patient was positioned in the supine position  The patient was positioned in the supine position, along with some reverse Trendelenburg  Local anesthetic agent was injected into the skin near the umbilicus and an incision made   The midline fascia was incised and the open technique was used to introduce a  port under direct vision  Pneumoperitoneum was then created with CO2 and tolerated well without any adverse changes in the patient's vital signs  Additional trocars were introduced under direct vision  All skin incisions were infiltrated with a local anesthetic agent before making the incision and placing the trocars  The patient was placed in the head up, left side down position  Robotic ports were used  The robot was then docked  The gallbladder was identified, the fundus grasped and retracted cephalad  Adhesions were lysed bluntly and with the electrocautery where indicated, taking care not to injure any adjacent organs or viscus  The infundibulum was grasped and retracted laterally, exposing the peritoneum overlying the triangle of Calot  The peritoneum was removed anteriorly and posteriorly to the gallbladder, with special attention to the backside of the gallbladder dissection  This allowed for freeing up the gallbladder  The critical view of the triangle Calot was carried out, dissecting out the cystic duct and cystic artery as the only two tubular structures leading to the gallbladder  Once these were clearly identified, the back wall of the gallbladder was lifted away from the cystic plate to expose the posterior aspect of this dissection, ensuring that there were no posterior structures leading into the liver  Fluorescein dye had been given to the patient preoperatively  Using the appropriate camera view, the common bile duct was visualized and well away from the cystic duct during the critical view  The cystic duct was then doubly ligated with Surgical Clips on the patient side and singly clipped on the gallbladder side and divided  The cystic artery was re-identified and ligated with clips and divided as well  The gallbladder was dissected from the liver bed in retrograde fashion with the electrocautery or harmonic scalpel where appropriate    The gallbladder was removed, via an Endo pouch, through the 10 mm port  The liver bed was irrigated and inspected  Hemostasis was achieved  Copious irrigation was utilized and was repeatedly aspirated until clear  The pneumoperitoneum was completely reduced after viewing removal of the trocars under direct vision  The wounds were thoroughly irrigated and the larger port site fascia was then closed with a figure of eight suture; the skin was then closed with 4-0 Monocryl sutures and a sterile dressing was applied  Sponge count and needle count and instrument count were correct x2, and RFA wanding for sponges was also negative at the end of the procedure prior to closure  The patient tolerated the procedure well  Some portions of this record may have been generated with voice recognition software  There may be translation, syntax,  or grammatical errors  Occasional wrong word or "sound-a-like" substitutions may have occurred due to the inherent limitations of the voice recognition software  Read the chart carefully and recognize, using context, where substations may have occurred  If you have any questions, please contact the dictating provider for clarification or correction, as needed         Complications: None    Condition: Stable to PACU    SIGNATURE: Naomie Pierre MD   DATE: April 13, 2022   TIME: 8:47 AM

## 2022-04-13 NOTE — OR NURSING
used by anesthesia and nursing prior to surgery  #[id number]   [facility] "[employer] [facility] RN

## 2022-04-13 NOTE — DISCHARGE INSTRUCTIONS
Fl[facility] Instructions  Dr Chinmay Zarate MD, FACS    1  General: You will feel pulling sensations around the wound or funny aches and pains around the incisions  This is normal  Even minor surgery is a change in your body and this is your bodys way of reaction to it  If you have had abdominal surgery, it may help to support the incision with a small pillow or blanket for comfort when moving or coughing  2  Wound care: IF you have white tape over your incisions, make sure to remove the bandage in about 24 hours, unless instructed otherwise  You usually don't have to redress the wound after 24-48 hours, unless for comfort  Keep the incision clean and dry  Let air get to it  If this Steri-Strips fall off, just keep the wound clean  IF you have a white Mepilex with clear edges you leave that on for three days and can shower over the bandage  Then remove bandage and leave the blue and steri strip over the incision  If those fall or peel off that is ok  3  Water: You may shower over the wound, unless there are drain tubes left in place  Do not bathe or use a pool or hot tub until cleared by the physician  You may shower right over the staples or Steri-Strips and packing dry when you are done  4  Activity: You may go up and down stairs, walk as much as you are comfortable, but walk at least 3 times each day  If you have had abdominal surgery, do not lift anything heavier than 15 pounds for at least 2-4 weeks, unless cleared by the doctor  5  Diet: You may resume a regular diet  If you had a same-day surgery or overnight stay surgery, you may wish to eat lightly for a few days: soups, crackers, and sandwiches  You may resume a regular diet when ready  6  Medications: Resume all of your previous medications, unless told otherwise by the doctor  Avoid aspirin or ibuprofen (Advil, Motrin, etc ) products for 2-3 days after the date of surgery   You may, at that time, began to take them again  Tylenol is always fine, unless you are taking any narcotic pain medication containing Tylenol (such as Percocet, Darvocet, Vicodin, or anything containing acetaminophen)  Do not take Tylenol if you're taking these medications  You do not need to take the narcotic pain medications unless you are having significant pain and discomfort  7  Driving: You will need someone to drive you home on the day of surgery  Do not drive or make any important decisions while on narcotic pain medication or 24 hours and after anesthesia or sedation for surgery  Generally, you may drive when your off all narcotic pain medications  8  Upset Stomach: You may take Maalox, Tums, or similar items for an upset stomach  If your narcotic pain medication causes an upset stomach, do not take it on an empty stomach  Try taking it with at least some crackers or toast      9  Constipation: Patients often experienced constipation after surgery  You may take over-the-counter medication for this, such as Metamucil, Senokot, Dulcolax, milk of magnesia, etc  You may take a suppository unless you have had anorectal surgery such as a procedure on your hemorrhoids  If you experience significant nausea or vomiting after abdominal surgery, call the office before trying any of these medications  10  Call the office: If you are experiencing any of the following, fevers above 101 5°, significant nausea or vomiting, if the wound develops drainage and/or is excessive redness around the wound, or if you have significant diarrhea or other worsening symptoms  11  Pain: You may be given a prescription for pain  This will be given to the hospital, the day of surgery  12  Sexual Activity: You may resume sexual activity when you feel ready and comfortable and your incision is sealed and healed without apparent infection risk      13  Urination: If you haven't urinated in 6 hours, go directly to the ER for evaluation for urinary retention       [address]  [place], [address]   Phone: [phone]

## 2022-04-13 NOTE — ANESTHESIA POSTPROCEDURE EVALUATION
Post-Op Assessment Note    CV Status:  Stable    Pain management: adequate     Mental Status:  Alert and awake   Hydration Status:  Euvolemic   PONV Controlled:  Controlled   Airway Patency:  Patent      Post Op Vitals Reviewed: Yes      Staff: Anesthesiologist         No complications documented      BP      Temp      Pulse     Resp      SpO2      /72   Pulse 60   Temp 98 °F (36 7 °C) (Temporal)   Resp 16   Ht 5' 2" (1 575 m)   Wt 67 1 kg (147 lb 14 9 oz) Comment: clothing on  SpO2 96%   BMI 27 06 kg/m²

## 2022-04-14 ENCOUNTER — TELEPHONE (OUTPATIENT)
Dept: SURGERY | Facility: CLINIC | Age: 77
End: 2022-04-14

## 2022-04-14 NOTE — TELEPHONE ENCOUNTER
S/P = ROBOTIC LAP JACQUE  = 4/13/2022    I spoke with [facility], patient's daughter-in-law  She stated the patient is doing well, c/o some neck pain  I explained she can use a heating pad to help with that  More than likely that is correlated to muscle spasms from the procedure  I did make her aware she may also notice shoulder pain as well related to this  Anthony noted the patient denies having any F/V/N/C and has not had a BM yet  Instructions given regarding what to do if she does not have a BM in the next 2-3 days  I explained the patient should shower today, but if she does not feel comfortable yet, she could wait until tomorrow  Today, though, she needs to take the outer bandages off the incisions  She'll notice the steri-strips/glue on the incisions - these stay on  Don't use any creams/lotions/antibacterial treatments  Any swelling she may have she can use ice 20 mins on 20 mins off  She can also use Tylenol/Motrin/Naproxen if needed in between pain medication doses  She is to monitor the incisions for signs of infection: excessive redness, odors, abnormal drainage or discharge, etc      [facility] is aware the gallbladder was sent for pathology, once the results are back we'll call  Post op appt reminded/confirmed

## 2022-04-25 ENCOUNTER — OFFICE VISIT (OUTPATIENT)
Dept: SURGERY | Facility: CLINIC | Age: 77
End: 2022-04-25

## 2022-04-25 VITALS
SYSTOLIC BLOOD PRESSURE: 140 MMHG | TEMPERATURE: 97.1 F | BODY MASS INDEX: 27.97 KG/M2 | DIASTOLIC BLOOD PRESSURE: 84 MMHG | HEART RATE: 71 BPM | WEIGHT: 152 LBS | HEIGHT: 62 IN

## 2022-04-25 DIAGNOSIS — Z90.49 STATUS POST CHOLECYSTECTOMY: Primary | ICD-10-CM

## 2022-04-25 DIAGNOSIS — I49.1 PREMATURE ATRIAL BEATS: ICD-10-CM

## 2022-04-25 PROCEDURE — 99024 POSTOP FOLLOW-UP VISIT: CPT | Performed by: PHYSICIAN ASSISTANT

## 2022-04-25 PROCEDURE — 3008F BODY MASS INDEX DOCD: CPT | Performed by: INTERNAL MEDICINE

## 2022-04-25 RX ORDER — METOPROLOL SUCCINATE 25 MG/1
TABLET, EXTENDED RELEASE ORAL
Qty: 90 TABLET | Refills: 0 | Status: SHIPPED | OUTPATIENT
Start: 2022-04-25 | End: 2022-06-01 | Stop reason: SDUPTHER

## 2022-04-25 NOTE — PROGRESS NOTES
Assessment/Plan:   Renaye Kanner is a 78 y  o female who comes in today for postoperative check after robotic cholecystectomy on 4/13/22 with Dr Abdifatah Garcia  Patient is pleased with the outcome of surgery and is doing well  Pathology: Pathology is pending, we will call patient when results return     ______________________________________________________  HPI:  Renaye Kanner is a 78 y  o female who comes in today for postoperative check after recent robotic cholecystectomy on 4/13/22 with Dr Abdifatah Garcia  Currently doing well without problems, no fever or chills,no nausea and no vomiting  Patient reports they have resumed their normal diet  denies biliary diarrhea  Reports increased gastritis and acid reflux  Will follow up with PCP  She is taking omeprazole  ROS:  General ROS: negative for - chills, fatigue, fever or night sweats, weight loss  Respiratory ROS: no cough, shortness of breath, or wheezing  Cardiovascular ROS: no chest pain or dyspnea on exertion  Genito-Urinary ROS: no dysuria, trouble voiding, or hematuria  Musculoskeletal ROS: negative for - gait disturbance, joint pain or muscle pain  Neurological ROS: no TIA or stroke symptoms  GI ROS: see HPI  Skin ROS: no new rashes or lesions   Lymphatic ROS: no new adenopathy noted by pt     Psy ROS: no new mental or behavioral disturbances       Patient Active Problem List   Diagnosis    Essential hypertension    Palpitation    Gastroesophageal reflux disease    Primary osteoarthritis of both knees    Osteopenia    Cervical radiculopathy    Aortic dilatation (HCC)    Rotator cuff syndrome of right shoulder    Nonrheumatic aortic valve insufficiency    Abnormal EKG    Pre-diabetes    Numbness and tingling of left arm and leg    Carpal tunnel syndrome of left wrist    Vertigo    Abnormal liver enzymes    Vitamin D deficiency    Tarsal tunnel syndrome of left side    COVID-19    Primary hypercholesterolemia Allergies:  Shellfish-derived products - food allergy and Lisinopril      Current Outpatient Medications:     acetaminophen (TYLENOL) 325 mg tablet, Take 650 mg by mouth every 6 (six) hours as needed for mild pain, Disp: , Rfl:     Aspirin 81 MG CAPS, Take 81 mg by mouth daily, Disp: 90 capsule, Rfl: 0    cholecalciferol (VITAMIN D3) 1,000 units tablet, Take 2 tablets (2,000 Units total) by mouth daily, Disp: 180 tablet, Rfl: 1    Diclofenac Sodium (VOLTAREN) 1 %, Apply 2 g topically 4 (four) times a day (Patient taking differently: Apply 2 g topically as needed  ), Disp: 100 g, Rfl: 0    ipratropium (ATROVENT) 0 03 % nasal spray, USE 2 SPRAYS IN EACH NOSTRIL EVERY 12 HOURS, Disp: 60 mL, Rfl: 1    losartan (COZAAR) 25 mg tablet, Take 1 tablet (25 mg total) by mouth daily (Patient taking differently: Take 25 mg by mouth every morning  ), Disp: 90 tablet, Rfl: 0    metoprolol succinate (Toprol XL) 25 mg 24 hr tablet, Take 1 tablet (25 mg total) by mouth daily Take in addition to 50 mg for total of 75 mg total by mouth daily (Patient taking differently: Take 25 mg by mouth every morning Take in addition to 50 mg for total of 75 mg total by mouth daily ), Disp: 90 tablet, Rfl: 0    metoprolol succinate (TOPROL-XL) 50 mg 24 hr tablet, Take 1 tablet (50 mg total) by mouth daily (Patient taking differently: Take 50 mg by mouth every morning  ), Disp: 90 tablet, Rfl: 0    Multiple Vitamin (MULTIVITAMIN) capsule, Take 1 capsule by mouth every morning  , Disp: , Rfl:     omeprazole (PriLOSEC) 20 mg delayed release capsule, Take 1 capsule (20 mg total) by mouth daily (Patient taking differently: Take 20 mg by mouth as needed  ), Disp: 90 capsule, Rfl: 1    ondansetron (Zofran ODT) 4 mg disintegrating tablet, Take 1 tablet (4 mg total) by mouth every 6 (six) hours as needed for nausea or vomiting, Disp: 20 tablet, Rfl: 0    docusate sodium (COLACE) 100 mg capsule, Take 1 capsule (100 mg total) by mouth 2 (two) times a day for 7 days (Patient not taking: Reported on 4/25/2022 ), Disp: 14 capsule, Rfl: 0    Past Medical History:   Diagnosis Date    Arthritis     Cholelithiasis     lap jewell today 4/13/2022    COVID 11/2021    fatigue,cough    Diverticulosis     Full dentures     GERD (gastroesophageal reflux disease)     Hypertension     Pancreatic cyst     drained    Wears glasses        Past Surgical History:   Procedure Laterality Date    CATARACT EXTRACTION      COLONOSCOPY  11/2020    [place] LAP,CHOLECYSTECTOMY N/A 4/13/2022    Procedure: ROBOTIC LAP JEWELL;  Surgeon: Christiane Schirmer, MD;  Location: [facility];  Service: General    TUBAL LIGATION      Ectopic pregnancy 1979    UPPER GASTROINTESTINAL ENDOSCOPY  11/2020       Family History   Problem Relation Age of Onset    Diabetes Mother     Hypertension Mother     Hypertension Father     Stroke Father     No Known Problems Sister     No Known Problems Brother     No Known Problems Daughter     No Known Problems Maternal Grandmother     No Known Problems Maternal Grandfather     No Known Problems Paternal Grandmother     No Known Problems Paternal Grandfather     No Known Problems Sister     No Known Problems Sister     No Known Problems Sister     No Known Problems Sister     No Known Problems Daughter     No Known Problems Maternal Aunt     No Known Problems Maternal Aunt     No Known Problems Maternal Aunt     No Known Problems Paternal Aunt     No Known Problems Paternal Aunt     No Known Problems Paternal Aunt     No Known Problems Paternal Aunt     No Known Problems Paternal Aunt         reports that she has never smoked  She has never used smokeless tobacco  She reports that she does not drink alcohol and does not use drugs      Vitals:    04/25/22 0959   BP: 140/84   Pulse: 71   Temp: (!) 97 1 °F (36 2 °C)       PHYSICAL EXAM  General: normal, cooperative, no distress  Abdominal: soft, nondistended or nontender  Incision: clean, dry, and intact and healing well      Hu Oliveira PA-C    Date: 4/25/2022 Time: 10:12 AM

## 2022-06-01 ENCOUNTER — OFFICE VISIT (OUTPATIENT)
Dept: INTERNAL MEDICINE CLINIC | Facility: CLINIC | Age: 77
End: 2022-06-01
Payer: COMMERCIAL

## 2022-06-01 VITALS
SYSTOLIC BLOOD PRESSURE: 120 MMHG | DIASTOLIC BLOOD PRESSURE: 68 MMHG | WEIGHT: 150.8 LBS | BODY MASS INDEX: 27.75 KG/M2 | HEIGHT: 62 IN | RESPIRATION RATE: 16 BRPM | HEART RATE: 74 BPM | TEMPERATURE: 97.1 F | OXYGEN SATURATION: 97 %

## 2022-06-01 DIAGNOSIS — I49.1 PREMATURE ATRIAL BEATS: ICD-10-CM

## 2022-06-01 DIAGNOSIS — Z98.49 HISTORY OF CATARACT EXTRACTION, UNSPECIFIED LATERALITY: ICD-10-CM

## 2022-06-01 DIAGNOSIS — M25.561 CHRONIC PAIN OF BOTH KNEES: ICD-10-CM

## 2022-06-01 DIAGNOSIS — L30.9 DERMATITIS: Primary | ICD-10-CM

## 2022-06-01 DIAGNOSIS — I10 ESSENTIAL HYPERTENSION: ICD-10-CM

## 2022-06-01 DIAGNOSIS — G89.29 CHRONIC PAIN OF BOTH KNEES: ICD-10-CM

## 2022-06-01 DIAGNOSIS — M25.562 CHRONIC PAIN OF BOTH KNEES: ICD-10-CM

## 2022-06-01 PROCEDURE — 3078F DIAST BP <80 MM HG: CPT | Performed by: INTERNAL MEDICINE

## 2022-06-01 PROCEDURE — 99214 OFFICE O/P EST MOD 30 MIN: CPT | Performed by: INTERNAL MEDICINE

## 2022-06-01 PROCEDURE — 1160F RVW MEDS BY RX/DR IN RCRD: CPT | Performed by: INTERNAL MEDICINE

## 2022-06-01 PROCEDURE — 3074F SYST BP LT 130 MM HG: CPT | Performed by: INTERNAL MEDICINE

## 2022-06-01 PROCEDURE — 3008F BODY MASS INDEX DOCD: CPT | Performed by: INTERNAL MEDICINE

## 2022-06-01 PROCEDURE — 1036F TOBACCO NON-USER: CPT | Performed by: INTERNAL MEDICINE

## 2022-06-01 RX ORDER — LOSARTAN POTASSIUM 25 MG/1
25 TABLET ORAL EVERY MORNING
Qty: 90 TABLET | Refills: 1 | Status: SHIPPED | OUTPATIENT
Start: 2022-06-01

## 2022-06-01 RX ORDER — MELOXICAM 7.5 MG/1
7.5 TABLET ORAL DAILY PRN
Qty: 30 TABLET | Refills: 0 | Status: SHIPPED | OUTPATIENT
Start: 2022-06-01

## 2022-06-01 RX ORDER — METOPROLOL SUCCINATE 50 MG/1
50 TABLET, EXTENDED RELEASE ORAL EVERY MORNING
Qty: 90 TABLET | Refills: 1 | Status: SHIPPED | OUTPATIENT
Start: 2022-06-01 | End: 2022-07-11

## 2022-06-01 RX ORDER — METOPROLOL SUCCINATE 25 MG/1
25 TABLET, EXTENDED RELEASE ORAL EVERY MORNING
Qty: 90 TABLET | Refills: 1 | Status: SHIPPED | OUTPATIENT
Start: 2022-06-01 | End: 2022-07-22 | Stop reason: SDUPTHER

## 2022-06-01 RX ORDER — TRIAMCINOLONE ACETONIDE 1 MG/G
CREAM TOPICAL 2 TIMES DAILY
Qty: 30 G | Refills: 0 | Status: SHIPPED | OUTPATIENT
Start: 2022-06-01 | End: 2022-07-21 | Stop reason: SDUPTHER

## 2022-06-01 NOTE — PROGRESS NOTES
INTERNAL MEDICINE OFFICE VISIT  [facility] Internal Medicine- [place]    NAME: Zev Hernandez  AGE: 78 y o  SEX: female    DATE OF ENCOUNTER: 6/1/2022    Assessment and Plan     1  Dermatitis  -possibly poison ivy rash  She was working in her garden 24-48 hours prior to start of rash  Shingles would be in the differential, though I have a lower suspicion for this  Would treat symptomatically with Kenalog for now  Advised her to let me know if the rash spreads or worsens    - triamcinolone (KENALOG) 0 1 % cream; Apply topically 2 (two) times a day  Dispense: 30 g; Refill: 0    2  Essential hypertension  - losartan (COZAAR) 25 mg tablet; Take 1 tablet (25 mg total) by mouth every morning  Dispense: 90 tablet; Refill: 1  - metoprolol succinate (TOPROL-XL) 50 mg 24 hr tablet; Take 1 tablet (50 mg total) by mouth every morning  Dispense: 90 tablet; Refill: 1    3  Premature atrial beats  - metoprolol succinate (TOPROL-XL) 50 mg 24 hr tablet; Take 1 tablet (50 mg total) by mouth every morning  Dispense: 90 tablet; Refill: 1  - metoprolol succinate (TOPROL-XL) 25 mg 24 hr tablet; Take 1 tablet (25 mg total) by mouth every morning  Dispense: 90 tablet; Refill: 1    4  History of cataract extraction, unspecified laterality  -remote history of cataract procedure  She requests referral to Ophthalmology  - Ambulatory Referral to Ophthalmology; Future    5  Chronic pain of both knees  -requests medication for chronic knee pain  Advised to take p r n  Meloxicam for severe pain    - meloxicam (Mobic) 7 5 mg tablet; Take 1 tablet (7 5 mg total) by mouth daily as needed for moderate pain  Dispense: 30 tablet; Refill: 0          Orders Placed This Encounter   Procedures    Ambulatory Referral to Ophthalmology       Chief Complaint     Chief Complaint   Patient presents with    Rash     Painful rash times 3 days / tdap       History of Present Illness     Here today for same-day appointment for rash   Medical history of GERD, hypertension, pancreatic cysts, symptomatic cholelithiasis, PACs, prediabetes, depression, aortic dilatation, gallstones status post cholecystectomy    Three day history of rash of the left upper and lower arm  Itchy, sometimes burns  She states she was doing some work in her garden for 5 days ago  She has applied Vicks Vaporub to the area  Denies any bleeding or purulence      The following portions of the patient's history were reviewed and updated as appropriate: allergies, current medications, past family history, past medical history, past social history, past surgical history and problem list     Review of Systems     10 point ROS negative except per HPI    Active Problem List     Patient Active Problem List   Diagnosis    Essential hypertension    Palpitation    Gastroesophageal reflux disease    Primary osteoarthritis of both knees    Osteopenia    Cervical radiculopathy    Aortic dilatation (HCC)    Rotator cuff syndrome of right shoulder    Nonrheumatic aortic valve insufficiency    Abnormal EKG    Pre-diabetes    Numbness and tingling of left arm and leg    Carpal tunnel syndrome of left wrist    Vertigo    Abnormal liver enzymes    Vitamin D deficiency    Tarsal tunnel syndrome of left side    COVID-19    Primary hypercholesterolemia       Objective     /68 (BP Location: Right arm, Patient Position: Sitting, Cuff Size: Standard)   Pulse 74   Temp (!) 97 1 °F (36 2 °C)   Resp 16   Ht 5' 2" (1 575 m)   Wt 68 4 kg (150 lb 12 8 oz)   SpO2 97%   BMI 27 58 kg/m²     Physical Exam  Constitutional:       Appearance: Normal appearance  She is not ill-appearing  HENT:      Head: Normocephalic and atraumatic  Eyes:      General: No scleral icterus  Right eye: No discharge  Left eye: No discharge  Cardiovascular:      Rate and Rhythm: Normal rate and regular rhythm  Heart sounds: No murmur heard  No friction rub     Pulmonary:      Effort: Pulmonary effort is normal       Breath sounds: Normal breath sounds  No wheezing or rales  Abdominal:      General: Abdomen is flat  There is no distension  Palpations: Abdomen is soft  Tenderness: There is no abdominal tenderness  Musculoskeletal:         General: No swelling or tenderness  Skin:     General: Skin is warm and dry  Findings: Rash present  No erythema  Neurological:      Mental Status: She is alert  Mental status is at baseline  Motor: No weakness  Psychiatric:         Mood and Affect: Mood normal          Behavior: Behavior normal          Pertinent Laboratory/Diagnostic Studies:  US right upper quadrant    Result Date: 4/2/2022  Impression: Gallbladder sludge/calculi Pancreatic cysts previously evaluated with MRI  Multiple hepatic cysts   Workstation performed: [id number]       Images and diagnostics reviewed     Current Medications     Current Outpatient Medications:     acetaminophen (TYLENOL) 325 mg tablet, Take 650 mg by mouth every 6 (six) hours as needed for mild pain, Disp: , Rfl:     Aspirin 81 MG CAPS, Take 81 mg by mouth daily, Disp: 90 capsule, Rfl: 0    cholecalciferol (VITAMIN D3) 1,000 units tablet, Take 2 tablets (2,000 Units total) by mouth daily, Disp: 180 tablet, Rfl: 1    Diclofenac Sodium (VOLTAREN) 1 %, Apply 2 g topically 4 (four) times a day (Patient taking differently: Apply 2 g topically as needed), Disp: 100 g, Rfl: 0    ipratropium (ATROVENT) 0 03 % nasal spray, USE 2 SPRAYS IN EACH NOSTRIL EVERY 12 HOURS, Disp: 60 mL, Rfl: 1    losartan (COZAAR) 25 mg tablet, Take 1 tablet (25 mg total) by mouth every morning, Disp: 90 tablet, Rfl: 1    meloxicam (Mobic) 7 5 mg tablet, Take 1 tablet (7 5 mg total) by mouth daily as needed for moderate pain, Disp: 30 tablet, Rfl: 0    metoprolol succinate (TOPROL-XL) 25 mg 24 hr tablet, Take 1 tablet (25 mg total) by mouth every morning, Disp: 90 tablet, Rfl: 1    metoprolol succinate (TOPROL-XL) 50 mg 24 hr tablet, Take 1 tablet (50 mg total) by mouth every morning, Disp: 90 tablet, Rfl: 1    Multiple Vitamin (MULTIVITAMIN) capsule, Take 1 capsule by mouth every morning  , Disp: , Rfl:     omeprazole (PriLOSEC) 20 mg delayed release capsule, Take 1 capsule (20 mg total) by mouth daily (Patient taking differently: Take 20 mg by mouth as needed), Disp: 90 capsule, Rfl: 1    triamcinolone (KENALOG) 0 1 % cream, Apply topically 2 (two) times a day, Disp: 30 g, Rfl: 0    docusate sodium (COLACE) 100 mg capsule, Take 1 capsule (100 mg total) by mouth 2 (two) times a day for 7 days (Patient not taking: Reported on 4/25/2022 ), Disp: 14 capsule, Rfl: 0    ondansetron (Zofran ODT) 4 mg disintegrating tablet, Take 1 tablet (4 mg total) by mouth every 6 (six) hours as needed for nausea or vomiting (Patient not taking: Reported on 6/1/2022), Disp: 20 tablet, Rfl: 0    Health Maintenance     Health Maintenance   Topic Date Due    DTaP,Tdap,and Td Vaccines (1 - Tdap) Never done    COVID-19 Vaccine (3 - Booster for Pfizer series) 10/20/2021    BMI: Followup Plan  11/22/2022    Fall Risk  01/21/2023    Depression Screening  01/21/2023    Medicare Annual Wellness Visit (AWV)  01/21/2023    Breast Cancer Screening: Mammogram  01/24/2023    BMI: Adult  06/01/2023    DXA SCAN  01/24/2024    Colorectal Cancer Screening  11/05/2030    Hepatitis C Screening  Completed    Osteoporosis Screening  Completed    Pneumococcal Vaccine: 65+ Years  Completed    Influenza Vaccine  Completed    HIB Vaccine  Aged Out    Hepatitis B Vaccine  Aged Out    IPV Vaccine  Aged Out    Hepatitis A Vaccine  Aged Out    Meningococcal ACWY Vaccine  Aged Out    HPV Vaccine  Aged Out     Immunization History   Administered Date(s) Administered    COVID-19 PFIZER VACCINE 0 3 ML IM 04/30/2021, 05/20/2021    INFLUENZA 08/29/2018    Influenza, high dose seasonal 0 7 mL 08/29/2018, 10/24/2019, 10/22/2020, 11/22/2021    Pneumococcal Conjugate 13-Valent 07/18/2018    Pneumococcal Polysaccharide PPV23 07/16/2020       RADHA Riggs  Internal Medicine - [place]  [address], [facility] SPECIALTY [facility] [address]  [facility], [address] [facility]  Office: [phone]

## 2022-07-09 DIAGNOSIS — I10 ESSENTIAL HYPERTENSION: ICD-10-CM

## 2022-07-09 DIAGNOSIS — I49.1 PREMATURE ATRIAL BEATS: ICD-10-CM

## 2022-07-11 RX ORDER — METOPROLOL SUCCINATE 50 MG/1
TABLET, EXTENDED RELEASE ORAL
Qty: 60 TABLET | Refills: 1 | Status: SHIPPED | OUTPATIENT
Start: 2022-07-11 | End: 2022-07-14

## 2022-07-14 DIAGNOSIS — I10 ESSENTIAL HYPERTENSION: ICD-10-CM

## 2022-07-14 DIAGNOSIS — I49.1 PREMATURE ATRIAL BEATS: ICD-10-CM

## 2022-07-14 RX ORDER — METOPROLOL SUCCINATE 50 MG/1
TABLET, EXTENDED RELEASE ORAL
Qty: 180 TABLET | Refills: 0 | Status: SHIPPED | OUTPATIENT
Start: 2022-07-14

## 2022-07-21 ENCOUNTER — OFFICE VISIT (OUTPATIENT)
Dept: INTERNAL MEDICINE CLINIC | Facility: CLINIC | Age: 77
End: 2022-07-21
Payer: COMMERCIAL

## 2022-07-21 VITALS
WEIGHT: 152.2 LBS | TEMPERATURE: 97.1 F | RESPIRATION RATE: 18 BRPM | SYSTOLIC BLOOD PRESSURE: 130 MMHG | HEART RATE: 67 BPM | DIASTOLIC BLOOD PRESSURE: 84 MMHG | HEIGHT: 62 IN | BODY MASS INDEX: 28.01 KG/M2 | OXYGEN SATURATION: 96 %

## 2022-07-21 DIAGNOSIS — E55.9 VITAMIN D DEFICIENCY: ICD-10-CM

## 2022-07-21 DIAGNOSIS — Z90.49 STATUS POST CHOLECYSTECTOMY: Primary | ICD-10-CM

## 2022-07-21 DIAGNOSIS — I10 ESSENTIAL HYPERTENSION: ICD-10-CM

## 2022-07-21 DIAGNOSIS — K21.9 GASTROESOPHAGEAL REFLUX DISEASE, UNSPECIFIED WHETHER ESOPHAGITIS PRESENT: ICD-10-CM

## 2022-07-21 DIAGNOSIS — E78.00 PRIMARY HYPERCHOLESTEROLEMIA: ICD-10-CM

## 2022-07-21 DIAGNOSIS — M85.80 OSTEOPENIA, UNSPECIFIED LOCATION: ICD-10-CM

## 2022-07-21 DIAGNOSIS — F41.9 ANXIETY: ICD-10-CM

## 2022-07-21 DIAGNOSIS — F51.01 PRIMARY INSOMNIA: ICD-10-CM

## 2022-07-21 DIAGNOSIS — R00.2 PALPITATION: ICD-10-CM

## 2022-07-21 DIAGNOSIS — L30.9 DERMATITIS: ICD-10-CM

## 2022-07-21 DIAGNOSIS — I77.819 AORTIC DILATATION (HCC): ICD-10-CM

## 2022-07-21 DIAGNOSIS — R73.03 PRE-DIABETES: ICD-10-CM

## 2022-07-21 PROCEDURE — 99214 OFFICE O/P EST MOD 30 MIN: CPT | Performed by: INTERNAL MEDICINE

## 2022-07-21 PROCEDURE — 3079F DIAST BP 80-89 MM HG: CPT | Performed by: INTERNAL MEDICINE

## 2022-07-21 PROCEDURE — 1160F RVW MEDS BY RX/DR IN RCRD: CPT | Performed by: INTERNAL MEDICINE

## 2022-07-21 PROCEDURE — 3075F SYST BP GE 130 - 139MM HG: CPT | Performed by: INTERNAL MEDICINE

## 2022-07-21 RX ORDER — OMEPRAZOLE 20 MG/1
20 CAPSULE, DELAYED RELEASE ORAL AS NEEDED
Start: 2022-07-21

## 2022-07-21 RX ORDER — UREA 10 %
1 LOTION (ML) TOPICAL
Qty: 30 TABLET | Refills: 0 | Status: SHIPPED | OUTPATIENT
Start: 2022-07-21

## 2022-07-21 RX ORDER — TRIAMCINOLONE ACETONIDE 1 MG/G
CREAM TOPICAL 2 TIMES DAILY
Qty: 30 G | Refills: 0 | Status: SHIPPED | OUTPATIENT
Start: 2022-07-21

## 2022-07-21 RX ORDER — MULTIVITAMIN
1 CAPSULE ORAL EVERY MORNING
Qty: 90 CAPSULE | Refills: 1 | Status: SHIPPED | OUTPATIENT
Start: 2022-07-21

## 2022-07-21 RX ORDER — MELATONIN
2000 DAILY
Qty: 180 TABLET | Refills: 1 | Status: SHIPPED | OUTPATIENT
Start: 2022-07-21

## 2022-07-21 NOTE — ASSESSMENT & PLAN NOTE
history of symptomatic palpitations In the setting of PACs  She still gets frequent sensation of palpitations but feels this is more related to anxiety  She drinks 1 cup of coffee a day    She does not consume alcohol  -continue with current dose of Toprol-XL  -can consider Holter monitor study if symptoms worsen

## 2022-07-21 NOTE — PROGRESS NOTES
INTERNAL MEDICINE OFFICE VISIT  [facility] Internal Medicine- [place]    NAME: Triny Rosenberg  AGE: 78 y o  SEX: female    DATE OF ENCOUNTER: 7/21/2022    Assessment and Plan     1  Status post cholecystectomy  Assessment & Plan:  Status post cholecystectomy March 2022      2  Aortic dilatation [facility])  Assessment & Plan:  2D echo completed March 2022  - moderate dilation of ascending aorta to 4 3 cm  Stable in size compared to prior echo in 2019      3  Essential hypertension  Assessment & Plan:  - Continues losartan and metoprolol; stable      4  Pre-diabetes  Assessment & Plan:  - Hemoglobin A1C 5 8   -continue to monitor      5  Primary hypercholesterolemia  Assessment & Plan:  - Not on statin therapy,    - Encouraged healthy dietary and lifestyle changes       6  Osteopenia, unspecified location  -     Multiple Vitamin (multivitamin) capsule; Take 1 capsule by mouth every morning  -     cholecalciferol (VITAMIN D3) 1,000 units tablet; Take 2 tablets (2,000 Units total) by mouth daily    7  Primary insomnia  -     melatonin 1 mg; Take 1 tablet (1 mg total) by mouth daily at bedtime    8  Gastroesophageal reflux disease, unspecified whether esophagitis present  -     omeprazole (PriLOSEC) 20 mg delayed release capsule; Take 1 capsule (20 mg total) by mouth as needed (Reflux)    9  Vitamin D deficiency  Assessment & Plan:  Continue supplementation  Refill of vitamin-D sent to pharmacy per patient request      10  Palpitation  Assessment & Plan:  history of symptomatic palpitations In the setting of PACs  She still gets frequent sensation of palpitations but feels this is more related to anxiety  She drinks 1 cup of coffee a day  She does not consume alcohol  -continue with current dose of Toprol-XL  -can consider Holter monitor study if symptoms worsen      11  Anxiety  - she has been having some issues with anxiety  Stressed out by her  at home    She states she had been on medication for anxiety many years ago when her son passed away  -discussed restarting medication versus referral to Psychiatry/behavioral health  She wishes to try to manage on her own for now  Advised her to reach out to me if her symptoms worsen                No orders of the defined types were placed in this encounter  Chief Complaint     Chief Complaint   Patient presents with    Follow-up     6 months       History of Present Illness     Here today for six-month follow-up  The following portions of the patient's history were reviewed and updated as appropriate: allergies, current medications, past family history, past medical history, past social history, past surgical history and problem list     Review of Systems     10 point ROS negative except per HPI    Active Problem List     Patient Active Problem List   Diagnosis    Essential hypertension    Palpitation    Gastroesophageal reflux disease    Primary osteoarthritis of both knees    Osteopenia    Cervical radiculopathy    Aortic dilatation (HCC)    Nonrheumatic aortic valve insufficiency    Abnormal EKG    Pre-diabetes    Numbness and tingling of left arm and leg    Carpal tunnel syndrome of left wrist    Vertigo    Abnormal liver enzymes    Vitamin D deficiency    Tarsal tunnel syndrome of left side    COVID-19    Primary hypercholesterolemia    Status post cholecystectomy       Objective     /84 (BP Location: Left arm, Patient Position: Sitting, Cuff Size: Standard)   Pulse 67   Temp (!) 97 1 °F (36 2 °C)   Resp 18   Ht 5' 2" (1 575 m)   Wt 69 kg (152 lb 3 2 oz)   SpO2 96%   BMI 27 84 kg/m²     Physical Exam  Constitutional:       Appearance: Normal appearance  She is not ill-appearing  HENT:      Head: Normocephalic and atraumatic  Eyes:      General: No scleral icterus  Right eye: No discharge  Left eye: No discharge  Cardiovascular:      Rate and Rhythm: Normal rate and regular rhythm        Heart sounds: No murmur heard  No friction rub  Pulmonary:      Effort: Pulmonary effort is normal       Breath sounds: Normal breath sounds  No wheezing or rales  Abdominal:      General: Abdomen is flat  There is no distension  Palpations: Abdomen is soft  Tenderness: There is no abdominal tenderness  Musculoskeletal:         General: No swelling or tenderness  Skin:     General: Skin is warm and dry  Findings: No erythema  Neurological:      Mental Status: She is alert  Mental status is at baseline  Motor: No weakness  Psychiatric:         Mood and Affect: Mood normal          Behavior: Behavior normal          Pertinent Laboratory/Diagnostic Studies:  US right upper quadrant    Result Date: 4/2/2022  Impression: Gallbladder sludge/calculi Pancreatic cysts previously evaluated with MRI  Multiple hepatic cysts   Workstation performed: [id number]       Images and diagnostics reviewed     Current Medications     Current Outpatient Medications:     acetaminophen (TYLENOL) 325 mg tablet, Take 650 mg by mouth every 6 (six) hours as needed for mild pain, Disp: , Rfl:     Aspirin 81 MG CAPS, Take 81 mg by mouth daily, Disp: 90 capsule, Rfl: 0    cholecalciferol (VITAMIN D3) 1,000 units tablet, Take 2 tablets (2,000 Units total) by mouth daily, Disp: 180 tablet, Rfl: 1    Diclofenac Sodium (VOLTAREN) 1 %, Apply 2 g topically 4 (four) times a day (Patient taking differently: Apply 2 g topically as needed), Disp: 100 g, Rfl: 0    losartan (COZAAR) 25 mg tablet, Take 1 tablet (25 mg total) by mouth every morning, Disp: 90 tablet, Rfl: 1    melatonin 1 mg, Take 1 tablet (1 mg total) by mouth daily at bedtime, Disp: 30 tablet, Rfl: 0    meloxicam (Mobic) 7 5 mg tablet, Take 1 tablet (7 5 mg total) by mouth daily as needed for moderate pain, Disp: 30 tablet, Rfl: 0    metoprolol succinate (TOPROL-XL) 25 mg 24 hr tablet, Take 1 tablet (25 mg total) by mouth every morning, Disp: 90 tablet, Rfl: 1    metoprolol succinate (TOPROL-XL) 50 mg 24 hr tablet, TAKE 2 TABLETS BY MOUTH DAILY, Disp: 180 tablet, Rfl: 0    Multiple Vitamin (multivitamin) capsule, Take 1 capsule by mouth every morning, Disp: 90 capsule, Rfl: 1    omeprazole (PriLOSEC) 20 mg delayed release capsule, Take 1 capsule (20 mg total) by mouth as needed (Reflux), Disp: , Rfl:     triamcinolone (KENALOG) 0 1 % cream, Apply topically 2 (two) times a day, Disp: 30 g, Rfl: 0    Health Maintenance     Health Maintenance   Topic Date Due    COVID-19 Vaccine (3 - Booster for inthinc series) 10/20/2021    Influenza Vaccine (1) 09/01/2022    BMI: Followup Plan  11/22/2022    Depression Screening  01/21/2023    Fall Risk  01/21/2023    Medicare Annual Wellness Visit (AWV)  01/21/2023    Breast Cancer Screening: Mammogram  01/24/2023    BMI: Adult  07/21/2023    DXA SCAN  01/24/2024    Colorectal Cancer Screening  11/05/2030    Hepatitis C Screening  Completed    Osteoporosis Screening  Completed    Pneumococcal Vaccine: 65+ Years  Completed    HIB Vaccine  Aged Out    Hepatitis B Vaccine  Aged Out    IPV Vaccine  Aged Out    Hepatitis A Vaccine  Aged Out    Meningococcal ACWY Vaccine  Aged Out    HPV Vaccine  Aged Out     Immunization History   Administered Date(s) Administered    COVID-19 PFIZER VACCINE 0 3 ML IM 04/30/2021, 05/20/2021    INFLUENZA 08/29/2018    Influenza, high dose seasonal 0 7 mL 08/29/2018, 10/24/2019, 10/22/2020, 11/22/2021    Pneumococcal Conjugate 13-Valent 07/18/2018    Pneumococcal Polysaccharide PPV23 07/16/2020       RADHA Salas  Internal Medicine - [place]  [address], [facility] [address]  [place], [address]   Office: [phone]

## 2022-07-21 NOTE — ASSESSMENT & PLAN NOTE
2D echo completed March 2022  - moderate dilation of ascending aorta to 4 3 cm    Stable in size compared to prior echo in 2019

## 2022-07-22 DIAGNOSIS — I49.1 PREMATURE ATRIAL BEATS: ICD-10-CM

## 2022-07-22 RX ORDER — METOPROLOL SUCCINATE 25 MG/1
25 TABLET, EXTENDED RELEASE ORAL EVERY MORNING
Qty: 90 TABLET | Refills: 1 | Status: SHIPPED | OUTPATIENT
Start: 2022-07-22

## 2022-08-04 ENCOUNTER — VBI (OUTPATIENT)
Dept: ADMINISTRATIVE | Facility: OTHER | Age: 77
End: 2022-08-04

## 2022-10-14 ENCOUNTER — TELEPHONE (OUTPATIENT)
Dept: INTERNAL MEDICINE CLINIC | Facility: CLINIC | Age: 77
End: 2022-10-14

## 2022-10-14 DIAGNOSIS — F41.9 ACUTE ANXIETY: Primary | ICD-10-CM

## 2022-10-14 RX ORDER — HYDROXYZINE HYDROCHLORIDE 25 MG/1
25 TABLET, FILM COATED ORAL EVERY 6 HOURS PRN
Qty: 20 TABLET | Refills: 0 | Status: SHIPPED | OUTPATIENT
Start: 2022-10-14

## 2022-10-14 NOTE — TELEPHONE ENCOUNTER
Pt called she stated that she is having palpitations/anxiety is getting worse Ramya Manzo does not want to go to the ER please advise  Per Dr Era Miller he sent to pharmacy atarax and if next weeks she is still having palpitations he would like for her to call the office so we can put a script for a Holter Monitor      Called pt detailed message given

## 2022-11-10 ENCOUNTER — OFFICE VISIT (OUTPATIENT)
Dept: CARDIOLOGY CLINIC | Facility: CLINIC | Age: 77
End: 2022-11-10

## 2022-11-10 VITALS
SYSTOLIC BLOOD PRESSURE: 104 MMHG | DIASTOLIC BLOOD PRESSURE: 60 MMHG | BODY MASS INDEX: 27.65 KG/M2 | HEART RATE: 61 BPM | WEIGHT: 151.2 LBS

## 2022-11-10 DIAGNOSIS — R00.2 PALPITATION: ICD-10-CM

## 2022-11-10 DIAGNOSIS — I10 ESSENTIAL HYPERTENSION: Primary | ICD-10-CM

## 2022-11-10 DIAGNOSIS — I77.819 AORTIC DILATATION (HCC): ICD-10-CM

## 2022-11-10 DIAGNOSIS — I35.1 NONRHEUMATIC AORTIC VALVE INSUFFICIENCY: ICD-10-CM

## 2022-11-10 DIAGNOSIS — E78.00 PRIMARY HYPERCHOLESTEROLEMIA: ICD-10-CM

## 2022-11-10 NOTE — PROGRESS NOTES
[facility]  [address] [facility], [place]   [address]  [id number]  Phone#  [phone]   Fax#  [phone]  *-*-*-*-*-*-*-*-*-*-*-*-*-*-*-*-*-*-*-*-*-*-*-*-*-*-*-*-*-*-*-*-*-*-*-*-*-*-*-*-*-*-*-*-*-*-*-*-*-*-*-*-*-*                                   Cardiology Follow Up      ENCOUNTER DATE: 11/10/22 10:58 AM  PATIENT NAME: Nico Flores   : 1945    MRN: [id number]  AGE:77 y o  SEX: female  [address] Rose[address] Giraldo MD     PRIMARY CARE PHYSICIAN: Denis Ramon DO    ACTIVE DIAGNOSIS THIS VISIT  1  Essential hypertension  POCT ECG   2  Nonrheumatic aortic valve insufficiency     3  Primary hypercholesterolemia     4  Aortic dilatation (HCC)     5  Palpitation  POCT ECG     ACTIVE PROBLEM LIST  Patient Active Problem List   Diagnosis   • Essential hypertension   • Palpitation   • Gastroesophageal reflux disease   • Primary osteoarthritis of both knees   • Osteopenia   • Cervical radiculopathy   • Aortic dilatation (HCC)   • Nonrheumatic aortic valve insufficiency   • Abnormal EKG   • Pre-diabetes   • Numbness and tingling of left arm and leg   • Carpal tunnel syndrome of left wrist   • Vertigo   • Abnormal liver enzymes   • Vitamin D deficiency   • Tarsal tunnel syndrome of left side   • COVID-19   • Primary hypercholesterolemia   • Status post cholecystectomy       CARDIOLOGY SPECIALTY COMMENTS  Patient transferring from [place] [address]   Diagnoses:  1  Aortic dilatation (HCC)  4 3 cm  2  Nonrheumatic aortic valve insufficiency, mild-to-moderate   3  Essential hypertension    4  Palpitations   5  Abnormal EKG    2019 carotid ultrasound:  Less than 50% bilateral carotid stenosis    2019 echocardiogram: Normal left ventricular systolic function, EF 74%  Grade 1 diastolic dysfunction  Ascending aorta mildly dilated at 4 3 cm  Mild-to-moderate aortic regurgitation      2022 normal left ventricular systolic function, EF 31%  Grade 1 diastolic dysfunction   Mild LA enlargement  Mild-to-moderate aortic regurgitation  Mild TR with normal PASP  Ascending aorta 4 3 cm    INTERVAL HISTORY:        Patient had been in the [facility] but has returned and want stenosis the results of her echocardiogram   Her sister came along to translate  The results of the echo explained to them including that her heart was strong  She had a mild ascending aortic aneurysm  She had mild-to-moderate aortic regurgitation  All of these findings have been stable and unchanged since 2019  All questions answered  The patient has no cardiac complaints  Patient denies chest discomfort or shortness of breath  Patient has no palpitations  Patient denies symptoms of dizziness, lightheadedness or near-syncope/syncope  Patient denies leg edema  Patient denies symptoms of orthopnea or paroxysmal nocturnal dyspnea        DISCUSSION/PLAN:          Return in 6 months    Lab Studies:    Lab Results   Component Value Date    CHOLESTEROL 181 04/05/2022    CHOLESTEROL 179 04/26/2021    CHOLESTEROL 150 06/08/2020     Lab Results   Component Value Date    TRIG 90 04/05/2022    TRIG 99 04/26/2021    TRIG 74 06/08/2020     Lab Results   Component Value Date    HDL 59 04/05/2022    HDL 57 04/26/2021    HDL 93 06/08/2020     Lab Results   Component Value Date    LDLCALC 104 (H) 04/05/2022    LDLCALC 102 (H) 04/26/2021    LDLCALC 42 06/08/2020       Lab Results   Component Value Date    HGBA1C 5 8 (H) 04/05/2022      Lab Results   Component Value Date    EGFR 88 04/05/2022    EGFR 89 05/04/2021    EGFR 89 04/26/2021    SODIUM 138 04/05/2022    SODIUM 137 05/04/2021    SODIUM 141 04/26/2021    K 4 0 04/05/2022    K 3 8 05/04/2021    K 4 1 04/26/2021     04/05/2022     05/04/2021     04/26/2021    CO2 30 04/05/2022    CO2 27 05/04/2021    CO2 29 04/26/2021    BUN 12 04/05/2022    BUN 15 05/04/2021    BUN 12 04/26/2021    CREATININE 0 64 04/05/2022    CREATININE 0 63 05/04/2021    CREATININE 0 63 04/26/2021     Lab Results   Component Value Date    WBC 5 85 04/05/2022    WBC 9 14 05/04/2021    WBC 6 02 04/26/2021    HGB 12 2 04/05/2022    HGB 12 5 05/04/2021    HGB 12 6 04/26/2021    HCT 38 9 04/05/2022    HCT 39 3 05/04/2021    HCT 39 6 04/26/2021    MCV 95 04/05/2022    MCV 96 05/04/2021    MCV 97 04/26/2021    MCH 29 9 04/05/2022    MCH 30 5 05/04/2021    MCH 30 8 04/26/2021    MCHC 31 4 04/05/2022    MCHC 31 8 05/04/2021    MCHC 31 8 04/26/2021     04/05/2022     05/04/2021     04/26/2021      Lab Results   Component Value Date    CALCIUM 9 3 04/05/2022    CALCIUM 9 2 05/04/2021    CALCIUM 9 2 04/26/2021    AST 31 04/05/2022    AST 30 05/04/2021    AST 32 04/26/2021    ALT 40 04/05/2022    ALT 38 05/04/2021    ALT 39 04/26/2021    ALKPHOS 111 04/05/2022    ALKPHOS 108 05/04/2021    ALKPHOS 115 04/26/2021    MG 2 1 06/19/2020       Lab Results   Component Value Date    FERRITIN 43 06/19/2020       Results for orders placed or performed in visit on 11/10/22   POCT ECG    Narrative    Normal sinus rhythm at a rate of 61 beats per minute  Left axis deviation  Right bundle branch block pattern  Abnormal EKG           Current Outpatient Medications:   •  acetaminophen (TYLENOL) 325 mg tablet, Take 650 mg by mouth every 6 (six) hours as needed for mild pain, Disp: , Rfl:   •  Aspirin 81 MG CAPS, Take 81 mg by mouth daily, Disp: 90 capsule, Rfl: 0  •  cholecalciferol (VITAMIN D3) 1,000 units tablet, Take 2 tablets (2,000 Units total) by mouth daily, Disp: 180 tablet, Rfl: 1  •  hydrOXYzine HCL (ATARAX) 25 mg tablet, Take 1 tablet (25 mg total) by mouth every 6 (six) hours as needed for anxiety, Disp: 20 tablet, Rfl: 0  •  losartan (COZAAR) 25 mg tablet, Take 1 tablet (25 mg total) by mouth every morning, Disp: 90 tablet, Rfl: 1  •  meloxicam (Mobic) 7 5 mg tablet, Take 1 tablet (7 5 mg total) by mouth daily as needed for moderate pain, Disp: 30 tablet, Rfl: 0  •  metoprolol succinate (TOPROL-XL) 25 mg 24 hr tablet, Take 1 tablet (25 mg total) by mouth every morning, Disp: 90 tablet, Rfl: 1  •  metoprolol succinate (TOPROL-XL) 50 mg 24 hr tablet, TAKE 2 TABLETS BY MOUTH DAILY (Patient taking differently: daily), Disp: 180 tablet, Rfl: 0  •  Multiple Vitamin (multivitamin) capsule, Take 1 capsule by mouth every morning, Disp: 90 capsule, Rfl: 1  •  omeprazole (PriLOSEC) 20 mg delayed release capsule, Take 1 capsule (20 mg total) by mouth as needed (Reflux), Disp: , Rfl:   •  triamcinolone (KENALOG) 0 1 % cream, Apply topically 2 (two) times a day, Disp: 30 g, Rfl: 0  •  Diclofenac Sodium (VOLTAREN) 1 %, Apply 2 g topically 4 (four) times a day (Patient not taking: Reported on 11/10/2022), Disp: 100 g, Rfl: 0  •  melatonin 1 mg, Take 1 tablet (1 mg total) by mouth daily at bedtime (Patient not taking: Reported on 11/10/2022), Disp: 30 tablet, Rfl: 0  Allergies   Allergen Reactions   • Shellfish-Derived Products - Food Allergy Nausea Only   • Lisinopril Cough       Past Medical History:   Diagnosis Date   • Cholelithiasis     lap jewell today 4/13/2022   • Full dentures    • GERD (gastroesophageal reflux disease)    • Hypertension    • Pancreatic cyst     drained   • Wears glasses      Social History     Socioeconomic History   • Marital status: /Civil Union     Spouse name: Not on file   • Number of children: Not on file   • Years of education: Not on file   • Highest education level: Not on file   Occupational History   • Not on file   Tobacco Use   • Smoking status: Never Smoker   • Smokeless tobacco: Never Used   Vaping Use   • Vaping Use: Never used   Substance and Sexual Activity   • Alcohol use: No   • Drug use: No   • Sexual activity: Yes     Partners: Male   Other Topics Concern   • Not on file   Social History Narrative    ** Merged History Encounter **          Social Determinants of Health     Financial Resource Strain: Not on file   Food Insecurity: Not on file   Transportation Needs: Not on file   Physical Activity: Not on file   Stress: Not on file   Social Connections: Not on file   Intimate Partner Violence: Not on file   Housing Stability: Not on file      Family History   Problem Relation Age of Onset   • Diabetes Mother    • Hypertension Mother    • Hypertension Father    • Stroke Father    • No Known Problems Sister    • No Known Problems Brother    • No Known Problems Daughter    • No Known Problems Maternal Grandmother    • No Known Problems Maternal Grandfather    • No Known Problems Paternal Grandmother    • No Known Problems Paternal Grandfather    • No Known Problems Sister    • No Known Problems Sister    • No Known Problems Sister    • No Known Problems Sister    • No Known Problems Daughter    • No Known Problems Maternal Aunt    • No Known Problems Maternal Aunt    • No Known Problems Maternal Aunt    • No Known Problems Paternal Aunt    • No Known Problems Paternal Aunt    • No Known Problems Paternal Aunt    • No Known Problems Paternal Aunt    • No Known Problems Paternal Aunt      Past Surgical History:   Procedure Laterality Date   • CATARACT EXTRACTION     • COLONOSCOPY  11/2020   • [place] LAP,CHOLECYSTECTOMY N/A 4/13/2022    Procedure: ROBOTIC LAP JACQUE;  Surgeon: Meagan Zaidi MD;  Location: [facility] OR;  Service: General   • TUBAL LIGATION      Ectopic pregnancy 1979   • UPPER GASTROINTESTINAL ENDOSCOPY  11/2020       PREVIOUS WEIGHTS:   Wt Readings from Last 10 Encounters:   11/10/22 68 6 kg (151 lb 3 2 oz)   07/21/22 69 kg (152 lb 3 2 oz)   06/01/22 68 4 kg (150 lb 12 8 oz)   04/25/22 68 9 kg (152 lb)   04/13/22 67 1 kg (147 lb 14 9 oz)   04/07/22 68 4 kg (150 lb 12 8 oz)   03/28/22 69 8 kg (153 lb 12 8 oz)   03/23/22 68 5 kg (151 lb)   02/10/22 68 6 kg (151 lb 3 2 oz)   01/24/22 69 4 kg (153 lb)        Review of Systems:  Review of Systems   Respiratory: Negative for cough, choking, chest tightness, shortness of breath and wheezing      Cardiovascular: Negative for chest pain, palpitations and leg swelling  Musculoskeletal: Negative for gait problem  Skin: Negative for rash  Neurological: Negative for dizziness, tremors, syncope, weakness, light-headedness, numbness and headaches  Psychiatric/Behavioral: Negative for agitation and behavioral problems  The patient is not hyperactive  Physical Exam:  /60 (BP Location: Right arm, Patient Position: Sitting, Cuff Size: Adult)   Pulse 61   Wt 68 6 kg (151 lb 3 2 oz)   BMI 27 65 kg/m²     Physical Exam  Constitutional:       General: She is not in acute distress  Appearance: She is well-developed  HENT:      Head: Normocephalic and atraumatic  Neck:      Thyroid: No thyromegaly  Vascular: No carotid bruit or JVD  Trachea: No tracheal deviation  Cardiovascular:      Rate and Rhythm: Normal rate and regular rhythm  Pulses: Normal pulses  Heart sounds: Normal heart sounds  No murmur heard  No friction rub  No gallop  Pulmonary:      Effort: Pulmonary effort is normal  No respiratory distress  Breath sounds: Normal breath sounds  No wheezing, rhonchi or rales  Chest:      Chest wall: No tenderness  Abdominal:      General: Bowel sounds are normal  There is no distension  Palpations: Abdomen is soft  Tenderness: There is no abdominal tenderness  Musculoskeletal:         General: Normal range of motion  Cervical back: Normal range of motion and neck supple  Right lower leg: No edema  Left lower leg: No edema  Skin:     General: Skin is warm and dry  Neurological:      General: No focal deficit present  Mental Status: She is alert and oriented to person, place, and time  Gait: Gait normal    Psychiatric:         Mood and Affect: Mood normal          Behavior: Behavior normal          Thought Content:  Thought content normal          Judgment: Judgment normal  ======================================================  Imaging:   I have personally reviewed pertinent reports  Portions of the record may have been created with voice recognition software  Occasional wrong word or "sound a like" substitutions may have occurred due to the inherent limitations of voice recognition software  Read the chart carefully and recognize, using context, where substitutions have occurred      SIGNATURES:   Randee Zambrano MD

## 2022-11-14 ENCOUNTER — RA CDI HCC (OUTPATIENT)
Dept: OTHER | Facility: HOSPITAL | Age: 77
End: 2022-11-14

## 2022-11-14 NOTE — PROGRESS NOTES
[facility]  coding opportunities       Chart reviewed, no opportunity found:   [address]        Patients Insurance     Medicare Insurance: [facility]

## 2022-11-17 DIAGNOSIS — I10 ESSENTIAL HYPERTENSION: ICD-10-CM

## 2022-11-17 DIAGNOSIS — I49.1 PREMATURE ATRIAL BEATS: ICD-10-CM

## 2022-11-17 RX ORDER — METOPROLOL SUCCINATE 50 MG/1
TABLET, EXTENDED RELEASE ORAL
Qty: 180 TABLET | Refills: 0 | Status: SHIPPED | OUTPATIENT
Start: 2022-11-17 | End: 2022-11-21

## 2022-11-21 ENCOUNTER — OFFICE VISIT (OUTPATIENT)
Dept: INTERNAL MEDICINE CLINIC | Facility: CLINIC | Age: 77
End: 2022-11-21

## 2022-11-21 VITALS
BODY MASS INDEX: 27.6 KG/M2 | OXYGEN SATURATION: 97 % | WEIGHT: 150 LBS | HEIGHT: 62 IN | TEMPERATURE: 97.2 F | DIASTOLIC BLOOD PRESSURE: 80 MMHG | RESPIRATION RATE: 16 BRPM | SYSTOLIC BLOOD PRESSURE: 132 MMHG | HEART RATE: 64 BPM

## 2022-11-21 DIAGNOSIS — R73.03 PRE-DIABETES: ICD-10-CM

## 2022-11-21 DIAGNOSIS — Z23 ENCOUNTER FOR IMMUNIZATION: ICD-10-CM

## 2022-11-21 DIAGNOSIS — I10 ESSENTIAL HYPERTENSION: ICD-10-CM

## 2022-11-21 DIAGNOSIS — I77.819 AORTIC DILATATION (HCC): ICD-10-CM

## 2022-11-21 DIAGNOSIS — I49.1 PREMATURE ATRIAL BEATS: ICD-10-CM

## 2022-11-21 DIAGNOSIS — R35.0 INCREASED URINARY FREQUENCY: Primary | ICD-10-CM

## 2022-11-21 DIAGNOSIS — M54.50 ACUTE BILATERAL LOW BACK PAIN WITHOUT SCIATICA: ICD-10-CM

## 2022-11-21 DIAGNOSIS — Z12.31 ENCOUNTER FOR SCREENING MAMMOGRAM FOR BREAST CANCER: ICD-10-CM

## 2022-11-21 LAB
BACTERIA UR QL AUTO: NORMAL /HPF
BILIRUB UR QL STRIP: NEGATIVE
CLARITY UR: CLEAR
COLOR UR: COLORLESS
GLUCOSE UR STRIP-MCNC: NEGATIVE MG/DL
HGB UR QL STRIP.AUTO: NEGATIVE
KETONES UR STRIP-MCNC: NEGATIVE MG/DL
LEUKOCYTE ESTERASE UR QL STRIP: NEGATIVE
NITRITE UR QL STRIP: NEGATIVE
NON-SQ EPI CELLS URNS QL MICRO: NORMAL /HPF
PH UR STRIP.AUTO: 6 [PH]
PROT UR STRIP-MCNC: NEGATIVE MG/DL
RBC #/AREA URNS AUTO: NORMAL /HPF
SL AMB  POCT GLUCOSE, UA: NEGATIVE
SL AMB LEUKOCYTE ESTERASE,UA: NEGATIVE
SL AMB POCT BILIRUBIN,UA: NEGATIVE
SL AMB POCT BLOOD,UA: NEGATIVE
SL AMB POCT CLARITY,UA: NORMAL
SL AMB POCT COLOR,UA: YELLOW
SL AMB POCT KETONES,UA: NEGATIVE
SL AMB POCT NITRITE,UA: NEGATIVE
SL AMB POCT PH,UA: 6
SL AMB POCT SPECIFIC GRAVITY,UA: 1
SL AMB POCT URINE PROTEIN: NEGATIVE
SL AMB POCT UROBILINOGEN: NEGATIVE
SP GR UR STRIP.AUTO: 1.01 (ref 1–1.03)
UROBILINOGEN UR STRIP-ACNC: <2 MG/DL
WBC #/AREA URNS AUTO: NORMAL /HPF

## 2022-11-21 RX ORDER — METOPROLOL SUCCINATE 50 MG/1
50 TABLET, EXTENDED RELEASE ORAL DAILY
Qty: 180 TABLET | Refills: 1 | Status: SHIPPED | OUTPATIENT
Start: 2022-11-21

## 2022-11-21 NOTE — ASSESSMENT & PLAN NOTE
Six months to 1 year history of increased urinary frequency with sensation of incomplete bladder emptying  Associated nocturia  She does have the urgency to go to the bathroom but does not appear to have significant incontinence problems  She does leak some urine with coughing  She has had 7 pregnancies    She possibly had a gynecologic procedure in the 1980s  -urine dip completed today in the office looks bland  -could be related to overactive bladder/urge incontinence/overflow incontinence with possible component of stress incontinence    Plan:  -will send a urinalysis and culture, though low suspicion for UTI especially given chronicity of symptoms  -check ultrasound kidney and bladder with postvoid residual   Further treatment recommendations to follow those results

## 2022-11-21 NOTE — PROGRESS NOTES
INTERNAL MEDICINE OFFICE VISIT  [facility] Internal Medicine- [place]    NAME: Belma Aschoff  AGE: 78 y o  SEX: female    DATE OF ENCOUNTER: 11/22/2022    Assessment and Plan/History of Present Illness     Here today for four-month follow-up  Medical history of GERD, hypertension, pancreatic cysts, symptomatic cholelithiasis, PACs, prediabetes, depression, aortic dilatation, gallstones status post cholecystectomy    1  Increased urinary frequency  Assessment & Plan:  Six months to 1 year history of increased urinary frequency with sensation of incomplete bladder emptying  Associated nocturia  She does have the urgency to go to the bathroom but does not appear to have significant incontinence problems  She does leak some urine with coughing  She has had 7 pregnancies  She possibly had a gynecologic procedure in the 1980s  -urine dip completed today in the office looks bland  -could be related to overactive bladder/urge incontinence/overflow incontinence with possible component of stress incontinence    Plan:  -will send a urinalysis and culture, though low suspicion for UTI especially given chronicity of symptoms  -check ultrasound kidney and bladder with postvoid residual   Further treatment recommendations to follow those results    Orders:  -     POCT urine dip  -     Urinalysis with microscopic  -     Urine culture; Future  -     US kidney and bladder with pvr; Future; Expected date: 11/21/2022  -     Urine culture    2  Essential hypertension  Assessment & Plan:  Stable, continue current regimen    Orders:  -     metoprolol succinate (TOPROL-XL) 50 mg 24 hr tablet; Take 1 tablet (50 mg total) by mouth daily  -     CBC and differential; Future  -     TSH, 3rd generation with Free T4 reflex; Future  -     Lipid Panel with Direct LDL reflex; Future    3  Aortic dilatation [facility])  Assessment & Plan:  2D echo completed March 2022  - moderate dilation of ascending aorta to 4 3 cm    Stable in size compared to prior echo in 2019      4  Pre-diabetes  Assessment & Plan:  -  most recent Hemoglobin A1C 5 8   -continue to monitor    Orders:  -     Comprehensive metabolic panel; Future  -     Hemoglobin A1C; Future    5  Encounter for screening mammogram for breast cancer  -     Mammo screening bilateral w 3d & cad; Future; Expected date: 11/21/2022    6  Encounter for immunization  -     FLUZONE HIGH-DOSE: influenza vaccine, high-dose, preservative-free 0 7 mL    7  Premature atrial beats  -     metoprolol succinate (TOPROL-XL) 50 mg 24 hr tablet; Take 1 tablet (50 mg total) by mouth daily    8   Acute bilateral low back pain without sciatica                Orders Placed This Encounter   Procedures   • Urine culture   • Mammo screening bilateral w 3d & cad   • US kidney and bladder with pvr   • FLUZONE HIGH-DOSE: influenza vaccine, high-dose, preservative-free 0 7 mL   • Urinalysis with microscopic   • Comprehensive metabolic panel   • Hemoglobin A1C   • CBC and differential   • TSH, 3rd generation with Free T4 reflex   • Lipid Panel with Direct LDL reflex   • POCT urine dip       Chief Complaint     Chief Complaint   Patient presents with   • Follow-up     4 month        Review of Systems     10 point ROS negative except per HPI    The following portions of the patient's history were reviewed and updated as appropriate: allergies, current medications, past family history, past medical history, past social history, past surgical history and problem list     Active Problem List     Patient Active Problem List   Diagnosis   • Essential hypertension   • Palpitation   • Gastroesophageal reflux disease   • Primary osteoarthritis of both knees   • Osteopenia   • Cervical radiculopathy   • Aortic dilatation (HCC)   • Nonrheumatic aortic valve insufficiency   • Abnormal EKG   • Pre-diabetes   • Numbness and tingling of left arm and leg   • Carpal tunnel syndrome of left wrist   • Vertigo   • Abnormal liver enzymes   • Vitamin D deficiency   • Tarsal tunnel syndrome of left side   • COVID-19   • Primary hypercholesterolemia   • Status post cholecystectomy   • Increased urinary frequency       Objective     /80 (BP Location: Right arm, Patient Position: Sitting, Cuff Size: Standard)   Pulse 64   Temp (!) 97 2 °F (36 2 °C)   Resp 16   Ht 5' 2" (1 575 m)   Wt 68 kg (150 lb)   SpO2 97%   BMI 27 44 kg/m²     Physical Exam  Constitutional:       Appearance: Normal appearance  She is not ill-appearing  HENT:      Head: Normocephalic and atraumatic  Eyes:      General: No scleral icterus  Right eye: No discharge  Left eye: No discharge  Cardiovascular:      Rate and Rhythm: Normal rate and regular rhythm  Heart sounds: No murmur heard  No friction rub  Pulmonary:      Effort: Pulmonary effort is normal       Breath sounds: Normal breath sounds  No wheezing or rales  Abdominal:      General: Abdomen is flat  There is no distension  Palpations: Abdomen is soft  Tenderness: There is no abdominal tenderness  Musculoskeletal:         General: No swelling or tenderness  Skin:     General: Skin is warm and dry  Findings: No erythema  Neurological:      Mental Status: She is alert  Mental status is at baseline  Motor: No weakness  Psychiatric:         Mood and Affect: Mood normal          Behavior: Behavior normal          Pertinent Laboratory/Diagnostic Studies:  No results found      Images and diagnostics reviewed     Current Medications     Current Outpatient Medications:   •  acetaminophen (TYLENOL) 325 mg tablet, Take 650 mg by mouth every 6 (six) hours as needed for mild pain, Disp: , Rfl:   •  Aspirin 81 MG CAPS, Take 81 mg by mouth daily, Disp: 90 capsule, Rfl: 0  •  cholecalciferol (VITAMIN D3) 1,000 units tablet, Take 2 tablets (2,000 Units total) by mouth daily, Disp: 180 tablet, Rfl: 1  •  hydrOXYzine HCL (ATARAX) 25 mg tablet, Take 1 tablet (25 mg total) by mouth every 6 (six) hours as needed for anxiety, Disp: 20 tablet, Rfl: 0  •  losartan (COZAAR) 25 mg tablet, Take 1 tablet (25 mg total) by mouth every morning, Disp: 90 tablet, Rfl: 1  •  meloxicam (Mobic) 7 5 mg tablet, Take 1 tablet (7 5 mg total) by mouth daily as needed for moderate pain, Disp: 30 tablet, Rfl: 0  •  metoprolol succinate (TOPROL-XL) 25 mg 24 hr tablet, Take 1 tablet (25 mg total) by mouth every morning, Disp: 90 tablet, Rfl: 1  •  metoprolol succinate (TOPROL-XL) 50 mg 24 hr tablet, Take 1 tablet (50 mg total) by mouth daily, Disp: 180 tablet, Rfl: 1  •  Multiple Vitamin (multivitamin) capsule, Take 1 capsule by mouth every morning, Disp: 90 capsule, Rfl: 1  •  omeprazole (PriLOSEC) 20 mg delayed release capsule, Take 1 capsule (20 mg total) by mouth as needed (Reflux), Disp: , Rfl:   •  triamcinolone (KENALOG) 0 1 % cream, Apply topically 2 (two) times a day, Disp: 30 g, Rfl: 0  •  Diclofenac Sodium (VOLTAREN) 1 %, Apply 2 g topically 4 (four) times a day (Patient not taking: Reported on 11/10/2022), Disp: 100 g, Rfl: 0  •  melatonin 1 mg, Take 1 tablet (1 mg total) by mouth daily at bedtime (Patient not taking: Reported on 11/10/2022), Disp: 30 tablet, Rfl: 0    Health Maintenance     Health Maintenance   Topic Date Due   • Hepatitis B Vaccine (1 of 3 - 3-dose series) Never done   • COVID-19 Vaccine (3 - Booster for [employer] series) 10/20/2021   • BMI: Followup Plan  11/22/2022   • Fall Risk  01/21/2023   • Depression Screening  01/21/2023   • Medicare Annual Wellness Visit (AWV)  01/21/2023   • Breast Cancer Screening: Mammogram  01/24/2023   • Urinary Incontinence Screening  01/21/2023   • BMI: Adult  11/21/2023   • DXA SCAN  01/24/2024   • Colorectal Cancer Screening  11/05/2030   • Hepatitis C Screening  Completed   • Osteoporosis Screening  Completed   • Pneumococcal Vaccine: 65+ Years  Completed   • Influenza Vaccine  Completed   • HIB Vaccine  Aged Out   • IPV Vaccine  Aged Out   • Hepatitis A Vaccine Aged Out   • Meningococcal ACWY Vaccine  Aged Out   • HPV Vaccine  Aged Out     Immunization History   Administered Date(s) Administered   • COVID-19 PFIZER VACCINE 0 3 ML IM 04/30/2021, 05/20/2021   • INFLUENZA 08/29/2018   • Influenza, high dose seasonal 0 7 mL 08/29/2018, 10/24/2019, 10/22/2020, 11/22/2021, 11/21/2022   • Pneumococcal Conjugate 13-Valent 07/18/2018   • Pneumococcal Polysaccharide PPV23 07/16/2020       RADHA Luna  Internal Medicine [address] , [address]  [facility], [address]   Office: [phone]  Fax: [phone]

## 2022-11-22 LAB — BACTERIA UR CULT: NORMAL

## 2022-11-29 ENCOUNTER — TELEMEDICINE (OUTPATIENT)
Dept: INTERNAL MEDICINE CLINIC | Facility: CLINIC | Age: 77
End: 2022-11-29

## 2022-11-29 VITALS — WEIGHT: 150 LBS | HEIGHT: 62 IN | BODY MASS INDEX: 27.6 KG/M2

## 2022-11-29 DIAGNOSIS — U07.1 COVID-19 VIRUS INFECTION: Primary | ICD-10-CM

## 2022-11-29 DIAGNOSIS — K21.9 GASTROESOPHAGEAL REFLUX DISEASE, UNSPECIFIED WHETHER ESOPHAGITIS PRESENT: ICD-10-CM

## 2022-11-29 RX ORDER — OMEPRAZOLE 20 MG/1
20 CAPSULE, DELAYED RELEASE ORAL AS NEEDED
Qty: 90 CAPSULE | Refills: 1 | Status: SHIPPED | OUTPATIENT
Start: 2022-11-29

## 2022-11-29 RX ORDER — NIRMATRELVIR AND RITONAVIR 300-100 MG
3 KIT ORAL 2 TIMES DAILY
Qty: 30 TABLET | Refills: 0 | Status: SHIPPED | OUTPATIENT
Start: 2022-11-29 | End: 2022-12-04

## 2022-11-29 NOTE — ASSESSMENT & PLAN NOTE
Mild case  Stable  Onset 11/28  Positive tests 11/29  We have decided to proceed with antiviral for COVID after reviewing benefits and risk    - continue Tylenol every 6 hours  - Mucinex DM  Patient advised on isolation precautions and to monitor symptoms for worsening/complications and to contact our office or seek medical care in the emergency room if symptoms are severe

## 2022-11-29 NOTE — PROGRESS NOTES
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 [place] COVID positive with home ag test today  Symptoms started yesterday 11/28 with fatigue, cough, headache  Chills overnight, unable to measure her temp  Took tylenol  Chills resolved  Now experiencing nasal congestion, chest congestion, some wheezing, low apetite  Denies SOB, n/v/d/abd pain  Assessment/Plan:    Problem List Items Addressed This Visit        Other    COVID-19 virus infection - Primary     Mild case  Stable  Onset 11/28  Positive tests 11/29  We have decided to proceed with antiviral for COVID after reviewing benefits and risk  - continue Tylenol every 6 hours  - Mucinex DM  Patient advised on isolation precautions and to monitor symptoms for worsening/complications and to contact our office or seek medical care in the emergency room if symptoms are severe           Relevant Medications    nirmatrelvir & ritonavir (Paxlovid, 300/100,) tablet therapy pack    dextromethorphan-guaifenesin (MUCINEX DM)  MG per 12 hr tablet       Recent Visits  No visits were found meeting these conditions  Showing recent visits within past 7 days and meeting all other requirements  Today's Visits  Date Type Provider Dept   11/29/22 Telemedicine Lisa Patel MD [facility] [email or web address]   Showing today's visits and meeting all other requirements  Future Appointments  No visits were found meeting these conditions    Showing future appointments within next 150 days and meeting all other requirements         Visit Time    Visit Start Time: 2:30PM  Visit Stop Time:  245PM  Total Visit Duration: 15 minutes

## 2022-12-01 DIAGNOSIS — I10 ESSENTIAL HYPERTENSION: ICD-10-CM

## 2022-12-01 DIAGNOSIS — I49.1 PREMATURE ATRIAL BEATS: ICD-10-CM

## 2022-12-01 RX ORDER — METOPROLOL SUCCINATE 25 MG/1
25 TABLET, EXTENDED RELEASE ORAL EVERY MORNING
Qty: 90 TABLET | Refills: 1 | Status: SHIPPED | OUTPATIENT
Start: 2022-12-01

## 2022-12-01 RX ORDER — LOSARTAN POTASSIUM 25 MG/1
25 TABLET ORAL EVERY MORNING
Qty: 90 TABLET | Refills: 1 | Status: SHIPPED | OUTPATIENT
Start: 2022-12-01

## 2022-12-12 ENCOUNTER — HOSPITAL ENCOUNTER (OUTPATIENT)
Dept: ULTRASOUND IMAGING | Facility: MEDICAL CENTER | Age: 77
Discharge: HOME | End: 2022-12-12

## 2022-12-12 DIAGNOSIS — R35.0 INCREASED URINARY FREQUENCY: ICD-10-CM

## 2023-02-09 DIAGNOSIS — I10 ESSENTIAL HYPERTENSION: ICD-10-CM

## 2023-02-09 DIAGNOSIS — I49.1 PREMATURE ATRIAL BEATS: ICD-10-CM

## 2023-02-09 RX ORDER — METOPROLOL SUCCINATE 50 MG/1
TABLET, EXTENDED RELEASE ORAL
Qty: 180 TABLET | Refills: 1 | Status: SHIPPED | OUTPATIENT
Start: 2023-02-09

## 2023-02-17 ENCOUNTER — APPOINTMENT (OUTPATIENT)
Dept: LAB | Facility: CLINIC | Age: 78
End: 2023-02-17

## 2023-02-17 DIAGNOSIS — R73.03 PRE-DIABETES: ICD-10-CM

## 2023-02-17 DIAGNOSIS — I10 ESSENTIAL HYPERTENSION: ICD-10-CM

## 2023-02-17 DIAGNOSIS — Z01.818 ENCOUNTER FOR PREADMISSION TESTING: ICD-10-CM

## 2023-02-17 LAB
ALBUMIN SERPL BCP-MCNC: 3.6 G/DL (ref 3.5–5)
ALP SERPL-CCNC: 78 U/L (ref 46–116)
ALT SERPL W P-5'-P-CCNC: 43 U/L (ref 12–78)
ANION GAP SERPL CALCULATED.3IONS-SCNC: 7 MMOL/L (ref 4–13)
AST SERPL W P-5'-P-CCNC: 43 U/L (ref 5–45)
BASOPHILS # BLD AUTO: 0.04 THOUSANDS/ÂΜL (ref 0–0.1)
BASOPHILS NFR BLD AUTO: 1 % (ref 0–1)
BILIRUB SERPL-MCNC: 0.33 MG/DL (ref 0.2–1)
BUN SERPL-MCNC: 10 MG/DL (ref 5–25)
CALCIUM SERPL-MCNC: 8.9 MG/DL (ref 8.3–10.1)
CHLORIDE SERPL-SCNC: 106 MMOL/L (ref 96–108)
CHOLEST SERPL-MCNC: 146 MG/DL
CO2 SERPL-SCNC: 26 MMOL/L (ref 21–32)
CREAT SERPL-MCNC: 0.66 MG/DL (ref 0.6–1.3)
EOSINOPHIL # BLD AUTO: 0.08 THOUSAND/ÂΜL (ref 0–0.61)
EOSINOPHIL NFR BLD AUTO: 2 % (ref 0–6)
ERYTHROCYTE [DISTWIDTH] IN BLOOD BY AUTOMATED COUNT: 13.1 % (ref 11.6–15.1)
GFR SERPL CREATININE-BSD FRML MDRD: 86 ML/MIN/1.73SQ M
GLUCOSE P FAST SERPL-MCNC: 87 MG/DL (ref 65–99)
HCT VFR BLD AUTO: 36.8 % (ref 34.8–46.1)
HDLC SERPL-MCNC: 58 MG/DL
HGB BLD-MCNC: 11.8 G/DL (ref 11.5–15.4)
IMM GRANULOCYTES # BLD AUTO: 0 THOUSAND/UL (ref 0–0.2)
IMM GRANULOCYTES NFR BLD AUTO: 0 % (ref 0–2)
LDLC SERPL CALC-MCNC: 79 MG/DL (ref 0–100)
LYMPHOCYTES # BLD AUTO: 1.54 THOUSANDS/ÂΜL (ref 0.6–4.47)
LYMPHOCYTES NFR BLD AUTO: 34 % (ref 14–44)
MCH RBC QN AUTO: 30.3 PG (ref 26.8–34.3)
MCHC RBC AUTO-ENTMCNC: 32.1 G/DL (ref 31.4–37.4)
MCV RBC AUTO: 95 FL (ref 82–98)
MONOCYTES # BLD AUTO: 0.95 THOUSAND/ÂΜL (ref 0.17–1.22)
MONOCYTES NFR BLD AUTO: 21 % (ref 4–12)
NEUTROPHILS # BLD AUTO: 1.93 THOUSANDS/ÂΜL (ref 1.85–7.62)
NEUTS SEG NFR BLD AUTO: 42 % (ref 43–75)
NRBC BLD AUTO-RTO: 0 /100 WBCS
PLATELET # BLD AUTO: 229 THOUSANDS/UL (ref 149–390)
PMV BLD AUTO: 10.2 FL (ref 8.9–12.7)
POTASSIUM SERPL-SCNC: 4.2 MMOL/L (ref 3.5–5.3)
PROT SERPL-MCNC: 7 G/DL (ref 6.4–8.4)
RBC # BLD AUTO: 3.89 MILLION/UL (ref 3.81–5.12)
SODIUM SERPL-SCNC: 139 MMOL/L (ref 135–147)
TRIGL SERPL-MCNC: 46 MG/DL
TSH SERPL DL<=0.05 MIU/L-ACNC: 1.34 UIU/ML (ref 0.45–4.5)
WBC # BLD AUTO: 4.54 THOUSAND/UL (ref 4.31–10.16)

## 2023-02-18 LAB
EST. AVERAGE GLUCOSE BLD GHB EST-MCNC: 114 MG/DL
HBA1C MFR BLD: 5.6 %

## 2023-02-21 ENCOUNTER — OFFICE VISIT (OUTPATIENT)
Dept: INTERNAL MEDICINE CLINIC | Facility: CLINIC | Age: 78
End: 2023-02-21

## 2023-02-21 VITALS
SYSTOLIC BLOOD PRESSURE: 110 MMHG | HEART RATE: 75 BPM | TEMPERATURE: 96.8 F | RESPIRATION RATE: 18 BRPM | WEIGHT: 145 LBS | OXYGEN SATURATION: 97 % | HEIGHT: 62 IN | BODY MASS INDEX: 26.68 KG/M2 | DIASTOLIC BLOOD PRESSURE: 68 MMHG

## 2023-02-21 DIAGNOSIS — M85.80 OSTEOPENIA, UNSPECIFIED LOCATION: Primary | ICD-10-CM

## 2023-02-21 DIAGNOSIS — I77.819 AORTIC DILATATION (HCC): ICD-10-CM

## 2023-02-21 DIAGNOSIS — I10 ESSENTIAL HYPERTENSION: ICD-10-CM

## 2023-02-21 DIAGNOSIS — K21.9 GASTROESOPHAGEAL REFLUX DISEASE, UNSPECIFIED WHETHER ESOPHAGITIS PRESENT: ICD-10-CM

## 2023-02-21 DIAGNOSIS — I49.1 PREMATURE ATRIAL BEATS: ICD-10-CM

## 2023-02-21 DIAGNOSIS — R00.2 PALPITATION: ICD-10-CM

## 2023-02-21 RX ORDER — OMEPRAZOLE 20 MG/1
20 CAPSULE, DELAYED RELEASE ORAL AS NEEDED
Qty: 90 CAPSULE | Refills: 0 | Status: SHIPPED | OUTPATIENT
Start: 2023-02-21 | End: 2023-02-21 | Stop reason: SDUPTHER

## 2023-02-21 RX ORDER — LOSARTAN POTASSIUM 25 MG/1
25 TABLET ORAL EVERY MORNING
Qty: 90 TABLET | Refills: 1 | Status: SHIPPED | OUTPATIENT
Start: 2023-02-21

## 2023-02-21 RX ORDER — METOPROLOL SUCCINATE 25 MG/1
25 TABLET, EXTENDED RELEASE ORAL EVERY MORNING
Qty: 90 TABLET | Refills: 1 | Status: SHIPPED | OUTPATIENT
Start: 2023-02-21

## 2023-02-21 RX ORDER — OMEPRAZOLE 20 MG/1
20 CAPSULE, DELAYED RELEASE ORAL DAILY PRN
Qty: 90 CAPSULE | Refills: 0 | Status: SHIPPED | OUTPATIENT
Start: 2023-02-21

## 2023-02-21 NOTE — ASSESSMENT & PLAN NOTE
Due for repeat DEXA scan January 2024  Continue adequate calcium, vitamin D intake, weightbearing exercise as tolerated

## 2023-02-21 NOTE — ASSESSMENT & PLAN NOTE
2D echo completed March 2022  - moderate dilation of ascending aorta to 4 3 cm    Stable in size compared to prior echo in 2019  Consider follow-up echocardiogram for surveillance

## 2023-02-21 NOTE — PROGRESS NOTES
INTERNAL MEDICINE OFFICE VISIT  [facility] Internal Medicine- [place]    NAME: George Jane  AGE: 78 y o  SEX: female    DATE OF ENCOUNTER: 2/21/2023    Assessment and Plan/History of Present Illness     Here today for follow-up  Medical history of GERD, hypertension, pancreatic cysts, symptomatic cholelithiasis, PACs, prediabetes, depression, aortic dilatation, gallstones status post cholecystectomy    No major concerns today  3-day history of diarrhea which is improving  No blood or dark/tarry stools  Initial nausea which has also improved  She is not aware of any sick contacts  Possible viral gastroenteritis    Intermittent right lateral pain in the abdomen just above the iliac crest   She states it is painful when she lays on the area in bed  Possibly musculoskeletal in origin, questionable bursitis? Recommend conservative management for now      1  Osteopenia, unspecified location  Assessment & Plan:  Due for repeat DEXA scan January 2024  Continue adequate calcium, vitamin D intake, weightbearing exercise as tolerated      2  Essential hypertension  Assessment & Plan:  Adequately controlled, continue current regimen    Orders:  -     losartan (COZAAR) 25 mg tablet; Take 1 tablet (25 mg total) by mouth every morning    3  Premature atrial beats  -     metoprolol succinate (TOPROL-XL) 25 mg 24 hr tablet; Take 1 tablet (25 mg total) by mouth every morning    4  Aortic dilatation [facility])  Assessment & Plan:  2D echo completed March 2022  - moderate dilation of ascending aorta to 4 3 cm  Stable in size compared to prior echo in 2019  Consider follow-up echocardiogram for surveillance      5  Gastroesophageal reflux disease, unspecified whether esophagitis present  Assessment & Plan:  Refill for Prilosec sent to pharmacy per patient request    Orders:  -     omeprazole (PriLOSEC) 20 mg delayed release capsule; Take 1 capsule (20 mg total) by mouth daily as needed (Reflux)    6  Palpitation  Assessment & Plan:  history of symptomatic palpitations In the setting of PACs  -continue with current dose of Toprol-XL  -can consider Holter monitor study if symptoms worsen           BMI Counseling: Body mass index is 26 52 kg/m²  The BMI is above normal  Nutrition recommendations include decreasing portion sizes, encouraging healthy choices of fruits and vegetables, moderation in carbohydrate intake and increasing intake of lean protein  Exercise recommendations include moderate physical activity 150 minutes/week  Rationale for BMI follow-up plan is due to patient being overweight or obese  No orders of the defined types were placed in this encounter        Chief Complaint     Chief Complaint   Patient presents with   • Follow-up     3 month        Review of Systems     10 point ROS negative except per HPI    The following portions of the patient's history were reviewed and updated as appropriate: allergies, current medications, past family history, past medical history, past social history, past surgical history and problem list     Active Problem List     Patient Active Problem List   Diagnosis   • Essential hypertension   • Palpitation   • Gastroesophageal reflux disease   • Primary osteoarthritis of both knees   • Osteopenia   • Cervical radiculopathy   • Aortic dilatation (HCC)   • Nonrheumatic aortic valve insufficiency   • Abnormal EKG   • Pre-diabetes   • Numbness and tingling of left arm and leg   • Carpal tunnel syndrome of left wrist   • Vertigo   • Abnormal liver enzymes   • Vitamin D deficiency   • Tarsal tunnel syndrome of left side   • Primary hypercholesterolemia   • Status post cholecystectomy   • Increased urinary frequency   • COVID-19 virus infection       Objective     /68 (BP Location: Left arm, Patient Position: Sitting, Cuff Size: Standard)   Pulse 75   Temp (!) 96 8 °F (36 °C)   Resp 18   Ht 5' 2" (1 575 m)   Wt 65 8 kg (145 lb)   SpO2 97%   BMI 26 52 kg/m²     Physical Exam  Constitutional:       Appearance: Normal appearance  She is not ill-appearing  HENT:      Head: Normocephalic and atraumatic  Eyes:      General: No scleral icterus  Right eye: No discharge  Left eye: No discharge  Cardiovascular:      Rate and Rhythm: Normal rate and regular rhythm  Heart sounds: No murmur heard  No friction rub  Pulmonary:      Effort: Pulmonary effort is normal       Breath sounds: Normal breath sounds  No wheezing or rales  Abdominal:      General: Abdomen is flat  There is no distension  Palpations: Abdomen is soft  Tenderness: There is no abdominal tenderness  Musculoskeletal:         General: No swelling or tenderness  Skin:     General: Skin is warm and dry  Findings: No erythema  Neurological:      Mental Status: She is alert  Mental status is at baseline  Motor: No weakness  Psychiatric:         Mood and Affect: Mood normal          Behavior: Behavior normal          Pertinent Laboratory/Diagnostic Studies:  US kidney and bladder with pvr    Result Date: 12/19/2022  Impression: 1  Left renal cyst  No acute findings   Signed by:  Lico Phelps MD Signed: 12/16/2022 11:23:24 AM Workstation: [id number]      Images and diagnostics reviewed     Current Medications     Current Outpatient Medications:   •  acetaminophen (TYLENOL) 325 mg tablet, Take 650 mg by mouth every 6 (six) hours as needed for mild pain, Disp: , Rfl:   •  Aspirin 81 MG CAPS, Take 81 mg by mouth daily, Disp: 90 capsule, Rfl: 0  •  cholecalciferol (VITAMIN D3) 1,000 units tablet, Take 2 tablets (2,000 Units total) by mouth daily, Disp: 180 tablet, Rfl: 1  •  dextromethorphan-guaifenesin (MUCINEX DM)  MG per 12 hr tablet, Take 1 tablet by mouth every 12 (twelve) hours, Disp: 30 tablet, Rfl: 0  •  Diclofenac Sodium (VOLTAREN) 1 %, Apply 2 g topically 4 (four) times a day, Disp: 100 g, Rfl: 0  •  hydrOXYzine HCL (ATARAX) 25 mg tablet, Take 1 tablet (25 mg total) by mouth every 6 (six) hours as needed for anxiety, Disp: 20 tablet, Rfl: 0  •  losartan (COZAAR) 25 mg tablet, Take 1 tablet (25 mg total) by mouth every morning, Disp: 90 tablet, Rfl: 1  •  melatonin 1 mg, Take 1 tablet (1 mg total) by mouth daily at bedtime, Disp: 30 tablet, Rfl: 0  •  meloxicam (Mobic) 7 5 mg tablet, Take 1 tablet (7 5 mg total) by mouth daily as needed for moderate pain, Disp: 30 tablet, Rfl: 0  •  metoprolol succinate (TOPROL-XL) 25 mg 24 hr tablet, Take 1 tablet (25 mg total) by mouth every morning, Disp: 90 tablet, Rfl: 1  •  metoprolol succinate (TOPROL-XL) 50 mg 24 hr tablet, TAKE 2 TABLETS BY MOUTH DAILY, Disp: 180 tablet, Rfl: 1  •  Multiple Vitamin (multivitamin) capsule, Take 1 capsule by mouth every morning, Disp: 90 capsule, Rfl: 1  •  omeprazole (PriLOSEC) 20 mg delayed release capsule, Take 1 capsule (20 mg total) by mouth daily as needed (Reflux), Disp: 90 capsule, Rfl: 0  •  triamcinolone (KENALOG) 0 1 % cream, Apply topically 2 (two) times a day, Disp: 30 g, Rfl: 0    Health Maintenance     Health Maintenance   Topic Date Due   • COVID-19 Vaccine (3 - Booster for Pfizer series) 07/15/2021   • Medicare Annual Wellness Visit (AWV)  01/21/2023   • Breast Cancer Screening: Mammogram  01/24/2023   • Fall Risk  11/29/2023   • Depression Screening  11/29/2023   • Urinary Incontinence Screening  11/29/2023   • DXA SCAN  01/24/2024   • BMI: Followup Plan  02/21/2024   • BMI: Adult  02/21/2024   • Colorectal Cancer Screening  11/05/2030   • Hepatitis C Screening  Completed   • Osteoporosis Screening  Completed   • Pneumococcal Vaccine: 65+ Years  Completed   • Influenza Vaccine  Completed   • HIB Vaccine  Aged Out   • IPV Vaccine  Aged Out   • Hepatitis A Vaccine  Aged Out   • Meningococcal ACWY Vaccine  Aged Out   • HPV Vaccine  Aged Out     Immunization History   Administered Date(s) Administered   • COVID-19 PFIZER VACCINE 0 3 ML IM 04/30/2021, 05/20/2021   • INFLUENZA 08/29/2018   • Influenza, high dose seasonal 0 7 mL 08/29/2018, 10/24/2019, 10/22/2020, 11/22/2021, 11/21/2022   • Pneumococcal Conjugate 13-Valent 07/18/2018   • Pneumococcal Polysaccharide PPV23 07/16/2020       RDAHA Plunkett  Internal Medicine [address], [address] [place] [address] [address]  [facility], [address] [facility]  Office: [phone]  Fax: [phone]

## 2023-02-21 NOTE — ASSESSMENT & PLAN NOTE
history of symptomatic palpitations In the setting of PACs  -continue with current dose of Toprol-XL  -can consider Holter monitor study if symptoms worsen

## 2023-02-23 ENCOUNTER — OFFICE VISIT (OUTPATIENT)
Dept: GASTROENTEROLOGY | Facility: CLINIC | Age: 78
End: 2023-02-23

## 2023-02-23 VITALS
WEIGHT: 146.2 LBS | SYSTOLIC BLOOD PRESSURE: 118 MMHG | TEMPERATURE: 98.6 F | DIASTOLIC BLOOD PRESSURE: 70 MMHG | BODY MASS INDEX: 26.91 KG/M2 | HEIGHT: 62 IN

## 2023-02-23 DIAGNOSIS — K86.2 PANCREATIC CYST: Primary | ICD-10-CM

## 2023-02-23 DIAGNOSIS — K21.9 GASTROESOPHAGEAL REFLUX DISEASE, UNSPECIFIED WHETHER ESOPHAGITIS PRESENT: ICD-10-CM

## 2023-02-23 NOTE — PROGRESS NOTES
[facility] - Outpatient Follow-up Note  Usama Nguyễn 78 y o  female MRN: [id number]  Encounter: [id number]          ASSESSMENT AND PLAN:      1  Pancreatic cyst  MRI abdomen w wo contrast and mrcp      2  Gastroesophageal reflux disease, unspecified whether esophagitis present          Will have her take omeprazole 20 mg once daily half hour before her night time meal  She is due for MRI/MRCP due to her pancreas cysts  Return to the office in 6 months  ________________________________________    SUBJECTIVE:  Patient is here for follow up of gastroesophageal reflux disease and history of pancreatic cysts  Last seen in May 2021, was feeling fine at the time on omeprazole 20 mg daily  EGD in 2020 was normal    She has two small cysts in the pancreas that have remained stable  She has not had repeat imaging to survey these cysts  She did have interval cholecystectomy due to cholelithiasis with Dr Orlin Yancey  She is feeling well today  She does have some discomfort after eating usually 15 minutes after eating  Does have reflux at night and will wake up at night occasionally due to the reflux and has to drink water  REVIEW OF SYSTEMS IS OTHERWISE NEGATIVE        Historical Information   Past Medical History:   Diagnosis Date   • Cholelithiasis     lap jacque today 4/13/2022   • Full dentures    • GERD (gastroesophageal reflux disease)    • Hypertension    • Pancreatic cyst     drained   • Wears glasses      Past Surgical History:   Procedure Laterality Date   • CATARACT EXTRACTION     • COLONOSCOPY  11/2020   • AK LAPAROSCOPY SURG CHOLECYSTECTOMY N/A 4/13/2022    Procedure: ROBOTIC LAP JACQUE;  Surgeon: Elyse Brown MD;  Location: [facility];  Service: General   • TUBAL LIGATION      Ectopic pregnancy 1979   • UPPER GASTROINTESTINAL ENDOSCOPY  11/2020     Social History   Social History     Substance and Sexual Activity   Alcohol Use No     Social History     Substance and Sexual Activity   Drug Use No     Social History     Tobacco Use   Smoking Status Never   Smokeless Tobacco Never     Family History   Problem Relation Age of Onset   • Diabetes Mother    • Hypertension Mother    • Hypertension Father    • Stroke Father    • No Known Problems Sister    • No Known Problems Brother    • No Known Problems Daughter    • No Known Problems Maternal Grandmother    • No Known Problems Maternal Grandfather    • No Known Problems Paternal Grandmother    • No Known Problems Paternal Grandfather    • No Known Problems Sister    • No Known Problems Sister    • No Known Problems Sister    • No Known Problems Sister    • No Known Problems Daughter    • No Known Problems Maternal Aunt    • No Known Problems Maternal Aunt    • No Known Problems Maternal Aunt    • No Known Problems Paternal Aunt    • No Known Problems Paternal Aunt    • No Known Problems Paternal Aunt    • No Known Problems Paternal Aunt    • No Known Problems Paternal Aunt        Meds/Allergies       Current Outpatient Medications:   •  acetaminophen (TYLENOL) 325 mg tablet  •  Aspirin 81 MG CAPS  •  cholecalciferol (VITAMIN D3) 1,000 units tablet  •  dextromethorphan-guaifenesin (MUCINEX DM)  MG per 12 hr tablet  •  Diclofenac Sodium (VOLTAREN) 1 %  •  hydrOXYzine HCL (ATARAX) 25 mg tablet  •  losartan (COZAAR) 25 mg tablet  •  melatonin 1 mg  •  meloxicam (Mobic) 7 5 mg tablet  •  metoprolol succinate (TOPROL-XL) 25 mg 24 hr tablet  •  metoprolol succinate (TOPROL-XL) 50 mg 24 hr tablet  •  Multiple Vitamin (multivitamin) capsule  •  omeprazole (PriLOSEC) 20 mg delayed release capsule  •  triamcinolone (KENALOG) 0 1 % cream    Allergies   Allergen Reactions   • Shellfish-Derived Products - Food Allergy Nausea Only   • Lisinopril Cough           Objective     Blood pressure 118/70, temperature 98 6 °F (37 °C), temperature source Tympanic, height 5' 2" (1 575 m), weight 66 3 kg (146 lb 3 2 oz)   Body mass index is 26 74 kg/m²       PHYSICAL EXAM:      General Appearance:   Alert, cooperative, no distress   HEENT:   Normocephalic, atraumatic, anicteric  Lungs:   Equal chest rise and unlabored breathing, normal cough   Heart:   No visualized JVD   Abdomen:   Soft, non-tender, non-distended; no masses, no organomegaly    Genitalia:   Deferred    Rectal:   Deferred    Extremities:  No cyanosis, clubbing or edema    Pulses:  Musculoskeletal:  2+ and symmetric  Normal range of motion visualized    Skin:  Neuro:  No jaundice, rashes, or lesions   Alert and appropriate           Lab Results:   No visits with results within 1 Day(s) from this visit     Latest known visit with results is:   Appointment on 02/17/2023   Component Date Value   • WBC 02/17/2023 4 54    • RBC 02/17/2023 3 89    • Hemoglobin 02/17/2023 11 8    • Hematocrit 02/17/2023 36 8    • MCV 02/17/2023 95    • MCH 02/17/2023 30 3    • MCHC 02/17/2023 32 1    • RDW 02/17/2023 13 1    • MPV 02/17/2023 10 2    • Platelets 07/29/4582 229    • nRBC 02/17/2023 0    • Neutrophils Relative 02/17/2023 42 (L)    • Immat GRANS % 02/17/2023 0    • Lymphocytes Relative 02/17/2023 34    • Monocytes Relative 02/17/2023 21 (H)    • Eosinophils Relative 02/17/2023 2    • Basophils Relative 02/17/2023 1    • Neutrophils Absolute 02/17/2023 1 93    • Immature Grans Absolute 02/17/2023 0 00    • Lymphocytes Absolute 02/17/2023 1 54    • Monocytes Absolute 02/17/2023 0 95    • Eosinophils Absolute 02/17/2023 0 08    • Basophils Absolute 02/17/2023 0 04    • Sodium 02/17/2023 139    • Potassium 02/17/2023 4 2    • Chloride 02/17/2023 106    • CO2 02/17/2023 26    • ANION GAP 02/17/2023 7    • BUN 02/17/2023 10    • Creatinine 02/17/2023 0 66    • Glucose, Fasting 02/17/2023 87    • Calcium 02/17/2023 8 9    • AST 02/17/2023 43    • ALT 02/17/2023 43    • Alkaline Phosphatase 02/17/2023 78    • Total Protein 02/17/2023 7 0    • Albumin 02/17/2023 3 6    • Total Bilirubin 02/17/2023 0 33 • eGFR 02/17/2023 86    • Hemoglobin A1C 02/17/2023 5 6    • EAG 02/17/2023 114    • TSH 3RD GENERATON 02/17/2023 1 340    • Cholesterol 02/17/2023 146    • Triglycerides 02/17/2023 46    • HDL, Direct 02/17/2023 58    • LDL Calculated 02/17/2023 79          Radiology Results:   No results found

## 2023-04-24 NOTE — PROGRESS NOTES
"INTERNAL MEDICINE OFFICE VISIT  [facility] Internal [facility]- [place]    NAME: Eugenio Odonnell  AGE: 78 y o  SEX: female    DATE OF ENCOUNTER: 4/25/2023    Assessment and Plan/History of Present Illness     Here today for same day appointment for toe pain  Medical history of GERD, hypertension, pancreatic cysts, symptomatic cholelithiasis, PACs, prediabetes, depression, moderate aortic dilatation, gallstones status post cholecystectomy    Patient reports approximately 3-month history of lesion on inner aspect of her left foot near the first MTP joint  Recently she has noticed increased swelling with yellowish drainage in the area  On exam, she appears to have bunion deformity of the left foot with possible corn formation and draining abscess? There is clear/yellowish discharge appreciated  No significant surrounding erythema      1  Abscess of foot  -Recommend treatment with 7-day course of Bactrim  Patient previously followed with podiatry and states she had a similar lesion which was \"drained\" approximately 1 year ago  If this issue does not resolve, I recommend she touch base with podiatry again  Referral placed  -Area cleaned with iodine and alcohol swab today and redressed    - sulfamethoxazole-trimethoprim (BACTRIM DS) 800-160 mg per tablet; Take 1 tablet by mouth every 12 (twelve) hours for 7 days  Dispense: 14 tablet; Refill: 0  - Ambulatory Referral to Podiatry; Future    2  Chronic pain of both knees  - meloxicam (Mobic) 7 5 mg tablet; Take 1 tablet (7 5 mg total) by mouth daily as needed for moderate pain  Dispense: 30 tablet;  Refill: 0          Orders Placed This Encounter   Procedures   • Ambulatory Referral to Podiatry       Chief Complaint     Chief Complaint   Patient presents with   • Follow-up     Left foot swollen/abscess with drainage       Review of Systems     10 point ROS negative except per HPI    The following portions of the patient's history were reviewed and updated as " "appropriate: allergies, current medications, past family history, past medical history, past social history, past surgical history and problem list     Active Problem List     Patient Active Problem List   Diagnosis   • Essential hypertension   • Palpitation   • Gastroesophageal reflux disease   • Primary osteoarthritis of both knees   • Osteopenia   • Cervical radiculopathy   • Aortic dilatation (HCC)   • Nonrheumatic aortic valve insufficiency   • Abnormal EKG   • Pre-diabetes   • Numbness and tingling of left arm and leg   • Carpal tunnel syndrome of left wrist   • Vertigo   • Abnormal liver enzymes   • Vitamin D deficiency   • Tarsal tunnel syndrome of left side   • Primary hypercholesterolemia   • Status post cholecystectomy   • Increased urinary frequency   • COVID-19 virus infection       Objective     /78 (BP Location: Left arm, Patient Position: Sitting, Cuff Size: Standard)   Pulse 69   Temp (!) 97 °F (36 1 °C)   Ht 5' 2\" (1 575 m)   Wt 65 8 kg (145 lb)   SpO2 97%   BMI 26 52 kg/m²     Physical Exam  Constitutional:       Appearance: Normal appearance  She is not ill-appearing  HENT:      Head: Normocephalic and atraumatic  Eyes:      General: No scleral icterus  Right eye: No discharge  Left eye: No discharge  Cardiovascular:      Rate and Rhythm: Normal rate and regular rhythm  Heart sounds: No murmur heard  No friction rub  Pulmonary:      Effort: Pulmonary effort is normal       Breath sounds: Normal breath sounds  No wheezing or rales  Musculoskeletal:         General: No swelling or tenderness  Skin:     Findings: Lesion present  No erythema or rash  Neurological:      Mental Status: She is alert  Mental status is at baseline        Gait: Gait normal    Psychiatric:         Mood and Affect: Mood normal          Behavior: Behavior normal          Pertinent Laboratory/Diagnostic Studies:  US kidney and bladder with pvr    Result Date: " 12/19/2022  Impression: 1  Left renal cyst  No acute findings   Signed by:  Melissa Marr MD Signed: 12/16/2022 11:23:24 AM Workstation: [id number]      Images and diagnostics reviewed     Current Medications     Current Outpatient Medications:   •  acetaminophen (TYLENOL) 325 mg tablet, Take 650 mg by mouth every 6 (six) hours as needed for mild pain, Disp: , Rfl:   •  Aspirin 81 MG CAPS, Take 81 mg by mouth daily, Disp: 90 capsule, Rfl: 0  •  cholecalciferol (VITAMIN D3) 1,000 units tablet, Take 2 tablets (2,000 Units total) by mouth daily, Disp: 180 tablet, Rfl: 1  •  Diclofenac Sodium (VOLTAREN) 1 %, Apply 2 g topically 4 (four) times a day, Disp: 100 g, Rfl: 0  •  hydrOXYzine HCL (ATARAX) 25 mg tablet, Take 1 tablet (25 mg total) by mouth every 6 (six) hours as needed for anxiety, Disp: 20 tablet, Rfl: 0  •  losartan (COZAAR) 25 mg tablet, Take 1 tablet (25 mg total) by mouth every morning, Disp: 90 tablet, Rfl: 1  •  melatonin 1 mg, Take 1 tablet (1 mg total) by mouth daily at bedtime, Disp: 30 tablet, Rfl: 0  •  meloxicam (Mobic) 7 5 mg tablet, Take 1 tablet (7 5 mg total) by mouth daily as needed for moderate pain, Disp: 30 tablet, Rfl: 0  •  metoprolol succinate (TOPROL-XL) 25 mg 24 hr tablet, Take 1 tablet (25 mg total) by mouth every morning, Disp: 90 tablet, Rfl: 1  •  metoprolol succinate (TOPROL-XL) 50 mg 24 hr tablet, TAKE 2 TABLETS BY MOUTH DAILY, Disp: 180 tablet, Rfl: 1  •  Multiple Vitamin (multivitamin) capsule, Take 1 capsule by mouth every morning, Disp: 90 capsule, Rfl: 1  •  omeprazole (PriLOSEC) 20 mg delayed release capsule, Take 1 capsule (20 mg total) by mouth daily as needed (Reflux), Disp: 90 capsule, Rfl: 0  •  sulfamethoxazole-trimethoprim (BACTRIM DS) 800-160 mg per tablet, Take 1 tablet by mouth every 12 (twelve) hours for 7 days, Disp: 14 tablet, Rfl: 0  •  triamcinolone (KENALOG) 0 1 % cream, Apply topically 2 (two) times a day, Disp: 30 g, Rfl: 0    Health Maintenance     Health Maintenance   Topic Date Due   • COVID-19 Vaccine (3 - Booster for Pfizer series) 07/15/2021   • Medicare Annual Wellness Visit (AWV)  01/21/2023   • Breast Cancer Screening: Mammogram  01/24/2023   • DXA SCAN  01/24/2024   • BMI: Followup Plan  02/21/2024   • Fall Risk  04/25/2024   • Depression Screening  04/25/2024   • Urinary Incontinence Screening  04/25/2024   • BMI: Adult  04/25/2024   • Colorectal Cancer Screening  11/05/2030   • Hepatitis C Screening  Completed   • Osteoporosis Screening  Completed   • Pneumococcal Vaccine: 65+ Years  Completed   • Influenza Vaccine  Completed   • HIB Vaccine  Aged Out   • IPV Vaccine  Aged Out   • Hepatitis A Vaccine  Aged Out   • Meningococcal ACWY Vaccine  Aged Out   • HPV Vaccine  Aged Out     Immunization History   Administered Date(s) Administered   • COVID-19 PFIZER VACCINE 0 3 ML IM 04/30/2021, 05/20/2021   • INFLUENZA 08/29/2018   • Influenza, high dose seasonal 0 7 mL 08/29/2018, 10/24/2019, 10/22/2020, 11/22/2021, 11/21/2022   • Pneumococcal Conjugate 13-Valent 07/18/2018   • Pneumococcal Polysaccharide PPV23 07/16/2020       RADHA Alston    [facility] - [address], [address] [place] [address] [address]  [facility] [place], [address] [facility]  Office: [phone]  Fax: [phone]

## 2023-04-25 ENCOUNTER — OFFICE VISIT (OUTPATIENT)
Dept: INTERNAL MEDICINE CLINIC | Facility: CLINIC | Age: 78
End: 2023-04-25

## 2023-04-25 VITALS
HEIGHT: 62 IN | HEART RATE: 69 BPM | SYSTOLIC BLOOD PRESSURE: 130 MMHG | OXYGEN SATURATION: 97 % | TEMPERATURE: 97 F | BODY MASS INDEX: 26.68 KG/M2 | DIASTOLIC BLOOD PRESSURE: 78 MMHG | WEIGHT: 145 LBS

## 2023-04-25 DIAGNOSIS — M25.562 CHRONIC PAIN OF BOTH KNEES: ICD-10-CM

## 2023-04-25 DIAGNOSIS — M25.561 CHRONIC PAIN OF BOTH KNEES: ICD-10-CM

## 2023-04-25 DIAGNOSIS — G89.29 CHRONIC PAIN OF BOTH KNEES: ICD-10-CM

## 2023-04-25 DIAGNOSIS — L02.619 ABSCESS OF FOOT: Primary | ICD-10-CM

## 2023-04-25 RX ORDER — SULFAMETHOXAZOLE AND TRIMETHOPRIM 800; 160 MG/1; MG/1
1 TABLET ORAL EVERY 12 HOURS SCHEDULED
Qty: 14 TABLET | Refills: 0 | Status: SHIPPED | OUTPATIENT
Start: 2023-04-25 | End: 2023-05-02

## 2023-04-25 RX ORDER — MELOXICAM 7.5 MG/1
7.5 TABLET ORAL DAILY PRN
Qty: 30 TABLET | Refills: 0 | Status: SHIPPED | OUTPATIENT
Start: 2023-04-25

## 2023-05-08 ENCOUNTER — OFFICE VISIT (OUTPATIENT)
Dept: PODIATRY | Facility: CLINIC | Age: 78
End: 2023-05-08

## 2023-05-08 VITALS
WEIGHT: 145 LBS | HEIGHT: 62 IN | HEART RATE: 90 BPM | DIASTOLIC BLOOD PRESSURE: 85 MMHG | BODY MASS INDEX: 26.68 KG/M2 | SYSTOLIC BLOOD PRESSURE: 140 MMHG

## 2023-05-08 DIAGNOSIS — S91.302A OPEN WOUND OF LEFT FOOT, INITIAL ENCOUNTER: Primary | ICD-10-CM

## 2023-05-08 NOTE — PROGRESS NOTES
"Assessment/Plan:     Diagnoses and all orders for this visit:    Open wound of left foot, initial encounter  -     MRI foot/forefoot toes left wo contrast; Future  -     Cam Boot      Diagnosis and options discussed with patient  Patient agreeable to the plan as stated below  -Reviewed PCP visit 4/25/2023 when this was first addressed      -Malay interpretor used throughout visit #[id number]    -XR taken in February reviewed, evidence of old bunionectomy but no acute findings  Given the chronic draining ulcer concern for OM, recommend MRI  -CAM boot to immobilize the toe      -Alginate to wound which I provided  -Finish antibiotic as written    -RTC 3 weeks, consider washout/ecision of ulcer, or biopsy depending on progress/MRI results      ADDENDUM: after visit patient refused the boot  She told my staff it was too big and she was worried about falling  The shoes she is wearing are far too tight and might be part of the problem  She left the boot at the practice and left  I am hoping she does do the wound care and MRI we discussed  She did make a F/U in a few weeks to check progress      Subjective:      Patient ID: Gautam Aguilera is a 78 y o  female  Patient has been referred to podiatry for an abscess on her toe  She mentioned it when she saw her PCP 2 weeks ago  The is a skin lesion on the toe that is draining  This occurred once before  Her first doctor put her on a cream    She was put on Bactrim by her PCP and tolerated the medication  She had bunion surgery many years ago on the toe  She has been to two other podiatrists and they \"couldn't help me  \"      The following portions of the patient's history were reviewed and updated as appropriate: allergies, current medications, past family history, past medical history, past social history, past surgical history and problem list     Review of Systems    Constitutional: Negative  Respiratory: Negative for cough and shortness of breath    " "  Gastrointestinal: Negative for diarrhea, nausea and vomiting  Musculoskeletal: toe pain and swelling  Skin:wound on toe  Neurological: Negative for weakness, numbness and headaches  Objective:      /85   Pulse 90   Ht 5' 2\" (1 575 m)   Wt 65 8 kg (145 lb)   BMI 26 52 kg/m²          Physical Exam  Vitals reviewed  Constitutional:       Appearance: She is not ill-appearing or diaphoretic  Cardiovascular:      Rate and Rhythm: Normal rate  Pulses: Normal pulses  Dorsalis pedis pulses are 2+ on the left side  Posterior tibial pulses are 2+ on the left side  Pulmonary:      Effort: Pulmonary effort is normal  No respiratory distress  Musculoskeletal:         General: Tenderness present  Left foot: Deformity and bunion present  Feet:      Left foot:      Protective Sensation: 10 sites tested  10 sites sensed  Skin integrity: Ulcer present  Skin:     Capillary Refill: Capillary refill takes less than 2 seconds  Findings: Lesion (open wound medial left first MTPJ with moderate serous drainage  Possible trace tophi  Wound measures 0 4x0 3cm  She would not allow me to check it's depth) present  No erythema or rash  Neurological:      Mental Status: She is alert and oriented to person, place, and time  Sensory: No sensory deficit        Gait: Gait normal    Psychiatric:         Mood and Affect: Mood normal            " Chronic back pain    Essential hypertension Chronic back pain    Essential hypertension    Hyperlipidemia, unspecified hyperlipidemia type

## 2023-05-11 ENCOUNTER — HOSPITAL ENCOUNTER (OUTPATIENT)
Dept: RADIOLOGY | Facility: HOSPITAL | Age: 78
Discharge: HOME | End: 2023-05-11
Attending: INTERNAL MEDICINE

## 2023-05-11 DIAGNOSIS — K86.2 PANCREATIC CYST: ICD-10-CM

## 2023-05-11 RX ADMIN — GADOBUTROL 6 ML: 604.72 INJECTION INTRAVENOUS at 15:13

## 2023-05-16 ENCOUNTER — RA CDI HCC (OUTPATIENT)
Dept: OTHER | Facility: HOSPITAL | Age: 78
End: 2023-05-16

## 2023-05-16 NOTE — PROGRESS NOTES
[facility]  coding opportunities       Chart reviewed, no opportunity found:   [address]        Patients Insurance     Medicare Insurance: [facility]

## 2023-05-20 PROBLEM — R00.2 PALPITATION: Status: RESOLVED | Noted: 2018-06-18 | Resolved: 2023-05-20

## 2023-05-21 ENCOUNTER — HOSPITAL ENCOUNTER (OUTPATIENT)
Dept: RADIOLOGY | Facility: HOSPITAL | Age: 78
Discharge: HOME | End: 2023-05-21
Attending: PODIATRIST

## 2023-05-21 DIAGNOSIS — S91.302A OPEN WOUND OF LEFT FOOT, INITIAL ENCOUNTER: ICD-10-CM

## 2023-05-21 DIAGNOSIS — K21.9 GASTROESOPHAGEAL REFLUX DISEASE, UNSPECIFIED WHETHER ESOPHAGITIS PRESENT: ICD-10-CM

## 2023-05-22 DIAGNOSIS — M25.562 CHRONIC PAIN OF BOTH KNEES: ICD-10-CM

## 2023-05-22 DIAGNOSIS — M25.561 CHRONIC PAIN OF BOTH KNEES: ICD-10-CM

## 2023-05-22 DIAGNOSIS — G89.29 CHRONIC PAIN OF BOTH KNEES: ICD-10-CM

## 2023-05-22 RX ORDER — MELOXICAM 7.5 MG/1
TABLET ORAL
Qty: 30 TABLET | Refills: 0 | Status: SHIPPED | OUTPATIENT
Start: 2023-05-22

## 2023-05-22 RX ORDER — OMEPRAZOLE 20 MG/1
CAPSULE, DELAYED RELEASE ORAL
Qty: 90 CAPSULE | Refills: 0 | Status: SHIPPED | OUTPATIENT
Start: 2023-05-22

## 2023-05-23 ENCOUNTER — OFFICE VISIT (OUTPATIENT)
Dept: INTERNAL MEDICINE CLINIC | Facility: CLINIC | Age: 78
End: 2023-05-23

## 2023-05-23 VITALS
HEART RATE: 77 BPM | HEIGHT: 62 IN | DIASTOLIC BLOOD PRESSURE: 80 MMHG | TEMPERATURE: 96.5 F | RESPIRATION RATE: 18 BRPM | BODY MASS INDEX: 26.87 KG/M2 | WEIGHT: 146 LBS | SYSTOLIC BLOOD PRESSURE: 140 MMHG | OXYGEN SATURATION: 97 %

## 2023-05-23 DIAGNOSIS — I49.1 PREMATURE ATRIAL BEATS: ICD-10-CM

## 2023-05-23 DIAGNOSIS — R20.0 NUMBNESS AND TINGLING: ICD-10-CM

## 2023-05-23 DIAGNOSIS — R20.0 NUMBNESS AND TINGLING OF LEFT ARM AND LEG: ICD-10-CM

## 2023-05-23 DIAGNOSIS — Z00.00 MEDICARE ANNUAL WELLNESS VISIT, SUBSEQUENT: Primary | ICD-10-CM

## 2023-05-23 DIAGNOSIS — E55.9 VITAMIN D DEFICIENCY: ICD-10-CM

## 2023-05-23 DIAGNOSIS — I10 ESSENTIAL HYPERTENSION: ICD-10-CM

## 2023-05-23 DIAGNOSIS — M25.511 ACUTE PAIN OF RIGHT SHOULDER: ICD-10-CM

## 2023-05-23 DIAGNOSIS — R20.2 NUMBNESS AND TINGLING: ICD-10-CM

## 2023-05-23 DIAGNOSIS — R20.2 NUMBNESS AND TINGLING OF LEFT ARM AND LEG: ICD-10-CM

## 2023-05-23 RX ORDER — GABAPENTIN 100 MG/1
100 CAPSULE ORAL 2 TIMES DAILY
Qty: 90 CAPSULE | Refills: 0 | Status: SHIPPED | OUTPATIENT
Start: 2023-05-23

## 2023-05-23 RX ORDER — METOPROLOL SUCCINATE 25 MG/1
25 TABLET, EXTENDED RELEASE ORAL EVERY MORNING
Qty: 90 TABLET | Refills: 1 | Status: SHIPPED | OUTPATIENT
Start: 2023-05-23

## 2023-05-23 RX ORDER — LOSARTAN POTASSIUM 25 MG/1
25 TABLET ORAL EVERY MORNING
Qty: 90 TABLET | Refills: 1 | Status: SHIPPED | OUTPATIENT
Start: 2023-05-23

## 2023-05-23 NOTE — PATIENT INSTRUCTIONS
Medicare Preventive Visit Patient Instructions  Thank you for completing your Welcome to Medicare Visit or Medicare Annual Wellness Visit today  Your next wellness visit will be due in one year (5/23/2024)  The screening/preventive services that you may require over the next 5-10 years are detailed below  Some tests may not apply to you based off risk factors and/or age  Screening tests ordered at today's visit but not completed yet may show as past due  Also, please note that scanned in results may not display below  Preventive Screenings:  Service Recommendations Previous Testing/Comments   Colorectal Cancer Screening  * Colonoscopy    * Fecal Occult Blood Test (FOBT)/Fecal Immunochemical Test (FIT)  * Fecal DNA/Cologuard Test  * Flexible Sigmoidoscopy Age: 39-70 years old   Colonoscopy: every 10 years (may be performed more frequently if at higher risk)  OR  FOBT/FIT: every 1 year  OR  Cologuard: every 3 years  OR  Sigmoidoscopy: every 5 years  Screening may be recommended earlier than age 39 if at higher risk for colorectal cancer  Also, an individualized decision between you and your healthcare provider will decide whether screening between the ages of 74-80 would be appropriate  Colonoscopy: 11/05/2020  FOBT/FIT: Not on file  Cologuard: Not on file  Sigmoidoscopy: Not on file    Screening Current     Breast Cancer Screening Age: 36 years old  Frequency: every 1-2 years  Not required if history of left and right mastectomy Mammogram: 01/24/2022    Screening Current   Cervical Cancer Screening Between the ages of 21-29, pap smear recommended once every 3 years  Between the ages of 33-67, can perform pap smear with HPV co-testing every 5 years     Recommendations may differ for women with a history of total hysterectomy, cervical cancer, or abnormal pap smears in past  Pap Smear: Not on file    Screening Not Indicated   Hepatitis C Screening Once for adults born between 1945 and 1965  More frequently in patients at high risk for Hepatitis C Hep C Antibody: 01/23/2019    Screening Current   Diabetes Screening 1-2 times per year if you're at risk for diabetes or have pre-diabetes Fasting glucose: 87 mg/dL (2/17/2023)  A1C: 5 6 % (2/17/2023)  Screening Current   Cholesterol Screening Once every 5 years if you don't have a lipid disorder  May order more often based on risk factors  Lipid panel: 02/17/2023    Screening Not Indicated  History Lipid Disorder     Other Preventive Screenings Covered by Medicare:  1  Abdominal Aortic Aneurysm (AAA) Screening: covered once if your at risk  You're considered to be at risk if you have a family history of AAA  2  Lung Cancer Screening: covers low dose CT scan once per year if you meet all of the following conditions: (1) Age 50-69; (2) No signs or symptoms of lung cancer; (3) Current smoker or have quit smoking within the last 15 years; (4) You have a tobacco smoking history of at least 20 pack years (packs per day multiplied by number of years you smoked); (5) You get a written order from a healthcare provider  3  Glaucoma Screening: covered annually if you're considered high risk: (1) You have diabetes OR (2) Family history of glaucoma OR (3)  aged 48 and older OR (3)  American aged 72 and older  3  Osteoporosis Screening: covered every 2 years if you meet one of the following conditions: (1) You're estrogen deficient and at risk for osteoporosis based off medical history and other findings; (2) Have a vertebral abnormality; (3) On glucocorticoid therapy for more than 3 months; (4) Have primary hyperparathyroidism; (5) On osteoporosis medications and need to assess response to drug therapy  · Last bone density test (DXA Scan): 01/24/2022   5  HIV Screening: covered annually if you're between the age of 15-65  Also covered annually if you are younger than 13 and older than 72 with risk factors for HIV infection   For pregnant patients, it is covered up to 3 times per pregnancy  Immunizations:  Immunization Recommendations   Influenza Vaccine Annual influenza vaccination during flu season is recommended for all persons aged >= 6 months who do not have contraindications   Pneumococcal Vaccine   * Pneumococcal conjugate vaccine = PCV13 (Prevnar 13), PCV15 (Vaxneuvance), PCV20 (Prevnar 20)  * Pneumococcal polysaccharide vaccine = PPSV23 (Pneumovax) Adults 25-60 years old: 1-3 doses may be recommended based on certain risk factors  Adults 72 years old: 1-2 doses may be recommended based off what pneumonia vaccine you previously received   Hepatitis B Vaccine 3 dose series if at intermediate or high risk (ex: diabetes, end stage renal disease, liver disease)   Tetanus (Td) Vaccine - COST NOT COVERED BY MEDICARE PART B Following completion of primary series, a booster dose should be given every 10 years to maintain immunity against tetanus  Td may also be given as tetanus wound prophylaxis  Tdap Vaccine - COST NOT COVERED BY MEDICARE PART B Recommended at least once for all adults  For pregnant patients, recommended with each pregnancy  Shingles Vaccine (Shingrix) - COST NOT COVERED BY MEDICARE PART B  2 shot series recommended in those aged 48 and above     Health Maintenance Due:      Topic Date Due   • Breast Cancer Screening: Mammogram  01/24/2023   • DXA SCAN  01/24/2024   • Colorectal Cancer Screening  11/05/2030   • Hepatitis C Screening  Completed     Immunizations Due:      Topic Date Due   • COVID-19 Vaccine (3 - Booster for Girard Peter series) 07/15/2021     Advance Directives   What are advance directives? Advance directives are legal documents that state your wishes and plans for medical care  These plans are made ahead of time in case you lose your ability to make decisions for yourself  Advance directives can apply to any medical decision, such as the treatments you want, and if you want to donate organs  What are the types of advance directives? There are many types of advance directives, and each state has rules about how to use them  You may choose a combination of any of the following:  · Living will: This is a written record of the treatment you want  You can also choose which treatments you do not want, which to limit, and which to stop at a certain time  This includes surgery, medicine, IV fluid, and tube feedings  · Durable power of  for healthcare [facility]): This is a written record that states who you want to make healthcare choices for you when you are unable to make them for yourself  This person, called a proxy, is usually a family member or a friend  You may choose more than 1 proxy  · Do not resuscitate (DNR) order:  A DNR order is used in case your heart stops beating or you stop breathing  It is a request not to have certain forms of treatment, such as CPR  A DNR order may be included in other types of advance directives  · Medical directive: This covers the care that you want if you are in a coma, near death, or unable to make decisions for yourself  You can list the treatments you want for each condition  Treatment may include pain medicine, surgery, blood transfusions, dialysis, IV or tube feedings, and a ventilator (breathing machine)  · Values history: This document has questions about your views, beliefs, and how you feel and think about life  This information can help others choose the care that you would choose  Why are advance directives important? An advance directive helps you control your care  Although spoken wishes may be used, it is better to have your wishes written down  Spoken wishes can be misunderstood, or not followed  Treatments may be given even if you do not want them  An advance directive may make it easier for your family to make difficult choices about your care     Weight Management   Why it is important to manage your weight:  Being overweight increases your risk of health conditions such as heart disease, high blood pressure, type 2 diabetes, and certain types of cancer  It can also increase your risk for osteoarthritis, sleep apnea, and other respiratory problems  Aim for a slow, steady weight loss  Even a small amount of weight loss can lower your risk of health problems  How to lose weight safely:  A safe and healthy way to lose weight is to eat fewer calories and get regular exercise  You can lose up about 1 pound a week by decreasing the number of calories you eat by 500 calories each day  Healthy meal plan for weight management:  A healthy meal plan includes a variety of foods, contains fewer calories, and helps you stay healthy  A healthy meal plan includes the following:  · Eat whole-grain foods more often  A healthy meal plan should contain fiber  Fiber is the part of grains, fruits, and vegetables that is not broken down by your body  Whole-grain foods are healthy and provide extra fiber in your diet  Some examples of whole-grain foods are whole-wheat breads and pastas, oatmeal, brown rice, and bulgur  · Eat a variety of vegetables every day  Include dark, leafy greens such as spinach, kale, lindsay greens, and mustard greens  Eat yellow and orange vegetables such as carrots, sweet potatoes, and winter squash  · Eat a variety of fruits every day  Choose fresh or canned fruit (canned in its own juice or light syrup) instead of juice  Fruit juice has very little or no fiber  · Eat low-fat dairy foods  Drink fat-free (skim) milk or 1% milk  Eat fat-free yogurt and low-fat cottage cheese  Try low-fat cheeses such as mozzarella and other reduced-fat cheeses  · Choose meat and other protein foods that are low in fat  Choose beans or other legumes such as split peas or lentils  Choose fish, skinless poultry (chicken or turkey), or lean cuts of red meat (beef or pork)  Before you cook meat or poultry, cut off any visible fat  · Use less fat and oil  Try baking foods instead of frying them  Add less fat, such as margarine, sour cream, regular salad dressing and mayonnaise to foods  Eat fewer high-fat foods  Some examples of high-fat foods include french fries, doughnuts, ice cream, and cakes  · Eat fewer sweets  Limit foods and drinks that are high in sugar  This includes candy, cookies, regular soda, and sweetened drinks  Exercise:  Exercise at least 30 minutes per day on most days of the week  Some examples of exercise include walking, biking, dancing, and swimming  You can also fit in more physical activity by taking the stairs instead of the elevator or parking farther away from stores  Ask your healthcare provider about the best exercise plan for you  © Copyright [address] 2018 Information is for End User's use only and may not be sold, redistributed or otherwise used for commercial purposes   All illustrations and images included in CareNotes® are the copyrighted property of [employer]  or [address] [place] [address]

## 2023-05-23 NOTE — ASSESSMENT & PLAN NOTE
Previously evaluated for numbness and tingling in the left side of her arm/leg  She had EMG which showed evidence of mild carpal tunnel syndrome as well as tarsal tunnel syndrome  Had previously been prescribed gabapentin which apparently was not particularly effective and caused cognitive issues  She endorses intermittent bothersome burning sensation in her left foot    After discussion, she would like to restart gabapentin  Start with 100 mg at bedtime, if she tolerates, she can take an additional 100 mg during the day    Can also discuss treatment for tarsal tunnel syndrome with podiatry

## 2023-05-23 NOTE — PROGRESS NOTES
Assessment and Plan:     Here today for follow-up  Medical history of GERD, hypertension, pancreatic cysts, symptomatic cholelithiasis, PACs, prediabetes, depression, moderate aortic dilatation, gallstones status post cholecystectomy    Following with podiatry for left foot wound/draining ulcer  No active drainage seen on exam today previously treated for infection  MRI has been requested by podiatry to evaluate for osteomyelitis  Radiology read is pending       Problem List Items Addressed This Visit        Cardiovascular and Mediastinum    Essential hypertension    Relevant Medications    losartan (COZAAR) 25 mg tablet    metoprolol succinate (TOPROL-XL) 25 mg 24 hr tablet       Other    Numbness and tingling of left arm and leg     Previously evaluated for numbness and tingling in the left side of her arm/leg  She had EMG which showed evidence of mild carpal tunnel syndrome as well as tarsal tunnel syndrome  Had previously been prescribed gabapentin which apparently was not particularly effective and caused cognitive issues  She endorses intermittent bothersome burning sensation in her left foot    After discussion, she would like to restart gabapentin  Start with 100 mg at bedtime, if she tolerates, she can take an additional 100 mg during the day  Can also discuss treatment for tarsal tunnel syndrome with podiatry         Vitamin D deficiency    Relevant Orders    Vitamin D 25 hydroxy    Acute pain of right shoulder     1 week history of right shoulder pain on the anterolateral aspect of the shoulder  She denies any recent injuries, trauma, change in activity level  On exam, she has decreased active range of motion, able to abduct shoulder to approximately 45 degrees on the right, full active range of motion with the left shoulder  Weakness with resistance to external rotation  Positive Makenzie's test  -Suspect rotator cuff tendinopathy  I recommended course of PT    She would prefer to do home exercises  These were provided        Other Visit Diagnoses     Medicare annual wellness visit, subsequent    -  Primary    Premature atrial beats        Relevant Medications    metoprolol succinate (TOPROL-XL) 25 mg 24 hr tablet    Numbness and tingling        Relevant Medications    gabapentin (Neurontin) 100 mg capsule    Other Relevant Orders    CK    CBC and differential    Comprehensive metabolic panel    TSH, 3rd generation with Free T4 reflex    Ferritin           Preventive health issues were discussed with patient, and age appropriate screening tests were ordered as noted in patient's After Visit Summary  Personalized health advice and appropriate referrals for health education or preventive services given if needed, as noted in patient's After Visit Summary       History of Present Illness:     Patient presents for a Medicare Wellness Visit    HPI   Patient Care Team:  Denis Lo DO as PCP - General (Internal Medicine)     Review of Systems:     Review of Systems     Problem List:     Patient Active Problem List   Diagnosis   • Essential hypertension   • Gastroesophageal reflux disease   • Primary osteoarthritis of both knees   • Osteopenia   • Cervical radiculopathy   • Aortic dilatation (HCC)   • Nonrheumatic aortic valve insufficiency   • Abnormal EKG   • Pre-diabetes   • Numbness and tingling of left arm and leg   • Carpal tunnel syndrome of left wrist   • Vertigo   • Abnormal liver enzymes   • Vitamin D deficiency   • Tarsal tunnel syndrome of left side   • Primary hypercholesterolemia   • Status post cholecystectomy   • Increased urinary frequency   • COVID-19 virus infection   • Open wound of left foot   • Acute pain of right shoulder      Past Medical and Surgical History:     Past Medical History:   Diagnosis Date   • Cholelithiasis     lap jewell today 4/13/2022   • Full dentures    • GERD (gastroesophageal reflux disease)    • Hypertension    • Pancreatic cyst     drained   • Wears glasses      Past Surgical History:   Procedure Laterality Date   • CATARACT EXTRACTION     • COLONOSCOPY  11/2020   • ID LAPAROSCOPY SURG CHOLECYSTECTOMY N/A 4/13/2022    Procedure: ROBOTIC LAP JACQUE;  Surgeon: Augustus Ward MD;  Location: [facility];  Service: General   • TUBAL LIGATION      Ectopic pregnancy 1979   • UPPER GASTROINTESTINAL ENDOSCOPY  11/2020      Family History:     Family History   Problem Relation Age of Onset   • Diabetes Mother    • Hypertension Mother    • Hypertension Father    • Stroke Father    • No Known Problems Sister    • No Known Problems Brother    • No Known Problems Daughter    • No Known Problems Maternal Grandmother    • No Known Problems Maternal Grandfather    • No Known Problems Paternal Grandmother    • No Known Problems Paternal Grandfather    • No Known Problems Sister    • No Known Problems Sister    • No Known Problems Sister    • No Known Problems Sister    • No Known Problems Daughter    • No Known Problems Maternal Aunt    • No Known Problems Maternal Aunt    • No Known Problems Maternal Aunt    • No Known Problems Paternal Aunt    • No Known Problems Paternal Aunt    • No Known Problems Paternal Aunt    • No Known Problems Paternal Aunt    • No Known Problems Paternal Aunt       Social History:     Social History     Socioeconomic History   • Marital status: /Civil Union     Spouse name: None   • Number of children: None   • Years of education: None   • Highest education level: None   Occupational History   • None   Tobacco Use   • Smoking status: Never   • Smokeless tobacco: Never   Vaping Use   • Vaping Use: Never used   Substance and Sexual Activity   • Alcohol use: No   • Drug use: No   • Sexual activity: Yes     Partners: Male   Other Topics Concern   • None   Social History Narrative    ** Merged History Encounter **          Social Determinants of Health     Financial Resource Strain: Medium Risk   • Difficulty of Paying Living Expenses: Somewhat hard   Food Insecurity: Not on file   Transportation Needs: No Transportation Needs   • Lack of Transportation (Medical): No   • Lack of Transportation (Non-Medical):  No   Physical Activity: Not on file   Stress: Not on file   Social Connections: Not on file   Intimate Partner Violence: Not on file   Housing Stability: Not on file      Medications and Allergies:     Current Outpatient Medications   Medication Sig Dispense Refill   • acetaminophen (TYLENOL) 325 mg tablet Take 650 mg by mouth every 6 (six) hours as needed for mild pain     • Aspirin 81 MG CAPS Take 81 mg by mouth daily 90 capsule 0   • cholecalciferol (VITAMIN D3) 1,000 units tablet Take 2 tablets (2,000 Units total) by mouth daily 180 tablet 1   • Diclofenac Sodium (VOLTAREN) 1 % Apply 2 g topically 4 (four) times a day 100 g 0   • gabapentin (Neurontin) 100 mg capsule Take 1 capsule (100 mg total) by mouth 2 (two) times a day 90 capsule 0   • losartan (COZAAR) 25 mg tablet Take 1 tablet (25 mg total) by mouth every morning 90 tablet 1   • meloxicam (MOBIC) 7 5 mg tablet TAKE 1 TABLET(7 5 MG) BY MOUTH DAILY AS NEEDED FOR MODERATE PAIN 30 tablet 0   • metoprolol succinate (TOPROL-XL) 25 mg 24 hr tablet Take 1 tablet (25 mg total) by mouth every morning 90 tablet 1   • metoprolol succinate (TOPROL-XL) 50 mg 24 hr tablet TAKE 2 TABLETS BY MOUTH DAILY 180 tablet 1   • Multiple Vitamin (multivitamin) capsule Take 1 capsule by mouth every morning 90 capsule 1   • omeprazole (PriLOSEC) 20 mg delayed release capsule TAKE 1 CAPSULE(20 MG) BY MOUTH DAILY AS NEEDED FOR REFLUX 90 capsule 0   • triamcinolone (KENALOG) 0 1 % cream Apply topically 2 (two) times a day 30 g 0   • hydrOXYzine HCL (ATARAX) 25 mg tablet Take 1 tablet (25 mg total) by mouth every 6 (six) hours as needed for anxiety (Patient not taking: Reported on 5/23/2023) 20 tablet 0   • melatonin 1 mg Take 1 tablet (1 mg total) by mouth daily at bedtime (Patient not taking: Reported on 5/23/2023) 30 tablet 0     No current facility-administered medications for this visit  Allergies   Allergen Reactions   • Shellfish-Derived Products - Food Allergy Nausea Only   • Lisinopril Cough      Immunizations:     Immunization History   Administered Date(s) Administered   • COVID-19 PFIZER VACCINE 0 3 ML IM 04/30/2021, 05/20/2021   • INFLUENZA 08/29/2018   • Influenza, high dose seasonal 0 7 mL 08/29/2018, 10/24/2019, 10/22/2020, 11/22/2021, 11/21/2022   • Pneumococcal Conjugate 13-Valent 07/18/2018   • Pneumococcal Polysaccharide PPV23 07/16/2020      Health Maintenance:         Topic Date Due   • Breast Cancer Screening: Mammogram  01/24/2023   • DXA SCAN  01/24/2024   • Colorectal Cancer Screening  11/05/2030   • Hepatitis C Screening  Completed         Topic Date Due   • COVID-19 Vaccine (3 - Booster for Pfizer series) 07/15/2021      Medicare Screening Tests and Risk Assessments:     Elvira Phillips is here for her Subsequent Wellness visit  Health Risk Assessment:   Patient rates overall health as good  Patient feels that their physical health rating is much better  Patient is satisfied with their life  Eyesight was rated as slightly worse  Hearing was rated as same  Patient feels that their emotional and mental health rating is same  Patients states they are sometimes angry  Patient states they are sometimes unusually tired/fatigued  Pain experienced in the last 7 days has been some  Patient's pain rating has been 7/10  Patient states that she has experienced no weight loss or gain in last 6 months  Depression Screening:   PHQ-2 Score: 2      Fall Risk Screening: In the past year, patient has experienced: no history of falling in past year      Urinary Incontinence Screening:   Patient has not leaked urine accidently in the last six months  Home Safety:  Patient does not have trouble with stairs inside or outside of their home  Patient has working smoke alarms and has working carbon monoxide detector  Home safety hazards include: none  Nutrition:   Current diet is Regular and Frequent junk food  Medications:   Patient is currently taking over-the-counter supplements  OTC medications include: see medication list  Patient is able to manage medications  Activities of Daily Living (ADLs)/Instrumental Activities of Daily Living (IADLs):   Walk and transfer into and out of bed and chair?: Yes  Dress and groom yourself?: Yes    Bathe or shower yourself?: Yes    Feed yourself? Yes  Do your laundry/housekeeping?: Yes  Manage your money, pay your bills and track your expenses?: Yes  Make your own meals?: Yes    Do your own shopping?: Yes    Previous Hospitalizations:   Any hospitalizations or ED visits within the last 12 months?: No      Advance Care Planning:   Living will: No    Durable POA for healthcare: Yes    Advanced directive: No      PREVENTIVE SCREENINGS      Cardiovascular Screening:    General: Screening Not Indicated and History Lipid Disorder      Diabetes Screening:     General: Screening Current      Colorectal Cancer Screening:     General: Screening Current      Breast Cancer Screening:     General: Screening Current      Cervical Cancer Screening:    General: Screening Not Indicated      Osteoporosis Screening:    General: Screening Current      Lung Cancer Screening:     General: Screening Not Indicated      Hepatitis C Screening:    General: Screening Current    Screening, Brief Intervention, and Referral to Treatment (SBIRT)    Screening  Typical number of drinks in a day: 0  Typical number of drinks in a week: 0  Interpretation: Low risk drinking behavior  Single Item Drug Screening:  How often have you used an illegal drug (including marijuana) or a prescription medication for non-medical reasons in the past year? never    Single Item Drug Screen Score: 0  Interpretation: Negative screen for possible drug use disorder    No results found       Physical Exam:     /80 (BP Location: "Left arm, Patient Position: Sitting, Cuff Size: Standard)   Pulse 77   Temp (!) 96 5 °F (35 8 °C)   Resp 18   Ht 5' 2\" (1 575 m)   Wt 66 2 kg (146 lb)   SpO2 97%   BMI 26 70 kg/m²     Physical Exam     Max Michelle Cano DO  "

## 2023-05-23 NOTE — ASSESSMENT & PLAN NOTE
1 week history of right shoulder pain on the anterolateral aspect of the shoulder  She denies any recent injuries, trauma, change in activity level  On exam, she has decreased active range of motion, able to abduct shoulder to approximately 45 degrees on the right, full active range of motion with the left shoulder  Weakness with resistance to external rotation  Positive Makenzie's test  -Suspect rotator cuff tendinopathy  I recommended course of PT  She would prefer to do home exercises    These were provided

## 2023-05-24 ENCOUNTER — TELEPHONE (OUTPATIENT)
Dept: PODIATRY | Facility: CLINIC | Age: 78
End: 2023-05-24

## 2023-05-25 ENCOUNTER — OFFICE VISIT (OUTPATIENT)
Dept: CARDIOLOGY CLINIC | Facility: CLINIC | Age: 78
End: 2023-05-25

## 2023-05-25 VITALS
BODY MASS INDEX: 26.61 KG/M2 | HEIGHT: 62 IN | SYSTOLIC BLOOD PRESSURE: 122 MMHG | WEIGHT: 144.6 LBS | HEART RATE: 64 BPM | DIASTOLIC BLOOD PRESSURE: 70 MMHG

## 2023-05-25 DIAGNOSIS — I35.1 NONRHEUMATIC AORTIC VALVE INSUFFICIENCY: ICD-10-CM

## 2023-05-25 DIAGNOSIS — I77.819 AORTIC DILATATION (HCC): ICD-10-CM

## 2023-05-25 DIAGNOSIS — R00.2 PALPITATIONS: Primary | ICD-10-CM

## 2023-05-25 DIAGNOSIS — E78.00 PRIMARY HYPERCHOLESTEROLEMIA: ICD-10-CM

## 2023-05-25 DIAGNOSIS — I10 ESSENTIAL HYPERTENSION: ICD-10-CM

## 2023-05-25 NOTE — PROGRESS NOTES
[facility]  [address] [facility], [place]   [address]  [address]  Phone#  [phone]   Fax#  [phone]  *-*-*-*-*-*-*-*-*-*-*-*-*-*-*-*-*-*-*-*-*-*-*-*-*-*-*-*-*-*-*-*-*-*-*-*-*-*-*-*-*-*-*-*-*-*-*-*-*-*-*-*-*-*                                   Cardiology Follow Up      ENCOUNTER DATE: 23 12:32 PM  PATIENT NAME: Jason Moctezuma   : 1945    MRN: [id number]  AGE:77 y o  SEX: female  [address] Giraldo MD     PRIMARY CARE PHYSICIAN: Denis Cano DO    ACTIVE DIAGNOSIS THIS VISIT  1  Palpitations  AMB extended holter monitor      2  Nonrheumatic aortic valve insufficiency        3  Primary hypercholesterolemia        4  Aortic dilatation (HCC)        5  Essential hypertension          ACTIVE PROBLEM LIST  Patient Active Problem List   Diagnosis   • Essential hypertension   • Gastroesophageal reflux disease   • Primary osteoarthritis of both knees   • Osteopenia   • Cervical radiculopathy   • Aortic dilatation (HCC)   • Nonrheumatic aortic valve insufficiency   • Abnormal EKG   • Pre-diabetes   • Numbness and tingling of left arm and leg   • Carpal tunnel syndrome of left wrist   • Vertigo   • Abnormal liver enzymes   • Vitamin D deficiency   • Tarsal tunnel syndrome of left side   • Primary hypercholesterolemia   • Status post cholecystectomy   • Increased urinary frequency   • COVID-19 virus infection   • Open wound of left foot   • Acute pain of right shoulder       CARDIOLOGY SPECIALTY COMMENTS  Patient transferring from [place] [id number]   Diagnoses:  1  Aortic dilatation (HCC)  4 3 cm  2  Nonrheumatic aortic valve insufficiency, mild-to-moderate   3  Essential hypertension    4  Palpitations   5  Abnormal EKG    2019 carotid ultrasound:  Less than 50% bilateral carotid stenosis    2019 echocardiogram: Normal left ventricular systolic function, EF 20%  Grade 1 diastolic dysfunction  Ascending aorta mildly dilated at 4 3 cm   Mild-to-moderate aortic regurgitation      03/23/2022 normal left ventricular systolic function, EF 63%  Grade 1 diastolic dysfunction  Mild LA enlargement  Mild-to-moderate aortic regurgitation  Mild TR with normal PASP  Ascending aorta 4 3 cm    INTERVAL HISTORY:        Patient is feeling good but complains of palpitations particularly when she lays down at night  She denies chest pain or shortness of breath  She has no leg edema  She has a history of an a sending aortic aneurysm but an echocardiogram last year demonstrated no change since 2019  Her blood pressure is well controlled at 122/70  Her LDL cholesterol is well controlled at 79 and her triglycerides are 46  DISCUSSION/PLAN:          · Zio patch monitor for 2 weeks  · Return in 2 months to go over the results      Lab Studies:    Lab Results   Component Value Date    CHOLESTEROL 146 02/17/2023    CHOLESTEROL 181 04/05/2022    CHOLESTEROL 179 04/26/2021     Lab Results   Component Value Date    TRIG 46 02/17/2023    TRIG 90 04/05/2022    TRIG 99 04/26/2021     Lab Results   Component Value Date    HDL 58 02/17/2023    HDL 59 04/05/2022    HDL 57 04/26/2021     Lab Results   Component Value Date    LDLCALC 79 02/17/2023    LDLCALC 104 (H) 04/05/2022    LDLCALC 102 (H) 04/26/2021       Lab Results   Component Value Date    HGBA1C 5 6 02/17/2023      Lab Results   Component Value Date    BUN 10 02/17/2023    BUN 12 04/05/2022    BUN 15 05/04/2021     02/17/2023     04/05/2022     05/04/2021    CO2 26 02/17/2023    CO2 30 04/05/2022    CO2 27 05/04/2021    CREATININE 0 66 02/17/2023    CREATININE 0 64 04/05/2022    CREATININE 0 63 05/04/2021    EGFR 86 02/17/2023    EGFR 88 04/05/2022    EGFR 89 05/04/2021    K 4 2 02/17/2023    K 4 0 04/05/2022    K 3 8 05/04/2021    SODIUM 139 02/17/2023    SODIUM 138 04/05/2022    SODIUM 137 05/04/2021     Lab Results   Component Value Date    HCT 36 8 02/17/2023    HCT 38 9 04/05/2022    HCT 39 3 05/04/2021    HGB 11 8 02/17/2023    HGB 12 2 04/05/2022    HGB 12 5 05/04/2021    MCH 30 3 02/17/2023    MCH 29 9 04/05/2022    MCH 30 5 05/04/2021    MCHC 32 1 02/17/2023    MCHC 31 4 04/05/2022    MCHC 31 8 05/04/2021    MCV 95 02/17/2023    MCV 95 04/05/2022    MCV 96 05/04/2021     02/17/2023     04/05/2022     05/04/2021    WBC 4 54 02/17/2023    WBC 5 85 04/05/2022    WBC 9 14 05/04/2021      Lab Results   Component Value Date    ALKPHOS 78 02/17/2023    ALKPHOS 111 04/05/2022    ALKPHOS 108 05/04/2021    ALT 43 02/17/2023    ALT 40 04/05/2022    ALT 38 05/04/2021    AST 43 02/17/2023    AST 31 04/05/2022    AST 30 05/04/2021    CALCIUM 8 9 02/17/2023    CALCIUM 9 3 04/05/2022    CALCIUM 9 2 05/04/2021    MG 2 1 06/19/2020       Lab Results   Component Value Date    FERRITIN 43 06/19/2020     No results found for this visit on 05/25/23        Current Outpatient Medications:   •  acetaminophen (TYLENOL) 325 mg tablet, Take 650 mg by mouth every 6 (six) hours as needed for mild pain, Disp: , Rfl:   •  Aspirin 81 MG CAPS, Take 81 mg by mouth daily, Disp: 90 capsule, Rfl: 0  •  cholecalciferol (VITAMIN D3) 1,000 units tablet, Take 2 tablets (2,000 Units total) by mouth daily, Disp: 180 tablet, Rfl: 1  •  Diclofenac Sodium (VOLTAREN) 1 %, Apply 2 g topically 4 (four) times a day, Disp: 100 g, Rfl: 0  •  gabapentin (Neurontin) 100 mg capsule, Take 1 capsule (100 mg total) by mouth 2 (two) times a day, Disp: 90 capsule, Rfl: 0  •  losartan (COZAAR) 25 mg tablet, Take 1 tablet (25 mg total) by mouth every morning, Disp: 90 tablet, Rfl: 1  •  meloxicam (MOBIC) 7 5 mg tablet, TAKE 1 TABLET(7 5 MG) BY MOUTH DAILY AS NEEDED FOR MODERATE PAIN, Disp: 30 tablet, Rfl: 0  •  metoprolol succinate (TOPROL-XL) 25 mg 24 hr tablet, Take 1 tablet (25 mg total) by mouth every morning, Disp: 90 tablet, Rfl: 1  •  metoprolol succinate (TOPROL-XL) 50 mg 24 hr tablet, TAKE 2 TABLETS BY MOUTH DAILY, Disp: 180 tablet, Rfl: 1  •  Multiple Vitamin (multivitamin) capsule, Take 1 capsule by mouth every morning, Disp: 90 capsule, Rfl: 1  •  omeprazole (PriLOSEC) 20 mg delayed release capsule, TAKE 1 CAPSULE(20 MG) BY MOUTH DAILY AS NEEDED FOR REFLUX, Disp: 90 capsule, Rfl: 0  •  triamcinolone (KENALOG) 0 1 % cream, Apply topically 2 (two) times a day, Disp: 30 g, Rfl: 0  •  hydrOXYzine HCL (ATARAX) 25 mg tablet, Take 1 tablet (25 mg total) by mouth every 6 (six) hours as needed for anxiety (Patient not taking: Reported on 5/23/2023), Disp: 20 tablet, Rfl: 0  •  melatonin 1 mg, Take 1 tablet (1 mg total) by mouth daily at bedtime (Patient not taking: Reported on 5/23/2023), Disp: 30 tablet, Rfl: 0  Allergies   Allergen Reactions   • Shellfish-Derived Products - Food Allergy Nausea Only   • Lisinopril Cough       Past Medical History:   Diagnosis Date   • Cholelithiasis     lap jewell today 4/13/2022   • Full dentures    • GERD (gastroesophageal reflux disease)    • Hypertension    • Pancreatic cyst     drained   • Wears glasses      Social History     Socioeconomic History   • Marital status: /Civil Union     Spouse name: Not on file   • Number of children: Not on file   • Years of education: Not on file   • Highest education level: Not on file   Occupational History   • Not on file   Tobacco Use   • Smoking status: Never   • Smokeless tobacco: Never   Vaping Use   • Vaping Use: Never used   Substance and Sexual Activity   • Alcohol use: No   • Drug use: No   • Sexual activity: Yes     Partners: Male   Other Topics Concern   • Not on file   Social History Narrative    ** Merged History Encounter **          Social Determinants of Health     Financial Resource Strain: Medium Risk (5/23/2023)    Overall Financial Resource Strain (CARDIA)    • Difficulty of Paying Living Expenses: Somewhat hard   Food Insecurity: Not on file   Transportation Needs: No Transportation Needs (5/23/2023)    PRAPARE - Transportation    • Lack of Transportation (Medical):  No • Lack of Transportation (Non-Medical): No   Physical Activity: Not on file   Stress: Not on file   Social Connections: Not on file   Intimate Partner Violence: Not on file   Housing Stability: Not on file      Family History   Problem Relation Age of Onset   • Diabetes Mother    • Hypertension Mother    • Hypertension Father    • Stroke Father    • No Known Problems Sister    • No Known Problems Brother    • No Known Problems Daughter    • No Known Problems Maternal Grandmother    • No Known Problems Maternal Grandfather    • No Known Problems Paternal Grandmother    • No Known Problems Paternal Grandfather    • No Known Problems Sister    • No Known Problems Sister    • No Known Problems Sister    • No Known Problems Sister    • No Known Problems Daughter    • No Known Problems Maternal Aunt    • No Known Problems Maternal Aunt    • No Known Problems Maternal Aunt    • No Known Problems Paternal Aunt    • No Known Problems Paternal Aunt    • No Known Problems Paternal Aunt    • No Known Problems Paternal Aunt    • No Known Problems Paternal Aunt      Past Surgical History:   Procedure Laterality Date   • CATARACT EXTRACTION     • COLONOSCOPY  11/2020   • CA LAPAROSCOPY SURG CHOLECYSTECTOMY N/A 4/13/2022    Procedure: ROBOTIC LAP JACQUE;  Surgeon: Eliana Goodwin MD;  Location: [facility];  Service: General   • TUBAL LIGATION      Ectopic pregnancy 1979   • UPPER GASTROINTESTINAL ENDOSCOPY  11/2020       PREVIOUS WEIGHTS:   Wt Readings from Last 10 Encounters:   05/25/23 65 6 kg (144 lb 9 6 oz)   05/23/23 66 2 kg (146 lb)   05/08/23 65 8 kg (145 lb)   04/25/23 65 8 kg (145 lb)   05/11/23 66 2 kg (146 lb)   02/23/23 66 3 kg (146 lb 3 2 oz)   02/21/23 65 8 kg (145 lb)   11/29/22 68 kg (150 lb)   11/21/22 68 kg (150 lb)   11/10/22 68 6 kg (151 lb 3 2 oz)        Review of Systems:  Review of Systems   Respiratory: Negative for cough, choking, chest tightness, shortness of breath and wheezing      Cardiovascular: "Negative for chest pain, palpitations and leg swelling  Musculoskeletal: Negative for gait problem  Skin: Negative for rash  Neurological: Negative for dizziness, tremors, syncope, weakness, light-headedness, numbness and headaches  Psychiatric/Behavioral: Negative for agitation and behavioral problems  The patient is not hyperactive  Physical Exam:  /70 (BP Location: Right arm, Patient Position: Sitting, Cuff Size: Adult)   Pulse 64   Ht 5' 2\" (1 575 m)   Wt 65 6 kg (144 lb 9 6 oz)   BMI 26 45 kg/m²     Physical Exam  Constitutional:       General: She is not in acute distress  Appearance: She is well-developed  HENT:      Head: Normocephalic and atraumatic  Neck:      Thyroid: No thyromegaly  Vascular: No carotid bruit or JVD  Trachea: No tracheal deviation  Cardiovascular:      Rate and Rhythm: Normal rate and regular rhythm  Pulses: Normal pulses  Heart sounds: Normal heart sounds  No murmur heard  No friction rub  No gallop  Pulmonary:      Effort: Pulmonary effort is normal  No respiratory distress  Breath sounds: Normal breath sounds  No wheezing, rhonchi or rales  Chest:      Chest wall: No tenderness  Musculoskeletal:         General: Normal range of motion  Cervical back: Normal range of motion and neck supple  Right lower leg: No edema  Left lower leg: No edema  Skin:     General: Skin is warm and dry  Neurological:      General: No focal deficit present  Mental Status: She is alert and oriented to person, place, and time  Psychiatric:         Mood and Affect: Mood normal          Behavior: Behavior normal          Thought Content: Thought content normal          Judgment: Judgment normal      ======================================================  Imaging:   I have personally reviewed pertinent reports  I spent 30 minutes on the patient's office visit  This time was spent on the day of the visit   I had " "direct contact with the patient in the office on the day of the visit  Greater than 50% of the total time was spent obtaining a history, examining patient, answering all patient questions, arranging and discussing plan of care with patient as well as directly providing instructions  Additional time then spent on orders and office chart  Portions of the record may have been created with voice recognition software  Occasional wrong word or \"sound a like\" substitutions may have occurred due to the inherent limitations of voice recognition software  Read the chart carefully and recognize, using context, where substitutions have occurred      SIGNATURES:   Sha Collins MD   "

## 2023-05-30 ENCOUNTER — OFFICE VISIT (OUTPATIENT)
Dept: PODIATRY | Facility: CLINIC | Age: 78
End: 2023-05-30

## 2023-05-30 VITALS — DIASTOLIC BLOOD PRESSURE: 75 MMHG | SYSTOLIC BLOOD PRESSURE: 145 MMHG | HEART RATE: 88 BPM

## 2023-05-30 DIAGNOSIS — S91.302A OPEN WOUND OF LEFT FOOT, INITIAL ENCOUNTER: Primary | ICD-10-CM

## 2023-05-30 DIAGNOSIS — L02.619 ABSCESS OF FOOT: ICD-10-CM

## 2023-05-30 NOTE — PROGRESS NOTES
"Assessment/Plan:         Diagnoses and all orders for this visit:    Open wound of left foot, initial encounter    Abscess of foot  -     Ambulatory Referral to Podiatry      Diagnosis and options discussed with patient  Patient agreeable to the plan as stated below  The acute wound is healed but this has been a chronic issue due to the skin lesion  We can consider excision but she would like to avoid surgery and stitches  MRI reviewed with patient  Low suspicion for bone infection    She finished antibiotics  MAke sure shoes are not too tight  If the wound reopens, consider excision  Subjective:      Patient ID: Germán Manzanares is a 78 y o  female  5/8/2023: Patient has been referred to podiatry for an abscess on her toe  She mentioned it when she saw her PCP 2 weeks ago  The is a skin lesion on the toe that is draining  This occurred once before  Her first doctor put her on a cream    She was put on Bactrim by her PCP and tolerated the medication  She had bunion surgery many years ago on the toe  She has been to two other podiatrists and they \"couldn't help me  \"    5/30/2023: Patient was given a CAM boot to offload her foot ulcer but refused to take it with her  She did get an MRI last week which was negative for a bone infection  The wound is closed      The following portions of the patient's history were reviewed and updated as appropriate: allergies, current medications, past family history, past medical history, past social history, past surgical history and problem list     Review of Systems     Constitutional: Negative  Respiratory: Negative for cough and shortness of breath  Gastrointestinal: Negative for diarrhea, nausea and vomiting  Musculoskeletal: Negative for arthralgias, gait problem, joint swelling and myalgias  Skin: callus   Neurological: Negative for weakness, numbness and headaches           Objective:      /75   Pulse 88          Physical " Exam  Vitals reviewed  Cardiovascular:      Rate and Rhythm: Normal rate  Pulses: Normal pulses  Pulmonary:      Effort: No respiratory distress  Musculoskeletal:         General: No deformity  Skin:     Findings: Lesion (callus over the medial left first MTPJ, the previous ulcer is healed  ) present  Neurological:      Mental Status: She is alert  Sensory: No sensory deficit  MRI personally reviewed of left foot  There is some slight marrow edema but no T1 replacement, low suspicion for a chronic bone infection

## 2023-06-01 ENCOUNTER — VBI (OUTPATIENT)
Dept: ADMINISTRATIVE | Facility: OTHER | Age: 78
End: 2023-06-01

## 2023-06-06 ENCOUNTER — HOSPITAL ENCOUNTER (OUTPATIENT)
Dept: CT IMAGING | Facility: HOSPITAL | Age: 78
Discharge: HOME | End: 2023-06-06
Attending: INTERNAL MEDICINE
Payer: COMMERCIAL

## 2023-06-06 ENCOUNTER — APPOINTMENT (OUTPATIENT)
Dept: LAB | Facility: HOSPITAL | Age: 78
End: 2023-06-06
Payer: COMMERCIAL

## 2023-06-06 ENCOUNTER — TELEPHONE (OUTPATIENT)
Dept: INTERNAL MEDICINE CLINIC | Facility: CLINIC | Age: 78
End: 2023-06-06

## 2023-06-06 ENCOUNTER — OFFICE VISIT (OUTPATIENT)
Dept: INTERNAL MEDICINE CLINIC | Facility: CLINIC | Age: 78
End: 2023-06-06
Payer: COMMERCIAL

## 2023-06-06 VITALS
SYSTOLIC BLOOD PRESSURE: 120 MMHG | BODY MASS INDEX: 27.05 KG/M2 | RESPIRATION RATE: 16 BRPM | TEMPERATURE: 97.5 F | OXYGEN SATURATION: 97 % | HEART RATE: 65 BPM | WEIGHT: 147 LBS | DIASTOLIC BLOOD PRESSURE: 76 MMHG | HEIGHT: 62 IN

## 2023-06-06 DIAGNOSIS — R10.32 LLQ PAIN: Primary | ICD-10-CM

## 2023-06-06 DIAGNOSIS — R10.32 LLQ PAIN: ICD-10-CM

## 2023-06-06 DIAGNOSIS — K57.92 ACUTE DIVERTICULITIS: Primary | ICD-10-CM

## 2023-06-06 LAB
ALBUMIN SERPL BCP-MCNC: 4.3 G/DL (ref 3.5–5)
ALP SERPL-CCNC: 83 U/L (ref 34–104)
ALT SERPL W P-5'-P-CCNC: 21 U/L (ref 7–52)
ANION GAP SERPL CALCULATED.3IONS-SCNC: 7 MMOL/L (ref 4–13)
AST SERPL W P-5'-P-CCNC: 28 U/L (ref 13–39)
BILIRUB SERPL-MCNC: 0.78 MG/DL (ref 0.2–1)
BUN SERPL-MCNC: 12 MG/DL (ref 5–25)
CALCIUM SERPL-MCNC: 9.9 MG/DL (ref 8.4–10.2)
CHLORIDE SERPL-SCNC: 102 MMOL/L (ref 96–108)
CO2 SERPL-SCNC: 27 MMOL/L (ref 21–32)
CREAT SERPL-MCNC: 0.66 MG/DL (ref 0.6–1.3)
GFR SERPL CREATININE-BSD FRML MDRD: 86 ML/MIN/1.73SQ M
GLUCOSE SERPL-MCNC: 98 MG/DL (ref 65–140)
POTASSIUM SERPL-SCNC: 4 MMOL/L (ref 3.5–5.3)
PROT SERPL-MCNC: 7.7 G/DL (ref 6.4–8.4)
SODIUM SERPL-SCNC: 136 MMOL/L (ref 135–147)

## 2023-06-06 PROCEDURE — 99214 OFFICE O/P EST MOD 30 MIN: CPT | Performed by: INTERNAL MEDICINE

## 2023-06-06 PROCEDURE — 80053 COMPREHEN METABOLIC PANEL: CPT

## 2023-06-06 PROCEDURE — 74177 CT ABD & PELVIS W/CONTRAST: CPT

## 2023-06-06 PROCEDURE — G1004 CDSM NDSC: HCPCS

## 2023-06-06 PROCEDURE — 36415 COLL VENOUS BLD VENIPUNCTURE: CPT

## 2023-06-06 RX ORDER — AMOXICILLIN AND CLAVULANATE POTASSIUM 875; 125 MG/1; MG/1
1 TABLET, FILM COATED ORAL EVERY 12 HOURS SCHEDULED
Qty: 20 TABLET | Refills: 0 | Status: SHIPPED | OUTPATIENT
Start: 2023-06-06 | End: 2023-06-16

## 2023-06-06 RX ADMIN — IOHEXOL 100 ML: 350 INJECTION, SOLUTION INTRAVENOUS at 17:20

## 2023-06-06 NOTE — PROGRESS NOTES
INTERNAL MEDICINE OFFICE VISIT  [facility] Internal Medicine- [place]    NAME: Cleve Henning  AGE: 78 y o  SEX: female    DATE OF ENCOUNTER: 6/6/2023    Assessment and Plan/History of Present Illness     Here today for same-day appointment  Medical history of GERD, hypertension, pancreatic cysts, PACs, prediabetes, depression, moderate aortic dilatation, gallstones status post cholecystectomy    3 to 4-day history of left lower quadrant pain  Relatively sudden in onset  Crampy in quality  Associated chills without fever  No nausea or vomiting  She has had lack of appetite  Endorses lots of gas, no diarrhea or constipation  She has history of cholecystectomy in 2022  Appears she had a prior episode of diverticulitis in 2020 with similar presentation    On exam, significant tenderness to palpation of the left lower quadrant with voluntary guarding  No rebound      1  LLQ pain  -We will obtain stat CT of the abdomen/pelvis to evaluate for possible diverticulitis    Patient may use meloxicam in the meantime for pain relief    - CT abdomen pelvis w contrast; Future          Orders Placed This Encounter   Procedures   • CT abdomen pelvis w contrast       Chief Complaint     Chief Complaint   Patient presents with   • Abdominal Pain     Left side pain, for 3 days, thinks it might be diverticulitis flare up        Review of Systems     10 point ROS negative except per HPI    The following portions of the patient's history were reviewed and updated as appropriate: allergies, current medications, past family history, past medical history, past social history, past surgical history and problem list     Active Problem List     Patient Active Problem List   Diagnosis   • Essential hypertension   • Gastroesophageal reflux disease   • Primary osteoarthritis of both knees   • Osteopenia   • Cervical radiculopathy   • Aortic dilatation (HCC)   • Nonrheumatic aortic valve insufficiency   • Abnormal EKG   • "Pre-diabetes   • Numbness and tingling of left arm and leg   • Carpal tunnel syndrome of left wrist   • Vertigo   • Abnormal liver enzymes   • Vitamin D deficiency   • Tarsal tunnel syndrome of left side   • Primary hypercholesterolemia   • Status post cholecystectomy   • Increased urinary frequency   • COVID-19 virus infection   • Open wound of left foot   • Acute pain of right shoulder       Objective     /76 (BP Location: Left arm, Patient Position: Sitting, Cuff Size: Standard)   Pulse 65   Temp 97 5 °F (36 4 °C)   Resp 16   Ht 5' 2\" (1 575 m)   Wt 66 7 kg (147 lb)   SpO2 97%   BMI 26 89 kg/m²     Physical Exam  Abdominal:      Tenderness: There is abdominal tenderness in the left lower quadrant  There is guarding  There is no rebound  Pertinent Laboratory/Diagnostic Studies:  MRI foot/forefoot toes left wo contrast    Result Date: 5/24/2023  Impression: Increased T2 signal first proximal phalangeal base and first metatarsal head and neck which may suggest stress response  No abnormal T1 signal to suggest osteomyelitis  Hallux valgus as seen on radiographs   Workstation performed: [id number]       Images and diagnostics reviewed     Current Medications     Current Outpatient Medications:   •  acetaminophen (TYLENOL) 325 mg tablet, Take 650 mg by mouth every 6 (six) hours as needed for mild pain, Disp: , Rfl:   •  Aspirin 81 MG CAPS, Take 81 mg by mouth daily, Disp: 90 capsule, Rfl: 0  •  cholecalciferol (VITAMIN D3) 1,000 units tablet, Take 2 tablets (2,000 Units total) by mouth daily, Disp: 180 tablet, Rfl: 1  •  Diclofenac Sodium (VOLTAREN) 1 %, Apply 2 g topically 4 (four) times a day, Disp: 100 g, Rfl: 0  •  gabapentin (Neurontin) 100 mg capsule, Take 1 capsule (100 mg total) by mouth 2 (two) times a day, Disp: 90 capsule, Rfl: 0  •  losartan (COZAAR) 25 mg tablet, Take 1 tablet (25 mg total) by mouth every morning, Disp: 90 tablet, Rfl: 1  •  meloxicam (MOBIC) 7 5 mg tablet, TAKE 1 " TABLET(7 5 MG) BY MOUTH DAILY AS NEEDED FOR MODERATE PAIN, Disp: 30 tablet, Rfl: 0  •  metoprolol succinate (TOPROL-XL) 25 mg 24 hr tablet, Take 1 tablet (25 mg total) by mouth every morning, Disp: 90 tablet, Rfl: 1  •  metoprolol succinate (TOPROL-XL) 50 mg 24 hr tablet, TAKE 2 TABLETS BY MOUTH DAILY, Disp: 180 tablet, Rfl: 1  •  Multiple Vitamin (multivitamin) capsule, Take 1 capsule by mouth every morning, Disp: 90 capsule, Rfl: 1  •  omeprazole (PriLOSEC) 20 mg delayed release capsule, TAKE 1 CAPSULE(20 MG) BY MOUTH DAILY AS NEEDED FOR REFLUX, Disp: 90 capsule, Rfl: 0  •  triamcinolone (KENALOG) 0 1 % cream, Apply topically 2 (two) times a day, Disp: 30 g, Rfl: 0  •  hydrOXYzine HCL (ATARAX) 25 mg tablet, Take 1 tablet (25 mg total) by mouth every 6 (six) hours as needed for anxiety (Patient not taking: Reported on 5/23/2023), Disp: 20 tablet, Rfl: 0  •  melatonin 1 mg, Take 1 tablet (1 mg total) by mouth daily at bedtime (Patient not taking: Reported on 5/23/2023), Disp: 30 tablet, Rfl: 0    Health Maintenance     Health Maintenance   Topic Date Due   • COVID-19 Vaccine (3 - Pfizer series) 07/15/2021   • Breast Cancer Screening: Mammogram  01/24/2023   • DXA SCAN  01/24/2024   • BMI: Followup Plan  02/21/2024   • Fall Risk  05/23/2024   • Depression Screening  05/23/2024   • Urinary Incontinence Screening  05/23/2024   • Medicare Annual Wellness Visit (AWV)  05/23/2024   • BMI: Adult  05/25/2024   • Colorectal Cancer Screening  11/05/2030   • Hepatitis C Screening  Completed   • Osteoporosis Screening  Completed   • Pneumococcal Vaccine: 65+ Years  Completed   • Influenza Vaccine  Completed   • HIB Vaccine  Aged Out   • IPV Vaccine  Aged Out   • Hepatitis A Vaccine  Aged Out   • Meningococcal ACWY Vaccine  Aged Out   • HPV Vaccine  Aged Out     Immunization History   Administered Date(s) Administered   • COVID-19 PFIZER VACCINE 0 3 ML IM 04/30/2021, 05/20/2021   • INFLUENZA 08/29/2018   • Influenza, high dose seasonal 0 7 mL 08/29/2018, 10/24/2019, 10/22/2020, 11/22/2021, 11/21/2022   • Pneumococcal Conjugate 13-Valent 07/18/2018   • Pneumococcal Polysaccharide PPV23 07/16/2020       RADHA Whitfield  Internal Medicine [place] [address] [address], [facility] SPECIALTY [facility] [address]  [facility], [address]   Office: [phone]  Fax: [phone]

## 2023-06-06 NOTE — TELEPHONE ENCOUNTER
Spoke with patient's granddaughter over the phone to review CT results  There is finding of acute uncomplicated sigmoid diverticulitis  Recommend treatment with 10 days of Augmentin  Can try liquid diet at this time and slowly introduce softer foods as tolerated  Meloxicam prn for pain  We discussed that follow-up colonoscopy should be considered in 6 to 8 weeks after resolution of symptoms  Granddaughter understood and she is agreeable to the plan    Incidental findings of anterior lung base opacities, favored to represent nodular atelectasis per radiology    Likely no follow-up needed

## 2023-06-09 ENCOUNTER — HOSPITAL ENCOUNTER (EMERGENCY)
Facility: HOSPITAL | Age: 78
Discharge: HOME | End: 2023-06-09
Attending: EMERGENCY MEDICINE
Payer: COMMERCIAL

## 2023-06-09 ENCOUNTER — APPOINTMENT (EMERGENCY)
Dept: CT IMAGING | Facility: HOSPITAL | Age: 78
End: 2023-06-09
Payer: COMMERCIAL

## 2023-06-09 VITALS
WEIGHT: 139.55 LBS | SYSTOLIC BLOOD PRESSURE: 122 MMHG | HEART RATE: 69 BPM | TEMPERATURE: 98.5 F | RESPIRATION RATE: 18 BRPM | BODY MASS INDEX: 25.52 KG/M2 | DIASTOLIC BLOOD PRESSURE: 60 MMHG | OXYGEN SATURATION: 96 %

## 2023-06-09 DIAGNOSIS — R10.9 ABDOMINAL PAIN: ICD-10-CM

## 2023-06-09 DIAGNOSIS — K57.92 DIVERTICULITIS: Primary | ICD-10-CM

## 2023-06-09 LAB
ALBUMIN SERPL BCP-MCNC: 4 G/DL (ref 3.5–5)
ALP SERPL-CCNC: 87 U/L (ref 34–104)
ALT SERPL W P-5'-P-CCNC: 19 U/L (ref 7–52)
ANION GAP SERPL CALCULATED.3IONS-SCNC: 8 MMOL/L (ref 4–13)
AST SERPL W P-5'-P-CCNC: 25 U/L (ref 13–39)
BACTERIA UR QL AUTO: ABNORMAL /HPF
BASOPHILS # BLD AUTO: 0.03 THOUSANDS/ÂΜL (ref 0–0.1)
BASOPHILS NFR BLD AUTO: 0 % (ref 0–1)
BILIRUB SERPL-MCNC: 0.67 MG/DL (ref 0.2–1)
BILIRUB UR QL STRIP: NEGATIVE
BUN SERPL-MCNC: 11 MG/DL (ref 5–25)
CALCIUM SERPL-MCNC: 9.2 MG/DL (ref 8.4–10.2)
CHLORIDE SERPL-SCNC: 102 MMOL/L (ref 96–108)
CLARITY UR: CLEAR
CO2 SERPL-SCNC: 23 MMOL/L (ref 21–32)
COLOR UR: ABNORMAL
CREAT SERPL-MCNC: 0.62 MG/DL (ref 0.6–1.3)
EOSINOPHIL # BLD AUTO: 0.02 THOUSAND/ÂΜL (ref 0–0.61)
EOSINOPHIL NFR BLD AUTO: 0 % (ref 0–6)
ERYTHROCYTE [DISTWIDTH] IN BLOOD BY AUTOMATED COUNT: 12.9 % (ref 11.6–15.1)
GFR SERPL CREATININE-BSD FRML MDRD: 88 ML/MIN/1.73SQ M
GLUCOSE SERPL-MCNC: 108 MG/DL (ref 65–140)
GLUCOSE UR STRIP-MCNC: NEGATIVE MG/DL
HCT VFR BLD AUTO: 36.1 % (ref 34.8–46.1)
HGB BLD-MCNC: 11.7 G/DL (ref 11.5–15.4)
HGB UR QL STRIP.AUTO: ABNORMAL
HYALINE CASTS #/AREA URNS LPF: ABNORMAL /LPF
IMM GRANULOCYTES # BLD AUTO: 0.04 THOUSAND/UL (ref 0–0.2)
IMM GRANULOCYTES NFR BLD AUTO: 0 % (ref 0–2)
KETONES UR STRIP-MCNC: ABNORMAL MG/DL
LEUKOCYTE ESTERASE UR QL STRIP: NEGATIVE
LIPASE SERPL-CCNC: 20 U/L (ref 11–82)
LYMPHOCYTES # BLD AUTO: 1.21 THOUSANDS/ÂΜL (ref 0.6–4.47)
LYMPHOCYTES NFR BLD AUTO: 10 % (ref 14–44)
MCH RBC QN AUTO: 30.5 PG (ref 26.8–34.3)
MCHC RBC AUTO-ENTMCNC: 32.4 G/DL (ref 31.4–37.4)
MCV RBC AUTO: 94 FL (ref 82–98)
MONOCYTES # BLD AUTO: 1.08 THOUSAND/ÂΜL (ref 0.17–1.22)
MONOCYTES NFR BLD AUTO: 9 % (ref 4–12)
MUCOUS THREADS UR QL AUTO: ABNORMAL
NEUTROPHILS # BLD AUTO: 9.61 THOUSANDS/ÂΜL (ref 1.85–7.62)
NEUTS SEG NFR BLD AUTO: 81 % (ref 43–75)
NITRITE UR QL STRIP: NEGATIVE
NON-SQ EPI CELLS URNS QL MICRO: ABNORMAL /HPF
NRBC BLD AUTO-RTO: 0 /100 WBCS
PH UR STRIP.AUTO: 6 [PH] (ref 4.5–8)
PLATELET # BLD AUTO: 232 THOUSANDS/UL (ref 149–390)
PMV BLD AUTO: 9.5 FL (ref 8.9–12.7)
POTASSIUM SERPL-SCNC: 3.7 MMOL/L (ref 3.5–5.3)
PROT SERPL-MCNC: 7.3 G/DL (ref 6.4–8.4)
PROT UR STRIP-MCNC: NEGATIVE MG/DL
RBC # BLD AUTO: 3.83 MILLION/UL (ref 3.81–5.12)
RBC #/AREA URNS AUTO: ABNORMAL /HPF
SODIUM SERPL-SCNC: 133 MMOL/L (ref 135–147)
SP GR UR STRIP.AUTO: 1.01 (ref 1–1.03)
UROBILINOGEN UR QL STRIP.AUTO: 1 E.U./DL
WBC # BLD AUTO: 11.99 THOUSAND/UL (ref 4.31–10.16)
WBC #/AREA URNS AUTO: ABNORMAL /HPF

## 2023-06-09 PROCEDURE — 85025 COMPLETE CBC W/AUTO DIFF WBC: CPT | Performed by: EMERGENCY MEDICINE

## 2023-06-09 PROCEDURE — 81001 URINALYSIS AUTO W/SCOPE: CPT

## 2023-06-09 PROCEDURE — 99285 EMERGENCY DEPT VISIT HI MDM: CPT

## 2023-06-09 PROCEDURE — 96374 THER/PROPH/DIAG INJ IV PUSH: CPT

## 2023-06-09 PROCEDURE — 80053 COMPREHEN METABOLIC PANEL: CPT | Performed by: EMERGENCY MEDICINE

## 2023-06-09 PROCEDURE — 74177 CT ABD & PELVIS W/CONTRAST: CPT

## 2023-06-09 PROCEDURE — 36415 COLL VENOUS BLD VENIPUNCTURE: CPT | Performed by: EMERGENCY MEDICINE

## 2023-06-09 PROCEDURE — G1004 CDSM NDSC: HCPCS

## 2023-06-09 PROCEDURE — 83690 ASSAY OF LIPASE: CPT | Performed by: EMERGENCY MEDICINE

## 2023-06-09 RX ORDER — KETOROLAC TROMETHAMINE 30 MG/ML
15 INJECTION, SOLUTION INTRAMUSCULAR; INTRAVENOUS ONCE
Status: COMPLETED | OUTPATIENT
Start: 2023-06-09 | End: 2023-06-09

## 2023-06-09 RX ADMIN — KETOROLAC TROMETHAMINE 15 MG: 30 INJECTION, SOLUTION INTRAMUSCULAR; INTRAVENOUS at 15:43

## 2023-06-09 RX ADMIN — IOHEXOL 100 ML: 350 INJECTION, SOLUTION INTRAVENOUS at 16:24

## 2023-06-09 NOTE — DISCHARGE INSTRUCTIONS
Follow-up with PCP for further care  Contact info provided below if needed  Use over the counter medications as stated on the bottle as needed for pain control  Continue previously prescribed antibiotic to completion  Return to the ED with new or worsening symptoms

## 2023-06-09 NOTE — ED PROVIDER NOTES
History  Chief Complaint   Patient presents with   • Abdominal Pain     Patient c/o LLQ pain that began 8 days ago but is worse today  Patient diagnosed with diverticulitis and is taking amoxicillin without relief  Pt is a 79yo F who presents for abdominal pain  Patient reports for 8 days she has had left lower quadrant abdominal pain  Patient was seen by her primary care provider 3 days ago and diagnosed with diverticulitis on CT scan  Patient was started on Augmentin and recommended to take meloxicam for pain  Patient states she has been taking both of these but her pain has been worsening  Patient also reporting chills now but denies any fevers  Patient denies any nausea, vomiting, diarrhea  Patient reports she had a bowel movement earlier today that was abnormal for her but denies any blood or loose stools  She reports previous  section but denies any other abdominal surgeries  Patient takes her medications regularly with no recent changes aside from the addition of the Augmentin  Prior to Admission Medications   Prescriptions Last Dose Informant Patient Reported? Taking?    Aspirin 81 MG CAPS  Self No Yes   Sig: Take 81 mg by mouth daily   Diclofenac Sodium (VOLTAREN) 1 %  Self No Yes   Sig: Apply 2 g topically 4 (four) times a day   Multiple Vitamin (multivitamin) capsule  Self No Yes   Sig: Take 1 capsule by mouth every morning   acetaminophen (TYLENOL) 325 mg tablet  Self Yes Yes   Sig: Take 650 mg by mouth every 6 (six) hours as needed for mild pain   amoxicillin-clavulanate (AUGMENTIN) 875-125 mg per tablet   No Yes   Sig: Take 1 tablet by mouth every 12 (twelve) hours for 10 days   cholecalciferol (VITAMIN D3) 1,000 units tablet  Self No Yes   Sig: Take 2 tablets (2,000 Units total) by mouth daily   gabapentin (Neurontin) 100 mg capsule   No Yes   Sig: Take 1 capsule (100 mg total) by mouth 2 (two) times a day   hydrOXYzine HCL (ATARAX) 25 mg tablet Not Taking Self No No Sig: Take 1 tablet (25 mg total) by mouth every 6 (six) hours as needed for anxiety   Patient not taking: Reported on 5/23/2023   losartan (COZAAR) 25 mg tablet   No Yes   Sig: Take 1 tablet (25 mg total) by mouth every morning   melatonin 1 mg Not Taking Self No No   Sig: Take 1 tablet (1 mg total) by mouth daily at bedtime   Patient not taking: Reported on 5/23/2023   meloxicam (MOBIC) 7 5 mg tablet   No Yes   Sig: TAKE 1 TABLET(7 5 MG) BY MOUTH DAILY AS NEEDED FOR MODERATE PAIN   metoprolol succinate (TOPROL-XL) 25 mg 24 hr tablet   No Yes   Sig: Take 1 tablet (25 mg total) by mouth every morning   metoprolol succinate (TOPROL-XL) 50 mg 24 hr tablet  Self No Yes   Sig: TAKE 2 TABLETS BY MOUTH DAILY   omeprazole (PriLOSEC) 20 mg delayed release capsule   No Yes   Sig: TAKE 1 CAPSULE(20 MG) BY MOUTH DAILY AS NEEDED FOR REFLUX   triamcinolone (KENALOG) 0 1 % cream  Self No Yes   Sig: Apply topically 2 (two) times a day      Facility-Administered Medications: None       Past Medical History:   Diagnosis Date   • Cholelithiasis     lap jewell today 4/13/2022   • Full dentures    • GERD (gastroesophageal reflux disease)    • Hypertension    • Pancreatic cyst     drained   • Wears glasses        Past Surgical History:   Procedure Laterality Date   • CATARACT EXTRACTION     • COLONOSCOPY  11/2020   • MS LAPAROSCOPY SURG CHOLECYSTECTOMY N/A 4/13/2022    Procedure: ROBOTIC LAP JEWELL;  Surgeon: Amarjit Hull MD;  Location: [facility] OR;  Service: General   • TUBAL LIGATION      Ectopic pregnancy 1979   • UPPER GASTROINTESTINAL ENDOSCOPY  11/2020       Family History   Problem Relation Age of Onset   • Diabetes Mother    • Hypertension Mother    • Hypertension Father    • Stroke Father    • No Known Problems Sister    • No Known Problems Brother    • No Known Problems Daughter    • No Known Problems Maternal Grandmother    • No Known Problems Maternal Grandfather    • No Known Problems Paternal Grandmother    • No Known Problems Paternal Grandfather    • No Known Problems Sister    • No Known Problems Sister    • No Known Problems Sister    • No Known Problems Sister    • No Known Problems Daughter    • No Known Problems Maternal Aunt    • No Known Problems Maternal Aunt    • No Known Problems Maternal Aunt    • No Known Problems Paternal Aunt    • No Known Problems Paternal Aunt    • No Known Problems Paternal Aunt    • No Known Problems Paternal Aunt    • No Known Problems Paternal Aunt      I have reviewed and agree with the history as documented  E-Cigarette/Vaping   • E-Cigarette Use Never User      E-Cigarette/Vaping Substances     Social History     Tobacco Use   • Smoking status: Never   • Smokeless tobacco: Never   Vaping Use   • Vaping Use: Never used   Substance Use Topics   • Alcohol use: No   • Drug use: No        Review of Systems   Constitutional: Positive for chills  Gastrointestinal: Positive for abdominal pain (LLQ)  Genitourinary: Positive for difficulty urinating  All other systems reviewed and are negative  Physical Exam  ED Triage Vitals   Temperature Pulse Respirations Blood Pressure SpO2   06/09/23 1610 06/09/23 1500 06/09/23 1500 06/09/23 1500 06/09/23 1500   98 5 °F (36 9 °C) 74 18 123/76 98 %      Temp Source Heart Rate Source Patient Position - Orthostatic VS BP Location FiO2 (%)   06/09/23 1610 06/09/23 1500 06/09/23 1500 06/09/23 1500 --   Oral Monitor Lying Right arm       Pain Score       06/09/23 1543       10 - Worst Possible Pain             Orthostatic Vital Signs  Vitals:    06/09/23 1500 06/09/23 1644   BP: 123/76 122/60   Pulse: 74 69   Patient Position - Orthostatic VS: Lying Lying       Physical Exam  Vitals reviewed  Constitutional:       General: She is not in acute distress  Appearance: She is well-developed  She is not toxic-appearing or diaphoretic  HENT:      Head: Normocephalic and atraumatic        Right Ear: External ear normal       Left Ear: External ear normal       Nose: Nose normal       Mouth/Throat:      Pharynx: Oropharynx is clear  Eyes:      Extraocular Movements: Extraocular movements intact  Pupils: Pupils are equal, round, and reactive to light  Cardiovascular:      Rate and Rhythm: Normal rate and regular rhythm  Heart sounds: Normal heart sounds  Pulmonary:      Effort: Pulmonary effort is normal  No respiratory distress  Breath sounds: Normal breath sounds  Abdominal:      General: There is no distension  Palpations: Abdomen is soft  Tenderness: There is abdominal tenderness (LUQ and LLQ)  There is no right CVA tenderness, left CVA tenderness, guarding or rebound  Musculoskeletal:         General: Normal range of motion  Cervical back: Normal range of motion and neck supple  Right lower leg: No edema  Left lower leg: No edema  Skin:     General: Skin is warm and dry  Capillary Refill: Capillary refill takes less than 2 seconds  Coloration: Skin is not pale  Findings: No erythema or rash  Neurological:      General: No focal deficit present  Mental Status: She is alert and oriented to person, place, and time  Psychiatric:         Speech: Speech normal          Behavior: Behavior is cooperative           ED Medications  Medications   ketorolac (TORADOL) injection 15 mg (15 mg Intravenous Given 6/9/23 1543)   iohexol (OMNIPAQUE) 350 MG/ML injection (SINGLE-DOSE) 100 mL (100 mL Intravenous Given 6/9/23 1624)       Diagnostic Studies  Results Reviewed     Procedure Component Value Units Date/Time    Urine Microscopic [[id number]]  (Abnormal) Collected: 06/09/23 1546    Lab Status: Final result Specimen: Urine, Clean Catch Updated: 06/09/23 1605     RBC, UA 1-2 /hpf      WBC, UA 2-4 /hpf      Epithelial Cells Occasional /hpf      Bacteria, UA None Seen /hpf      MUCUS THREADS Occasional     Hyaline Casts, UA 0-3 /lpf     Comprehensive metabolic panel [[id number]]  (Abnormal) Collected: 06/09/23 1525    Lab Status: Final result Specimen: Blood from Arm, Right Updated: 06/09/23 1557     Sodium 133 mmol/L      Potassium 3 7 mmol/L      Chloride 102 mmol/L      CO2 23 mmol/L      ANION GAP 8 mmol/L      BUN 11 mg/dL      Creatinine 0 62 mg/dL      Glucose 108 mg/dL      Calcium 9 2 mg/dL      AST 25 U/L      ALT 19 U/L      Alkaline Phosphatase 87 U/L      Total Protein 7 3 g/dL      Albumin 4 0 g/dL      Total Bilirubin 0 67 mg/dL      eGFR 88 ml/min/1 73sq m     Narrative:      National Kid[employer] Disease Foundation guidelines for Chronic Kidney Disease (CKD):   •  Stage 1 with normal or high GFR (GFR > 90 mL/min/1 73 square meters)  •  Stage 2 Mild CKD (GFR = 60-89 mL/min/1 73 square meters)  •  Stage 3A Moderate CKD (GFR = 45-59 mL/min/1 73 square meters)  •  Stage 3B Moderate CKD (GFR = 30-44 mL/min/1 73 square meters)  •  Stage 4 Severe CKD (GFR = 15-29 mL/min/1 73 square meters)  •  Stage 5 End Stage CKD (GFR <15 mL/min/1 73 square meters)  Note: GFR calculation is accurate only with a steady state creatinine    Lipase [[id number]]  (Normal) Collected: 06/09/23 1525    Lab Status: Final result Specimen: Blood from Arm, Right Updated: 06/09/23 1557     Lipase 20 u/L     Urine Macroscopic, POC [[id number]]  (Abnormal) Collected: 06/09/23 1546    Lab Status: Final result Specimen: Urine Updated: 06/09/23 1547     Color, UA Zeinab     Clarity, UA Clear     pH, UA 6 0     Leukocytes, UA Negative     Nitrite, UA Negative     Protein, UA Negative mg/dl      Glucose, UA Negative mg/dl      Ketones, UA 40 (2+) mg/dl      Urobilinogen, UA 1 0 E U /dl      Bilirubin, UA Negative     Occult Blood, UA Trace     Specific False Pass, UA 1 010    Narrative:      CLINITEK RESULT    CBC and differential [[id number]]  (Abnormal) Collected: 06/09/23 1525    Lab Status: Final result Specimen: Blood from Arm, Right Updated: 06/09/23 1536     WBC 11 99 Thousand/uL      RBC 3 83 Million/uL      Hemoglobin 11 7 g/dL Hematocrit 36 1 %      MCV 94 fL      MCH 30 5 pg      MCHC 32 4 g/dL      RDW 12 9 %      MPV 9 5 fL      Platelets 576 Thousands/uL      nRBC 0 /100 WBCs      Neutrophils Relative 81 %      Immat GRANS % 0 %      Lymphocytes Relative 10 %      Monocytes Relative 9 %      Eosinophils Relative 0 %      Basophils Relative 0 %      Neutrophils Absolute 9 61 Thousands/µL      Immature Grans Absolute 0 04 Thousand/uL      Lymphocytes Absolute 1 21 Thousands/µL      Monocytes Absolute 1 08 Thousand/µL      Eosinophils Absolute 0 02 Thousand/µL      Basophils Absolute 0 03 Thousands/µL                  CT abdomen pelvis with contrast   ED Interpretation by Oskar [address], DO (06/09 1737)   Interpreted by me as no obvious free air      Final Result by Jennifer Araujo MD (06/09 1735)      Improved uncomplicated acute sigmoid diverticulitis  No new findings  Workstation performed: [id number]               Procedures  POC AAA US    Date/Time: 6/9/2023 4:02 PM    Performed by: Paola Severe, MD  Authorized by: Paola Severe, MD    Patient location:  ED  Performing Provider:  Resident  Procedure details:     Exam Type:  Educational    Indications: abdominal pain      Views Obtained:  Transverse proximal, transverse mid view and transverse distal view    Image quality: limited diagnostic      Image availability:  Images available in PACS  Findings:     Abdominal Aorta Findings: normal      Maximal Aorta diameter (cm):  2 44    Intra-abdominal fluid: not identified            ED Course  ED Course as of 06/09/23 1745   Fri Jun 09, 2023   1536 WBC(!): 11 99  Mildly elevated  Non-diagnostic  Awaiting further labs  No recent prior for comparison  1537 CBC and differential(!)  Reviewed and without actionable derangement  1550 Leukocytes, UA: Negative   1550 Nitrite, UA: Negative   1550 Blood, UA(!): Trace   1601 Comprehensive metabolic panel(!)  Reviewed and without actionable derangement      1601 Lipase: 20  WNL   1613 WBC, UA(!): 2-4   1613 Bacteria, UA: None Seen  Will not treat as infection  46 CT abdomen pelvis with contrast  Improved uncomplicated acute sigmoid diverticulitis  No new findings  - As CT showing improvement, will continue previously prescribed abx     1743 Reassessed pt who is resting comfortably  Discussed all results with pt and daughter as well as plan for DC with continued abx at home as well as symptomatic treatment at home  Both agreeable to plan  Medical Decision Making  Pt is a 79yo F who presents with abdominal pain  Exam pertinent for left-sided abdominal tenderness  Differential diagnosis to include but not limited to uncomplicated diverticulitis, abscess, perf, UTI  Will obtain belly labs and urinalysis as well as repeat CT scan due to worsening of symptoms despite appropriate treatment  Will treat symptomatically and reassess  See ED course for results and details  Plan to discharge pt with f/u to PCP  Discussed returning the ED with new or worsening of symptoms  Discussed use of over the counter medications as stated on the bottle as needed for pain  Discussed taking previously prescribed abx as prescribed and to completion  Pt expressed understanding of discharge instructions, return precautions, and medication instructions and is stable for discharge at this time  All questions were answered and pt was discharged without incident  Amount and/or Complexity of Data Reviewed  Labs: ordered  Decision-making details documented in ED Course  Radiology: ordered  Decision-making details documented in ED Course  Risk  Prescription drug management              Disposition  Final diagnoses:   Abdominal pain   Diverticulitis     Time reflects when diagnosis was documented in both MDM as applicable and the Disposition within this note     Time User Action Codes Description Comment    6/9/2023  5:38 PM Eileen Rock [R10 9] Abdominal pain     6/9/2023  5:38 PM Dev Dewey [K57 92] Diverticulitis     6/9/2023  5:38 PM Demetriakaitlin Do Modify [R10 9] Abdominal pain     6/9/2023  5:38 PM Demetria Do Modify [P71 30] Diverticulitis       ED Disposition     ED Disposition   Discharge    Condition   Stable    Date/Time   Fri Jun 9, 2023  5:38 PM    Comment   Bryan Whiting discharge to home/self care  Follow-up Information     Follow up With Specialties Details Why Contact Info    Denis Dixon DO Internal Medicine Call on 6/12/2023  [address] [place] [address]  49  [phone]            Patient's Medications   Discharge Prescriptions    No medications on file     No discharge procedures on file  PDMP Review       Value Time User    PDMP Reviewed  Yes 4/13/2022  7:12 AM Terryann Brunner Grass, PA-C           ED Provider  Attending physically available and evaluated Dinachristiano Henok SUAREZ managed the patient along with the ED Attending      Electronically Signed by         Gabrielle Chester MD  06/09/23 2315

## 2023-06-09 NOTE — ED ATTENDING ATTESTATION
6/9/2023  Marguerite SUAREZ DO, saw and evaluated the patient  I have discussed the patient with the resident/non-physician practitioner and agree with the resident's/non-physician practitioner's findings, Plan of Care, and MDM as documented in the resident's/non-physician practitioner's note, except where noted  All available labs and Radiology studies were reviewed  I was present for key portions of any procedure(s) performed by the resident/non-physician practitioner and I was immediately available to provide assistance  At this point I agree with the current assessment done in the Emergency Department  I have conducted an independent evaluation of this patient a history and physical is as follows:    ED Course     78 y o  F w/h/o diverticulitis, HTN, cholecystectomy p/w abd pain x 8 days  Pt had CT A/P on 6/6 for LLQ pain read as acute uncomplicated sigmoid diverticulitis involving the proximal sigmoid  She was given an rx for Augmentin for 10 days and meloxicam for pain  Coming to ER for worsening pain  Radiates to left back and chills  Had nausea yesterday but none today  Also reports it feels like she's not voiding fully, but denies dysuria  Denies fevers, vomiting  TTP to LLQ without peritoneal signs  Plan: Labs, CT A/P to r/o abscess/perforation      Critical Care Time  Procedures

## 2023-06-21 ENCOUNTER — CLINICAL SUPPORT (OUTPATIENT)
Dept: CARDIOLOGY CLINIC | Facility: CLINIC | Age: 78
End: 2023-06-21
Payer: COMMERCIAL

## 2023-06-21 DIAGNOSIS — M25.562 CHRONIC PAIN OF BOTH KNEES: ICD-10-CM

## 2023-06-21 DIAGNOSIS — M25.561 CHRONIC PAIN OF BOTH KNEES: ICD-10-CM

## 2023-06-21 DIAGNOSIS — R00.2 PALPITATIONS: ICD-10-CM

## 2023-06-21 DIAGNOSIS — G89.29 CHRONIC PAIN OF BOTH KNEES: ICD-10-CM

## 2023-06-21 PROCEDURE — 93248 EXT ECG>7D<15D REV&INTERPJ: CPT | Performed by: INTERNAL MEDICINE

## 2023-06-21 RX ORDER — MELOXICAM 7.5 MG/1
TABLET ORAL
Qty: 30 TABLET | Refills: 0 | Status: SHIPPED | OUTPATIENT
Start: 2023-06-21

## 2023-07-06 DIAGNOSIS — R20.0 NUMBNESS AND TINGLING: ICD-10-CM

## 2023-07-06 DIAGNOSIS — R20.2 NUMBNESS AND TINGLING: ICD-10-CM

## 2023-07-06 RX ORDER — GABAPENTIN 100 MG/1
CAPSULE ORAL
Qty: 90 CAPSULE | Refills: 0 | Status: SHIPPED | OUTPATIENT
Start: 2023-07-06

## 2023-07-19 DIAGNOSIS — M25.561 CHRONIC PAIN OF BOTH KNEES: ICD-10-CM

## 2023-07-19 DIAGNOSIS — M25.562 CHRONIC PAIN OF BOTH KNEES: ICD-10-CM

## 2023-07-19 DIAGNOSIS — G89.29 CHRONIC PAIN OF BOTH KNEES: ICD-10-CM

## 2023-07-19 RX ORDER — MELOXICAM 7.5 MG/1
TABLET ORAL
Qty: 30 TABLET | Refills: 0 | Status: SHIPPED | OUTPATIENT
Start: 2023-07-19

## 2023-07-20 ENCOUNTER — OFFICE VISIT (OUTPATIENT)
Dept: CARDIOLOGY CLINIC | Facility: CLINIC | Age: 78
End: 2023-07-20
Payer: COMMERCIAL

## 2023-07-20 VITALS
BODY MASS INDEX: 26.87 KG/M2 | DIASTOLIC BLOOD PRESSURE: 70 MMHG | SYSTOLIC BLOOD PRESSURE: 122 MMHG | OXYGEN SATURATION: 100 % | WEIGHT: 146 LBS | HEIGHT: 62 IN | HEART RATE: 66 BPM

## 2023-07-20 DIAGNOSIS — I77.819 AORTIC DILATATION (HCC): ICD-10-CM

## 2023-07-20 DIAGNOSIS — I35.1 NONRHEUMATIC AORTIC VALVE INSUFFICIENCY: ICD-10-CM

## 2023-07-20 DIAGNOSIS — I10 ESSENTIAL HYPERTENSION: ICD-10-CM

## 2023-07-20 DIAGNOSIS — E78.00 PRIMARY HYPERCHOLESTEROLEMIA: ICD-10-CM

## 2023-07-20 DIAGNOSIS — R00.2 PALPITATIONS: Primary | ICD-10-CM

## 2023-07-20 PROCEDURE — 99214 OFFICE O/P EST MOD 30 MIN: CPT | Performed by: INTERNAL MEDICINE

## 2023-07-20 NOTE — PROGRESS NOTES
[facility]  [address] [place] [address]  Phone#  [phone]   Fax#  [phone]  *-*-*-*-*-*-*-*-*-*-*-*-*-*-*-*-*-*-*-*-*-*-*-*-*-*-*-*-*-*-*-*-*-*-*-*-*-*-*-*-*-*-*-*-*-*-*-*-*-*-*-*-*-*                                   Cardiology Follow Up      ENCOUNTER DATE: 23 11:18 AM  PATIENT NAME: Megan Murray   : 1945    MRN: [id number]  AGE:77 y.o. SEX: female  [facility] MD [place]yann     PRIMARY CARE PHYSICIAN: Denis Kwon DO    ACTIVE DIAGNOSIS THIS VISIT  1. Palpitations        2. Nonrheumatic aortic valve insufficiency        3. Aortic dilatation (HCC)        4. Essential hypertension        5. Primary hypercholesterolemia          ACTIVE PROBLEM LIST  Patient Active Problem List   Diagnosis   • Essential hypertension   • Palpitations   • Gastroesophageal reflux disease   • Primary osteoarthritis of both knees   • Osteopenia   • Cervical radiculopathy   • Aortic dilatation (HCC)   • Nonrheumatic aortic valve insufficiency   • Abnormal EKG   • Pre-diabetes   • Numbness and tingling of left arm and leg   • Carpal tunnel syndrome of left wrist   • Vertigo   • Abnormal liver enzymes   • Vitamin D deficiency   • Tarsal tunnel syndrome of left side   • Primary hypercholesterolemia   • Status post cholecystectomy   • Increased urinary frequency   • COVID-19 virus infection   • Open wound of left foot   • Acute pain of right shoulder       CARDIOLOGY SPECIALTY COMMENTS  Patient transferring from [address] [place] [address]  Diagnoses:  1. Aortic dilatation (HCC)  4.3 cm  2. Nonrheumatic aortic valve insufficiency, mild-to-moderate   3. Essential hypertension    4. Palpitations   5. Abnormal EKG    2019 carotid ultrasound:  Less than 50% bilateral carotid stenosis    2019 echocardiogram: Normal left ventricular systolic function, EF 53%. Grade 1 diastolic dysfunction. Ascending aorta mildly dilated at 4.3 cm.  Mild-to-moderate aortic regurgitation.     03/23/2022 normal left ventricular systolic function, EF 34%. Grade 1 diastolic dysfunction. Mild LA enlargement. Mild-to-moderate aortic regurgitation. Mild TR with normal PASP. Ascending aorta 4.3 cm    6/21/2023 Zio patch monitor  -  Predominant underlying rhythm was Sinus Rhythm at a min HR of 43 bpm,   max HR of 115 bpm, and avg HR of 67 bpm.  -  Bundle Branch Block/IVCD was present. -  37 Supraventricular Tachycardia runs occurred, the run with the fastest interval lasting 13 beats with a max rate of 190 bpm, the longest lasting 18 beats with an avg rate of 138 bpm.    INTERVAL HISTORY:        Patient's only complaint is of palpitations. She says that the palpitations recently have been less than they used to be. She had a Zio patch monitor. It demonstrated that her heart rate ranged from 43 to 115 bpm and average 67 bpm.  A bundle branch block was present. Patient had 37 episodes of supraventricular tachycardia with the longest episode being 18 beats with an average rate of 138 bpm.  The fastest episode was 190 bpm and lasted for 13 beats. This fastest episode was 4 seconds long. Since patient is having no associated symptoms with the palpitations, I do not think that is worth treating them. There may be more side effects trying to control the episodes of SVT than leaving them alone. Patient never gets lightheaded with the palpitations. She does get lightheaded if she bends over and then straightens up. This represents an orthostatic problem which will only get worse if we try to control her episodes of SVT. Patient used to have sublingual nitroglycerin that she would take when her blood pressure was high. However the highest her blood pressure ever gets is 156 mmHg. At 78years old, I would not be taking sublingual nitroglycerin for a blood pressure of that value. If her blood pressure would get over 200 I might consider it.   Patient states that it never gets that high.    DISCUSSION/PLAN:          · Continue present medications without changes  · Return in 6 months  · EKG on return    Lab Studies:    Lab Results   Component Value Date    CHOLESTEROL 146 02/17/2023    CHOLESTEROL 181 04/05/2022    CHOLESTEROL 179 04/26/2021     Lab Results   Component Value Date    TRIG 46 02/17/2023    TRIG 90 04/05/2022    TRIG 99 04/26/2021     Lab Results   Component Value Date    HDL 58 02/17/2023    HDL 59 04/05/2022    HDL 57 04/26/2021     Lab Results   Component Value Date    LDLCALC 79 02/17/2023    LDLCALC 104 (H) 04/05/2022    LDLCALC 102 (H) 04/26/2021       Lab Results   Component Value Date    HGBA1C 5.6 02/17/2023      Lab Results   Component Value Date    EGFR 88 06/09/2023    EGFR 86 06/06/2023    EGFR 86 02/17/2023    SODIUM 133 (L) 06/09/2023    SODIUM 136 06/06/2023    SODIUM 139 02/17/2023    K 3.7 06/09/2023    K 4.0 06/06/2023    K 4.2 02/17/2023     06/09/2023     06/06/2023     02/17/2023    CO2 23 06/09/2023    CO2 27 06/06/2023    CO2 26 02/17/2023    BUN 11 06/09/2023    BUN 12 06/06/2023    BUN 10 02/17/2023    CREATININE 0.62 06/09/2023    CREATININE 0.66 06/06/2023    CREATININE 0.66 02/17/2023     Lab Results   Component Value Date    WBC 11.99 (H) 06/09/2023    WBC 4.54 02/17/2023    WBC 5.85 04/05/2022    HGB 11.7 06/09/2023    HGB 11.8 02/17/2023    HGB 12.2 04/05/2022    HCT 36.1 06/09/2023    HCT 36.8 02/17/2023    HCT 38.9 04/05/2022    MCV 94 06/09/2023    MCV 95 02/17/2023    MCV 95 04/05/2022    MCH 30.5 06/09/2023    MCH 30.3 02/17/2023    MCH 29.9 04/05/2022    MCHC 32.4 06/09/2023    MCHC 32.1 02/17/2023    MCHC 31.4 04/05/2022     06/09/2023     02/17/2023     04/05/2022      Lab Results   Component Value Date    CALCIUM 9.2 06/09/2023    CALCIUM 9.9 06/06/2023    CALCIUM 8.9 02/17/2023    AST 25 06/09/2023    AST 28 06/06/2023    AST 43 02/17/2023    ALT 19 06/09/2023    ALT 21 06/06/2023    ALT 43 02/17/2023    ALKPHOS 87 06/09/2023    ALKPHOS 83 06/06/2023    ALKPHOS 78 02/17/2023    MG 2.1 06/19/2020       Lab Results   Component Value Date    FERRITIN 43 06/19/2020     No results found for this visit on 07/20/23.       Current Outpatient Medications:   •  acetaminophen (TYLENOL) 325 mg tablet, Take 650 mg by mouth every 6 (six) hours as needed for mild pain, Disp: , Rfl:   •  cholecalciferol (VITAMIN D3) 1,000 units tablet, Take 2 tablets (2,000 Units total) by mouth daily, Disp: 180 tablet, Rfl: 1  •  Diclofenac Sodium (VOLTAREN) 1 %, Apply 2 g topically 4 (four) times a day, Disp: 100 g, Rfl: 0  •  hydrOXYzine HCL (ATARAX) 25 mg tablet, Take 1 tablet (25 mg total) by mouth every 6 (six) hours as needed for anxiety, Disp: 20 tablet, Rfl: 0  •  losartan (COZAAR) 25 mg tablet, Take 1 tablet (25 mg total) by mouth every morning, Disp: 90 tablet, Rfl: 1  •  melatonin 1 mg, Take 1 tablet (1 mg total) by mouth daily at bedtime, Disp: 30 tablet, Rfl: 0  •  meloxicam (MOBIC) 7.5 mg tablet, TAKE 1 TABLET(7.5 MG) BY MOUTH DAILY AS NEEDED FOR MODERATE PAIN, Disp: 30 tablet, Rfl: 0  •  metoprolol succinate (TOPROL-XL) 25 mg 24 hr tablet, Take 1 tablet (25 mg total) by mouth every morning, Disp: 90 tablet, Rfl: 1  •  metoprolol succinate (TOPROL-XL) 50 mg 24 hr tablet, TAKE 2 TABLETS BY MOUTH DAILY, Disp: 180 tablet, Rfl: 1  •  Multiple Vitamin (multivitamin) capsule, Take 1 capsule by mouth every morning, Disp: 90 capsule, Rfl: 1  •  omeprazole (PriLOSEC) 20 mg delayed release capsule, TAKE 1 CAPSULE(20 MG) BY MOUTH DAILY AS NEEDED FOR REFLUX, Disp: 90 capsule, Rfl: 0  •  triamcinolone (KENALOG) 0.1 % cream, Apply topically 2 (two) times a day, Disp: 30 g, Rfl: 0  •  Aspirin 81 MG CAPS, Take 81 mg by mouth daily (Patient not taking: Reported on 7/20/2023), Disp: 90 capsule, Rfl: 0  •  gabapentin (NEURONTIN) 100 mg capsule, TAKE 1 CAPSULE BY MOUTH TWICE DAILY (Patient not taking: Reported on 7/20/2023), Disp: 90 capsule, Rfl: 0  Allergies   Allergen Reactions   • Shellfish-Derived Products - Food Allergy Nausea Only   • Lisinopril Cough       Past Medical History:   Diagnosis Date   • Cholelithiasis     lap jewell today 4/13/2022   • Full dentures    • GERD (gastroesophageal reflux disease)    • Hypertension    • Pancreatic cyst     drained   • Wears glasses      Social History     Socioeconomic History   • Marital status: /Civil Union     Spouse name: Not on file   • Number of children: Not on file   • Years of education: Not on file   • Highest education level: Not on file   Occupational History   • Not on file   Tobacco Use   • Smoking status: Never   • Smokeless tobacco: Never   Vaping Use   • Vaping Use: Never used   Substance and Sexual Activity   • Alcohol use: No   • Drug use: No   • Sexual activity: Yes     Partners: Male   Other Topics Concern   • Not on file   Social History Narrative    ** Merged History Encounter **          Social Determinants of Health     Financial Resource Strain: Medium Risk (5/23/2023)    Overall Financial Resource Strain (CARDIA)    • Difficulty of Paying Living Expenses: Somewhat hard   Food Insecurity: Not on file   Transportation Needs: No Transportation Needs (5/23/2023)    PRAPARE - Transportation    • Lack of Transportation (Medical): No    • Lack of Transportation (Non-Medical):  No   Physical Activity: Not on file   Stress: Not on file   Social Connections: Not on file   Intimate Partner Violence: Not on file   Housing Stability: Not on file      Family History   Problem Relation Age of Onset   • Diabetes Mother    • Hypertension Mother    • Hypertension Father    • Stroke Father    • No Known Problems Sister    • No Known Problems Brother    • No Known Problems Daughter    • No Known Problems Maternal Grandmother    • No Known Problems Maternal Grandfather    • No Known Problems Paternal Grandmother    • No Known Problems Paternal Grandfather    • No Known Problems Sister • No Known Problems Sister    • No Known Problems Sister    • No Known Problems Sister    • No Known Problems Daughter    • No Known Problems Maternal Aunt    • No Known Problems Maternal Aunt    • No Known Problems Maternal Aunt    • No Known Problems Paternal Aunt    • No Known Problems Paternal Aunt    • No Known Problems Paternal Aunt    • No Known Problems Paternal Aunt    • No Known Problems Paternal Aunt      Past Surgical History:   Procedure Laterality Date   • CATARACT EXTRACTION     • COLONOSCOPY  11/2020   • RI LAPAROSCOPY SURG CHOLECYSTECTOMY N/A 4/13/2022    Procedure: ROBOTIC LAP JACQUE;  Surgeon: Atanacio Bamberger, MD;  Location: [facility];  Service: General   • TUBAL LIGATION      Ectopic pregnancy 1979   • UPPER GASTROINTESTINAL ENDOSCOPY  11/2020       PREVIOUS WEIGHTS:   Wt Readings from Last 10 Encounters:   07/20/23 66.2 kg (146 lb)   06/09/23 63.3 kg (139 lb 8.8 oz)   06/06/23 66.7 kg (147 lb)   05/25/23 65.6 kg (144 lb 9.6 oz)   05/23/23 66.2 kg (146 lb)   05/08/23 65.8 kg (145 lb)   04/25/23 65.8 kg (145 lb)   05/11/23 66.2 kg (146 lb)   02/23/23 66.3 kg (146 lb 3.2 oz)   02/21/23 65.8 kg (145 lb)        Review of Systems:  Review of Systems   Respiratory: Negative for cough, choking, chest tightness, shortness of breath, wheezing and stridor. Cardiovascular: Positive for palpitations. Negative for chest pain and leg swelling. Musculoskeletal: Negative for gait problem. Skin: Negative for rash. Neurological: Negative for dizziness, tremors, syncope, weakness, light-headedness, numbness and headaches. Psychiatric/Behavioral: Negative for agitation and behavioral problems. The patient is not hyperactive. Physical Exam:  /70   Pulse 66   Ht 5' 2" (1.575 m)   Wt 66.2 kg (146 lb)   SpO2 100%   BMI 26.70 kg/m²     Physical Exam  Constitutional:       General: She is not in acute distress. Appearance: She is well-developed.    HENT:      Head: Normocephalic and atraumatic. Neck:      Thyroid: No thyromegaly. Vascular: No carotid bruit or JVD. Trachea: No tracheal deviation. Cardiovascular:      Rate and Rhythm: Normal rate and regular rhythm. Pulses: Normal pulses. Heart sounds: Normal heart sounds. No murmur heard. No friction rub. No gallop. Pulmonary:      Effort: Pulmonary effort is normal. No respiratory distress. Breath sounds: Normal breath sounds. No wheezing, rhonchi or rales. Chest:      Chest wall: No tenderness. Musculoskeletal:         General: Normal range of motion. Cervical back: Normal range of motion and neck supple. Right lower leg: No edema. Left lower leg: No edema. Skin:     General: Skin is warm and dry. Neurological:      General: No focal deficit present. Mental Status: She is alert and oriented to person, place, and time. Psychiatric:         Mood and Affect: Mood normal.         Behavior: Behavior normal.         Thought Content: Thought content normal.         Judgment: Judgment normal.       ======================================================  Imaging:   I have personally reviewed pertinent reports. I spent 30 minutes on the patient's office visit. This time was spent on the day of the visit. I had direct contact with the patient in the office on the day of the visit. Greater than 50% of the total time was spent obtaining a history, examining patient, answering all patient questions, arranging and discussing plan of care with patient as well as directly providing instructions. Additional time then spent on orders and office chart. Portions of the record may have been created with voice recognition software. Occasional wrong word or "sound a like" substitutions may have occurred due to the inherent limitations of voice recognition software. Read the chart carefully and recognize, using context, where substitutions have occurred.     SIGNATURES:   Natan Ross MD

## 2023-08-15 DIAGNOSIS — G89.29 CHRONIC PAIN OF BOTH KNEES: ICD-10-CM

## 2023-08-15 DIAGNOSIS — M25.562 CHRONIC PAIN OF BOTH KNEES: ICD-10-CM

## 2023-08-15 DIAGNOSIS — M25.561 CHRONIC PAIN OF BOTH KNEES: ICD-10-CM

## 2023-08-15 RX ORDER — MELOXICAM 7.5 MG/1
TABLET ORAL
Qty: 30 TABLET | Refills: 0 | Status: SHIPPED | OUTPATIENT
Start: 2023-08-15

## 2023-08-20 DIAGNOSIS — K21.9 GASTROESOPHAGEAL REFLUX DISEASE, UNSPECIFIED WHETHER ESOPHAGITIS PRESENT: ICD-10-CM

## 2023-08-21 RX ORDER — OMEPRAZOLE 20 MG/1
CAPSULE, DELAYED RELEASE ORAL
Qty: 90 CAPSULE | Refills: 0 | Status: SHIPPED | OUTPATIENT
Start: 2023-08-21

## 2023-08-26 DIAGNOSIS — R20.0 NUMBNESS AND TINGLING: ICD-10-CM

## 2023-08-26 DIAGNOSIS — R20.2 NUMBNESS AND TINGLING: ICD-10-CM

## 2023-08-28 RX ORDER — GABAPENTIN 100 MG/1
CAPSULE ORAL
Qty: 90 CAPSULE | Refills: 0 | Status: SHIPPED | OUTPATIENT
Start: 2023-08-28

## 2023-09-13 ENCOUNTER — APPOINTMENT (OUTPATIENT)
Dept: LAB | Facility: CLINIC | Age: 78
End: 2023-09-13
Payer: COMMERCIAL

## 2023-09-13 DIAGNOSIS — E55.9 VITAMIN D DEFICIENCY: ICD-10-CM

## 2023-09-13 DIAGNOSIS — R20.0 NUMBNESS AND TINGLING: ICD-10-CM

## 2023-09-13 DIAGNOSIS — R20.2 NUMBNESS AND TINGLING: ICD-10-CM

## 2023-09-13 LAB
25(OH)D3 SERPL-MCNC: 65.2 NG/ML (ref 30–100)
BASOPHILS # BLD AUTO: 0.06 THOUSANDS/ÂΜL (ref 0–0.1)
BASOPHILS NFR BLD AUTO: 1 % (ref 0–1)
CK SERPL-CCNC: 56 U/L (ref 26–192)
EOSINOPHIL # BLD AUTO: 0.16 THOUSAND/ÂΜL (ref 0–0.61)
EOSINOPHIL NFR BLD AUTO: 3 % (ref 0–6)
ERYTHROCYTE [DISTWIDTH] IN BLOOD BY AUTOMATED COUNT: 13 % (ref 11.6–15.1)
FERRITIN SERPL-MCNC: 43 NG/ML (ref 11–307)
HCT VFR BLD AUTO: 40.1 % (ref 34.8–46.1)
HGB BLD-MCNC: 12.5 G/DL (ref 11.5–15.4)
IMM GRANULOCYTES # BLD AUTO: 0.02 THOUSAND/UL (ref 0–0.2)
IMM GRANULOCYTES NFR BLD AUTO: 0 % (ref 0–2)
LYMPHOCYTES # BLD AUTO: 2.58 THOUSANDS/ÂΜL (ref 0.6–4.47)
LYMPHOCYTES NFR BLD AUTO: 40 % (ref 14–44)
MCH RBC QN AUTO: 30.4 PG (ref 26.8–34.3)
MCHC RBC AUTO-ENTMCNC: 31.2 G/DL (ref 31.4–37.4)
MCV RBC AUTO: 98 FL (ref 82–98)
MONOCYTES # BLD AUTO: 0.62 THOUSAND/ÂΜL (ref 0.17–1.22)
MONOCYTES NFR BLD AUTO: 10 % (ref 4–12)
NEUTROPHILS # BLD AUTO: 3.06 THOUSANDS/ÂΜL (ref 1.85–7.62)
NEUTS SEG NFR BLD AUTO: 46 % (ref 43–75)
NRBC BLD AUTO-RTO: 0 /100 WBCS
PLATELET # BLD AUTO: 298 THOUSANDS/UL (ref 149–390)
PMV BLD AUTO: 9.7 FL (ref 8.9–12.7)
RBC # BLD AUTO: 4.11 MILLION/UL (ref 3.81–5.12)
TSH SERPL DL<=0.05 MIU/L-ACNC: 1.51 UIU/ML (ref 0.45–4.5)
WBC # BLD AUTO: 6.5 THOUSAND/UL (ref 4.31–10.16)

## 2023-09-13 PROCEDURE — 82728 ASSAY OF FERRITIN: CPT

## 2023-09-13 PROCEDURE — 36415 COLL VENOUS BLD VENIPUNCTURE: CPT

## 2023-09-13 PROCEDURE — 82306 VITAMIN D 25 HYDROXY: CPT

## 2023-09-13 PROCEDURE — 84443 ASSAY THYROID STIM HORMONE: CPT

## 2023-09-13 PROCEDURE — 82550 ASSAY OF CK (CPK): CPT

## 2023-09-13 PROCEDURE — 85025 COMPLETE CBC W/AUTO DIFF WBC: CPT

## 2023-09-15 ENCOUNTER — TELEPHONE (OUTPATIENT)
Dept: INTERNAL MEDICINE CLINIC | Facility: CLINIC | Age: 78
End: 2023-09-15

## 2023-09-16 DIAGNOSIS — M25.562 CHRONIC PAIN OF BOTH KNEES: ICD-10-CM

## 2023-09-16 DIAGNOSIS — M25.561 CHRONIC PAIN OF BOTH KNEES: ICD-10-CM

## 2023-09-16 DIAGNOSIS — G89.29 CHRONIC PAIN OF BOTH KNEES: ICD-10-CM

## 2023-09-18 ENCOUNTER — OFFICE VISIT (OUTPATIENT)
Dept: INTERNAL MEDICINE CLINIC | Facility: CLINIC | Age: 78
End: 2023-09-18
Payer: COMMERCIAL

## 2023-09-18 VITALS
WEIGHT: 149 LBS | SYSTOLIC BLOOD PRESSURE: 140 MMHG | DIASTOLIC BLOOD PRESSURE: 80 MMHG | TEMPERATURE: 97.5 F | HEART RATE: 73 BPM | RESPIRATION RATE: 16 BRPM | HEIGHT: 62 IN | OXYGEN SATURATION: 96 % | BODY MASS INDEX: 27.42 KG/M2

## 2023-09-18 DIAGNOSIS — G89.29 CHRONIC PAIN OF BOTH KNEES: ICD-10-CM

## 2023-09-18 DIAGNOSIS — M25.562 CHRONIC PAIN OF BOTH KNEES: ICD-10-CM

## 2023-09-18 DIAGNOSIS — M25.511 CHRONIC RIGHT SHOULDER PAIN: Primary | ICD-10-CM

## 2023-09-18 DIAGNOSIS — M25.561 CHRONIC PAIN OF BOTH KNEES: ICD-10-CM

## 2023-09-18 DIAGNOSIS — Z12.31 ENCOUNTER FOR SCREENING MAMMOGRAM FOR BREAST CANCER: ICD-10-CM

## 2023-09-18 DIAGNOSIS — R42 DIZZINESS: ICD-10-CM

## 2023-09-18 DIAGNOSIS — I49.1 PREMATURE ATRIAL BEATS: ICD-10-CM

## 2023-09-18 DIAGNOSIS — I10 ESSENTIAL HYPERTENSION: ICD-10-CM

## 2023-09-18 DIAGNOSIS — R00.2 PALPITATIONS: ICD-10-CM

## 2023-09-18 DIAGNOSIS — G89.29 CHRONIC RIGHT SHOULDER PAIN: Primary | ICD-10-CM

## 2023-09-18 DIAGNOSIS — L30.9 DERMATITIS: ICD-10-CM

## 2023-09-18 PROCEDURE — 99214 OFFICE O/P EST MOD 30 MIN: CPT | Performed by: INTERNAL MEDICINE

## 2023-09-18 RX ORDER — TRIAMCINOLONE ACETONIDE 1 MG/G
CREAM TOPICAL 2 TIMES DAILY
Qty: 30 G | Refills: 0 | Status: SHIPPED | OUTPATIENT
Start: 2023-09-18

## 2023-09-18 RX ORDER — MELOXICAM 15 MG/1
15 TABLET ORAL DAILY PRN
Qty: 30 TABLET | Refills: 0 | Status: SHIPPED | OUTPATIENT
Start: 2023-09-18

## 2023-09-18 RX ORDER — MELOXICAM 7.5 MG/1
TABLET ORAL
Qty: 30 TABLET | Refills: 0 | Status: SHIPPED | OUTPATIENT
Start: 2023-09-18 | End: 2023-09-18

## 2023-09-18 RX ORDER — METOPROLOL SUCCINATE 50 MG/1
50 TABLET, EXTENDED RELEASE ORAL DAILY
Qty: 180 TABLET | Refills: 1
Start: 2023-09-18

## 2023-09-18 NOTE — ASSESSMENT & PLAN NOTE
Approximately 4-month history of right anterolateral shoulder pain. No inciting trauma. She has decreased active range of motion, able to abduct the right shoulder to approximately 45 degrees. Better passive range of motion. Reproduction of pain and weakness with Makenzie's test.  Suspect she may have underlying rotator cuff tendinopathy.   Recommend course of physical therapy  -Meloxicam as needed

## 2023-09-18 NOTE — PROGRESS NOTES
INTERNAL MEDICINE OFFICE VISIT  [facility] [place]    NAME: Marilou Jacobs  AGE: 70 y.o. SEX: female    DATE OF ENCOUNTER: 9/18/2023    Assessment and Plan/History of Present Illness     Here today for follow-up. Medical history of GERD, hypertension, pancreatic cysts, PACs, prediabetes, depression, moderate aortic dilatation, gallstones status post cholecystectomy, diverticulitis    Occasional "dizzy" episodes. Last only seconds. Sometimes occur with standing and when she is washing the dishes. Suspect this may be related to orthostasis. 1. Chronic right shoulder pain  Assessment & Plan:  Approximately 4-month history of right anterolateral shoulder pain. No inciting trauma. She has decreased active range of motion, able to abduct the right shoulder to approximately 45 degrees. Better passive range of motion. Reproduction of pain and weakness with Makenzie's test.  Suspect she may have underlying rotator cuff tendinopathy. Recommend course of physical therapy  -Meloxicam as needed    Orders:  -     Ambulatory Referral to Physical Therapy; Future  -     meloxicam (MOBIC) 15 mg tablet; Take 1 tablet (15 mg total) by mouth daily as needed for moderate pain    2. Encounter for screening mammogram for breast cancer    3. Essential hypertension  Assessment & Plan:  Well-controlled. Continue current regimen    Orders:  -     metoprolol succinate (TOPROL-XL) 50 mg 24 hr tablet; Take 1 tablet (50 mg total) by mouth daily Take in addition to 25 mg tablet for total of 75 mg daily    4. Premature atrial beats  -     metoprolol succinate (TOPROL-XL) 50 mg 24 hr tablet; Take 1 tablet (50 mg total) by mouth daily Take in addition to 25 mg tablet for total of 75 mg daily    5. Dermatitis  -     triamcinolone (KENALOG) 0.1 % cream; Apply topically 2 (two) times a day    6. Chronic pain of both knees    7.  Palpitations  Assessment & Plan:  history of symptomatic palpitations In the setting of PACs  -continue with current dose of Toprol-XL, taking 75 mg daily   -Following with cardiology      8. Dizziness              Orders Placed This Encounter   Procedures   • Ambulatory Referral to Physical Therapy       Chief Complaint     Chief Complaint   Patient presents with   • Follow-up     3 month ? Poison ivy       Review of Systems     10 point ROS negative except per HPI    The following portions of the patient's history were reviewed and updated as appropriate: allergies, current medications, past family history, past medical history, past social history, past surgical history and problem list.    Active Problem List     Patient Active Problem List   Diagnosis   • Essential hypertension   • Palpitations   • Gastroesophageal reflux disease   • Primary osteoarthritis of both knees   • Osteopenia   • Cervical radiculopathy   • Aortic dilatation (HCC)   • Nonrheumatic aortic valve insufficiency   • Abnormal EKG   • Pre-diabetes   • Numbness and tingling of left arm and leg   • Carpal tunnel syndrome of left wrist   • Vertigo   • Abnormal liver enzymes   • Vitamin D deficiency   • Tarsal tunnel syndrome of left side   • Primary hypercholesterolemia   • Status post cholecystectomy   • Increased urinary frequency   • COVID-19 virus infection   • Open wound of left foot   • Chronic right shoulder pain       Objective     /80 (BP Location: Left arm, Patient Position: Sitting, Cuff Size: Standard)   Pulse 73   Temp 97.5 °F (36.4 °C)   Resp 16   Ht 5' 2" (1.575 m)   Wt 67.6 kg (149 lb)   SpO2 96%   BMI 27.25 kg/m²     Physical Exam  Cardiovascular:      Rate and Rhythm: Normal rate and regular rhythm. Heart sounds: Normal heart sounds. No murmur heard. Pulmonary:      Effort: Pulmonary effort is normal.      Breath sounds: Normal breath sounds. No wheezing or rales.          Pertinent Laboratory/Diagnostic Studies:  CT abdomen pelvis with contrast    Result Date: 6/9/2023  Impression: Improved uncomplicated acute sigmoid diverticulitis. No new findings.  Workstation performed: [id number]       Images and diagnostics reviewed     Current Medications     Current Outpatient Medications:   •  acetaminophen (TYLENOL) 325 mg tablet, Take 650 mg by mouth every 6 (six) hours as needed for mild pain, Disp: , Rfl:   •  cholecalciferol (VITAMIN D3) 1,000 units tablet, Take 2 tablets (2,000 Units total) by mouth daily, Disp: 180 tablet, Rfl: 1  •  losartan (COZAAR) 25 mg tablet, Take 1 tablet (25 mg total) by mouth every morning, Disp: 90 tablet, Rfl: 1  •  meloxicam (MOBIC) 15 mg tablet, Take 1 tablet (15 mg total) by mouth daily as needed for moderate pain, Disp: 30 tablet, Rfl: 0  •  metoprolol succinate (TOPROL-XL) 25 mg 24 hr tablet, Take 1 tablet (25 mg total) by mouth every morning, Disp: 90 tablet, Rfl: 1  •  metoprolol succinate (TOPROL-XL) 50 mg 24 hr tablet, Take 1 tablet (50 mg total) by mouth daily Take in addition to 25 mg tablet for total of 75 mg daily, Disp: 180 tablet, Rfl: 1  •  Multiple Vitamin (multivitamin) capsule, Take 1 capsule by mouth every morning, Disp: 90 capsule, Rfl: 1  •  omeprazole (PriLOSEC) 20 mg delayed release capsule, TAKE 1 CAPSULE(20 MG) BY MOUTH DAILY AS NEEDED FOR REFLUX, Disp: 90 capsule, Rfl: 0  •  triamcinolone (KENALOG) 0.1 % cream, Apply topically 2 (two) times a day, Disp: 30 g, Rfl: 0  •  Diclofenac Sodium (VOLTAREN) 1 %, Apply 2 g topically 4 (four) times a day (Patient not taking: Reported on 9/18/2023), Disp: 100 g, Rfl: 0    Health Maintenance     Health Maintenance   Topic Date Due   • COVID-19 Vaccine (3 - Pfizer series) 07/15/2021   • Breast Cancer Screening: Mammogram  01/24/2023   • Influenza Vaccine (1) 09/01/2023   • BMI: Followup Plan  02/21/2024   • DXA SCAN  01/24/2024   • Fall Risk  05/23/2024   • Depression Screening  05/23/2024   • Urinary Incontinence Screening  05/23/2024   • Medicare Annual Wellness Visit (AWV)  05/23/2024   • BMI: Adult  09/18/2024 • Hepatitis C Screening  Completed   • Osteoporosis Screening  Completed   • Pneumococcal Vaccine: 65+ Years  Completed   • HIB Vaccine  Aged Out   • IPV Vaccine  Aged Out   • Hepatitis A Vaccine  Aged Out   • Meningococcal ACWY Vaccine  Aged Out   • HPV Vaccine  Aged Out   • Colorectal Cancer Screening  Discontinued     Immunization History   Administered Date(s) Administered   • COVID-19 PFIZER VACCINE 0.3 ML IM 04/30/2021, 05/20/2021   • INFLUENZA 08/29/2018   • Influenza, high dose seasonal 0.7 mL 08/29/2018, 10/24/2019, 10/22/2020, 11/22/2021, 11/21/2022   • Pneumococcal Conjugate 13-Valent 07/18/2018   • Pneumococcal Polysaccharide PPV23 07/16/2020       Denis Sena D.O.   [facility] [place] [address], [facility] [address] [place] [address]  Office: [phone]  Fax: [phone]

## 2023-09-18 NOTE — ASSESSMENT & PLAN NOTE
history of symptomatic palpitations In the setting of PACs  -continue with current dose of Toprol-XL, taking 75 mg daily   -Following with cardiology

## 2023-10-05 ENCOUNTER — EVALUATION (OUTPATIENT)
Dept: PHYSICAL THERAPY | Facility: REHABILITATION | Age: 78
End: 2023-10-05
Payer: COMMERCIAL

## 2023-10-05 DIAGNOSIS — M25.511 CHRONIC RIGHT SHOULDER PAIN: Primary | ICD-10-CM

## 2023-10-05 DIAGNOSIS — M25.512 CHRONIC LEFT SHOULDER PAIN: ICD-10-CM

## 2023-10-05 DIAGNOSIS — G89.29 CHRONIC RIGHT SHOULDER PAIN: Primary | ICD-10-CM

## 2023-10-05 DIAGNOSIS — G89.29 CHRONIC LEFT SHOULDER PAIN: ICD-10-CM

## 2023-10-05 PROCEDURE — 97140 MANUAL THERAPY 1/> REGIONS: CPT

## 2023-10-05 PROCEDURE — 97110 THERAPEUTIC EXERCISES: CPT

## 2023-10-05 PROCEDURE — 97161 PT EVAL LOW COMPLEX 20 MIN: CPT

## 2023-10-05 NOTE — PROGRESS NOTES
PT Evaluation     Today's date: 10/5/2023  Patient name: Jan Lopez  :   MRN: [id number]  Referring provider: Lauryn Mendez DO  Dx:   Encounter Diagnosis     ICD-10-CM    1. Chronic right shoulder pain  M25.511 Ambulatory Referral to Physical Therapy    G89.29       2. Chronic left shoulder pain  M25.512     G89.29           Start Time: 1120  Stop Time: 1210  Total time in clinic (min): 50 minutes    Assessment  Assessment details: Jan Lopez is a 70 y.o. female presenting with R anterolateral Sh pain indicative of supraspinatus and infraspinatus involvement. Pt also presents with L Sh pain with resisted Sh movements in all directions. Signs and symptoms indicative of chronic RTC tendinitis. R Sh tendinitis is more severe and may potentially have RTC tear. Primary impairments include R Sh anterolateral pain with functional activities, global R Sh weakness, global R Sh AROM dysfunction, scapular motor control dysfunction. Educated pt on anatomy and physiology of diagnosis. Will benefit from skilled PT interventions for community reintegration, ADL management/independence, return to hobbies. Provided pt with written home exercise program to complete daily. Impairments: abnormal coordination, abnormal muscle firing, abnormal muscle tone, abnormal or restricted ROM, activity intolerance, impaired physical strength, lacks appropriate home exercise program, pain with function, scapular dyskinesis, poor posture  and poor body mechanics  Functional limitations: cooking, cleaning, ADLs, dressing, shoulder elevation, carrying objects, sleep dysfunction  Symptom irritability: highUnderstanding of Dx/Px/POC: good   Prognosis: fair    Goals    Short Term Goals: In 4 weeks, the patient will:  1. Improve R Sh flexion strength to 3-/5 MMT  2. Improve R Sh abduction strength to 3-/5 MMT  3. Supervision with [facility] for self-care    Long Term Goals: In 8 weeks, the patient will:  1. Improve R Sh ER strength to 3/5 MMT  2. FOTO to greater than predicted value  3. Independent with HEP for self-care      Plan  Patient would benefit from: skilled physical therapy  Planned modality interventions: manual electrical stimulation  Planned therapy interventions: abdominal trunk stabilization, activity modification, balance, balance/weight bearing training, behavior modification, body mechanics training, community reintegration, coordination, fine motor coordination training, flexibility, functional ROM exercises, gait training, graded activity, graded exercise, graded motor, home exercise program, work reintegration, therapeutic training, therapeutic exercise, therapeutic activities, stretching, strengthening, self care, postural training, patient education, neuromuscular re-education, motor coordination training, massage, manual therapy, joint mobilization and ADL training  Frequency: 1x week  Duration in weeks: 8  Plan of Care beginning date: 10/5/2023  Plan of Care expiration date: 11/30/2023  Treatment plan discussed with: patient and family        Subjective    Pain Location: R Sh - pain also at R UT, and through RUE  Pain Intensity: constant, pulling sensation, worst 9/10, current 6/10  YUE: unknown  DOI: 6 months pain worsened - had mild pain prior to 6 months  Aggravating Factors: R Sh elevation, unable to sleep on R side, cleaning, dressing  Alleviating Factors: meloxicam (does not help), analgesic patch (does not help)  Living Situation: lives with  - gets assistance for ADLs due to pain and dysfunction  Constitutional S/S: denies paresthesias   Dominant Hand: R  Goals: "pain relief"  PLOF: prior to pain she was independent with everything    Granddaughter present for entirety of initial evaluation to assist with translation.       Objective         Standing         Head Position x Protracted  Neutral  Retracted   Scapular Position x Protracted  Neutral  Retracted   Thoracic Spine x Inc Kyphosis  Neutral     Lumbar Spine  Inc Lordosis  Neutral x Dec Lordosis   Pelvis  Anterior Tilt  Neutral x Posterior Tilt   Iliac Crest  L elevated x Neutral  R elevated   Scoliotic Curvature  "C" Curve  "S" Curve     Lateral Shift  Right  Left x None     Strength and ROM evaluated B from a regional biomechanical perspective and values relevant to this episode recorded in table below    ROM: Goniometric measurement revealed the following findings. Shoulder ROM Right Left   Flexion 45* 170*   Abduction 40* 160*   ER C7* T1*   IR S1* T12*        * indicates increase in pain    Strength: MMT revealed the following findings.   Joint Motion Right Left   Sh. Flexion 1+/5* 3+/5*   Sh. Abduction 1+/5* 3+/5*   Sh. ER 2-/5* 3+/5*   Sh. IR 2+/5* 4-/5    strength 40 lbs 36 lbs   * indicates increase in pain    Additional Assessments:  Palpation: TTP at adalberto-lateral R Sh, TTP UT/LS region  Joint mobility: co-contraction noted with PROM testing, unable to relax to fully assess, pain in all directions                                                                                                                                                                Test / Measure  Right 10/5/2023 Left 10/5/2023   Alison-Valentín +    Painful Arc + +   Infraspinatus Strength Test + +   Drop Arm + -   Supraspinatus (empty can) + +   Neer +                   Precautions: HTN, osteopenia    Pertinent Findings:                                                                                                                                                     Test / Measure  10/5/2023   FOTO 41   R Sh flex AROM 45 deg   R Sh abd MMT 1+/5       Manuals 10/5            R Sh PROM MM 6'            R Sh inf/post mobs MM 2' gr I                                      Neuro Re-Ed             Flex, abd, ER isometrics             Scap retr 10x3"            Supine flex AAROM             Supine ER AAROM             Table slides Ther Ex             HEP review 5'            Porsha             R UT S 5x20"            TB rows             TB Sh ext             Biceps curls                                       Ther Activity                                       Gait Training                                       Modalities

## 2023-10-18 ENCOUNTER — OFFICE VISIT (OUTPATIENT)
Dept: PHYSICAL THERAPY | Facility: REHABILITATION | Age: 78
End: 2023-10-18
Payer: COMMERCIAL

## 2023-10-18 DIAGNOSIS — M25.511 CHRONIC RIGHT SHOULDER PAIN: Primary | ICD-10-CM

## 2023-10-18 DIAGNOSIS — G89.29 CHRONIC RIGHT SHOULDER PAIN: Primary | ICD-10-CM

## 2023-10-18 DIAGNOSIS — M25.512 CHRONIC LEFT SHOULDER PAIN: ICD-10-CM

## 2023-10-18 DIAGNOSIS — G89.29 CHRONIC LEFT SHOULDER PAIN: ICD-10-CM

## 2023-10-18 PROCEDURE — 97110 THERAPEUTIC EXERCISES: CPT

## 2023-10-18 PROCEDURE — 97112 NEUROMUSCULAR REEDUCATION: CPT

## 2023-10-18 PROCEDURE — 97140 MANUAL THERAPY 1/> REGIONS: CPT

## 2023-10-18 NOTE — PROGRESS NOTES
Daily Note     Today's date: 10/18/2023  Patient name: Sahara Delarosa  :   MRN: [id number]  Referring provider: Nafisa Montague DO  Dx:   Encounter Diagnosis     ICD-10-CM    1. Chronic right shoulder pain  M25.511     G89.29       2. Chronic left shoulder pain  M25.512     G89.29           Start Time: 1050  Stop Time: 1130  Total time in clinic (min): 40 minutes    Subjective: Pt reports continuation of pain symptomology prior to start of tx session. Granddaughter present for entirety of tx session. Objective: See treatment diary below      Assessment: Tolerated treatment well. Patient would benefit from continued PT. Pt continues to have B Sh pain at end range PROM and with resisted interventions. Utilized isometrics in an attempt to decrease pain intensity. Pt has RUE pain through to thumb. Good tolerance to AAROM in flexion but has moderate discomfort at end range. 1:1 with Andrea Marcos DPT entirety of tx. Plan: Continue per plan of care.       Precautions: HTN, osteopenia    Pertinent Findings:                                                                                                                                                     Test / Measure  10/5/2023   FOTO 41   R Sh flex AROM 45 deg   R Sh abd MMT 1+/5       Manuals 10/5 10/18           B Sh PROM MM 6' R only MM 10'           R Sh inf/post mobs MM 2' gr I            B Sh isometrics  MM 10' ER/IR, F/E                        Neuro Re-Ed             Flex, abd, ER isometrics             Scap retr 10x3" 15x5"           Supine flex AAROM  2x8 cane           Supine ER AAROM             Table slides - flex + scap  10x ea B                                     Ther Ex             HEP review 5'            Pulleys  4' flex           R UT S 5x20"            TB rows             TB Sh ext             Biceps curls             Supine chest press  2x8 cane                        Ther Activity Gait Training                                       Modalities

## 2023-10-25 ENCOUNTER — OFFICE VISIT (OUTPATIENT)
Dept: PHYSICAL THERAPY | Facility: REHABILITATION | Age: 78
End: 2023-10-25
Payer: COMMERCIAL

## 2023-10-25 DIAGNOSIS — G89.29 CHRONIC LEFT SHOULDER PAIN: ICD-10-CM

## 2023-10-25 DIAGNOSIS — M25.511 CHRONIC RIGHT SHOULDER PAIN: Primary | ICD-10-CM

## 2023-10-25 DIAGNOSIS — M25.512 CHRONIC LEFT SHOULDER PAIN: ICD-10-CM

## 2023-10-25 DIAGNOSIS — G89.29 CHRONIC RIGHT SHOULDER PAIN: Primary | ICD-10-CM

## 2023-10-25 PROCEDURE — 97112 NEUROMUSCULAR REEDUCATION: CPT

## 2023-10-25 PROCEDURE — 97140 MANUAL THERAPY 1/> REGIONS: CPT

## 2023-10-25 PROCEDURE — 97110 THERAPEUTIC EXERCISES: CPT

## 2023-10-25 NOTE — PROGRESS NOTES
Daily Note     Today's date: 10/25/2023  Patient name: Clara Higgins  : 3250  MRN: [id number]  Referring provider: Andreia Cordero DO  Dx:   Encounter Diagnosis     ICD-10-CM    1. Chronic right shoulder pain  M25.511     G89.29       2. Chronic left shoulder pain  M25.512     G89.29           Start Time: 1115  Stop Time: 1153  Total time in clinic (min): 38 minutes    Subjective: Pt reports R Sh pain worse than contralateral Sh.        Objective: See treatment diary below      Assessment: Tolerated treatment well. Patient would benefit from continued PT. Pt with aggravation of R Sh discomfort when performing planned therapeutic interventions. Challenged with resistance training in flexion, abduction, and ER. Exercises against gravity was enough resistance for tx session. Mild pain relief following manual therapy. 1:1 with Josee Ewing DPT entirety of tx. Plan: Continue per plan of care.       Precautions: HTN, osteopenia    Pertinent Findings:                                                                                                                                                     Test / Measure  10/5/2023   FOTO 41   R Sh flex AROM 45 deg   R Sh abd MMT 1+/5       Manuals 10/5 10/18 10/25          B Sh PROM MM 6' R only MM 10' MM 8' R only          R Sh inf/post mobs MM 2' gr I            B Sh isometrics  MM 10' ER/IR, F/E                        Neuro Re-Ed             Flex, abd, ER isometrics   5x5" ea B          Scap retr 10x3" 15x5" 15x5"          Supine flex AAROM  2x8 cane           Supine ER AAROM             Table slides - flex + scap  10x ea B                                     Ther Ex             HEP review 5'            Pulleys  4' flex 4' flex          R UT S 5x20"            TB rows             TB Sh ext             Biceps curls   2x10 2# DBs          Supine chest press  2x8 cane           Shrugs   15x3" 2# DBs          DB elevation 3 way   10x ea 0#          Ther Activity                                       Gait Training                                       Modalities

## 2023-11-15 ENCOUNTER — VBI (OUTPATIENT)
Dept: ADMINISTRATIVE | Facility: OTHER | Age: 78
End: 2023-11-15

## 2023-11-17 DIAGNOSIS — I49.1 PREMATURE ATRIAL BEATS: ICD-10-CM

## 2023-11-17 DIAGNOSIS — K21.9 GASTROESOPHAGEAL REFLUX DISEASE, UNSPECIFIED WHETHER ESOPHAGITIS PRESENT: ICD-10-CM

## 2023-11-17 DIAGNOSIS — I10 ESSENTIAL HYPERTENSION: ICD-10-CM

## 2023-11-17 RX ORDER — LOSARTAN POTASSIUM 25 MG/1
25 TABLET ORAL EVERY MORNING
Qty: 90 TABLET | Refills: 1 | Status: SHIPPED | OUTPATIENT
Start: 2023-11-17

## 2023-11-17 RX ORDER — OMEPRAZOLE 20 MG/1
CAPSULE, DELAYED RELEASE ORAL
Qty: 90 CAPSULE | Refills: 0 | Status: SHIPPED | OUTPATIENT
Start: 2023-11-17

## 2023-11-20 RX ORDER — METOPROLOL SUCCINATE 25 MG/1
25 TABLET, EXTENDED RELEASE ORAL EVERY MORNING
Qty: 90 TABLET | Refills: 1 | Status: SHIPPED | OUTPATIENT
Start: 2023-11-20

## 2023-11-28 ENCOUNTER — TELEPHONE (OUTPATIENT)
Dept: INTERNAL MEDICINE CLINIC | Facility: CLINIC | Age: 78
End: 2023-11-28

## 2023-11-28 DIAGNOSIS — K57.92 ACUTE DIVERTICULITIS: ICD-10-CM

## 2023-11-28 DIAGNOSIS — R10.32 LLQ PAIN: Primary | ICD-10-CM

## 2023-11-28 RX ORDER — AMOXICILLIN AND CLAVULANATE POTASSIUM 875; 125 MG/1; MG/1
1 TABLET, FILM COATED ORAL EVERY 12 HOURS SCHEDULED
Qty: 20 TABLET | Refills: 0 | Status: SHIPPED | OUTPATIENT
Start: 2023-11-28 | End: 2023-12-08

## 2023-11-28 NOTE — TELEPHONE ENCOUNTER
Pt called he stated that her diverticulitis is acting up and she is in pain and she would like for you to send her antibiotics please advise

## 2023-11-29 DIAGNOSIS — I10 ESSENTIAL HYPERTENSION: ICD-10-CM

## 2023-11-29 DIAGNOSIS — I49.1 PREMATURE ATRIAL BEATS: ICD-10-CM

## 2023-11-29 RX ORDER — METOPROLOL SUCCINATE 50 MG/1
50 TABLET, EXTENDED RELEASE ORAL DAILY
Qty: 180 TABLET | Refills: 0 | Status: SHIPPED | OUTPATIENT
Start: 2023-11-29

## 2023-12-18 ENCOUNTER — HOSPITAL ENCOUNTER (OUTPATIENT)
Dept: RADIOLOGY | Facility: HOSPITAL | Age: 78
Discharge: HOME | End: 2023-12-18
Payer: COMMERCIAL

## 2023-12-18 ENCOUNTER — OFFICE VISIT (OUTPATIENT)
Dept: INTERNAL MEDICINE CLINIC | Facility: CLINIC | Age: 78
End: 2023-12-18
Payer: COMMERCIAL

## 2023-12-18 VITALS
OXYGEN SATURATION: 97 % | BODY MASS INDEX: 27.97 KG/M2 | TEMPERATURE: 97.5 F | SYSTOLIC BLOOD PRESSURE: 150 MMHG | HEART RATE: 87 BPM | RESPIRATION RATE: 18 BRPM | DIASTOLIC BLOOD PRESSURE: 80 MMHG | HEIGHT: 62 IN | WEIGHT: 152 LBS

## 2023-12-18 DIAGNOSIS — R10.31 BILATERAL LOWER ABDOMINAL CRAMPING: Primary | ICD-10-CM

## 2023-12-18 DIAGNOSIS — N89.8 VAGINAL DRYNESS: ICD-10-CM

## 2023-12-18 DIAGNOSIS — M25.562 ACUTE PAIN OF LEFT KNEE: Primary | ICD-10-CM

## 2023-12-18 DIAGNOSIS — R10.32 BILATERAL LOWER ABDOMINAL CRAMPING: Primary | ICD-10-CM

## 2023-12-18 DIAGNOSIS — Z12.31 ENCOUNTER FOR SCREENING MAMMOGRAM FOR BREAST CANCER: ICD-10-CM

## 2023-12-18 DIAGNOSIS — R33.9 INCOMPLETE EMPTYING OF BLADDER: ICD-10-CM

## 2023-12-18 DIAGNOSIS — M25.561 ACUTE PAIN OF RIGHT KNEE: ICD-10-CM

## 2023-12-18 DIAGNOSIS — R20.2 NUMBNESS AND TINGLING: ICD-10-CM

## 2023-12-18 DIAGNOSIS — M25.562 ACUTE PAIN OF LEFT KNEE: ICD-10-CM

## 2023-12-18 DIAGNOSIS — R20.2 NUMBNESS AND TINGLING OF LEFT ARM AND LEG: ICD-10-CM

## 2023-12-18 DIAGNOSIS — R20.0 NUMBNESS AND TINGLING: ICD-10-CM

## 2023-12-18 DIAGNOSIS — R20.0 NUMBNESS AND TINGLING OF LEFT ARM AND LEG: ICD-10-CM

## 2023-12-18 PROCEDURE — 73562 X-RAY EXAM OF KNEE 3: CPT

## 2023-12-18 PROCEDURE — 99214 OFFICE O/P EST MOD 30 MIN: CPT | Performed by: INTERNAL MEDICINE

## 2023-12-18 NOTE — ASSESSMENT & PLAN NOTE
"Previously evaluated for numbness and tingling in the left side of her arm/leg.  She had EMG which showed evidence of mild carpal tunnel syndrome as well as tarsal tunnel syndrome.  Had previously been prescribed gabapentin which apparently was not particularly effective and caused cognitive issues.  Since our last appointment, she has had recurrence of numbness and \"warmth\" in her left foot extending from the midfoot to the toes.  No evidence of vascular compromise on exam, adequate pedal pulses, gross sensation intact, sensation to pinprick intact      -Symptoms could possibly be related to tarsal tunnel syndrome vs peripheral neuropathy vs lumbar radiculopathy  -We will obtain updated EMG for further characterization  "

## 2023-12-18 NOTE — ASSESSMENT & PLAN NOTE
Patient with intermittent cramping sensation in her lower abdomen. She has had previous issues with nocturia, sensation of incomplete bladder emptying.  She had previously reported 7 pregnancies. Today, denies any history of gynecologic procedure.  Prior urine studies were not suggestive of infection.  Ultrasound kidneys and bladder with postvoid residual completed December 2022 without any significant pathology or evidence of urinary retention.  She states that she feels like something is pressing on her bladder.  Associated vaginal dryness.  Denies any issues with diarrhea, constipation, blood in the stool. She has hx of diverticulitis    Plan:  -We will recheck CT of the abdomen and pelvis with contrast to evaluate for recurrence of diverticulitis  -Recommend evaluation with gynecology for pelvic exam and to evaluate for possible cystocele/prolapse or related pathology

## 2023-12-18 NOTE — PROGRESS NOTES
INTERNAL MEDICINE OFFICE VISIT  [facility] Internal Medicine- [place]    NAME: Beverly Soto  AGE: 78 y.o. SEX: female    DATE OF ENCOUNTER: 12/18/2023    Assessment and Plan/History of Present Illness     Here today for follow-up  Medical history of GERD, hypertension, pancreatic cysts, PACs, prediabetes, depression, moderate aortic dilatation, gallstones status post cholecystectomy, diverticulitis         1. Bilateral lower abdominal cramping  Assessment & Plan:  Patient with intermittent cramping sensation in her lower abdomen. She has had previous issues with nocturia, sensation of incomplete bladder emptying.  She had previously reported 7 pregnancies. Today, denies any history of gynecologic procedure.  Prior urine studies were not suggestive of infection.  Ultrasound kidneys and bladder with postvoid residual completed December 2022 without any significant pathology or evidence of urinary retention.  She states that she feels like something is pressing on her bladder.  Associated vaginal dryness.  Denies any issues with diarrhea, constipation, blood in the stool. She has hx of diverticulitis    Plan:  -We will recheck CT of the abdomen and pelvis with contrast to evaluate for recurrence of diverticulitis  -Recommend evaluation with gynecology for pelvic exam and to evaluate for possible cystocele/prolapse or related pathology    Orders:  -     Ambulatory Referral to Gynecology; Future  -     CT abdomen pelvis w contrast; Future; Expected date: 12/18/2023    2. Encounter for screening mammogram for breast cancer  -     Mammo screening bilateral w 3d & cad; Future; Expected date: 12/18/2023    3. Incomplete emptying of bladder  -     Ambulatory Referral to Gynecology; Future    4. Vaginal dryness  -     Ambulatory Referral to Gynecology; Future    5. Numbness and tingling  -     EMG 1 Limb; Future    6. Acute pain of right knee  -     XR knee 4+ vw right injury; Future; Expected date: 12/18/2023    7.  "Numbness and tingling of left arm and leg  Assessment & Plan:  Previously evaluated for numbness and tingling in the left side of her arm/leg.  She had EMG which showed evidence of mild carpal tunnel syndrome as well as tarsal tunnel syndrome.  Had previously been prescribed gabapentin which apparently was not particularly effective and caused cognitive issues.  Since our last appointment, she has had recurrence of numbness and \"warmth\" in her left foot extending from the midfoot to the toes.  No evidence of vascular compromise on exam, adequate pedal pulses, gross sensation intact, sensation to pinprick intact      -Symptoms could possibly be related to tarsal tunnel syndrome vs peripheral neuropathy vs lumbar radiculopathy  -We will obtain updated EMG for further characterization                 Orders Placed This Encounter   Procedures   • CT abdomen pelvis w contrast   • Mammo screening bilateral w 3d & cad   • XR knee 4+ vw right injury   • Ambulatory Referral to Gynecology   • EMG 1 Limb       Chief Complaint     Chief Complaint   Patient presents with   • Follow-up     3 month        Review of Systems     10 point ROS negative except per HPI    The following portions of the patient's history were reviewed and updated as appropriate: allergies, current medications, past family history, past medical history, past social history, past surgical history and problem list.    Active Problem List     Patient Active Problem List   Diagnosis   • Essential hypertension   • Palpitations   • Gastroesophageal reflux disease   • Primary osteoarthritis of both knees   • Osteopenia   • Cervical radiculopathy   • Aortic dilatation (HCC)   • Nonrheumatic aortic valve insufficiency   • Abnormal EKG   • Pre-diabetes   • Numbness and tingling of left arm and leg   • Carpal tunnel syndrome of left wrist   • Vertigo   • Abnormal liver enzymes   • Vitamin D deficiency   • Tarsal tunnel syndrome of left side   • Primary " "hypercholesterolemia   • Status post cholecystectomy   • Increased urinary frequency   • COVID-19 virus infection   • Open wound of left foot   • Chronic right shoulder pain   • Bilateral lower abdominal cramping       Objective     /80 (BP Location: Left arm, Patient Position: Sitting, Cuff Size: Standard)   Pulse 87   Temp 97.5 °F (36.4 °C)   Resp 18   Ht 5' 2\" (1.575 m)   Wt 68.9 kg (152 lb)   SpO2 97%   BMI 27.80 kg/m²     Physical Exam    Pertinent Laboratory/Diagnostic Studies:  CT abdomen pelvis with contrast    Result Date: 6/9/2023  Impression: Improved uncomplicated acute sigmoid diverticulitis. No new findings. Workstation performed: [id number]       Images and diagnostics reviewed     Current Medications     Current Outpatient Medications:   •  acetaminophen (TYLENOL) 325 mg tablet, Take 650 mg by mouth every 6 (six) hours as needed for mild pain, Disp: , Rfl:   •  cholecalciferol (VITAMIN D3) 1,000 units tablet, Take 2 tablets (2,000 Units total) by mouth daily, Disp: 180 tablet, Rfl: 1  •  losartan (COZAAR) 25 mg tablet, TAKE 1 TABLET BY MOUTH EVERY MORNING, Disp: 90 tablet, Rfl: 1  •  meloxicam (MOBIC) 15 mg tablet, Take 1 tablet (15 mg total) by mouth daily as needed for moderate pain, Disp: 30 tablet, Rfl: 0  •  metoprolol succinate (TOPROL-XL) 25 mg 24 hr tablet, TAKE 1 TABLET BY MOUTH EVERY MORNING, Disp: 90 tablet, Rfl: 1  •  metoprolol succinate (TOPROL-XL) 50 mg 24 hr tablet, Take 1 tablet (50 mg total) by mouth daily Take in addition to 25 mg tablet for total of 75 mg daily, Disp: 180 tablet, Rfl: 0  •  Multiple Vitamin (multivitamin) capsule, Take 1 capsule by mouth every morning, Disp: 90 capsule, Rfl: 1  •  omeprazole (PriLOSEC) 20 mg delayed release capsule, TAKE 1 CAPSULE(20 MG) BY MOUTH DAILY AS NEEDED FOR REFLUX, Disp: 90 capsule, Rfl: 0  •  triamcinolone (KENALOG) 0.1 % cream, Apply topically 2 (two) times a day, Disp: 30 g, Rfl: 0  •  Diclofenac Sodium (VOLTAREN) 1 %, " Apply 2 g topically 4 (four) times a day (Patient not taking: Reported on 9/18/2023), Disp: 100 g, Rfl: 0    Health Maintenance     Health Maintenance   Topic Date Due   • Breast Cancer Screening: Mammogram  01/24/2023   • Influenza Vaccine (1) 09/01/2023   • COVID-19 Vaccine (3 - 2023-24 season) 09/01/2023   • BMI: Followup Plan  02/21/2024   • Depression Screening  05/23/2024   • DXA SCAN  01/24/2024   • Urinary Incontinence Screening  05/23/2024   • Medicare Annual Wellness Visit (AWV)  05/23/2024   • BMI: Adult  09/18/2024   • Fall Risk  10/05/2024   • Hepatitis C Screening  Completed   • Osteoporosis Screening  Completed   • Pneumococcal Vaccine: 65+ Years  Completed   • HIB Vaccine  Aged Out   • IPV Vaccine  Aged Out   • Hepatitis A Vaccine  Aged Out   • Meningococcal ACWY Vaccine  Aged Out   • HPV Vaccine  Aged Out   • Colorectal Cancer Screening  Discontinued     Immunization History   Administered Date(s) Administered   • COVID-19 PFIZER VACCINE 0.3 ML IM 04/30/2021, 05/20/2021   • INFLUENZA 08/29/2018   • Influenza, high dose seasonal 0.7 mL 08/29/2018, 10/24/2019, 10/22/2020, 11/22/2021, 11/21/2022   • Pneumococcal Conjugate 13-Valent 07/18/2018   • Pneumococcal Polysaccharide PPV23 07/16/2020       Denis Weaver D.O.  [facility] Internal Medicine - [address]  [place] [address]  Office: [phone]  Fax: [phone]

## 2023-12-19 ENCOUNTER — APPOINTMENT (OUTPATIENT)
Dept: LAB | Facility: CLINIC | Age: 78
End: 2023-12-19
Payer: COMMERCIAL

## 2023-12-19 DIAGNOSIS — R33.9 INCOMPLETE EMPTYING OF BLADDER: ICD-10-CM

## 2023-12-19 LAB
ANION GAP SERPL CALCULATED.3IONS-SCNC: 3 MMOL/L
BUN SERPL-MCNC: 16 MG/DL (ref 5–25)
CALCIUM SERPL-MCNC: 9.4 MG/DL (ref 8.4–10.2)
CHLORIDE SERPL-SCNC: 105 MMOL/L (ref 96–108)
CO2 SERPL-SCNC: 31 MMOL/L (ref 21–32)
CREAT SERPL-MCNC: 0.69 MG/DL (ref 0.6–1.3)
GFR SERPL CREATININE-BSD FRML MDRD: 84 ML/MIN/1.73SQ M
GLUCOSE P FAST SERPL-MCNC: 92 MG/DL (ref 65–99)
POTASSIUM SERPL-SCNC: 4.2 MMOL/L (ref 3.5–5.3)
SODIUM SERPL-SCNC: 139 MMOL/L (ref 135–147)

## 2023-12-19 PROCEDURE — 36415 COLL VENOUS BLD VENIPUNCTURE: CPT

## 2023-12-19 PROCEDURE — 80048 BASIC METABOLIC PNL TOTAL CA: CPT

## 2023-12-20 ENCOUNTER — TELEPHONE (OUTPATIENT)
Dept: INTERNAL MEDICINE CLINIC | Facility: CLINIC | Age: 78
End: 2023-12-20

## 2023-12-20 NOTE — TELEPHONE ENCOUNTER
----- Message from Denis Weaver DO sent at 12/19/2023  8:03 AM EST -----  Reviewed knee x-rays.  Evidence of moderate arthritis in the left knee, severe arthritis in the right knee. OK to continue with tylenol or NSAIDs like ibuprofen or naproxen as needed for pain. Can also consider referral to PT or referral to sports medicine for knee injection if Mercedes is interested. Thanks

## 2023-12-22 ENCOUNTER — OFFICE VISIT (OUTPATIENT)
Dept: GASTROENTEROLOGY | Facility: CLINIC | Age: 78
End: 2023-12-22
Payer: COMMERCIAL

## 2023-12-22 VITALS
DIASTOLIC BLOOD PRESSURE: 77 MMHG | SYSTOLIC BLOOD PRESSURE: 140 MMHG | BODY MASS INDEX: 27.6 KG/M2 | HEIGHT: 62 IN | WEIGHT: 150 LBS | TEMPERATURE: 98.9 F

## 2023-12-22 DIAGNOSIS — K59.00 CONSTIPATION, UNSPECIFIED CONSTIPATION TYPE: Primary | ICD-10-CM

## 2023-12-22 PROCEDURE — 99214 OFFICE O/P EST MOD 30 MIN: CPT | Performed by: INTERNAL MEDICINE

## 2023-12-22 RX ORDER — POLYETHYLENE GLYCOL 3350 17 G/17G
17 POWDER, FOR SOLUTION ORAL DAILY
Qty: 510 G | Refills: 3 | Status: SHIPPED | OUTPATIENT
Start: 2023-12-22 | End: 2024-03-21

## 2023-12-22 NOTE — PROGRESS NOTES
Feeling boated more than normal. She has not been eating as much. This has been going on for the last two weeks and noted it is mildly better today.   She is noticing more gas and some reflux.  Feels a burn in her chest from her stomach. She takes 20 mg omeprapzole but only on as needed basis.

## 2023-12-22 NOTE — PATIENT INSTRUCTIONS
Trial of BRAT diet  Will have her hydrate with water, electrolyte solutions, ginger ale.  Increase omeprazole to 40 mg once daily half hour before larger meal of the day.  Also will give miralax for constipations (constipation likely contributing to bloating and lower abdominal pain)

## 2023-12-22 NOTE — PROGRESS NOTES
[facility] - Outpatient Follow-up Note  Beverly Soto 78 y.o. female MRN: [id number]  Encounter: [id number]          ASSESSMENT AND PLAN:      1. Constipation, unspecified constipation type  polyethylene glycol (GLYCOLAX) 17 GM/SCOOP powder        Trial of BRAT diet  Will have her hydrate with water, electrolyte solutions, ginger ale.  Increase omeprazole to 40 mg once daily half hour before larger meal of the day.  Continues to have mild to moderate constipation.  Will have her take miralax on as needed basis. Titrate to 1-2 bowel movements a day.    RTC in 6 months  ______________________________________________________________________    SUBJECTIVE: Patient here for follow-up of abdominal pain and constipation.  She has been having hard stools and has abdominal discomfort and bloating.  She feels like her pain causes her to have worsening reflux and she has been experiencing burning sensation.  She is not having daily bowel movements, and her bowel movements are sporadic.      REVIEW OF SYSTEMS IS OTHERWISE NEGATIVE.      Historical Information   Past Medical History:   Diagnosis Date    Cholelithiasis     lap jewell today 4/13/2022    Full dentures     GERD (gastroesophageal reflux disease)     Hypertension     Pancreatic cyst     drained    Wears glasses      Past Surgical History:   Procedure Laterality Date    CATARACT EXTRACTION      COLONOSCOPY  11/2020    [place] LAPAROSCOPY SURG CHOLECYSTECTOMY N/A 4/13/2022    Procedure: ROBOTIC LAP JEWELL;  Surgeon: Sherin Horn MD;  Location: [facility] OR;  Service: General    TUBAL LIGATION      Ectopic pregnancy 1979    UPPER GASTROINTESTINAL ENDOSCOPY  11/2020     Social History   Social History     Substance and Sexual Activity   Alcohol Use No     Social History     Substance and Sexual Activity   Drug Use No     Social History     Tobacco Use   Smoking Status Never   Smokeless Tobacco Never     Family History   Problem Relation Age  "of Onset    Diabetes Mother     Hypertension Mother     Hypertension Father     Stroke Father     No Known Problems Sister     No Known Problems Brother     No Known Problems Daughter     No Known Problems Maternal Grandmother     No Known Problems Maternal Grandfather     No Known Problems Paternal Grandmother     No Known Problems Paternal Grandfather     No Known Problems Sister     No Known Problems Sister     No Known Problems Sister     No Known Problems Sister     No Known Problems Daughter     No Known Problems Maternal Aunt     No Known Problems Maternal Aunt     No Known Problems Maternal Aunt     No Known Problems Paternal Aunt     No Known Problems Paternal Aunt     No Known Problems Paternal Aunt     No Known Problems Paternal Aunt     No Known Problems Paternal Aunt        Meds/Allergies       Current Outpatient Medications:     acetaminophen (TYLENOL) 325 mg tablet    cholecalciferol (VITAMIN D3) 1,000 units tablet    Diclofenac Sodium (VOLTAREN) 1 %    losartan (COZAAR) 25 mg tablet    meloxicam (MOBIC) 15 mg tablet    metoprolol succinate (TOPROL-XL) 25 mg 24 hr tablet    metoprolol succinate (TOPROL-XL) 50 mg 24 hr tablet    Multiple Vitamin (multivitamin) capsule    omeprazole (PriLOSEC) 20 mg delayed release capsule    triamcinolone (KENALOG) 0.1 % cream    Allergies   Allergen Reactions    Shellfish-Derived Products - Food Allergy Nausea Only    Lisinopril Cough           Objective     Blood pressure 140/77, temperature 98.9 °F (37.2 °C), temperature source Tympanic, height 5' 2\" (1.575 m), weight 68 kg (150 lb). Body mass index is 27.44 kg/m².      PHYSICAL EXAM:      General Appearance:   Alert, cooperative, no distress   HEENT:   Normocephalic, atraumatic, anicteric.         Lungs:   Equal chest rise and unlabored breathing, normal cough   Heart:   No visualized JVD   Abdomen:   Soft, non-tender, non-distended; no masses, no organomegaly    Genitalia:   Deferred    Rectal:   Deferred  "   Extremities:  No cyanosis, clubbing or edema    Pulses:  Musculoskeletal:  2+ and symmetric  Normal range of motion visualized    Skin:  Neuro:  No jaundice, rashes, or lesions   Alert and appropriate           Lab Results:   No visits with results within 1 Day(s) from this visit.   Latest known visit with results is:   Appointment on 12/19/2023   Component Date Value    Sodium 12/19/2023 139     Potassium 12/19/2023 4.2     Chloride 12/19/2023 105     CO2 12/19/2023 31     ANION GAP 12/19/2023 3     BUN 12/19/2023 16     Creatinine 12/19/2023 0.69     Glucose, Fasting 12/19/2023 92     Calcium 12/19/2023 9.4     eGFR 12/19/2023 84          Radiology Results:   XR knee 3 vw right non injury    Result Date: 12/18/2023  Narrative: RIGHT KNEE INDICATION:   Pain in right knee.   COMPARISON:  Comparison made with previous examination(s) dated (MR) 21-May-2023,(DX) 04-Jan-2019. VIEWS:  XR KNEE 3 VW RIGHT NON INJURY Images: 5 FINDINGS: There is no acute fracture or dislocation. There is no joint effusion. There is severe medial and moderate lateral and patellofemoral compartment joint space narrowing with sclerosis and osteophytosis. Soft tissues are unremarkable.     Impression: Right knee osteoarthritis, severe in the medial compartment Electronically signed: 12/18/2023 03:41 PM Margarito Ramey MD    XR knee 3 vw left non injury    Result Date: 12/18/2023  Narrative: LEFT KNEE INDICATION:   Pain in left knee. COMPARISON:  Comparison made with previous examination(s) dated (MR) 21-May-2023,(DX) 04-Jan-2019. VIEWS:  XR KNEE 3 VW LEFT NON INJURY Images: 3 FINDINGS: There is no acute fracture or dislocation. There is no joint effusion. There is moderate tricompartmental joint space narrowing with osteophytosis. No lytic or blastic osseous lesion. Soft tissues are unremarkable.     Impression: Moderate osteoarthritis. Electronically signed: 12/18/2023 03:05 PM Margarito Ramey MD

## 2023-12-27 ENCOUNTER — TELEPHONE (OUTPATIENT)
Dept: INTERNAL MEDICINE CLINIC | Facility: CLINIC | Age: 78
End: 2023-12-27

## 2023-12-27 DIAGNOSIS — R05.1 ACUTE COUGH: Primary | ICD-10-CM

## 2023-12-27 NOTE — TELEPHONE ENCOUNTER
Pt called she stated that she is coughing a lot and she has a lot of chest congestion /dry phlegm she wuld like for you to please send to the pharmacy some cough medication thank you

## 2023-12-28 ENCOUNTER — TELEMEDICINE (OUTPATIENT)
Dept: INTERNAL MEDICINE CLINIC | Facility: CLINIC | Age: 78
End: 2023-12-28
Payer: COMMERCIAL

## 2023-12-28 DIAGNOSIS — J06.9 UPPER RESPIRATORY TRACT INFECTION, UNSPECIFIED TYPE: Primary | ICD-10-CM

## 2023-12-28 PROCEDURE — 99214 OFFICE O/P EST MOD 30 MIN: CPT | Performed by: STUDENT IN AN ORGANIZED HEALTH CARE EDUCATION/TRAINING PROGRAM

## 2023-12-28 RX ORDER — AZITHROMYCIN 250 MG/1
TABLET, FILM COATED ORAL DAILY
Qty: 6 TABLET | Refills: 0 | Status: SHIPPED | OUTPATIENT
Start: 2023-12-28 | End: 2024-01-03

## 2023-12-28 RX ORDER — GUAIFENESIN/DEXTROMETHORPHAN 100-10MG/5
10 SYRUP ORAL 4 TIMES DAILY PRN
Qty: 473 ML | Refills: 0 | Status: SHIPPED | OUTPATIENT
Start: 2023-12-28

## 2023-12-28 RX ORDER — DEXTROMETHORPHAN HYDROBROMIDE AND PROMETHAZINE HYDROCHLORIDE 15; 6.25 MG/5ML; MG/5ML
5 SYRUP ORAL 4 TIMES DAILY PRN
Qty: 240 ML | Refills: 1 | Status: SHIPPED | OUTPATIENT
Start: 2023-12-28

## 2023-12-28 NOTE — TELEPHONE ENCOUNTER
Hi, good morning. My name is Doreen. Today I'm calling on behalf of my grandmother, Beverly Modi. Her date of birth is [birth year]. I'm just calling because she is experiencing the flu like symptoms. Her body is, she's got body aches, she's got a really bad cough and she's really fatigued and we're just wondering if we could maybe do a virtual visit or what the suggestion is for her. If you could please give me a call back. My phone number is [phone]. Thank you.

## 2023-12-28 NOTE — ASSESSMENT & PLAN NOTE
Onset 3 days with general malaise  Now experiencing with cough, body aches, h/a  Chills earlier in the day, without measured fever -   Denies SOB, Chest tightness, wheezing, n/v/d/abd pain  Tested Negative for COVID x 2    Suspect viral infection however considering her age and has medical history we have agreed on proceeding with Z-Miguelito  -Advised patient to take Tylenol or ibuprofen every 6 hours as needed for the next 24 to 48 hours  -Will send a prescription for promethazine-DM for cough  - Patient advised  to monitor symptoms for worsening/complications and to contact our office or seek medical care in the emergency room if symptoms are severe

## 2024-01-03 ENCOUNTER — OFFICE VISIT (OUTPATIENT)
Dept: INTERNAL MEDICINE CLINIC | Facility: CLINIC | Age: 79
End: 2024-01-03
Payer: COMMERCIAL

## 2024-01-03 VITALS
HEIGHT: 62 IN | HEART RATE: 69 BPM | RESPIRATION RATE: 18 BRPM | WEIGHT: 147 LBS | TEMPERATURE: 97.5 F | BODY MASS INDEX: 27.05 KG/M2 | SYSTOLIC BLOOD PRESSURE: 140 MMHG | DIASTOLIC BLOOD PRESSURE: 86 MMHG | OXYGEN SATURATION: 96 %

## 2024-01-03 DIAGNOSIS — J20.9 ACUTE BRONCHITIS, UNSPECIFIED ORGANISM: Primary | ICD-10-CM

## 2024-01-03 PROCEDURE — 99214 OFFICE O/P EST MOD 30 MIN: CPT | Performed by: INTERNAL MEDICINE

## 2024-01-03 RX ORDER — ALBUTEROL SULFATE 90 UG/1
2 AEROSOL, METERED RESPIRATORY (INHALATION) EVERY 6 HOURS PRN
Qty: 8 G | Refills: 0 | Status: SHIPPED | OUTPATIENT
Start: 2024-01-03

## 2024-01-03 RX ORDER — PREDNISONE 20 MG/1
40 TABLET ORAL DAILY
Qty: 10 TABLET | Refills: 0 | Status: SHIPPED | OUTPATIENT
Start: 2024-01-03 | End: 2024-01-08

## 2024-01-03 RX ORDER — BENZONATATE 100 MG/1
100-200 CAPSULE ORAL 3 TIMES DAILY PRN
Qty: 30 CAPSULE | Refills: 0 | Status: SHIPPED | OUTPATIENT
Start: 2024-01-03

## 2024-01-03 NOTE — PROGRESS NOTES
INTERNAL MEDICINE OFFICE VISIT  [facility]- [place]    NAME: Beverly Soto  AGE: 78 y.o. SEX: female    DATE OF ENCOUNTER: 1/3/2024    Assessment and Plan/History of Present Illness     Here today for same-day sick visit  Medical history of GERD, hypertension, pancreatic cysts, PACs, prediabetes, depression, moderate aortic dilatation, gallstones status post cholecystectomy, diverticulitis     10-day history of cough.  Affecting her sleep, lack of appetite.  Does not report any fevers or significant respiratory difficulties.  She does report wheezing at nighttime.  Previously seen for telemedicine visit on 12/28 was sent prescription for Z-Miguelito and promethazine-DM to pharmacy.  The directions on her pill bottle apparently said for her to take all of the azithromycin tablets at once    Mild expiratory wheezing of the lung bases noted on exam today.  Exam otherwise unremarkable    Plan:  -Suspect acute bronchitis.  We discussed that cough may linger for 3 weeks and possibly beyond then  -Continue with supportive care  -5-day course of prednisone, Tessalon Perles as needed, albuterol as needed  -Advised her to let us know if symptoms worsen      1. Acute bronchitis, unspecified organism  -     predniSONE 20 mg tablet; Take 2 tablets (40 mg total) by mouth daily for 5 days  -     albuterol (Ventolin HFA) 90 mcg/act inhaler; Inhale 2 puffs every 6 (six) hours as needed for wheezing  -     benzonatate (TESSALON PERLES) 100 mg capsule; Take 1-2 capsules (100-200 mg total) by mouth 3 (three) times a day as needed for cough               No orders of the defined types were placed in this encounter.      Chief Complaint     Chief Complaint   Patient presents with   • Cough       Review of Systems     10 point ROS negative except per HPI    The following portions of the patient's history were reviewed and updated as appropriate: allergies, current medications, past family history, past medical  "history, past social history, past surgical history and problem list.    Active Problem List     Patient Active Problem List   Diagnosis   • Essential hypertension   • Palpitations   • Gastroesophageal reflux disease   • Primary osteoarthritis of both knees   • Osteopenia   • Cervical radiculopathy   • Aortic dilatation (HCC)   • Nonrheumatic aortic valve insufficiency   • Abnormal EKG   • Pre-diabetes   • Numbness and tingling of left arm and leg   • Carpal tunnel syndrome of left wrist   • Vertigo   • Abnormal liver enzymes   • Vitamin D deficiency   • Tarsal tunnel syndrome of left side   • Primary hypercholesterolemia   • Status post cholecystectomy   • Increased urinary frequency   • COVID-19 virus infection   • Open wound of left foot   • Chronic right shoulder pain   • Bilateral lower abdominal cramping   • Upper respiratory tract infection       Objective     /86 (BP Location: Left arm, Patient Position: Sitting, Cuff Size: Standard)   Pulse 69   Temp 97.5 °F (36.4 °C)   Resp 18   Ht 5' 2\" (1.575 m)   Wt 66.7 kg (147 lb)   SpO2 96%   BMI 26.89 kg/m²     Physical Exam  Pulmonary:      Effort: Pulmonary effort is normal.      Breath sounds: Wheezing present. No rales.         Pertinent Laboratory/Diagnostic Studies:  XR knee 3 vw right non injury    Result Date: 12/18/2023  Impression: Right knee osteoarthritis, severe in the medial compartment Electronically signed: 12/18/2023 03:41 PM Margarito Ramey MD    XR knee 3 vw left non injury    Result Date: 12/18/2023  Impression: Moderate osteoarthritis. Electronically signed: 12/18/2023 03:05 PM Margarito Ramey MD      Images and diagnostics reviewed     Current Medications     Current Outpatient Medications:   •  acetaminophen (TYLENOL) 325 mg tablet, Take 650 mg by mouth every 6 (six) hours as needed for mild pain, Disp: , Rfl:   •  albuterol (Ventolin HFA) 90 mcg/act inhaler, Inhale 2 puffs every 6 (six) hours as needed for wheezing, Disp: 8 g, " Rfl: 0  •  azithromycin (Zithromax) 250 mg tablet, Take 2 tablets (500 mg total) by mouth daily for 1 day, THEN 1 tablet (250 mg total) daily for 4 days., Disp: 6 tablet, Rfl: 0  •  benzonatate (TESSALON PERLES) 100 mg capsule, Take 1-2 capsules (100-200 mg total) by mouth 3 (three) times a day as needed for cough, Disp: 30 capsule, Rfl: 0  •  cholecalciferol (VITAMIN D3) 1,000 units tablet, Take 2 tablets (2,000 Units total) by mouth daily, Disp: 180 tablet, Rfl: 1  •  dextromethorphan-guaiFENesin (ROBITUSSIN DM)  mg/5 mL syrup, Take 10 mL by mouth 4 (four) times a day as needed for cough, Disp: 473 mL, Rfl: 0  •  Diclofenac Sodium (VOLTAREN) 1 %, Apply 2 g topically 4 (four) times a day, Disp: 100 g, Rfl: 0  •  losartan (COZAAR) 25 mg tablet, TAKE 1 TABLET BY MOUTH EVERY MORNING, Disp: 90 tablet, Rfl: 1  •  meloxicam (MOBIC) 15 mg tablet, Take 1 tablet (15 mg total) by mouth daily as needed for moderate pain, Disp: 30 tablet, Rfl: 0  •  metoprolol succinate (TOPROL-XL) 25 mg 24 hr tablet, TAKE 1 TABLET BY MOUTH EVERY MORNING, Disp: 90 tablet, Rfl: 1  •  metoprolol succinate (TOPROL-XL) 50 mg 24 hr tablet, Take 1 tablet (50 mg total) by mouth daily Take in addition to 25 mg tablet for total of 75 mg daily, Disp: 180 tablet, Rfl: 0  •  Multiple Vitamin (multivitamin) capsule, Take 1 capsule by mouth every morning, Disp: 90 capsule, Rfl: 1  •  omeprazole (PriLOSEC) 20 mg delayed release capsule, TAKE 1 CAPSULE(20 MG) BY MOUTH DAILY AS NEEDED FOR REFLUX, Disp: 90 capsule, Rfl: 0  •  polyethylene glycol (GLYCOLAX) 17 GM/SCOOP powder, Take 17 g by mouth daily, Disp: 510 g, Rfl: 3  •  predniSONE 20 mg tablet, Take 2 tablets (40 mg total) by mouth daily for 5 days, Disp: 10 tablet, Rfl: 0  •  promethazine-dextromethorphan (PHENERGAN-DM) 6.25-15 mg/5 mL oral syrup, Take 5 mL by mouth 4 (four) times a day as needed for cough, Disp: 240 mL, Rfl: 1  •  triamcinolone (KENALOG) 0.1 % cream, Apply topically 2 (two) times a  day, Disp: 30 g, Rfl: 0    Health Maintenance     Health Maintenance   Topic Date Due   • Breast Cancer Screening: Mammogram  01/24/2023   • Influenza Vaccine (1) 09/01/2023   • COVID-19 Vaccine (3 - 2023-24 season) 09/01/2023   • DXA SCAN  01/24/2024   • Depression Screening  05/23/2024   • Urinary Incontinence Screening  05/23/2024   • Medicare Annual Wellness Visit (AWV)  05/23/2024   • Fall Risk  10/05/2024   • Hepatitis C Screening  Completed   • Osteoporosis Screening  Completed   • Pneumococcal Vaccine: 65+ Years  Completed   • HIB Vaccine  Aged Out   • IPV Vaccine  Aged Out   • Hepatitis A Vaccine  Aged Out   • Meningococcal ACWY Vaccine  Aged Out   • HPV Vaccine  Aged Out   • Colorectal Cancer Screening  Discontinued     Immunization History   Administered Date(s) Administered   • COVID-19 PFIZER VACCINE 0.3 ML IM 04/30/2021, 05/20/2021   • INFLUENZA 08/29/2018   • Influenza, high dose seasonal 0.7 mL 08/29/2018, 10/24/2019, 10/22/2020, 11/22/2021, 11/21/2022   • Pneumococcal Conjugate 13-Valent 07/18/2018   • Pneumococcal Polysaccharide PPV23 07/16/2020       Denis Weaver D.O.  [facility] - [address] [place] [address]  Office: [phone]  Fax: [phone]

## 2024-01-16 ENCOUNTER — TELEPHONE (OUTPATIENT)
Dept: INTERNAL MEDICINE CLINIC | Facility: CLINIC | Age: 79
End: 2024-01-16

## 2024-01-16 NOTE — TELEPHONE ENCOUNTER
Left message for patient to re-schedule 3/18 appointment, physician will be out of office that day.

## 2024-01-24 ENCOUNTER — VBI (OUTPATIENT)
Dept: ADMINISTRATIVE | Facility: OTHER | Age: 79
End: 2024-01-24

## 2024-01-25 ENCOUNTER — OFFICE VISIT (OUTPATIENT)
Dept: CARDIOLOGY CLINIC | Facility: CLINIC | Age: 79
End: 2024-01-25
Payer: COMMERCIAL

## 2024-01-25 VITALS
HEART RATE: 75 BPM | BODY MASS INDEX: 27.69 KG/M2 | SYSTOLIC BLOOD PRESSURE: 124 MMHG | DIASTOLIC BLOOD PRESSURE: 72 MMHG | WEIGHT: 151.4 LBS

## 2024-01-25 DIAGNOSIS — I35.1 NONRHEUMATIC AORTIC VALVE INSUFFICIENCY: Primary | ICD-10-CM

## 2024-01-25 DIAGNOSIS — R00.2 PALPITATIONS: ICD-10-CM

## 2024-01-25 DIAGNOSIS — I10 ESSENTIAL HYPERTENSION: ICD-10-CM

## 2024-01-25 DIAGNOSIS — I77.819 AORTIC DILATATION (HCC): ICD-10-CM

## 2024-01-25 DIAGNOSIS — E78.00 PRIMARY HYPERCHOLESTEROLEMIA: ICD-10-CM

## 2024-01-25 PROCEDURE — 99214 OFFICE O/P EST MOD 30 MIN: CPT | Performed by: INTERNAL MEDICINE

## 2024-01-25 PROCEDURE — 93000 ELECTROCARDIOGRAM COMPLETE: CPT | Performed by: INTERNAL MEDICINE

## 2024-01-25 RX ORDER — METOPROLOL SUCCINATE 100 MG/1
100 TABLET, EXTENDED RELEASE ORAL DAILY
Qty: 90 TABLET | Refills: 3 | Status: SHIPPED | OUTPATIENT
Start: 2024-01-25

## 2024-01-25 NOTE — PROGRESS NOTES
[facility]  [address] [place] [address]  Phone#  [phone]   Fax#  [phone]  *-*-*-*-*-*-*-*-*-*-*-*-*-*-*-*-*-*-*-*-*-*-*-*-*-*-*-*-*-*-*-*-*-*-*-*-*-*-*-*-*-*-*-*-*-*-*-*-*-*-*-*-*-*                                   Cardiology Follow Up      ENCOUNTER DATE: 24 12:11 PM  PATIENT NAME: Beverly Soto   : 1945    MRN: [id number]  AGE:78 y.o.      SEX: female  ENCOUNTER PROVIDER:Ross Mukherjee MD     PRIMARY CARE PHYSICIAN: Denis Weaver DO    ACTIVE DIAGNOSIS THIS VISIT  1. Nonrheumatic aortic valve insufficiency  Echo complete w/ contrast if indicated      2. Primary hypercholesterolemia        3. Aortic dilatation (HCC)  Echo complete w/ contrast if indicated      4. Essential hypertension        5. Palpitations  POCT ECG    metoprolol succinate (TOPROL-XL) 100 mg 24 hr tablet        ACTIVE PROBLEM LIST  Patient Active Problem List   Diagnosis    Essential hypertension    Palpitations    Gastroesophageal reflux disease    Primary osteoarthritis of both knees    Osteopenia    Cervical radiculopathy    Aortic dilatation (HCC)    Nonrheumatic aortic valve insufficiency    Abnormal EKG    Pre-diabetes    Numbness and tingling of left arm and leg    Carpal tunnel syndrome of left wrist    Vertigo    Abnormal liver enzymes    Vitamin D deficiency    Tarsal tunnel syndrome of left side    Primary hypercholesterolemia    Status post cholecystectomy    Increased urinary frequency    COVID-19 virus infection    Open wound of left foot    Chronic right shoulder pain    Bilateral lower abdominal cramping    Upper respiratory tract infection       CARDIOLOGY SPECIALTY COMMENTS  Patient transferring from   Diagnoses:  1. Aortic dilatation (HCC)  4.3 cm  2. Nonrheumatic aortic valve insufficiency, mild-to-moderate   3. Essential hypertension    4. Palpitations   5. Abnormal EKG    2019 carotid ultrasound:  Less than 50% bilateral carotid  stenosis    09/12/2019 echocardiogram: Normal left ventricular systolic function, EF 60%. Grade 1 diastolic dysfunction. Ascending aorta mildly dilated at 4.3 cm. Mild-to-moderate aortic regurgitation.     03/23/2022 normal left ventricular systolic function, EF 60%. Grade 1 diastolic dysfunction. Mild LA enlargement. Mild-to-moderate aortic regurgitation. Mild TR with normal PASP. Ascending aorta 4.3 cm    6/21/2023 Zio patch monitor  -  Predominant underlying rhythm was Sinus Rhythm at a min HR of 43 bpm,   max HR of 115 bpm, and avg HR of 67 bpm.  -  Bundle Branch Block/IVCD was present.   -  37 Supraventricular Tachycardia runs occurred, the run with the fastest interval lasting 13 beats with a max rate of 190 bpm, the longest lasting 18 beats with an avg rate of 138 bpm.    INTERVAL HISTORY:        Since patient had influenza, she has had an increase in shortness of breath and an increase in her palpitations.  She denies chest discomfort, orthopnea or PND.  She denies leg edema.    DISCUSSION/PLAN:          Recommend being patient, it may take 6 months following her influenza bout for her breathing to get back to normal.  Recommend increasing metoprolol succinate to 100 mg daily.  Obtain echocardiogram before return visit consider 24-hour or 48-hour Holter monitor after return  Return in 3 months    Lab Studies:    Lab Results   Component Value Date    CHOLESTEROL 146 02/17/2023    CHOLESTEROL 181 04/05/2022    CHOLESTEROL 179 04/26/2021     Lab Results   Component Value Date    TRIG 46 02/17/2023    TRIG 90 04/05/2022    TRIG 99 04/26/2021     Lab Results   Component Value Date    HDL 58 02/17/2023    HDL 59 04/05/2022    HDL 57 04/26/2021     Lab Results   Component Value Date    LDLCALC 79 02/17/2023    LDLCALC 104 (H) 04/05/2022    LDLCALC 102 (H) 04/26/2021     Lab Results   Component Value Date    HGBA1C 5.6 02/17/2023      Lab Results   Component Value Date    EGFR 84 12/19/2023    EGFR 88 06/09/2023     "EGFR 86 06/06/2023    SODIUM 139 12/19/2023    SODIUM 133 (L) 06/09/2023    SODIUM 136 06/06/2023    K 4.2 12/19/2023    K 3.7 06/09/2023    K 4.0 06/06/2023     12/19/2023     06/09/2023     06/06/2023    CO2 31 12/19/2023    CO2 23 06/09/2023    CO2 27 06/06/2023    BUN 16 12/19/2023    BUN 11 06/09/2023    BUN 12 06/06/2023    CREATININE 0.69 12/19/2023    CREATININE 0.62 06/09/2023    CREATININE 0.66 06/06/2023     Lab Results   Component Value Date    WBC 6.50 09/13/2023    WBC 11.99 (H) 06/09/2023    WBC 4.54 02/17/2023    HGB 12.5 09/13/2023    HGB 11.7 06/09/2023    HGB 11.8 02/17/2023    HCT 40.1 09/13/2023    HCT 36.1 06/09/2023    HCT 36.8 02/17/2023    MCV 98 09/13/2023    MCV 94 06/09/2023    MCV 95 02/17/2023    MCH 30.4 09/13/2023    MCH 30.5 06/09/2023    MCH 30.3 02/17/2023    MCHC 31.2 (L) 09/13/2023    MCHC 32.4 06/09/2023    MCHC 32.1 02/17/2023     09/13/2023     06/09/2023     02/17/2023      Lab Results   Component Value Date    CALCIUM 9.4 12/19/2023    CALCIUM 9.2 06/09/2023    CALCIUM 9.9 06/06/2023    AST 25 06/09/2023    AST 28 06/06/2023    AST 43 02/17/2023    ALT 19 06/09/2023    ALT 21 06/06/2023    ALT 43 02/17/2023    ALKPHOS 87 06/09/2023    ALKPHOS 83 06/06/2023    ALKPHOS 78 02/17/2023    MG 2.1 06/19/2020       Lab Results   Component Value Date    FERRITIN 43 09/13/2023     No components found for: \"OCCULTBLOODSTOOL\"  No results found for: \"SEDRATE\"  Results for orders placed or performed in visit on 01/25/24   POCT ECG    Narrative    Ectopic atrial rhythm at a rate of 75 bpm.  Left axis deviation.  Right bundle branch block pattern.  Secondary ST-T wave abnormalities.  Abnormal EKG.         Current Outpatient Medications:     acetaminophen (TYLENOL) 325 mg tablet, Take 650 mg by mouth every 6 (six) hours as needed for mild pain, Disp: , Rfl:     cholecalciferol (VITAMIN D3) 1,000 units tablet, Take 2 tablets (2,000 Units total) by mouth " daily, Disp: 180 tablet, Rfl: 1    Diclofenac Sodium (VOLTAREN) 1 %, Apply 2 g topically 4 (four) times a day, Disp: 100 g, Rfl: 0    losartan (COZAAR) 25 mg tablet, TAKE 1 TABLET BY MOUTH EVERY MORNING, Disp: 90 tablet, Rfl: 1    meloxicam (MOBIC) 15 mg tablet, Take 1 tablet (15 mg total) by mouth daily as needed for moderate pain, Disp: 30 tablet, Rfl: 0    metoprolol succinate (TOPROL-XL) 100 mg 24 hr tablet, Take 1 tablet (100 mg total) by mouth daily, Disp: 90 tablet, Rfl: 3    Multiple Vitamin (multivitamin) capsule, Take 1 capsule by mouth every morning, Disp: 90 capsule, Rfl: 1    omeprazole (PriLOSEC) 20 mg delayed release capsule, TAKE 1 CAPSULE(20 MG) BY MOUTH DAILY AS NEEDED FOR REFLUX, Disp: 90 capsule, Rfl: 0  Allergies   Allergen Reactions    Shellfish-Derived Products - Food Allergy Nausea Only    Lisinopril Cough       Past Medical History:   Diagnosis Date    Cholelithiasis     lap jewell today 4/13/2022    Full dentures     GERD (gastroesophageal reflux disease)     Hypertension     Pancreatic cyst     drained    Wears glasses      Social History     Socioeconomic History    Marital status: /Civil Union     Spouse name: Not on file    Number of children: Not on file    Years of education: Not on file    Highest education level: Not on file   Occupational History    Not on file   Tobacco Use    Smoking status: Never    Smokeless tobacco: Never   Vaping Use    Vaping status: Never Used   Substance and Sexual Activity    Alcohol use: No    Drug use: No    Sexual activity: Yes     Partners: Male   Other Topics Concern    Not on file   Social History Narrative    ** Merged History Encounter **          Social Determinants of Health     Financial Resource Strain: Medium Risk (5/23/2023)    Overall Financial Resource Strain (CARDIA)     Difficulty of Paying Living Expenses: Somewhat hard   Food Insecurity: Not on file   Transportation Needs: No Transportation Needs (5/23/2023)    PRAPARE -  Transportation     Lack of Transportation (Medical): No     Lack of Transportation (Non-Medical): No   Physical Activity: Not on file   Stress: Not on file   Social Connections: Not on file   Intimate Partner Violence: Not on file   Housing Stability: Not on file      Family History   Problem Relation Age of Onset    Diabetes Mother     Hypertension Mother     Hypertension Father     Stroke Father     No Known Problems Sister     No Known Problems Brother     No Known Problems Daughter     No Known Problems Maternal Grandmother     No Known Problems Maternal Grandfather     No Known Problems Paternal Grandmother     No Known Problems Paternal Grandfather     No Known Problems Sister     No Known Problems Sister     No Known Problems Sister     No Known Problems Sister     No Known Problems Daughter     No Known Problems Maternal Aunt     No Known Problems Maternal Aunt     No Known Problems Maternal Aunt     No Known Problems Paternal Aunt     No Known Problems Paternal Aunt     No Known Problems Paternal Aunt     No Known Problems Paternal Aunt     No Known Problems Paternal Aunt      Past Surgical History:   Procedure Laterality Date    CATARACT EXTRACTION      COLONOSCOPY  11/2020    [place] LAPAROSCOPY SURG CHOLECYSTECTOMY N/A 4/13/2022    Procedure: ROBOTIC LAP JACQUE;  Surgeon: Sherin Horn MD;  Location: [facility];  Service: General    TUBAL LIGATION      Ectopic pregnancy 1979    UPPER GASTROINTESTINAL ENDOSCOPY  11/2020       PREVIOUS WEIGHTS:   Wt Readings from Last 10 Encounters:   01/25/24 68.7 kg (151 lb 6.4 oz)   01/03/24 66.7 kg (147 lb)   12/22/23 68 kg (150 lb)   12/18/23 68.9 kg (152 lb)   09/18/23 67.6 kg (149 lb)   07/20/23 66.2 kg (146 lb)   06/09/23 63.3 kg (139 lb 8.8 oz)   06/06/23 66.7 kg (147 lb)   05/25/23 65.6 kg (144 lb 9.6 oz)   05/23/23 66.2 kg (146 lb)        Review of Systems:  Review of Systems   Respiratory:  Negative for cough, choking, chest tightness, shortness of breath and  wheezing.    Cardiovascular:  Negative for chest pain, palpitations and leg swelling.   Musculoskeletal:  Negative for gait problem.   Skin:  Negative for rash.   Neurological:  Negative for dizziness, tremors, syncope, weakness, light-headedness, numbness and headaches.   Psychiatric/Behavioral:  Negative for agitation and behavioral problems. The patient is not hyperactive.        Physical Exam:  /72 (BP Location: Right arm, Patient Position: Sitting, Cuff Size: Adult)   Pulse 75   Wt 68.7 kg (151 lb 6.4 oz)   BMI 27.69 kg/m²     Physical Exam  Constitutional:       General: She is not in acute distress.     Appearance: She is well-developed.   HENT:      Head: Normocephalic and atraumatic.   Neck:      Thyroid: No thyromegaly.      Vascular: No carotid bruit or JVD.      Trachea: No tracheal deviation.   Cardiovascular:      Rate and Rhythm: Normal rate and regular rhythm.      Pulses: Normal pulses.      Heart sounds: Normal heart sounds. No murmur heard.     No friction rub. No gallop.   Pulmonary:      Effort: Pulmonary effort is normal. No respiratory distress.      Breath sounds: Normal breath sounds. No wheezing, rhonchi or rales.   Chest:      Chest wall: No tenderness.   Musculoskeletal:         General: Normal range of motion.      Cervical back: Normal range of motion and neck supple.      Right lower leg: No edema.      Left lower leg: No edema.   Skin:     General: Skin is warm and dry.   Neurological:      General: No focal deficit present.      Mental Status: She is alert and oriented to person, place, and time.   Psychiatric:         Mood and Affect: Mood normal.         Behavior: Behavior normal.         Thought Content: Thought content normal.         Judgment: Judgment normal.       ======================================================  Imaging:   I have personally reviewed pertinent reports.      Portions of the record may have been created with voice recognition software. Occasional  "wrong word or \"sound a like\" substitutions may have occurred due to the inherent limitations of voice recognition software. Read the chart carefully and recognize, using context, where substitutions have occurred.    SIGNATURES:   Ross Mukherjee MD   "

## 2024-02-15 DIAGNOSIS — K21.9 GASTROESOPHAGEAL REFLUX DISEASE, UNSPECIFIED WHETHER ESOPHAGITIS PRESENT: ICD-10-CM

## 2024-02-15 RX ORDER — OMEPRAZOLE 20 MG/1
CAPSULE, DELAYED RELEASE ORAL
Qty: 90 CAPSULE | Refills: 1 | Status: SHIPPED | OUTPATIENT
Start: 2024-02-15

## 2024-03-01 DIAGNOSIS — I10 ESSENTIAL HYPERTENSION: ICD-10-CM

## 2024-03-01 RX ORDER — LOSARTAN POTASSIUM 25 MG/1
25 TABLET ORAL EVERY MORNING
Qty: 90 TABLET | Refills: 1 | Status: SHIPPED | OUTPATIENT
Start: 2024-03-01

## 2024-03-08 ENCOUNTER — RA CDI HCC (OUTPATIENT)
Dept: OTHER | Facility: HOSPITAL | Age: 79
End: 2024-03-08

## 2024-04-08 ENCOUNTER — HOSPITAL ENCOUNTER (OUTPATIENT)
Dept: NON INVASIVE DIAGNOSTICS | Facility: HOSPITAL | Age: 79
Discharge: HOME | End: 2024-04-08
Attending: INTERNAL MEDICINE
Payer: COMMERCIAL

## 2024-04-08 ENCOUNTER — RA CDI HCC (OUTPATIENT)
Dept: OTHER | Facility: HOSPITAL | Age: 79
End: 2024-04-08

## 2024-04-08 VITALS
HEIGHT: 62 IN | SYSTOLIC BLOOD PRESSURE: 139 MMHG | WEIGHT: 151 LBS | HEART RATE: 65 BPM | BODY MASS INDEX: 27.79 KG/M2 | DIASTOLIC BLOOD PRESSURE: 79 MMHG

## 2024-04-08 DIAGNOSIS — I77.819 AORTIC DILATATION (HCC): ICD-10-CM

## 2024-04-08 DIAGNOSIS — I35.1 NONRHEUMATIC AORTIC VALVE INSUFFICIENCY: ICD-10-CM

## 2024-04-08 PROBLEM — Z86.16 HISTORY OF COVID-19: Status: ACTIVE | Noted: 2024-04-08

## 2024-04-08 PROBLEM — Z86.16 HISTORY OF COVID-19: Status: ACTIVE | Noted: 2022-11-29

## 2024-04-08 LAB
AORTIC ROOT: 3.4 CM
AORTIC VALVE MEAN VELOCITY: 9 M/S
APICAL FOUR CHAMBER EJECTION FRACTION: 53 %
ASCENDING AORTA: 4.3 CM
AV LVOT MEAN GRADIENT: 3 MMHG
AV LVOT PEAK GRADIENT: 6 MMHG
AV MEAN GRADIENT: 4 MMHG
AV PEAK GRADIENT: 7 MMHG
AV VELOCITY RATIO: 0.91
BSA FOR ECHO PROCEDURE: 1.7 M2
DOP CALC AO PEAK VEL: 1.34 M/S
DOP CALC AO VTI: 25.89 CM
DOP CALC LVOT PEAK VEL VTI: 23.96 CM
DOP CALC LVOT PEAK VEL: 1.22 M/S
E WAVE DECELERATION TIME: 196 MS
E/A RATIO: 0.53
FRACTIONAL SHORTENING: 35 (ref 28–44)
INTERVENTRICULAR SEPTUM IN DIASTOLE (PARASTERNAL SHORT AXIS VIEW): 1.4 CM
INTERVENTRICULAR SEPTUM: 1.4 CM (ref 0.6–1.1)
LAAS-AP2: 18.5 CM2
LAAS-AP4: 22.2 CM2
LEFT ATRIUM SIZE: 4 CM
LEFT ATRIUM VOLUME (MOD BIPLANE): 65 ML
LEFT ATRIUM VOLUME INDEX (MOD BIPLANE): 38.2 ML/M2
LEFT INTERNAL DIMENSION IN SYSTOLE: 2.2 CM (ref 2.1–4)
LEFT VENTRICULAR INTERNAL DIMENSION IN DIASTOLE: 3.4 CM (ref 3.5–6)
LEFT VENTRICULAR POSTERIOR WALL IN END DIASTOLE: 1.1 CM
LEFT VENTRICULAR STROKE VOLUME: 33 ML
LVSV (TEICH): 33 ML
MV E'TISSUE VEL-LAT: 5 CM/S
MV E'TISSUE VEL-SEP: 7 CM/S
MV PEAK A VEL: 0.85 M/S
MV PEAK E VEL: 45 CM/S
RIGHT ATRIAL 2D VOLUME: 19 ML
RIGHT ATRIUM AREA SYSTOLE A4C: 10.3 CM2
RIGHT VENTRICLE ID DIMENSION: 2.6 CM
SL CV LEFT ATRIUM LENGTH A2C: 5.1 CM
SL CV PED ECHO LEFT VENTRICLE DIASTOLIC VOLUME (MOD BIPLANE) 2D: 49 ML
SL CV PED ECHO LEFT VENTRICLE SYSTOLIC VOLUME (MOD BIPLANE) 2D: 16 ML
TR MAX PG: 21 MMHG
TR PEAK VELOCITY: 2.3 M/S
TRICUSPID ANNULAR PLANE SYSTOLIC EXCURSION: 2.1 CM
TRICUSPID VALVE PEAK REGURGITATION VELOCITY: 2.28 M/S

## 2024-04-08 PROCEDURE — 93306 TTE W/DOPPLER COMPLETE: CPT

## 2024-04-08 PROCEDURE — 93306 TTE W/DOPPLER COMPLETE: CPT | Performed by: INTERNAL MEDICINE

## 2024-04-11 ENCOUNTER — HOSPITAL ENCOUNTER (OUTPATIENT)
Dept: NEUROLOGY | Facility: CLINIC | Age: 79
End: 2024-04-11
Payer: COMMERCIAL

## 2024-04-11 DIAGNOSIS — R20.0 NUMBNESS AND TINGLING: ICD-10-CM

## 2024-04-11 DIAGNOSIS — R20.2 NUMBNESS AND TINGLING: ICD-10-CM

## 2024-04-11 PROCEDURE — 95886 MUSC TEST DONE W/N TEST COMP: CPT | Performed by: PSYCHIATRY & NEUROLOGY

## 2024-04-11 PROCEDURE — 95910 NRV CNDJ TEST 7-8 STUDIES: CPT | Performed by: PSYCHIATRY & NEUROLOGY

## 2024-04-16 ENCOUNTER — OFFICE VISIT (OUTPATIENT)
Dept: INTERNAL MEDICINE CLINIC | Facility: CLINIC | Age: 79
End: 2024-04-16
Payer: COMMERCIAL

## 2024-04-16 VITALS
BODY MASS INDEX: 28.37 KG/M2 | WEIGHT: 154.2 LBS | TEMPERATURE: 97.3 F | HEART RATE: 77 BPM | HEIGHT: 62 IN | DIASTOLIC BLOOD PRESSURE: 82 MMHG | SYSTOLIC BLOOD PRESSURE: 122 MMHG

## 2024-04-16 DIAGNOSIS — G89.29 CHRONIC PAIN OF BOTH KNEES: ICD-10-CM

## 2024-04-16 DIAGNOSIS — M25.562 CHRONIC PAIN OF BOTH KNEES: ICD-10-CM

## 2024-04-16 DIAGNOSIS — M25.561 CHRONIC PAIN OF BOTH KNEES: ICD-10-CM

## 2024-04-16 DIAGNOSIS — R20.0 NUMBNESS AND TINGLING: Primary | ICD-10-CM

## 2024-04-16 DIAGNOSIS — R00.2 PALPITATIONS: ICD-10-CM

## 2024-04-16 DIAGNOSIS — D22.9 NEVUS: ICD-10-CM

## 2024-04-16 DIAGNOSIS — R20.2 NUMBNESS AND TINGLING: Primary | ICD-10-CM

## 2024-04-16 PROCEDURE — G2211 COMPLEX E/M VISIT ADD ON: HCPCS | Performed by: INTERNAL MEDICINE

## 2024-04-16 PROCEDURE — 99214 OFFICE O/P EST MOD 30 MIN: CPT | Performed by: INTERNAL MEDICINE

## 2024-04-16 RX ORDER — DICLOFENAC SODIUM 75 MG/1
75 TABLET, DELAYED RELEASE ORAL 2 TIMES DAILY PRN
Qty: 60 TABLET | Refills: 0 | Status: SHIPPED | OUTPATIENT
Start: 2024-04-16

## 2024-04-16 NOTE — ASSESSMENT & PLAN NOTE
"Previously evaluated for numbness and tingling in the left side of her arm/leg.  She had EMG which showed evidence of mild carpal tunnel syndrome as well as tarsal tunnel syndrome.  Had previously been prescribed gabapentin which apparently was not particularly effective and caused cognitive issues.      She previously reported recurrence of numbness and \"warmth\" in her left foot extending from the midfoot to the toes.  Neurovascularly intact.  EMG was obtained for further characterization.  EMG results were reviewed today.  No electrodiagnostic evidence of tibial mononeuropathy, generalized polyneuropathy or lumbar radiculopathy.  Improvement compared to study performed in 2020    Etiology of symptoms unclear, recommend reevaluation with neurology.  Consideration for small fiber neuropathy given normal EMG?  "

## 2024-04-16 NOTE — PROGRESS NOTES
"INTERNAL MEDICINE OFFICE VISIT  [facility]- [place]    NAME: Beverly Soto  AGE: 78 y.o. SEX: female    DATE OF ENCOUNTER: 4/16/2024    Assessment and Plan/History of Present Illness     Here today for follow-up  Medical history of GERD, hypertension, pancreatic cysts, PACs, prediabetes, depression, moderate aortic dilatation, gallstones status post cholecystectomy, diverticulitis      Patient reports episode of \"dizziness\" within the past few days.  She was in her kitchen at the time folding some close.  She suddenly felt like she was going to pass out but did not lose consciousness.  Had associated palpitations.  No chest pain.  She states that she does at times have lightheadedness when bending over.  Does not report any associated headache, visual disturbances.  No new muscle weakness, slurred speech.  Recently had 2D echo completed earlier this month with LVEF 58%, no significant structural or valve issues  -Etiology unclear.  She has had increase in her palpitations and Holter monitor was suggested by cardiology and most recent appointment.  Will order 48-hour Holter monitor today.  Keep follow-up with cardiology scheduled for next month  -Consideration for orthostasis.  If these episodes continue, can consider lowering dose of losartan though her BP is well-controlled at the moment        1. Numbness and tingling  Assessment & Plan:  Previously evaluated for numbness and tingling in the left side of her arm/leg.  She had EMG which showed evidence of mild carpal tunnel syndrome as well as tarsal tunnel syndrome.  Had previously been prescribed gabapentin which apparently was not particularly effective and caused cognitive issues.      She previously reported recurrence of numbness and \"warmth\" in her left foot extending from the midfoot to the toes.  Neurovascularly intact.  EMG was obtained for further characterization.  EMG results were reviewed today.  No electrodiagnostic " evidence of tibial mononeuropathy, generalized polyneuropathy or lumbar radiculopathy.  Improvement compared to study performed in 2020    Etiology of symptoms unclear, recommend reevaluation with neurology.  Consideration for small fiber neuropathy given normal EMG?    Orders:  -     Ambulatory Referral to Neurology; Future    2. Palpitations  -     Holter monitor; Future; Expected date: 04/16/2024    3. Nevus  Assessment & Plan:  Raised, pigmented lesion of the left anterior chest.  This has been there for a year plus but she states it has grown in size.  Slightly irregular borders.  She denies any personal or family history of skin cancer.  Recommend evaluation with dermatology    Orders:  -     Ambulatory Referral to Dermatology; Future    4. Chronic pain of both knees  -     diclofenac (VOLTAREN) 75 mg EC tablet; Take 1 tablet (75 mg total) by mouth 2 (two) times a day as needed (Knee pain)               Orders Placed This Encounter   Procedures   • Ambulatory Referral to Neurology   • Ambulatory Referral to Dermatology   • Holter monitor         Chief Complaint     Chief Complaint   Patient presents with   • Follow-up     3 month follow up        Review of Systems     10 point ROS negative except per HPI    The following portions of the patient's history were reviewed and updated as appropriate: allergies, current medications, past family history, past medical history, past social history, past surgical history and problem list.    Active Problem List     Patient Active Problem List   Diagnosis   • Essential hypertension   • Palpitations   • Gastroesophageal reflux disease   • Primary osteoarthritis of both knees   • Osteopenia   • Cervical radiculopathy   • Aortic dilatation (HCC)   • Nonrheumatic aortic valve insufficiency   • Abnormal EKG   • Pre-diabetes   • Numbness and tingling   • Carpal tunnel syndrome of left wrist   • Vertigo   • Abnormal liver enzymes   • Vitamin D deficiency   • Tarsal tunnel  "syndrome of left side   • Primary hypercholesterolemia   • Status post cholecystectomy   • Increased urinary frequency   • Open wound of left foot   • Chronic right shoulder pain   • Bilateral lower abdominal cramping   • Upper respiratory tract infection   • History of COVID-19   • Nevus       Objective     /82 (BP Location: Left arm, Patient Position: Sitting, Cuff Size: Standard)   Pulse 77   Temp (!) 97.3 °F (36.3 °C)   Ht 5' 2\" (1.575 m)   Wt 69.9 kg (154 lb 3.2 oz)   BMI 28.20 kg/m²     Physical Exam    Pertinent Laboratory/Diagnostic Studies:  No results found.    Images and diagnostics reviewed     Current Medications     Current Outpatient Medications:   •  acetaminophen (TYLENOL) 325 mg tablet, Take 650 mg by mouth every 6 (six) hours as needed for mild pain, Disp: , Rfl:   •  cholecalciferol (VITAMIN D3) 1,000 units tablet, Take 2 tablets (2,000 Units total) by mouth daily, Disp: 180 tablet, Rfl: 1  •  diclofenac (VOLTAREN) 75 mg EC tablet, Take 1 tablet (75 mg total) by mouth 2 (two) times a day as needed (Knee pain), Disp: 60 tablet, Rfl: 0  •  Diclofenac Sodium (VOLTAREN) 1 %, Apply 2 g topically 4 (four) times a day, Disp: 100 g, Rfl: 0  •  losartan (COZAAR) 25 mg tablet, TAKE 1 TABLET BY MOUTH EVERY MORNING, Disp: 90 tablet, Rfl: 1  •  metoprolol succinate (TOPROL-XL) 100 mg 24 hr tablet, Take 1 tablet (100 mg total) by mouth daily, Disp: 90 tablet, Rfl: 3  •  Multiple Vitamin (multivitamin) capsule, Take 1 capsule by mouth every morning, Disp: 90 capsule, Rfl: 1  •  omeprazole (PriLOSEC) 20 mg delayed release capsule, TAKE 1 CAPSULE(20 MG) BY MOUTH DAILY AS NEEDED FOR REFLUX, Disp: 90 capsule, Rfl: 1    Health Maintenance     Health Maintenance   Topic Date Due   • Zoster Vaccine (1 of 2) Never done   • Breast Cancer Screening: Mammogram  01/24/2023   • Influenza Vaccine (1) 09/01/2023   • COVID-19 Vaccine (3 - 2023-24 season) 09/01/2023   • DXA SCAN  01/24/2024   • Medicare Annual " Wellness Visit (AWV)  05/23/2024   • Urinary Incontinence Screening  05/23/2024   • Fall Risk  10/05/2024   • Depression Screening  04/16/2025   • Hepatitis C Screening  Completed   • Osteoporosis Screening  Completed   • Pneumococcal Vaccine: 65+ Years  Completed   • HIB Vaccine  Aged Out   • IPV Vaccine  Aged Out   • Hepatitis A Vaccine  Aged Out   • Meningococcal ACWY Vaccine  Aged Out   • HPV Vaccine  Aged Out   • Colorectal Cancer Screening  Discontinued     Immunization History   Administered Date(s) Administered   • COVID-19 PFIZER VACCINE 0.3 ML IM 04/30/2021, 05/20/2021   • INFLUENZA 08/29/2018   • Influenza, high dose seasonal 0.7 mL 08/29/2018, 10/24/2019, 10/22/2020, 11/22/2021, 11/21/2022   • Pneumococcal Conjugate 13-Valent 07/18/2018   • Pneumococcal Polysaccharide PPV23 07/16/2020       Denis Weaver D.O.  [facility] Internal Medicine - [address]  [place] [address]  Office: [phone]  Fax: [phone]

## 2024-04-16 NOTE — ASSESSMENT & PLAN NOTE
Raised, pigmented lesion of the left anterior chest.  This has been there for a year plus but she states it has grown in size.  Slightly irregular borders.  She denies any personal or family history of skin cancer.  Recommend evaluation with dermatology

## 2024-04-29 ENCOUNTER — HOSPITAL ENCOUNTER (OUTPATIENT)
Dept: NON INVASIVE DIAGNOSTICS | Facility: CLINIC | Age: 79
Discharge: HOME | End: 2024-04-29
Payer: COMMERCIAL

## 2024-04-29 DIAGNOSIS — R00.2 PALPITATIONS: ICD-10-CM

## 2024-04-29 PROCEDURE — 93226 XTRNL ECG REC<48 HR SCAN A/R: CPT

## 2024-04-29 PROCEDURE — 93225 XTRNL ECG REC<48 HRS REC: CPT

## 2024-05-07 PROCEDURE — 93227 XTRNL ECG REC<48 HR R&I: CPT | Performed by: INTERNAL MEDICINE

## 2024-05-08 ENCOUNTER — TELEPHONE (OUTPATIENT)
Dept: INTERNAL MEDICINE CLINIC | Facility: CLINIC | Age: 79
End: 2024-05-08

## 2024-05-08 NOTE — TELEPHONE ENCOUNTER
"----- Message from Denis Weaver DO sent at 5/7/2024  8:12 PM EDT -----  Reviewed Holter monitor.  No abnormal findings. Occasional benign \"extra beat\" noted.  Would not make any changes to medications at this time.  Can review with cardiology at upcoming appointment later this month  "

## 2024-05-09 NOTE — TELEPHONE ENCOUNTER
Completed call with  line agent Tj id# [id number].     Patient returned phone call. I warm transferred her to [facility] in [facility] [place]

## 2024-05-09 NOTE — TELEPHONE ENCOUNTER
Patient made aware of her HM result with cira Spencer the language line.    Patient stated that today she had to sit down do to having a lot of Palpitations.    Patient informed to call Cardiology and see if she can be seen sooner.  Patient reminded of her Cardiology visit 5/20/24.    Please review and advice  Thank you

## 2024-05-15 DIAGNOSIS — M25.561 CHRONIC PAIN OF BOTH KNEES: ICD-10-CM

## 2024-05-15 DIAGNOSIS — G89.29 CHRONIC PAIN OF BOTH KNEES: ICD-10-CM

## 2024-05-15 DIAGNOSIS — M25.562 CHRONIC PAIN OF BOTH KNEES: ICD-10-CM

## 2024-05-15 RX ORDER — DICLOFENAC SODIUM 75 MG/1
TABLET, DELAYED RELEASE ORAL
Qty: 60 TABLET | Refills: 0 | Status: SHIPPED | OUTPATIENT
Start: 2024-05-15

## 2024-05-28 ENCOUNTER — TELEPHONE (OUTPATIENT)
Dept: INTERNAL MEDICINE CLINIC | Facility: CLINIC | Age: 79
End: 2024-05-28

## 2024-05-30 NOTE — ASSESSMENT & PLAN NOTE
Toprol 100 mg QD  Holter monitor on 4/29 was negative for significant arrhythmia. Patient did not report symptoms during that time.  Encouraged hydration

## 2024-05-30 NOTE — PROGRESS NOTES
"   Cardiology Follow Up    [facility] [address] [address]  PHONE: [phone]  FAX: [phone]    Beverly Soto  1945  [id number]    Assessment/Plan:  Nonrheumatic aortic valve insufficiency  Stable on echo from 4/8/2024 compared to 2022    Palpitations  Continue Toprol 100 mg QD  Holter monitor on 4/29 was negative for significant arrhythmia. Patient tells me she felt 1 episode of the palpitations while she was wearing the monitor but I assured her there is no dangerous arrhythmia.  Encouraged hydration as well as stress reduction tactics.         RTO in 1 year w/ Dr. Mukherjee.    Interval History:   Beverly Soto is a 78 y.o. female with past medical history as below who presents for follow-up after cardiology testing this spring for her complaints of shortness of breath after influenza infection last winter.     Patient reports that since her last visit when labetalol was increased her palpitations have decreased in frequency.  They still happen but mostly when she is very stressed.  They do not cause her discomfort.  She drinks chamomile tea to try to relieve the palpitations as well as relieve her stress.    Patient reports dizziness happens about 1-2 times per week it will last a few moments if she moves too quickly feels like she is unsteady on her feet or presyncopal like her vision is \"going black\".  Resolves with holding onto something or taking a moment to steady herself.  She has not syncopized, no falls or injury.  This only happens with quick movement.  It never happens while she is just sitting, resting or in bed.    Review of Systems   Constitutional: Negative for diaphoresis and malaise/fatigue.        - weight change   Eyes:  Negative for double vision.   Cardiovascular:  Positive for near-syncope. Negative for chest pain, dyspnea on exertion, irregular heartbeat, leg swelling, orthopnea, palpitations, paroxysmal " nocturnal dyspnea and syncope.   Respiratory:  Negative for cough, shortness of breath and sleep disturbances due to breathing.    Musculoskeletal:  Positive for arthritis. Negative for falls and muscle weakness.   Gastrointestinal:  Negative for abdominal pain.   Neurological:  Positive for dizziness and light-headedness.        + headache on the to of her head some mornings     Past Medical History:   Diagnosis Date    Cholelithiasis     lap jewell today 4/13/2022    Full dentures     GERD (gastroesophageal reflux disease)     Hypertension     Pancreatic cyst     drained    Wears glasses       Past Surgical History:   Procedure Laterality Date    CATARACT EXTRACTION      COLONOSCOPY  11/2020    IA LAPAROSCOPY SURG CHOLECYSTECTOMY N/A 4/13/2022    Procedure: ROBOTIC LAP JEWELL;  Surgeon: Sherin Horn MD;  Location: [facility];  Service: General    TUBAL LIGATION      Ectopic pregnancy 1979    UPPER GASTROINTESTINAL ENDOSCOPY  11/2020      Family History   Problem Relation Age of Onset    Diabetes Mother     Hypertension Mother     Hypertension Father     Stroke Father     No Known Problems Sister     No Known Problems Brother     No Known Problems Daughter     No Known Problems Maternal Grandmother     No Known Problems Maternal Grandfather     No Known Problems Paternal Grandmother     No Known Problems Paternal Grandfather     No Known Problems Sister     No Known Problems Sister     No Known Problems Sister     No Known Problems Sister     No Known Problems Daughter     No Known Problems Maternal Aunt     No Known Problems Maternal Aunt     No Known Problems Maternal Aunt     No Known Problems Paternal Aunt     No Known Problems Paternal Aunt     No Known Problems Paternal Aunt     No Known Problems Paternal Aunt     No Known Problems Paternal Aunt       Social history:  Smoker: never  Alcohol: none  Drugs: never  Living situation: home w , she is his caregiver he has leukemia. This is the source of her  stress.    Current Outpatient Medications:     acetaminophen (TYLENOL) 325 mg tablet, Take 650 mg by mouth every 6 (six) hours as needed for mild pain, Disp: , Rfl:     cholecalciferol (VITAMIN D3) 1,000 units tablet, Take 2 tablets (2,000 Units total) by mouth daily, Disp: 180 tablet, Rfl: 1    diclofenac (VOLTAREN) 75 mg EC tablet, TAKE 1 TABLET(75 MG) BY MOUTH TWICE DAILY AS NEEDED FOR KNEE PAIN, Disp: 60 tablet, Rfl: 0    Diclofenac Sodium (VOLTAREN) 1 %, Apply 2 g topically 4 (four) times a day, Disp: 100 g, Rfl: 0    losartan (COZAAR) 25 mg tablet, TAKE 1 TABLET BY MOUTH EVERY MORNING, Disp: 90 tablet, Rfl: 1    metoprolol succinate (TOPROL-XL) 100 mg 24 hr tablet, Take 1 tablet (100 mg total) by mouth daily, Disp: 90 tablet, Rfl: 3    Multiple Vitamin (multivitamin) capsule, Take 1 capsule by mouth every morning, Disp: 90 capsule, Rfl: 1    omeprazole (PriLOSEC) 20 mg delayed release capsule, TAKE 1 CAPSULE(20 MG) BY MOUTH DAILY AS NEEDED FOR REFLUX, Disp: 90 capsule, Rfl: 1     Allergies   Allergen Reactions    Shellfish-Derived Products - Food Allergy Nausea Only    Lisinopril Cough     Physical Exam:  There were no vitals filed for this visit.  There were no vitals filed for this visit.      There is no height or weight on file to calculate BMI.    Physical Exam  Vitals and nursing note reviewed.   Constitutional:       General: She is not in acute distress.     Appearance: She is not ill-appearing.   HENT:      Head: Normocephalic and atraumatic.      Nose: Nose normal.      Mouth/Throat:      Mouth: Mucous membranes are dry.   Eyes:      Extraocular Movements: Extraocular movements intact.   Neck:      Vascular: No carotid bruit or JVD.   Cardiovascular:      Rate and Rhythm: Regular rhythm. No extrasystoles are present.     Pulses:           Radial pulses are 2+ on the right side and 2+ on the left side.      Heart sounds: S1 normal and S2 normal. Murmur heard.      Diastolic murmur is present with a  grade of 1/4.      Comments: Hr approx 80 bpm  Pulmonary:      Effort: Pulmonary effort is normal.      Breath sounds: No wheezing, rhonchi or rales.   Chest:      Chest wall: No tenderness.   Abdominal:      General: Abdomen is flat.   Musculoskeletal:         General: No swelling. Normal range of motion.   Skin:     General: Skin is warm and dry.   Neurological:      General: No focal deficit present.      Mental Status: She is alert.   Psychiatric:         Behavior: Behavior normal.     Data:  Holter: 4/29/2024.  Normal sinus rhythm with average heart rate 69 bpm.  Fluctuated  beats per minute.  Rare PACs.    ECHO: 4/8/2024 mild LVH slightly more prominent in the basal interventricular septum.  LVEF 58%.  Grade 1 diastolic dysfunction.  Normal RV size and function.  Moderate left atrial enlargement.  Mild aortic valve sclerosis.  Mild to moderate aortic valve regurgitation.  Mild MAC.  Trace MR.  Trace TR.  Ascending aorta is ectatic measuring 4.3 cm.  These findings are unchanged compared to echo from 3/23/2022.    Jane Diaz PA-C  [facility]

## 2024-06-05 ENCOUNTER — OFFICE VISIT (OUTPATIENT)
Dept: CARDIOLOGY CLINIC | Facility: CLINIC | Age: 79
End: 2024-06-05
Payer: COMMERCIAL

## 2024-06-05 VITALS
SYSTOLIC BLOOD PRESSURE: 115 MMHG | DIASTOLIC BLOOD PRESSURE: 60 MMHG | BODY MASS INDEX: 29.08 KG/M2 | HEART RATE: 66 BPM | WEIGHT: 158 LBS | HEIGHT: 62 IN

## 2024-06-05 DIAGNOSIS — I35.1 NONRHEUMATIC AORTIC VALVE INSUFFICIENCY: Primary | ICD-10-CM

## 2024-06-05 DIAGNOSIS — R00.2 PALPITATIONS: ICD-10-CM

## 2024-06-05 PROCEDURE — 99214 OFFICE O/P EST MOD 30 MIN: CPT | Performed by: PHYSICIAN ASSISTANT

## 2024-06-05 NOTE — PATIENT INSTRUCTIONS
Yesi vance botella de multivitaminas para verificar si tiene vitaminas B en vance interior. La vitamina para ayudar a la memoria es la B1 & la B12.    Puedes beber chamomile te.

## 2024-07-12 ENCOUNTER — NURSE TRIAGE (OUTPATIENT)
Age: 79
End: 2024-07-12

## 2024-07-12 NOTE — TELEPHONE ENCOUNTER
Pt called back and I advised her to see the urgent care or the ER and she stated that she will be going

## 2024-07-12 NOTE — TELEPHONE ENCOUNTER
"Interpretor on the line, Patient states she had abdominal pain, she said it is diverticulitis and would like an antibiotic. Also states she is having palpitations. Her /92 and 73 pulse. Patient is refusing to go to Urgent Care. She would like the Dr to call in an antibiotic for her. Please follow up with patient.                     Reason for Disposition   Constant abdominal pain lasting > 2 hours    Answer Assessment - Initial Assessment Questions  1. LOCATION: \"Where does it hurt?\"       Left side abdominal pain.   2. RADIATION: \"Does the pain shoot anywhere else?\" (e.g., chest, back)      no  3. ONSET: \"When did the pain begin?\" (e.g., minutes, hours or days ago)       2 days ago  4. SUDDEN: \"Gradual or sudden onset?\"      States she didn't eat anything and it came on suddenly   5. PATTERN \"Does the pain come and go, or is it constant?\"     - If constant: \"Is it getting better, staying the same, or worsening?\"       (Note: Constant means the pain never goes away completely; most serious pain is constant and it progresses)      - If intermittent: \"How long does it last?\" \"Do you have pain now?\"      (Note: Intermittent means the pain goes away completely between bouts)      constant  6. SEVERITY: \"How bad is the pain?\"  (e.g., Scale 1-10; mild, moderate, or severe)    - MILD (1-3): doesn't interfere with normal activities, abdomen soft and not tender to touch     - MODERATE (4-7): interferes with normal activities or awakens from sleep, tender to touch     - SEVERE (8-10): excruciating pain, doubled over, unable to do any normal activities       Strong pain  7. RECURRENT SYMPTOM: \"Have you ever had this type of stomach pain before?\" If Yes, ask: \"When was the last time?\" and \"What happened that time?\"       yes  8. CAUSE: \"What do you think is causing the stomach pain?\"      diverticulitis  9. RELIEVING/AGGRAVATING FACTORS: \"What makes it better or worse?\" (e.g., movement, antacids, bowel movement)      " "no  10. OTHER SYMPTOMS: \"Has there been any vomiting, diarrhea, constipation, or urine problems?\"        No other symptoms except that she started having heart palpitations    Protocols used: Abdominal Pain - Female-ADULT-[place]    "

## 2024-07-12 NOTE — TELEPHONE ENCOUNTER
Patient states she has diverticulitis. She would like an antibiotic sent to the pharmacy for her. No appts available.

## 2024-07-31 ENCOUNTER — OFFICE VISIT (OUTPATIENT)
Dept: INTERNAL MEDICINE CLINIC | Facility: CLINIC | Age: 79
End: 2024-07-31
Payer: COMMERCIAL

## 2024-07-31 VITALS
WEIGHT: 158 LBS | SYSTOLIC BLOOD PRESSURE: 130 MMHG | TEMPERATURE: 98.4 F | BODY MASS INDEX: 29.08 KG/M2 | HEART RATE: 80 BPM | DIASTOLIC BLOOD PRESSURE: 80 MMHG | HEIGHT: 62 IN | RESPIRATION RATE: 14 BRPM

## 2024-07-31 DIAGNOSIS — E55.9 VITAMIN D DEFICIENCY: ICD-10-CM

## 2024-07-31 DIAGNOSIS — R00.2 PALPITATIONS: ICD-10-CM

## 2024-07-31 DIAGNOSIS — R10.30 LOWER ABDOMINAL PAIN: ICD-10-CM

## 2024-07-31 DIAGNOSIS — R10.32 BILATERAL LOWER ABDOMINAL CRAMPING: ICD-10-CM

## 2024-07-31 DIAGNOSIS — I10 ESSENTIAL HYPERTENSION: ICD-10-CM

## 2024-07-31 DIAGNOSIS — Z87.19 HISTORY OF DIVERTICULITIS: ICD-10-CM

## 2024-07-31 DIAGNOSIS — K21.9 GASTROESOPHAGEAL REFLUX DISEASE WITHOUT ESOPHAGITIS: ICD-10-CM

## 2024-07-31 DIAGNOSIS — E61.1 IRON DEFICIENCY: ICD-10-CM

## 2024-07-31 DIAGNOSIS — Z00.00 MEDICARE ANNUAL WELLNESS VISIT, SUBSEQUENT: Primary | ICD-10-CM

## 2024-07-31 DIAGNOSIS — R10.31 BILATERAL LOWER ABDOMINAL CRAMPING: ICD-10-CM

## 2024-07-31 PROCEDURE — 1170F FXNL STATUS ASSESSED: CPT | Performed by: INTERNAL MEDICINE

## 2024-07-31 PROCEDURE — 3288F FALL RISK ASSESSMENT DOCD: CPT | Performed by: INTERNAL MEDICINE

## 2024-07-31 PROCEDURE — 3725F SCREEN DEPRESSION PERFORMED: CPT | Performed by: INTERNAL MEDICINE

## 2024-07-31 PROCEDURE — G0439 PPPS, SUBSEQ VISIT: HCPCS | Performed by: INTERNAL MEDICINE

## 2024-07-31 PROCEDURE — 1160F RVW MEDS BY RX/DR IN RCRD: CPT | Performed by: INTERNAL MEDICINE

## 2024-07-31 PROCEDURE — 99214 OFFICE O/P EST MOD 30 MIN: CPT | Performed by: INTERNAL MEDICINE

## 2024-07-31 PROCEDURE — 3075F SYST BP GE 130 - 139MM HG: CPT | Performed by: INTERNAL MEDICINE

## 2024-07-31 PROCEDURE — 3079F DIAST BP 80-89 MM HG: CPT | Performed by: INTERNAL MEDICINE

## 2024-07-31 PROCEDURE — 1159F MED LIST DOCD IN RCRD: CPT | Performed by: INTERNAL MEDICINE

## 2024-07-31 PROCEDURE — 1125F AMNT PAIN NOTED PAIN PRSNT: CPT | Performed by: INTERNAL MEDICINE

## 2024-07-31 RX ORDER — FAMOTIDINE 20 MG/1
20 TABLET, FILM COATED ORAL 2 TIMES DAILY
Qty: 180 TABLET | Refills: 0 | Status: SHIPPED | OUTPATIENT
Start: 2024-07-31

## 2024-07-31 NOTE — PROGRESS NOTES
Ambulatory Visit  Name: Beverly Soto      : 1945      MRN: [id number]  Encounter Provider: Denis Weaver DO  Encounter Date: 2024   Encounter department: [facility] [place]    Medical history of GERD, hypertension, pancreatic cysts, PACs, prediabetes, depression, moderate aortic dilatation, gallstones status post cholecystectomy, diverticulitis           Assessment & Plan   1. Medicare annual wellness visit, subsequent  2. Essential hypertension  -     CBC and differential; Future  -     Comprehensive metabolic panel; Future  -     Lipid Panel with Direct LDL reflex; Future  -     TSH, 3rd generation with Free T4 reflex; Future  3. Palpitations  Assessment & Plan:  history of symptomatic palpitations In the setting of PACs  -continue with current dose of Toprol-XL, taking 75 mg daily   -Following with cardiology  4. Vitamin D deficiency  -     Vitamin D 25 hydroxy; Future  5. Gastroesophageal reflux disease without esophagitis  -     famotidine (PEPCID) 20 mg tablet; Take 1 tablet (20 mg total) by mouth 2 (two) times a day  -     Ambulatory Referral to Gastroenterology; Future  -     H. pylori antigen, stool; Future  6. Lower abdominal pain  -     CT abdomen pelvis w contrast; Future; Expected date: 2024  -     Ambulatory Referral to Gastroenterology; Future  -     H. pylori antigen, stool; Future  7. History of diverticulitis  -     CT abdomen pelvis w contrast; Future; Expected date: 2024  8. Iron deficiency  -     Iron Panel (Includes Ferritin, Iron Sat%, Iron, and TIBC); Future  9. Bilateral lower abdominal cramping  Assessment & Plan:  Patient reports ongoing lower abdominal discomfort.  Occasional constipation.  Symptoms tend to improve after a bowel movement.  No associated nausea or vomiting.  She feels burning epigastric sensation.  Denies any blood in the stool.  No unexplained weight loss.  No dysphagia or odynophagia.  Mild tenderness to  "palpation of the left upper and left lower quadrant on exam today without rebound or guarding.  She had been complaining of intermittent cramping sensation back in December 2023 and CT was requested at that time which has not yet been completed.   -She had a prior colonoscopy in November 2020 showing large external and internal hemorrhoids, multiple scattered diverticula. She had EUS/EGD in September 2020 showing normal esophagus, normal stomach and duodenum.  Biopsies showed increased intraepithelial lymphocytes in the small intestinal mucosa. Per pathology comment: \"This is a histologically nonspecific diagnosis with a broad differential diagnosis including gluten-sensitive enteropathy (celiac sprue), bacterial overgrowth, other enteric infection, autoimmune disease, inflammatory bowel disease, drug (NSAID) reaction, and gastric infection with Helicobacter pylori.  Correlation with patient history and additional clinical laboratory testing (antibody studies) may be helpful for interpretation of the results\".  Stomach biopsies did not show any evidence of H. pylori at that time    Plan:  -Etiology of symptoms unclear.  Consider IBS or GERD/PUD. Patient concerned that she may have an ulcer  -We will obtain updated CT of the abdomen and pelvis  -Check H. pylori stool antigen  -She wishes to try medication other than omeprazole. Will trial famotidine  -Will also refer back to GI        Preventive health issues were discussed with patient, and age appropriate screening tests were ordered as noted in patient's After Visit Summary. Personalized health advice and appropriate referrals for health education or preventive services given if needed, as noted in patient's After Visit Summary.    History of Present Illness     HPI   Patient Care Team:  Denis Weaver DO as PCP - General (Internal Medicine)    Review of Systems  Medical History Reviewed by provider this encounter:  Meds  Problems       Annual Wellness Visit " Questionnaire   Beverly is here for her Subsequent Wellness visit.     Health Risk Assessment:   Patient rates overall health as good. Patient feels that their physical health rating is slightly worse. Patient is satisfied with their life. Eyesight was rated as slightly worse. Hearing was rated as same. Patient feels that their emotional and mental health rating is same. Patients states they are sometimes angry. Patient states they are sometimes unusually tired/fatigued. Pain experienced in the last 7 days has been a lot. Patient's pain rating has been 8/10. Patient states that she has experienced no weight loss or gain in last 6 months.     Depression Screening:   PHQ-2 Score: 2      Fall Risk Screening:   In the past year, patient has experienced: no history of falling in past year      Urinary Incontinence Screening:   Patient has leaked urine accidently in the last six months.     Home Safety:  Patient does not have trouble with stairs inside or outside of their home. Patient has working smoke alarms and has working carbon monoxide detector. Home safety hazards include: none.     Nutrition:   Current diet is Regular, No Added Salt and Limited junk food.     Medications:   Patient is currently taking over-the-counter supplements. OTC medications include: see medication list. Patient is able to manage medications.     Activities of Daily Living (ADLs)/Instrumental Activities of Daily Living (IADLs):   Walk and transfer into and out of bed and chair?: Yes  Dress and groom yourself?: Yes    Bathe or shower yourself?: Yes    Feed yourself? Yes  Do your laundry/housekeeping?: Yes  Manage your money, pay your bills and track your expenses?: Yes  Make your own meals?: Yes    Do your own shopping?: Yes    Previous Hospitalizations:   Any hospitalizations or ED visits within the last 12 months?: Yes    How many hospitalizations have you had in the last year?: 1-2    Advance Care Planning:   Living will: No    Durable POA  for healthcare: No    Advanced directive: No      Cognitive Screening:   Provider or family/friend/caregiver concerned regarding cognition?: No    PREVENTIVE SCREENINGS      Cardiovascular Screening:    General: Screening Not Indicated and History Lipid Disorder      Diabetes Screening:     General: Screening Current      Colorectal Cancer Screening:     General: Screening Current      Breast Cancer Screening:     General: Risks and Benefits Discussed    Due for: Mammogram        Cervical Cancer Screening:    General: Screening Not Indicated      Osteoporosis Screening:    General: Risks and Benefits Discussed    Due for: DXA Axial      Abdominal Aortic Aneurysm (AAA) Screening:        General: Screening Not Indicated      Lung Cancer Screening:     General: Screening Not Indicated      Hepatitis C Screening:    General: Screening Current    Screening, Brief Intervention, and Referral to Treatment (SBIRT)    Screening  Typical number of drinks in a day: 0  Typical number of drinks in a week: 0  Interpretation: Low risk drinking behavior.    Single Item Drug Screening:  How often have you used an illegal drug (including marijuana) or a prescription medication for non-medical reasons in the past year? never    Single Item Drug Screen Score: 0  Interpretation: Negative screen for possible drug use disorder    Brief Intervention  Alcohol & drug use screenings were reviewed. No concerns regarding substance use disorder identified.     Social Determinants of Health     Financial Resource Strain: Medium Risk (5/23/2023)    Overall Financial Resource Strain (CARDIA)    • Difficulty of Paying Living Expenses: Somewhat hard   Food Insecurity: No Food Insecurity (7/31/2024)    Hunger Vital Sign    • Worried About Running Out of Food in the Last Year: Never true    • Ran Out of Food in the Last Year: Never true   Transportation Needs: No Transportation Needs (7/31/2024)    PRAPARE - Transportation    • Lack of Transportation  "(Medical): No    • Lack of Transportation (Non-Medical): No   Housing Stability: Unknown (7/31/2024)    Housing Stability Vital Sign    • Unable to Pay for Housing in the Last Year: No    • Homeless in the Last Year: No   Utilities: Not At Risk (7/31/2024)    [facility] Utilities    • Threatened with loss of utilities: No     No results found.    Objective     /80 (BP Location: Left arm, Patient Position: Sitting, Cuff Size: Standard)   Pulse 80   Temp 98.4 °F (36.9 °C)   Resp 14   Ht 5' 2\" (1.575 m)   Wt 71.7 kg (158 lb)   BMI 28.90 kg/m²     Physical Exam  Cardiovascular:      Rate and Rhythm: Normal rate and regular rhythm.      Heart sounds: No murmur heard.  Pulmonary:      Effort: Pulmonary effort is normal.      Breath sounds: Normal breath sounds. No wheezing or rales.   Abdominal:      General: Abdomen is flat.      Tenderness: There is abdominal tenderness. There is no guarding or rebound.           "

## 2024-07-31 NOTE — ASSESSMENT & PLAN NOTE
"Patient reports ongoing lower abdominal discomfort.  Occasional constipation.  Symptoms tend to improve after a bowel movement.  No associated nausea or vomiting.  She feels burning epigastric sensation.  Denies any blood in the stool.  No unexplained weight loss.  No dysphagia or odynophagia.  Mild tenderness to palpation of the left upper and left lower quadrant on exam today without rebound or guarding.  She had been complaining of intermittent cramping sensation back in December 2023 and CT was requested at that time which has not yet been completed.   -She had a prior colonoscopy in November 2020 showing large external and internal hemorrhoids, multiple scattered diverticula. She had EUS/EGD in September 2020 showing normal esophagus, normal stomach and duodenum.  Biopsies showed increased intraepithelial lymphocytes in the small intestinal mucosa. Per pathology comment: \"This is a histologically nonspecific diagnosis with a broad differential diagnosis including gluten-sensitive enteropathy (celiac sprue), bacterial overgrowth, other enteric infection, autoimmune disease, inflammatory bowel disease, drug (NSAID) reaction, and gastric infection with Helicobacter pylori.  Correlation with patient history and additional clinical laboratory testing (antibody studies) may be helpful for interpretation of the results\".  Stomach biopsies did not show any evidence of H. pylori at that time    Plan:  -Etiology of symptoms unclear.  Consider IBS or GERD/PUD. Patient concerned that she may have an ulcer  -We will obtain updated CT of the abdomen and pelvis  -Check H. pylori stool antigen  -She wishes to try medication other than omeprazole. Will trial famotidine  -Will also refer back to GI   "

## 2024-08-15 ENCOUNTER — APPOINTMENT (OUTPATIENT)
Dept: LAB | Facility: CLINIC | Age: 79
End: 2024-08-15
Payer: COMMERCIAL

## 2024-08-15 DIAGNOSIS — E61.1 IRON DEFICIENCY: ICD-10-CM

## 2024-08-15 DIAGNOSIS — I10 ESSENTIAL HYPERTENSION: ICD-10-CM

## 2024-08-15 DIAGNOSIS — E55.9 VITAMIN D DEFICIENCY: ICD-10-CM

## 2024-08-15 LAB
25(OH)D3 SERPL-MCNC: 50.8 NG/ML (ref 30–100)
ALBUMIN SERPL BCG-MCNC: 4.1 G/DL (ref 3.5–5)
ALP SERPL-CCNC: 91 U/L (ref 34–104)
ALT SERPL W P-5'-P-CCNC: 16 U/L (ref 7–52)
ANION GAP SERPL CALCULATED.3IONS-SCNC: 8 MMOL/L (ref 4–13)
AST SERPL W P-5'-P-CCNC: 23 U/L (ref 13–39)
BASOPHILS # BLD AUTO: 0.04 THOUSANDS/ÂΜL (ref 0–0.1)
BASOPHILS NFR BLD AUTO: 1 % (ref 0–1)
BILIRUB SERPL-MCNC: 0.51 MG/DL (ref 0.2–1)
BUN SERPL-MCNC: 14 MG/DL (ref 5–25)
CALCIUM SERPL-MCNC: 9.5 MG/DL (ref 8.4–10.2)
CHLORIDE SERPL-SCNC: 105 MMOL/L (ref 96–108)
CHOLEST SERPL-MCNC: 201 MG/DL
CO2 SERPL-SCNC: 27 MMOL/L (ref 21–32)
CREAT SERPL-MCNC: 0.63 MG/DL (ref 0.6–1.3)
EOSINOPHIL # BLD AUTO: 0.16 THOUSAND/ÂΜL (ref 0–0.61)
EOSINOPHIL NFR BLD AUTO: 3 % (ref 0–6)
ERYTHROCYTE [DISTWIDTH] IN BLOOD BY AUTOMATED COUNT: 13.1 % (ref 11.6–15.1)
FERRITIN SERPL-MCNC: 54 NG/ML (ref 11–307)
GFR SERPL CREATININE-BSD FRML MDRD: 86 ML/MIN/1.73SQ M
GLUCOSE P FAST SERPL-MCNC: 88 MG/DL (ref 65–99)
HCT VFR BLD AUTO: 39.1 % (ref 34.8–46.1)
HDLC SERPL-MCNC: 65 MG/DL
HGB BLD-MCNC: 12.4 G/DL (ref 11.5–15.4)
IMM GRANULOCYTES # BLD AUTO: 0.01 THOUSAND/UL (ref 0–0.2)
IMM GRANULOCYTES NFR BLD AUTO: 0 % (ref 0–2)
IRON SATN MFR SERPL: 37 % (ref 15–50)
IRON SERPL-MCNC: 122 UG/DL (ref 50–212)
LDLC SERPL CALC-MCNC: 112 MG/DL (ref 0–100)
LYMPHOCYTES # BLD AUTO: 2.16 THOUSANDS/ÂΜL (ref 0.6–4.47)
LYMPHOCYTES NFR BLD AUTO: 37 % (ref 14–44)
MCH RBC QN AUTO: 30.5 PG (ref 26.8–34.3)
MCHC RBC AUTO-ENTMCNC: 31.7 G/DL (ref 31.4–37.4)
MCV RBC AUTO: 96 FL (ref 82–98)
MONOCYTES # BLD AUTO: 0.6 THOUSAND/ÂΜL (ref 0.17–1.22)
MONOCYTES NFR BLD AUTO: 10 % (ref 4–12)
NEUTROPHILS # BLD AUTO: 2.86 THOUSANDS/ÂΜL (ref 1.85–7.62)
NEUTS SEG NFR BLD AUTO: 49 % (ref 43–75)
NRBC BLD AUTO-RTO: 0 /100 WBCS
PLATELET # BLD AUTO: 255 THOUSANDS/UL (ref 149–390)
PMV BLD AUTO: 10.4 FL (ref 8.9–12.7)
POTASSIUM SERPL-SCNC: 4.4 MMOL/L (ref 3.5–5.3)
PROT SERPL-MCNC: 7.2 G/DL (ref 6.4–8.4)
RBC # BLD AUTO: 4.07 MILLION/UL (ref 3.81–5.12)
SODIUM SERPL-SCNC: 140 MMOL/L (ref 135–147)
TIBC SERPL-MCNC: 333 UG/DL (ref 250–450)
TRIGL SERPL-MCNC: 120 MG/DL
TSH SERPL DL<=0.05 MIU/L-ACNC: 1.56 UIU/ML (ref 0.45–4.5)
UIBC SERPL-MCNC: 211 UG/DL (ref 155–355)
WBC # BLD AUTO: 5.83 THOUSAND/UL (ref 4.31–10.16)

## 2024-08-15 PROCEDURE — 82728 ASSAY OF FERRITIN: CPT

## 2024-08-15 PROCEDURE — 83550 IRON BINDING TEST: CPT

## 2024-08-15 PROCEDURE — 36415 COLL VENOUS BLD VENIPUNCTURE: CPT

## 2024-08-15 PROCEDURE — 84443 ASSAY THYROID STIM HORMONE: CPT

## 2024-08-15 PROCEDURE — 80053 COMPREHEN METABOLIC PANEL: CPT

## 2024-08-15 PROCEDURE — 85025 COMPLETE CBC W/AUTO DIFF WBC: CPT

## 2024-08-15 PROCEDURE — 80061 LIPID PANEL: CPT

## 2024-08-15 PROCEDURE — 83540 ASSAY OF IRON: CPT

## 2024-08-15 PROCEDURE — 82306 VITAMIN D 25 HYDROXY: CPT

## 2024-08-16 ENCOUNTER — TELEPHONE (OUTPATIENT)
Age: 79
End: 2024-08-16

## 2024-08-16 ENCOUNTER — NURSE TRIAGE (OUTPATIENT)
Age: 79
End: 2024-08-16

## 2024-08-16 NOTE — TELEPHONE ENCOUNTER
Pt transferred to nurses line with language line     Pt reports abdominal cramping around pelvis - it relieves pain after BM but then comes back with increase in gas.   She also reports burning in stomach up into throat michael in the afternoons. Reports daily Bms, occasionally will miss one day but then goes regular again.     Pt went to see PCP on 7/31 and started on Pepcid 2 days ago once a day and stopped omeprazole last week.     She is concerned for having h pylori again- PCP ordered stool studies.     I advised pt she can increase Pepcid to BID. Pt prefers to see provider before taking any more medicaitons due to potential side effects.     I offered next available appt which is in October- pt was looking for something sooner but no sooner appt. Pt unsure if she will keep appt but did put on wait list.         Reason for Disposition   The patient has epigastric pain for <3 months    Answer Questions - Initial Assesment - Gerd Recurrent  When did your symptoms start: on going     How often are you experiencing symptoms: daily     When are your symptoms occurring: day     Are you currently taking: Gerd meds: PPI: no and H2 blockers: yes    Have you tried any OTC medications:     Have any OTC medications resolved or lessened your symptoms:     What recent testing has the patient had:     Are you following the diet and lifestyle modifications: unsure     Any recent changes in your bowel habits: no    Protocols used: GERD

## 2024-08-16 NOTE — TELEPHONE ENCOUNTER
Patient calling as she has been experiencing abdominal pain, 6 out of 10 for a few days, severity does worsen at times. Also does describe the pain as burning.  Call warm transferred to clinical team for triage.

## 2024-08-20 ENCOUNTER — TELEPHONE (OUTPATIENT)
Dept: INTERNAL MEDICINE CLINIC | Facility: CLINIC | Age: 79
End: 2024-08-20

## 2024-08-20 NOTE — TELEPHONE ENCOUNTER
----- Message from Denis Weaver DO sent at 8/18/2024  9:50 AM EDT -----  Mild elevation in LDL, labs otherwise within normal limits.  No concerns from my standpoint

## 2024-08-21 ENCOUNTER — APPOINTMENT (OUTPATIENT)
Dept: LAB | Facility: CLINIC | Age: 79
End: 2024-08-21
Payer: COMMERCIAL

## 2024-08-21 DIAGNOSIS — R10.30 LOWER ABDOMINAL PAIN: ICD-10-CM

## 2024-08-21 DIAGNOSIS — K21.9 GASTROESOPHAGEAL REFLUX DISEASE WITHOUT ESOPHAGITIS: ICD-10-CM

## 2024-08-21 PROCEDURE — 87338 HPYLORI STOOL AG IA: CPT

## 2024-08-24 ENCOUNTER — HOSPITAL ENCOUNTER (EMERGENCY)
Facility: HOSPITAL | Age: 79
Discharge: HOME | End: 2024-08-24
Attending: INTERNAL MEDICINE
Payer: COMMERCIAL

## 2024-08-24 ENCOUNTER — APPOINTMENT (EMERGENCY)
Dept: CT IMAGING | Facility: HOSPITAL | Age: 79
End: 2024-08-24
Payer: COMMERCIAL

## 2024-08-24 VITALS
TEMPERATURE: 98 F | OXYGEN SATURATION: 98 % | WEIGHT: 156.53 LBS | DIASTOLIC BLOOD PRESSURE: 75 MMHG | SYSTOLIC BLOOD PRESSURE: 123 MMHG | RESPIRATION RATE: 16 BRPM | HEART RATE: 74 BPM | BODY MASS INDEX: 28.63 KG/M2

## 2024-08-24 DIAGNOSIS — K57.92 DIVERTICULITIS: Primary | ICD-10-CM

## 2024-08-24 LAB
ALBUMIN SERPL BCG-MCNC: 4.2 G/DL (ref 3.5–5)
ALP SERPL-CCNC: 90 U/L (ref 34–104)
ALT SERPL W P-5'-P-CCNC: 20 U/L (ref 7–52)
ANION GAP SERPL CALCULATED.3IONS-SCNC: 6 MMOL/L (ref 4–13)
AST SERPL W P-5'-P-CCNC: 25 U/L (ref 13–39)
BASOPHILS # BLD AUTO: 0.04 THOUSANDS/ÂΜL (ref 0–0.1)
BASOPHILS NFR BLD AUTO: 0 % (ref 0–1)
BILIRUB SERPL-MCNC: 0.52 MG/DL (ref 0.2–1)
BILIRUB UR QL STRIP: NEGATIVE
BUN SERPL-MCNC: 14 MG/DL (ref 5–25)
CALCIUM SERPL-MCNC: 9.3 MG/DL (ref 8.4–10.2)
CHLORIDE SERPL-SCNC: 103 MMOL/L (ref 96–108)
CLARITY UR: CLEAR
CO2 SERPL-SCNC: 27 MMOL/L (ref 21–32)
COLOR UR: YELLOW
CREAT SERPL-MCNC: 0.63 MG/DL (ref 0.6–1.3)
EOSINOPHIL # BLD AUTO: 0.12 THOUSAND/ÂΜL (ref 0–0.61)
EOSINOPHIL NFR BLD AUTO: 1 % (ref 0–6)
ERYTHROCYTE [DISTWIDTH] IN BLOOD BY AUTOMATED COUNT: 12.7 % (ref 11.6–15.1)
GFR SERPL CREATININE-BSD FRML MDRD: 86 ML/MIN/1.73SQ M
GLUCOSE SERPL-MCNC: 89 MG/DL (ref 65–140)
GLUCOSE UR STRIP-MCNC: NEGATIVE MG/DL
HCT VFR BLD AUTO: 38.2 % (ref 34.8–46.1)
HGB BLD-MCNC: 12.7 G/DL (ref 11.5–15.4)
HGB UR QL STRIP.AUTO: NEGATIVE
IMM GRANULOCYTES # BLD AUTO: 0.02 THOUSAND/UL (ref 0–0.2)
IMM GRANULOCYTES NFR BLD AUTO: 0 % (ref 0–2)
KETONES UR STRIP-MCNC: NEGATIVE MG/DL
LEUKOCYTE ESTERASE UR QL STRIP: NEGATIVE
LIPASE SERPL-CCNC: 30 U/L (ref 11–82)
LYMPHOCYTES # BLD AUTO: 2.36 THOUSANDS/ÂΜL (ref 0.6–4.47)
LYMPHOCYTES NFR BLD AUTO: 23 % (ref 14–44)
MCH RBC QN AUTO: 31.4 PG (ref 26.8–34.3)
MCHC RBC AUTO-ENTMCNC: 33.2 G/DL (ref 31.4–37.4)
MCV RBC AUTO: 95 FL (ref 82–98)
MONOCYTES # BLD AUTO: 0.88 THOUSAND/ÂΜL (ref 0.17–1.22)
MONOCYTES NFR BLD AUTO: 9 % (ref 4–12)
NEUTROPHILS # BLD AUTO: 6.96 THOUSANDS/ÂΜL (ref 1.85–7.62)
NEUTS SEG NFR BLD AUTO: 67 % (ref 43–75)
NITRITE UR QL STRIP: NEGATIVE
NRBC BLD AUTO-RTO: 0 /100 WBCS
PH UR STRIP.AUTO: 7 [PH] (ref 4.5–8)
PLATELET # BLD AUTO: 256 THOUSANDS/UL (ref 149–390)
PMV BLD AUTO: 10 FL (ref 8.9–12.7)
POTASSIUM SERPL-SCNC: 3.8 MMOL/L (ref 3.5–5.3)
PROT SERPL-MCNC: 6.9 G/DL (ref 6.4–8.4)
PROT UR STRIP-MCNC: NEGATIVE MG/DL
RBC # BLD AUTO: 4.04 MILLION/UL (ref 3.81–5.12)
SODIUM SERPL-SCNC: 136 MMOL/L (ref 135–147)
SP GR UR STRIP.AUTO: 1.02 (ref 1–1.03)
UROBILINOGEN UR QL STRIP.AUTO: 0.2 E.U./DL
WBC # BLD AUTO: 10.38 THOUSAND/UL (ref 4.31–10.16)

## 2024-08-24 PROCEDURE — 85025 COMPLETE CBC W/AUTO DIFF WBC: CPT | Performed by: PHYSICIAN ASSISTANT

## 2024-08-24 PROCEDURE — 96375 TX/PRO/DX INJ NEW DRUG ADDON: CPT

## 2024-08-24 PROCEDURE — 81003 URINALYSIS AUTO W/O SCOPE: CPT

## 2024-08-24 PROCEDURE — 36415 COLL VENOUS BLD VENIPUNCTURE: CPT | Performed by: PHYSICIAN ASSISTANT

## 2024-08-24 PROCEDURE — 80053 COMPREHEN METABOLIC PANEL: CPT | Performed by: PHYSICIAN ASSISTANT

## 2024-08-24 PROCEDURE — 99284 EMERGENCY DEPT VISIT MOD MDM: CPT | Performed by: PHYSICIAN ASSISTANT

## 2024-08-24 PROCEDURE — 83690 ASSAY OF LIPASE: CPT | Performed by: PHYSICIAN ASSISTANT

## 2024-08-24 PROCEDURE — 74177 CT ABD & PELVIS W/CONTRAST: CPT

## 2024-08-24 PROCEDURE — 99284 EMERGENCY DEPT VISIT MOD MDM: CPT

## 2024-08-24 PROCEDURE — 96365 THER/PROPH/DIAG IV INF INIT: CPT

## 2024-08-24 RX ORDER — KETOROLAC TROMETHAMINE 30 MG/ML
15 INJECTION, SOLUTION INTRAMUSCULAR; INTRAVENOUS ONCE
Status: COMPLETED | OUTPATIENT
Start: 2024-08-24 | End: 2024-08-24

## 2024-08-24 RX ORDER — ACETAMINOPHEN 10 MG/ML
1000 INJECTION, SOLUTION INTRAVENOUS ONCE
Status: COMPLETED | OUTPATIENT
Start: 2024-08-24 | End: 2024-08-24

## 2024-08-24 RX ADMIN — ACETAMINOPHEN 1000 MG: 10 INJECTION INTRAVENOUS at 12:36

## 2024-08-24 RX ADMIN — KETOROLAC TROMETHAMINE 15 MG: 30 INJECTION, SOLUTION INTRAMUSCULAR; INTRAVENOUS at 15:28

## 2024-08-24 RX ADMIN — AMOXICILLIN AND CLAVULANATE POTASSIUM 1 TABLET: 875; 125 TABLET, FILM COATED ORAL at 15:28

## 2024-08-24 RX ADMIN — IOHEXOL 100 ML: 350 INJECTION, SOLUTION INTRAVENOUS at 13:53

## 2024-08-24 RX ADMIN — SODIUM CHLORIDE 500 ML: 0.9 INJECTION, SOLUTION INTRAVENOUS at 12:36

## 2024-08-24 NOTE — ED PROVIDER NOTES
History  Chief Complaint   Patient presents with    Abdominal Pain     Began 2 days ago. Hx of diverticulitis, pt reports feeling the same.      Mercedes is a 78 yo F presenting with family member with L sided abdominal pain for the past 2 days. The pain is reportedly similar to previous episodes of diverticulitis. No nausea/vomiting or diarrhea. Notes normal bowel movements today which are nonbloody. No fevers/chills.       History provided by:  Patient      Prior to Admission Medications   Prescriptions Last Dose Informant Patient Reported? Taking?   Multiple Vitamin (multivitamin) capsule  Self No No   Sig: Take 1 capsule by mouth every morning   acetaminophen (TYLENOL) 325 mg tablet  Self Yes No   Sig: Take 650 mg by mouth every 6 (six) hours as needed for mild pain   cholecalciferol (VITAMIN D3) 1,000 units tablet  Self No No   Sig: Take 2 tablets (2,000 Units total) by mouth daily   famotidine (PEPCID) 20 mg tablet   No No   Sig: Take 1 tablet (20 mg total) by mouth 2 (two) times a day   losartan (COZAAR) 25 mg tablet  Self No No   Sig: TAKE 1 TABLET BY MOUTH EVERY MORNING   metoprolol succinate (TOPROL-XL) 100 mg 24 hr tablet  Self No No   Sig: Take 1 tablet (100 mg total) by mouth daily      Facility-Administered Medications: None       Past Medical History:   Diagnosis Date    Cholelithiasis     lap jewell today 4/13/2022    Diverticulitis     Full dentures     GERD (gastroesophageal reflux disease)     Hypertension     Pancreatic cyst     drained    Wears glasses        Past Surgical History:   Procedure Laterality Date    CATARACT EXTRACTION      COLONOSCOPY  11/2020    [facility] LAPAROSCOPY SURG CHOLECYSTECTOMY N/A 4/13/2022    Procedure: ROBOTIC LAP JEWELL;  Surgeon: Sherin Horn MD;  Location: [facility];  Service: General    TUBAL LIGATION      Ectopic pregnancy 1979    UPPER GASTROINTESTINAL ENDOSCOPY  11/2020       Family History   Problem Relation Age of Onset    Diabetes Mother     Hypertension Mother      Hypertension Father     Stroke Father     No Known Problems Sister     No Known Problems Brother     No Known Problems Daughter     No Known Problems Maternal Grandmother     No Known Problems Maternal Grandfather     No Known Problems Paternal Grandmother     No Known Problems Paternal Grandfather     No Known Problems Sister     No Known Problems Sister     No Known Problems Sister     No Known Problems Sister     No Known Problems Daughter     No Known Problems Maternal Aunt     No Known Problems Maternal Aunt     No Known Problems Maternal Aunt     No Known Problems Paternal Aunt     No Known Problems Paternal Aunt     No Known Problems Paternal Aunt     No Known Problems Paternal Aunt     No Known Problems Paternal Aunt      I have reviewed and agree with the history as documented.    E-Cigarette/Vaping    E-Cigarette Use Never User      E-Cigarette/Vaping Substances     Social History     Tobacco Use    Smoking status: Never    Smokeless tobacco: Never   Vaping Use    Vaping status: Never Used   Substance Use Topics    Alcohol use: No    Drug use: No       Review of Systems   Constitutional:  Negative for chills and fever.   HENT:  Negative for congestion, rhinorrhea and sore throat.    Eyes:  Negative for pain and visual disturbance.   Respiratory:  Negative for cough, shortness of breath and wheezing.    Cardiovascular:  Negative for chest pain and palpitations.   Gastrointestinal:  Positive for abdominal pain. Negative for blood in stool, constipation, diarrhea, nausea and vomiting.   Genitourinary:  Negative for dysuria, frequency and urgency.   Musculoskeletal:  Negative for back pain, neck pain and neck stiffness.   Skin:  Negative for rash and wound.   Neurological:  Negative for dizziness, weakness, light-headedness and numbness.       Physical Exam  Physical Exam  Constitutional:       General: She is not in acute distress.     Appearance: She is well-developed. She is not diaphoretic.   HENT:       Head: Normocephalic and atraumatic.      Right Ear: External ear normal.      Left Ear: External ear normal.   Eyes:      Extraocular Movements:      Right eye: Normal extraocular motion.      Left eye: Normal extraocular motion.      Conjunctiva/sclera: Conjunctivae normal.      Pupils: Pupils are equal, round, and reactive to light.   Cardiovascular:      Rate and Rhythm: Normal rate and regular rhythm.   Pulmonary:      Effort: Pulmonary effort is normal. No accessory muscle usage or respiratory distress.      Breath sounds: No wheezing or rales.   Abdominal:      General: Abdomen is flat. There is no distension.      Tenderness: There is abdominal tenderness. There is no right CVA tenderness, left CVA tenderness or guarding.      Comments: TTP across upper/lower L abdomen. No rigidity/rebound/guarding. No CVAT.    Musculoskeletal:      Cervical back: Normal range of motion. No rigidity.   Skin:     General: Skin is warm and dry.      Capillary Refill: Capillary refill takes less than 2 seconds.      Findings: No erythema or rash.   Neurological:      Mental Status: She is alert and oriented to person, place, and time.      Motor: No abnormal muscle tone.      Coordination: Coordination normal.   Psychiatric:         Behavior: Behavior normal.         Thought Content: Thought content normal.         Judgment: Judgment normal.         Vital Signs  ED Triage Vitals [08/24/24 1142]   Temperature Pulse Respirations Blood Pressure SpO2   98 °F (36.7 °C) 74 16 123/75 98 %      Temp Source Heart Rate Source Patient Position - Orthostatic VS BP Location FiO2 (%)   Oral -- Sitting Right arm --      Pain Score       8           Vitals:    08/24/24 1142   BP: 123/75   Pulse: 74   Patient Position - Orthostatic VS: Sitting         Visual Acuity      ED Medications  Medications   acetaminophen (Ofirmev) injection 1,000 mg (0 mg Intravenous Stopped 8/24/24 1300)   sodium chloride 0.9 % bolus 500 mL (0 mL Intravenous Stopped  8/24/24 1321)   iohexol (OMNIPAQUE) 350 MG/ML injection (MULTI-DOSE) 100 mL (100 mL Intravenous Given 8/24/24 1353)   amoxicillin-clavulanate (AUGMENTIN) 875-125 mg per tablet 1 tablet (1 tablet Oral Given 8/24/24 1528)   ketorolac (TORADOL) injection 15 mg (15 mg Intravenous Given 8/24/24 1528)       Diagnostic Studies  Results Reviewed       Procedure Component Value Units Date/Time    Urine Macroscopic, POC [[id number]] Collected: 08/24/24 1304    Lab Status: Final result Specimen: Urine Updated: 08/24/24 1305     Color, UA Yellow     Clarity, UA Clear     pH, UA 7.0     Leukocytes, UA Negative     Nitrite, UA Negative     Protein, UA Negative mg/dl      Glucose, UA Negative mg/dl      Ketones, UA Negative mg/dl      Urobilinogen, UA 0.2 E.U./dl      Bilirubin, UA Negative     Occult Blood, UA Negative     Specific Gravity, UA 1.020    Narrative:      CLIN[facility] RESULT    Comprehensive metabolic panel [[id number]] Collected: 08/24/24 1232    Lab Status: Final result Specimen: Blood from Arm, Left Updated: 08/24/24 1301     Sodium 136 mmol/L      Potassium 3.8 mmol/L      Chloride 103 mmol/L      CO2 27 mmol/L      ANION GAP 6 mmol/L      BUN 14 mg/dL      Creatinine 0.63 mg/dL      Glucose 89 mg/dL      Calcium 9.3 mg/dL      AST 25 U/L      ALT 20 U/L      Alkaline Phosphatase 90 U/L      Total Protein 6.9 g/dL      Albumin 4.2 g/dL      Total Bilirubin 0.52 mg/dL      eGFR 86 ml/min/1.73sq m     Narrative:      National Kid[employer] Foundation guidelines for Chronic Kidney Disease (CKD):     Stage 1 with normal or high GFR (GFR > 90 mL/min/1.73 square meters)    Stage 2 Mild CKD (GFR = 60-89 mL/min/1.73 square meters)    Stage 3A Moderate CKD (GFR = 45-59 mL/min/1.73 square meters)    Stage 3B Moderate CKD (GFR = 30-44 mL/min/1.73 square meters)    Stage 4 Severe CKD (GFR = 15-29 mL/min/1.73 square meters)    Stage 5 End Stage CKD (GFR <15 mL/min/1.73 square meters)  Note: GFR calculation is accurate only with a  steady state creatinine    Lipase [[id number]]  (Normal) Collected: 08/24/24 1232    Lab Status: Final result Specimen: Blood from Arm, Left Updated: 08/24/24 1301     Lipase 30 u/L     CBC and differential [[id number]]  (Abnormal) Collected: 08/24/24 1232    Lab Status: Final result Specimen: Blood from Arm, Left Updated: 08/24/24 1243     WBC 10.38 Thousand/uL      RBC 4.04 Million/uL      Hemoglobin 12.7 g/dL      Hematocrit 38.2 %      MCV 95 fL      MCH 31.4 pg      MCHC 33.2 g/dL      RDW 12.7 %      MPV 10.0 fL      Platelets 256 Thousands/uL      nRBC 0 /100 WBCs      Segmented % 67 %      Immature Grans % 0 %      Lymphocytes % 23 %      Monocytes % 9 %      Eosinophils Relative 1 %      Basophils Relative 0 %      Absolute Neutrophils 6.96 Thousands/µL      Absolute Immature Grans 0.02 Thousand/uL      Absolute Lymphocytes 2.36 Thousands/µL      Absolute Monocytes 0.88 Thousand/µL      Eosinophils Absolute 0.12 Thousand/µL      Basophils Absolute 0.04 Thousands/µL                    CT abdomen pelvis with contrast   Final Result by Henrry Phillips MD (08/24 1047)      Inflammatory changes adjacent to the distal descending colon in the region of multiple diverticula, most consistent with acute diverticulitis. No CT complication identified. Followup colonoscopy after the acute illness has resolved is recommended to rule    out colon cancer mimicking diverticulitis.      The study was marked in EPIC for immediate notification.         Workstation performed: [id number]                    Procedures  Procedures         ED Course                                               Medical Decision Making  L abdominal pain with onset 2 days ago, described as similar to previous diverticulitis episodes. No N/V/D, no systemic symptoms/fevers/chills. Well appearing overall. Lab workup reveals top normal WBC count, otherwise largely WNL. CT abd/pelvis with findings c/w uncomplicated diverticulitis. Pt previously tolerated  Augmentin will during prior treatment, will give course of same. F/u with PCP recommended for re-evaluation and consideration of f/u colonoscopy if felt indicated per CT report. Dietary recommendations reviewed. Strict return to ED indications discussed.     Amount and/or Complexity of Data Reviewed  Labs: ordered.  Radiology: ordered.    Risk  Prescription drug management.                 Disposition  Final diagnoses:   Diverticulitis     Time reflects when diagnosis was documented in both MDM as applicable and the Disposition within this note       Time User Action Codes Description Comment    8/24/2024  3:26 PM Alcides Malik Add [K57.92] Diverticulitis           ED Disposition       ED Disposition   Discharge    Condition   Stable    Date/Time   Sat Aug 24, 2024  3:26 PM    Comment   Beverly Soto discharge to home/self care.                   Follow-up Information       Follow up With Specialties Details Why Contact Info Additional Information    [facility] Emergency Department Emergency Medicine  If symptoms worsen [address] [address]  [phone] [facility] Emergency Department, [address], [place], [address]    Denis Weaver,  Internal Medicine Schedule an appointment as soon as possible for a visit   [address] [facility]  [address]  [facility] [address] [phone]               Discharge Medication List as of 8/24/2024  3:29 PM        START taking these medications    Details   amoxicillin-clavulanate (AUGMENTIN) 875-125 mg per tablet Take 1 tablet by mouth every 12 (twelve) hours for 7 days, Starting Sat 8/24/2024, Until Sat 8/31/2024, Normal      naproxen (NAPROSYN) 375 mg tablet Take 1 tablet (375 mg total) by mouth 2 (two) times a day as needed for mild pain or moderate pain, Starting Sat 8/24/2024, Normal           CONTINUE these medications which have NOT CHANGED    Details   acetaminophen  (TYLENOL) 325 mg tablet Take 650 mg by mouth every 6 (six) hours as needed for mild pain, Historical Med      cholecalciferol (VITAMIN D3) 1,000 units tablet Take 2 tablets (2,000 Units total) by mouth daily, Starting Thu 7/21/2022, Normal      famotidine (PEPCID) 20 mg tablet Take 1 tablet (20 mg total) by mouth 2 (two) times a day, Starting Wed 7/31/2024, Normal      losartan (COZAAR) 25 mg tablet TAKE 1 TABLET BY MOUTH EVERY MORNING, Starting Fri 3/1/2024, Normal      metoprolol succinate (TOPROL-XL) 100 mg 24 hr tablet Take 1 tablet (100 mg total) by mouth daily, Starting Thu 1/25/2024, Normal      Multiple Vitamin (multivitamin) capsule Take 1 capsule by mouth every morning, Starting Thu 7/21/2022, Normal             No discharge procedures on file.    PDMP Review         Value Time User    PDMP Reviewed  Yes 4/13/2022  7:12 AM Leidy Malloy PA-C            ED Provider  Electronically Signed by             Alcides Malik PA-C  08/24/24 5647

## 2024-08-24 NOTE — DISCHARGE INSTRUCTIONS
Please refer to the attached information for strict return instructions. If symptoms worsen or new symptoms develop please return to the ER. Drink plenty of fluids to stay hydrated. Gently advance your diet from clear liquids to normal solids as tolerated.

## 2024-08-24 NOTE — ED NOTES
Pt's daughter reports pt usually gets Cipro when ever she has diverticulitis, RN made MAYELA Malik aware. Daughter wants to hold off on augmentin until she talks to Mayela.     Liz Shine RN  08/24/24 0152

## 2024-08-26 LAB — H PYLORI AG STL QL IA: NEGATIVE

## 2024-08-27 ENCOUNTER — TELEPHONE (OUTPATIENT)
Dept: INTERNAL MEDICINE CLINIC | Facility: CLINIC | Age: 79
End: 2024-08-27

## 2024-08-27 NOTE — TELEPHONE ENCOUNTER
----- Message from Denis Weaver DO sent at 8/26/2024  4:16 PM EDT -----  Testing for H. pylori is negative, no evidence of bacterial infection in the stomach.  Good news    Keep follow-up appointment with GI scheduled for October

## 2024-10-02 ENCOUNTER — OFFICE VISIT (OUTPATIENT)
Dept: GASTROENTEROLOGY | Facility: CLINIC | Age: 79
End: 2024-10-02
Payer: COMMERCIAL

## 2024-10-02 VITALS
DIASTOLIC BLOOD PRESSURE: 74 MMHG | SYSTOLIC BLOOD PRESSURE: 116 MMHG | WEIGHT: 157.2 LBS | BODY MASS INDEX: 28.93 KG/M2 | TEMPERATURE: 97.7 F | HEIGHT: 62 IN

## 2024-10-02 DIAGNOSIS — K21.9 GASTROESOPHAGEAL REFLUX DISEASE, UNSPECIFIED WHETHER ESOPHAGITIS PRESENT: Primary | ICD-10-CM

## 2024-10-02 DIAGNOSIS — K59.00 CONSTIPATION, UNSPECIFIED CONSTIPATION TYPE: ICD-10-CM

## 2024-10-02 PROCEDURE — 99214 OFFICE O/P EST MOD 30 MIN: CPT | Performed by: INTERNAL MEDICINE

## 2024-10-02 PROCEDURE — G2211 COMPLEX E/M VISIT ADD ON: HCPCS | Performed by: INTERNAL MEDICINE

## 2024-10-02 RX ORDER — CYCLOSPORINE 0.5 MG/ML
EMULSION OPHTHALMIC
COMMUNITY
Start: 2024-09-06

## 2024-10-02 NOTE — PROGRESS NOTES
[facility] - Outpatient Note  Beverly Soto 79 y.o. female MRN: [id number]  Unit/Bed#:  Encounter: [id number]          ASSESSMENT AND PLAN:    Patient is a 79-year-old female with a past medical history of GERD, pancreatic cyst s/p EUS and hypertension who is presenting with a 1 month history of worsening reflux.    GERD  History of pancreatic cyst  Constipation  Patient is presenting with a worsening history of GERD over the past few months. Patient initially on omeprazole however was discontinued at some point which she does not remember. Patient was seen by primary care provider who ordered H. pylori testing which was negative and was started on famotidine at the time.  Patient denies any relief from famotidine. Discussed with patient regarding possible repeat EGD last one was in 2020 which did not show any abnormalities, at that time pancreatic cyst was drained via EUS.  Patient wants to try omeprazole first as it did provide relief in the past, advised that if symptoms do not improve can consider EGD at that time.    -Omeprazole 20 mg twice daily, if symptoms fail to improve can consider EGD at that time  -6-month follow-up  -MiraLAX for constipation    FOLLOW-UP:  No follow-ups on file.    VISIT DIAGNOSES AND ORDERS:      1. Gastroesophageal reflux disease, unspecified whether esophagitis present      No orders of the defined types were placed in this encounter.        ______________________________________________________________________    HPI:    Patient is a 79-year-old female with a past medical history of GERD, pancreatic cyst s/p EUS and hypertension who is presenting with a 1 month history of worsening reflux.    Patient states she has been having worsening reflux over the past month.  She was previously on omeprazole and had a EGD back in 2020 which did not show any abnormalities, patient had undergone an EUS due to a pancreatic cyst which was drained during the procedure.   Patient states she does not remember when the omeprazole was discontinued however after stopping the medications her symptoms recur. Patient was was started on famotidine by her primary care doctor however it has failed to provide her with any relief.  Patient denies any unintentional weight loss or any blood in her stool.  Patient states she does have episodes of constipation however she eats prunes and that helps resolve her symptoms.      REVIEW OF SYSTEMS:    10 point ROS reviewed and negative except otherwise noted in the HPI above.     Historical Information   Past Medical History:   Diagnosis Date    Cholelithiasis     lap jewell today 4/13/2022    Diverticulitis     Full dentures     GERD (gastroesophageal reflux disease)     Hypertension     Pancreatic cyst     drained    Wears glasses      Past Surgical History:   Procedure Laterality Date    CATARACT EXTRACTION      COLONOSCOPY  11/2020    [facility] LAPAROSCOPY SURG CHOLECYSTECTOMY N/A 4/13/2022    Procedure: ROBOTIC LAP JEWELL;  Surgeon: Sherin Horn MD;  Location: [facility] OR;  Service: General    TUBAL LIGATION      Ectopic pregnancy 1979    UPPER GASTROINTESTINAL ENDOSCOPY  11/2020     Social History   Social History     Substance and Sexual Activity   Alcohol Use No     Social History     Substance and Sexual Activity   Drug Use No     Social History     Tobacco Use   Smoking Status Never   Smokeless Tobacco Never     Family History   Problem Relation Age of Onset    Diabetes Mother     Hypertension Mother     Hypertension Father     Stroke Father     No Known Problems Sister     No Known Problems Brother     No Known Problems Daughter     No Known Problems Maternal Grandmother     No Known Problems Maternal Grandfather     No Known Problems Paternal Grandmother     No Known Problems Paternal Grandfather     No Known Problems Sister     No Known Problems Sister     No Known Problems Sister     No Known Problems Sister     No Known Problems Daughter     No  "Known Problems Maternal Aunt     No Known Problems Maternal Aunt     No Known Problems Maternal Aunt     No Known Problems Paternal Aunt     No Known Problems Paternal Aunt     No Known Problems Paternal Aunt     No Known Problems Paternal Aunt     No Known Problems Paternal Aunt        Meds/Allergies       Current Outpatient Medications:     acetaminophen (TYLENOL) 325 mg tablet    cholecalciferol (VITAMIN D3) 1,000 units tablet    famotidine (PEPCID) 20 mg tablet    losartan (COZAAR) 25 mg tablet    metoprolol succinate (TOPROL-XL) 100 mg 24 hr tablet    Multiple Vitamin (multivitamin) capsule    omeprazole (PriLOSEC) 20 mg delayed release capsule    Restasis 0.05 % ophthalmic emulsion    Allergies   Allergen Reactions    Shellfish-Derived Products - Food Allergy Nausea Only    Lisinopril Cough       Objective     Blood pressure 116/74, temperature 97.7 °F (36.5 °C), temperature source Tympanic, height 5' 2\" (1.575 m), weight 71.3 kg (157 lb 3.2 oz).    PHYSICAL EXAM:    General: Well-appearing, NAD  Eyes:  no conjunctival icterus or pallor  Abdominal: Soft, non-tender, non-distended  Neuro: alert and oriented  Psych: Normal affect    Lab Results:   Lab Results   Component Value Date    K 3.8 08/24/2024    CO2 27 08/24/2024     08/24/2024    BUN 14 08/24/2024    CREATININE 0.63 08/24/2024     Lab Results   Component Value Date    WBC 10.38 (H) 08/24/2024    HGB 12.7 08/24/2024    HCT 38.2 08/24/2024    MCV 95 08/24/2024     08/24/2024     Lab Results   Component Value Date    TP 6.9 08/24/2024    AST 25 08/24/2024    ALT 20 08/24/2024    BILIDIR 0.11 04/26/2021    INR 1.10 04/26/2021      Lab Results   Component Value Date    IRON 122 08/15/2024    FERRITIN 54 08/15/2024     Lab Results   Component Value Date    HDL 65 08/15/2024    TRIG 120 08/15/2024       Radiology Results:   I have reviewed the results of any radiology studies performed within the last 90 days.     Maki Church, " MD  Gastroenterology Fellow  [facility]  Division of Gastroenterology and Hepatology

## 2024-10-28 DIAGNOSIS — K21.9 GASTROESOPHAGEAL REFLUX DISEASE WITHOUT ESOPHAGITIS: ICD-10-CM

## 2024-10-29 ENCOUNTER — TELEPHONE (OUTPATIENT)
Age: 79
End: 2024-10-29

## 2024-10-29 DIAGNOSIS — R73.03 PRE-DIABETES: ICD-10-CM

## 2024-10-29 DIAGNOSIS — I10 ESSENTIAL HYPERTENSION: Primary | ICD-10-CM

## 2024-10-29 RX ORDER — FAMOTIDINE 20 MG/1
20 TABLET, FILM COATED ORAL 2 TIMES DAILY
Qty: 180 TABLET | Refills: 1 | Status: SHIPPED | OUTPATIENT
Start: 2024-10-29 | End: 2024-11-01

## 2024-10-29 NOTE — TELEPHONE ENCOUNTER
Patient called the RX Refill Line. Message is being forwarded to the office.     Patient called stating that she needs lab work ordered so she can have them done before her Appointment on 11/01/24 with Denis Weaver DO . Patient requested labs for Cholesterol and diabetes and any other labs that she may be due for.   She would like to go tomorrow morning for the labs. Please call patient as soon as the labs are ordered.     Please contact patient at [phone]

## 2024-10-30 ENCOUNTER — APPOINTMENT (OUTPATIENT)
Dept: LAB | Facility: CLINIC | Age: 79
End: 2024-10-30
Payer: COMMERCIAL

## 2024-10-30 DIAGNOSIS — I10 ESSENTIAL HYPERTENSION: ICD-10-CM

## 2024-10-30 DIAGNOSIS — R73.03 PRE-DIABETES: ICD-10-CM

## 2024-10-30 LAB
ANION GAP SERPL CALCULATED.3IONS-SCNC: 8 MMOL/L (ref 4–13)
BASOPHILS # BLD AUTO: 0.05 THOUSANDS/ΜL (ref 0–0.1)
BASOPHILS NFR BLD AUTO: 1 % (ref 0–1)
BUN SERPL-MCNC: 16 MG/DL (ref 5–25)
CALCIUM SERPL-MCNC: 9.5 MG/DL (ref 8.4–10.2)
CHLORIDE SERPL-SCNC: 102 MMOL/L (ref 96–108)
CHOLEST SERPL-MCNC: 209 MG/DL
CO2 SERPL-SCNC: 28 MMOL/L (ref 21–32)
CREAT SERPL-MCNC: 0.63 MG/DL (ref 0.6–1.3)
EOSINOPHIL # BLD AUTO: 0.2 THOUSAND/ΜL (ref 0–0.61)
EOSINOPHIL NFR BLD AUTO: 3 % (ref 0–6)
ERYTHROCYTE [DISTWIDTH] IN BLOOD BY AUTOMATED COUNT: 12.9 % (ref 11.6–15.1)
EST. AVERAGE GLUCOSE BLD GHB EST-MCNC: 123 MG/DL
GFR SERPL CREATININE-BSD FRML MDRD: 86 ML/MIN/1.73SQ M
GLUCOSE P FAST SERPL-MCNC: 90 MG/DL (ref 65–99)
HBA1C MFR BLD: 5.9 %
HCT VFR BLD AUTO: 38.9 % (ref 34.8–46.1)
HDLC SERPL-MCNC: 63 MG/DL
HGB BLD-MCNC: 12.5 G/DL (ref 11.5–15.4)
IMM GRANULOCYTES # BLD AUTO: 0.03 THOUSAND/UL (ref 0–0.2)
IMM GRANULOCYTES NFR BLD AUTO: 0 % (ref 0–2)
LDLC SERPL CALC-MCNC: 119 MG/DL (ref 0–100)
LYMPHOCYTES # BLD AUTO: 2.66 THOUSANDS/ΜL (ref 0.6–4.47)
LYMPHOCYTES NFR BLD AUTO: 38 % (ref 14–44)
MCH RBC QN AUTO: 31 PG (ref 26.8–34.3)
MCHC RBC AUTO-ENTMCNC: 32.1 G/DL (ref 31.4–37.4)
MCV RBC AUTO: 97 FL (ref 82–98)
MONOCYTES # BLD AUTO: 0.65 THOUSAND/ΜL (ref 0.17–1.22)
MONOCYTES NFR BLD AUTO: 9 % (ref 4–12)
NEUTROPHILS # BLD AUTO: 3.47 THOUSANDS/ΜL (ref 1.85–7.62)
NEUTS SEG NFR BLD AUTO: 49 % (ref 43–75)
NRBC BLD AUTO-RTO: 0 /100 WBCS
PLATELET # BLD AUTO: 256 THOUSANDS/UL (ref 149–390)
PMV BLD AUTO: 10.5 FL (ref 8.9–12.7)
POTASSIUM SERPL-SCNC: 4.4 MMOL/L (ref 3.5–5.3)
RBC # BLD AUTO: 4.03 MILLION/UL (ref 3.81–5.12)
SODIUM SERPL-SCNC: 138 MMOL/L (ref 135–147)
TRIGL SERPL-MCNC: 133 MG/DL
WBC # BLD AUTO: 7.06 THOUSAND/UL (ref 4.31–10.16)

## 2024-10-30 PROCEDURE — 80048 BASIC METABOLIC PNL TOTAL CA: CPT

## 2024-10-30 PROCEDURE — 80061 LIPID PANEL: CPT

## 2024-10-30 PROCEDURE — 85025 COMPLETE CBC W/AUTO DIFF WBC: CPT

## 2024-10-30 PROCEDURE — 83036 HEMOGLOBIN GLYCOSYLATED A1C: CPT

## 2024-10-30 PROCEDURE — 36415 COLL VENOUS BLD VENIPUNCTURE: CPT

## 2024-11-01 ENCOUNTER — OFFICE VISIT (OUTPATIENT)
Dept: INTERNAL MEDICINE CLINIC | Facility: CLINIC | Age: 79
End: 2024-11-01
Payer: COMMERCIAL

## 2024-11-01 VITALS
DIASTOLIC BLOOD PRESSURE: 82 MMHG | SYSTOLIC BLOOD PRESSURE: 148 MMHG | WEIGHT: 160 LBS | HEART RATE: 80 BPM | HEIGHT: 62 IN | TEMPERATURE: 97.9 F | RESPIRATION RATE: 14 BRPM | BODY MASS INDEX: 29.44 KG/M2

## 2024-11-01 DIAGNOSIS — R73.03 PRE-DIABETES: ICD-10-CM

## 2024-11-01 DIAGNOSIS — Z23 ENCOUNTER FOR IMMUNIZATION: ICD-10-CM

## 2024-11-01 DIAGNOSIS — K21.9 GASTROESOPHAGEAL REFLUX DISEASE, UNSPECIFIED WHETHER ESOPHAGITIS PRESENT: ICD-10-CM

## 2024-11-01 DIAGNOSIS — M25.561 BILATERAL CHRONIC KNEE PAIN: ICD-10-CM

## 2024-11-01 DIAGNOSIS — I10 ESSENTIAL HYPERTENSION: Primary | ICD-10-CM

## 2024-11-01 DIAGNOSIS — E78.00 PRIMARY HYPERCHOLESTEROLEMIA: ICD-10-CM

## 2024-11-01 DIAGNOSIS — G62.9 NEUROPATHY: ICD-10-CM

## 2024-11-01 DIAGNOSIS — G89.29 BILATERAL CHRONIC KNEE PAIN: ICD-10-CM

## 2024-11-01 DIAGNOSIS — M25.562 BILATERAL CHRONIC KNEE PAIN: ICD-10-CM

## 2024-11-01 DIAGNOSIS — R05.3 CHRONIC COUGH: ICD-10-CM

## 2024-11-01 DIAGNOSIS — Z78.0 POST-MENOPAUSAL: ICD-10-CM

## 2024-11-01 DIAGNOSIS — R06.02 SHORTNESS OF BREATH: ICD-10-CM

## 2024-11-01 DIAGNOSIS — Z12.31 ENCOUNTER FOR SCREENING MAMMOGRAM FOR BREAST CANCER: ICD-10-CM

## 2024-11-01 DIAGNOSIS — M25.551 RIGHT HIP PAIN: ICD-10-CM

## 2024-11-01 PROCEDURE — 99214 OFFICE O/P EST MOD 30 MIN: CPT | Performed by: INTERNAL MEDICINE

## 2024-11-01 PROCEDURE — G0008 ADMIN INFLUENZA VIRUS VAC: HCPCS

## 2024-11-01 PROCEDURE — 90662 IIV NO PRSV INCREASED AG IM: CPT

## 2024-11-01 RX ORDER — DULOXETIN HYDROCHLORIDE 60 MG/1
60 CAPSULE, DELAYED RELEASE ORAL DAILY
Qty: 90 CAPSULE | Refills: 0 | Status: SHIPPED | OUTPATIENT
Start: 2024-11-01

## 2024-11-01 RX ORDER — ALBUTEROL SULFATE 90 UG/1
2 INHALANT RESPIRATORY (INHALATION) EVERY 6 HOURS PRN
Qty: 18 G | Refills: 0 | Status: SHIPPED | OUTPATIENT
Start: 2024-11-01 | End: 2024-11-01

## 2024-11-01 RX ORDER — ALBUTEROL SULFATE 90 UG/1
2 INHALANT RESPIRATORY (INHALATION) EVERY 6 HOURS PRN
Qty: 18 G | Refills: 1 | Status: SHIPPED | OUTPATIENT
Start: 2024-11-01

## 2024-11-01 NOTE — ASSESSMENT & PLAN NOTE
Slightly elevated today, has been well-controlled at prior visits  Continue current regimen for now  Continue losartan 25 mg daily  Continue metoprolol succinate 100 mg daily

## 2024-11-01 NOTE — ASSESSMENT & PLAN NOTE
Reviewed GI consult from last month.  Patient transitioned from famotidine to omeprazole twice daily

## 2024-11-01 NOTE — PROGRESS NOTES
Ambulatory Visit  Name: Beverly Soto      : 1945      MRN: [id number]  Encounter Provider: Denis Weaver DO  Encounter Date: 2024   Encounter department: [facility] INTERNAL MEDICINE [place]    Medical history of GERD, hypertension, pancreatic cysts, PACs, prediabetes, depression, moderate aortic dilatation, gallstones status post cholecystectomy, diverticulitis     Here today for follow-up  Assessment & Plan  Essential hypertension  Slightly elevated today, has been well-controlled at prior visits  Continue current regimen for now  Continue losartan 25 mg daily  Continue metoprolol succinate 100 mg daily       Pre-diabetes  Stable  A1c 5.2024  Continue to monitor.  Healthy dietary and lifestyle choices are encouraged       Primary hypercholesterolemia  Reviewed lipid panel 2024.  , HDL 63,   10-year ASCVD risk score is high  Not currently on statin therapy  We will continue to manage with lifestyle modification         Gastroesophageal reflux disease, unspecified whether esophagitis present  Reviewed GI consult from last month.  Patient transitioned from famotidine to omeprazole twice daily       Encounter for immunization    Orders:    Fluzone High-Dose 0.5 mL IM    Post-menopausal    Orders:    DXA bone density spine hip and pelvis; Future    Encounter for screening mammogram for breast cancer    Orders:    Mammo screening bilateral w 3d and cad; Future    Bilateral chronic knee pain  Ongoing bilateral knee and right sided hip pain.  Previously had knee x-rays completed 2023 showing moderate arthritis of the left knee, severe arthritis of the right knee  -Will refer to sports medicine/orthopedics for consideration of CSI    Orders:    Ambulatory Referral to Orthopedic Surgery; Future    Shortness of breath    Orders:    albuterol (Ventolin HFA) 90 mcg/act inhaler; Inhale 2 puffs every 6 (six) hours as needed for wheezing    Chronic  "cough  Patient reports ongoing intermittent dry cough.  States this has been an issue since her COVID diagnosis around 2021, at times will have associated shortness of breath and chest tightness.  Lungs clear to auscultation today.  She is no longer on lisinopril.  She is a non-smoker  -Possible this is related to UACS versus GERD or reactive airway disease  -Sent for as needed albuterol to pharmacy  -Will also check chest x-ray  -If symptoms persist, consider further evaluation with PFTs or empiric asthma treatments    Orders:    XR chest pa and lateral; Future    Right hip pain    Orders:    Ambulatory Referral to Orthopedic Surgery; Future    Neuropathy  Previously evaluated for numbness and tingling in the left side of her arm/leg.  She had EMG which showed evidence of mild carpal tunnel syndrome as well as tarsal tunnel syndrome.  Had previously been prescribed gabapentin which apparently was not particularly effective and caused cognitive issues.       EMG completed April 2024 - No electrodiagnostic evidence of tibial mononeuropathy, generalized polyneuropathy or lumbar radiculopathy.  Improvement compared to study performed in 2020    Patient reports ongoing bothersome symptoms.  Etiology unclear, consideration for small fiber neuropathy given normal EMG?  We will trial duloxetine 60 mg daily  Previously referred to neurology though it looks like she did not schedule appointment.  Would recommend reevaluation if symptoms do not improve      Orders:    DULoxetine (CYMBALTA) 60 mg delayed release capsule; Take 1 capsule (60 mg total) by mouth daily       History of Present Illness     HPI      Review of Systems        Objective     /82 (BP Location: Left arm, Patient Position: Sitting, Cuff Size: Standard)   Pulse 80   Temp 97.9 °F (36.6 °C)   Resp 14   Ht 5' 2\" (1.575 m)   Wt 72.6 kg (160 lb)   BMI 29.26 kg/m²     Physical Exam  Cardiovascular:      Rate and Rhythm: Normal rate and regular rhythm. "      Heart sounds: Normal heart sounds. No murmur heard.  Pulmonary:      Effort: Pulmonary effort is normal.      Breath sounds: Normal breath sounds. No wheezing, rhonchi or rales.

## 2024-11-01 NOTE — ASSESSMENT & PLAN NOTE
Stable  A1c 5.9 October 2024  Continue to monitor.  Healthy dietary and lifestyle choices are encouraged

## 2024-11-01 NOTE — ASSESSMENT & PLAN NOTE
Reviewed lipid panel October 2024.  , HDL 63,   10-year ASCVD risk score is high  Not currently on statin therapy  We will continue to manage with lifestyle modification

## 2024-11-19 DIAGNOSIS — I10 ESSENTIAL HYPERTENSION: ICD-10-CM

## 2024-11-20 RX ORDER — LOSARTAN POTASSIUM 25 MG/1
25 TABLET ORAL EVERY MORNING
Qty: 90 TABLET | Refills: 1 | Status: SHIPPED | OUTPATIENT
Start: 2024-11-20

## 2025-01-06 NOTE — PROGRESS NOTES
Phone call to patient to convey results and recommendations.  Patient reports she would like to repeat her lab work.  She was not fasting and reports she was not taking her Rosuvastatin while she was sick.  Would like to re-check.  Is now taking her Rosuvastatin regularly.  To be re-reviewed with Dr. Sunshine for recommendations.    Virtual Regular Visit    Verification of patient location:    Patient is located at Home in the following state in which I hold an active license [place]      Assessment/Plan:    Problem List Items Addressed This Visit          Respiratory    Upper respiratory tract infection - Primary     Onset 3 days with general malaise  Now experiencing with cough, body aches, h/a  Chills earlier in the day, without measured fever -   Denies SOB, Chest tightness, wheezing, n/v/d/abd pain  Tested Negative for COVID x 2    Suspect viral infection however considering her age and has medical history we have agreed on proceeding with Z-Miguelito  -Advised patient to take Tylenol or ibuprofen every 6 hours as needed for the next 24 to 48 hours  -Will send a prescription for promethazine-DM for cough  - Patient advised  to monitor symptoms for worsening/complications and to contact our office or seek medical care in the emergency room if symptoms are severe         Relevant Medications    azithromycin (Zithromax) 250 mg tablet    promethazine-dextromethorphan (PHENERGAN-DM) 6.25-15 mg/5 mL oral syrup         Reason for visit is   Chief Complaint   Patient presents with    Virtual Regular Visit          Encounter provider Moy Copeland MD    Provider located at [address] [place] [address] INTERNAL MEDICINE [address] [id number]      Recent Visits  Date Type Provider Dept   12/27/23 Telephone Jessika Norman MA  Internal [facility]   Showing recent visits within past 7 days and meeting all other requirements  Today's Visits  Date Type Provider Dept   12/28/23 Telemedicine Moy Copeland MD  Internal [facility]   Showing today's visits and meeting all other requirements  Future Appointments  No visits were found meeting these conditions.  Showing future appointments within next 150 days and meeting all other requirements       The patient was identified by name and date of birth. Mercedes  Brittany was informed that this is a telemedicine visit and that the visit is being conducted through the [employer] platform. She agrees to proceed..  My office door was closed. No one else was in the room.  She acknowledged consent and understanding of privacy and security of the video platform. The patient has agreed to participate and understands they can discontinue the visit at any time.    Patient is aware this is a billable service.     Javon Soto is a 78 y.o. female  .      HPI     Past Medical History:   Diagnosis Date    Cholelithiasis     lap jacque today 4/13/2022    Full dentures     GERD (gastroesophageal reflux disease)     Hypertension     Pancreatic cyst     drained    Wears glasses        Past Surgical History:   Procedure Laterality Date    CATARACT EXTRACTION      COLONOSCOPY  11/2020    HI LAPAROSCOPY SURG CHOLECYSTECTOMY N/A 4/13/2022    Procedure: ROBOTIC LAP JACQUE;  Surgeon: Sherin Horn MD;  Location: [facility] OR;  Service: General    TUBAL LIGATION      Ectopic pregnancy 1979    UPPER GASTROINTESTINAL ENDOSCOPY  11/2020       Current Outpatient Medications   Medication Sig Dispense Refill    acetaminophen (TYLENOL) 325 mg tablet Take 650 mg by mouth every 6 (six) hours as needed for mild pain      cholecalciferol (VITAMIN D3) 1,000 units tablet Take 2 tablets (2,000 Units total) by mouth daily 180 tablet 1    dextromethorphan-guaiFENesin (ROBITUSSIN DM)  mg/5 mL syrup Take 10 mL by mouth 4 (four) times a day as needed for cough 473 mL 0    Diclofenac Sodium (VOLTAREN) 1 % Apply 2 g topically 4 (four) times a day 100 g 0    losartan (COZAAR) 25 mg tablet TAKE 1 TABLET BY MOUTH EVERY MORNING 90 tablet 1    meloxicam (MOBIC) 15 mg tablet Take 1 tablet (15 mg total) by mouth daily as needed for moderate pain 30 tablet 0    metoprolol succinate (TOPROL-XL) 25 mg 24 hr tablet TAKE 1 TABLET BY MOUTH EVERY MORNING 90 tablet 1    metoprolol succinate (TOPROL-XL)  50 mg 24 hr tablet Take 1 tablet (50 mg total) by mouth daily Take in addition to 25 mg tablet for total of 75 mg daily 180 tablet 0    Multiple Vitamin (multivitamin) capsule Take 1 capsule by mouth every morning 90 capsule 1    omeprazole (PriLOSEC) 20 mg delayed release capsule TAKE 1 CAPSULE(20 MG) BY MOUTH DAILY AS NEEDED FOR REFLUX 90 capsule 0    polyethylene glycol (GLYCOLAX) 17 GM/SCOOP powder Take 17 g by mouth daily 510 g 3    triamcinolone (KENALOG) 0.1 % cream Apply topically 2 (two) times a day 30 g 0     No current facility-administered medications for this visit.        Allergies   Allergen Reactions    Shellfish-Derived Products - Food Allergy Nausea Only    Lisinopril Cough       Review of Systems    Video Exam    There were no vitals filed for this visit.    Physical Exam     Visit Time  Total Visit Duration: 10

## 2025-01-27 DIAGNOSIS — R00.2 PALPITATIONS: ICD-10-CM

## 2025-01-27 NOTE — TELEPHONE ENCOUNTER
[facility]  [facility] metoprolol er succinate 100mg

## 2025-01-28 RX ORDER — METOPROLOL SUCCINATE 100 MG/1
100 TABLET, EXTENDED RELEASE ORAL DAILY
Qty: 90 TABLET | Refills: 1 | Status: SHIPPED | OUTPATIENT
Start: 2025-01-28 | End: 2025-02-03 | Stop reason: SDUPTHER

## 2025-01-30 DIAGNOSIS — G62.9 NEUROPATHY: ICD-10-CM

## 2025-01-30 RX ORDER — DULOXETIN HYDROCHLORIDE 60 MG/1
CAPSULE, DELAYED RELEASE ORAL
Qty: 90 CAPSULE | Refills: 1 | Status: SHIPPED | OUTPATIENT
Start: 2025-01-30

## 2025-02-03 ENCOUNTER — OFFICE VISIT (OUTPATIENT)
Dept: INTERNAL MEDICINE CLINIC | Facility: CLINIC | Age: 80
End: 2025-02-03
Payer: COMMERCIAL

## 2025-02-03 VITALS
HEIGHT: 62 IN | DIASTOLIC BLOOD PRESSURE: 76 MMHG | WEIGHT: 159 LBS | BODY MASS INDEX: 29.26 KG/M2 | TEMPERATURE: 97.6 F | SYSTOLIC BLOOD PRESSURE: 120 MMHG | HEART RATE: 83 BPM | OXYGEN SATURATION: 98 %

## 2025-02-03 DIAGNOSIS — R00.2 PALPITATIONS: ICD-10-CM

## 2025-02-03 DIAGNOSIS — R06.02 SHORTNESS OF BREATH: ICD-10-CM

## 2025-02-03 DIAGNOSIS — M25.551 RIGHT HIP PAIN: ICD-10-CM

## 2025-02-03 DIAGNOSIS — J06.9 ACUTE URI: Primary | ICD-10-CM

## 2025-02-03 DIAGNOSIS — I10 ESSENTIAL HYPERTENSION: ICD-10-CM

## 2025-02-03 PROBLEM — I77.819 AORTIC DILATATION (HCC): Status: RESOLVED | Noted: 2018-07-19 | Resolved: 2025-02-03

## 2025-02-03 PROCEDURE — G2211 COMPLEX E/M VISIT ADD ON: HCPCS | Performed by: INTERNAL MEDICINE

## 2025-02-03 PROCEDURE — 99214 OFFICE O/P EST MOD 30 MIN: CPT | Performed by: INTERNAL MEDICINE

## 2025-02-03 RX ORDER — ALBUTEROL SULFATE 90 UG/1
2 INHALANT RESPIRATORY (INHALATION) EVERY 6 HOURS PRN
Qty: 18 G | Refills: 1 | Status: SHIPPED | OUTPATIENT
Start: 2025-02-03

## 2025-02-03 RX ORDER — LOSARTAN POTASSIUM 25 MG/1
25 TABLET ORAL EVERY MORNING
Qty: 90 TABLET | Refills: 1 | Status: SHIPPED | OUTPATIENT
Start: 2025-02-03

## 2025-02-03 RX ORDER — METOPROLOL SUCCINATE 100 MG/1
100 TABLET, EXTENDED RELEASE ORAL DAILY
Qty: 90 TABLET | Refills: 1 | Status: SHIPPED | OUTPATIENT
Start: 2025-02-03

## 2025-02-03 NOTE — ASSESSMENT & PLAN NOTE
Well-controlled today  Continue losartan  Continue metoprolol succinate  Orders:  •  losartan (COZAAR) 25 mg tablet; Take 1 tablet (25 mg total) by mouth every morning

## 2025-02-03 NOTE — ASSESSMENT & PLAN NOTE
History of symptomatic palpitations in the setting of PACs  Continue Toprol- mg daily  Following with cardiology  Orders:  •  metoprolol succinate (TOPROL-XL) 100 mg 24 hr tablet; Take 1 tablet (100 mg total) by mouth daily

## 2025-02-03 NOTE — PROGRESS NOTES
Name: Beverly Soto      : 1945      MRN: [id number]  Encounter Provider: Denis Weaver DO  Encounter Date: 2/3/2025   [facility]  :  Medical history of GERD, hypertension, pancreatic cysts, PACs, prediabetes, depression, moderate aortic dilatation, gallstones status post cholecystectomy, diverticulitis     Here today for follow-up  Assessment & Plan  Acute URI  Recent onset of sick symptoms approximately 4 days ago.  Headache, ringing in the ears, fatigue.  Appears to be having mild shortness of breath with inspiration.  Not aware of any sick contacts.  Has been taking Tylenol.  She is well-appearing today in the office, afebrile.  Lungs clear to auscultation.  Point-of-care testing for influenza and COVID here in the office is negative    Suspect acute URI.  Recommend ongoing supportive care         Shortness of breath    Orders:  •  albuterol (Ventolin HFA) 90 mcg/act inhaler; Inhale 2 puffs every 6 (six) hours as needed for wheezing    Essential hypertension  Well-controlled today  Continue losartan  Continue metoprolol succinate  Orders:  •  losartan (COZAAR) 25 mg tablet; Take 1 tablet (25 mg total) by mouth every morning    Palpitations  History of symptomatic palpitations in the setting of PACs  Continue Toprol- mg daily  Following with cardiology  Orders:  •  metoprolol succinate (TOPROL-XL) 100 mg 24 hr tablet; Take 1 tablet (100 mg total) by mouth daily    Right hip pain  Approximately 1 month history of right lateral hip pain.  She feels it is internally located.  At times pain is significant enough to affect her walking.  She is able to ambulate independently here in the office today.  Does not report any recent falls or trauma  -Could be multifactorial related to hip bursitis/osteoarthritis  -Will check x-ray of the hip for further evaluation  -Continue with supportive care otherwise  Orders:  •  XR hip/pelvis 4+ vw right if  "performed; Future           History of Present Illness   HPI  Review of Systems    Objective   /76 (BP Location: Left arm, Patient Position: Sitting, Cuff Size: Standard)   Pulse 83   Temp 97.6 °F (36.4 °C) (Tympanic)   Ht 5' 2\" (1.575 m)   Wt 72.1 kg (159 lb)   SpO2 98%   BMI 29.08 kg/m²      Physical Exam  Cardiovascular:      Rate and Rhythm: Normal rate and regular rhythm.      Heart sounds: Normal heart sounds. No murmur heard.  Pulmonary:      Effort: Pulmonary effort is normal.      Breath sounds: Normal breath sounds. No wheezing, rhonchi or rales.         "

## 2025-02-18 ENCOUNTER — TELEPHONE (OUTPATIENT)
Age: 80
End: 2025-02-18

## 2025-02-18 DIAGNOSIS — R11.0 NAUSEA: Primary | ICD-10-CM

## 2025-02-18 NOTE — TELEPHONE ENCOUNTER
Patient called in and states that she stopped taking the medication duloxetine since its not making her feel well and has nausea and trouble sleeping. Please advise     Requesting Sinhala speaking MA to call back.

## 2025-02-19 RX ORDER — ONDANSETRON 4 MG/1
4 TABLET, FILM COATED ORAL EVERY 8 HOURS PRN
Qty: 20 TABLET | Refills: 0 | Status: SHIPPED | OUTPATIENT
Start: 2025-02-19

## 2025-02-19 NOTE — TELEPHONE ENCOUNTER
She may be having side effects from the duloxetine.  Agree with stopping the duloxetine    I will send prescription for as needed Zofran to help with the nausea.  Would hope that her symptoms improve in the coming days with discontinuation of the duloxetine

## 2025-03-05 ENCOUNTER — TELEPHONE (OUTPATIENT)
Dept: NEUROLOGY | Facility: CLINIC | Age: 80
End: 2025-03-05

## 2025-04-01 ENCOUNTER — APPOINTMENT (OUTPATIENT)
Dept: LAB | Facility: HOSPITAL | Age: 80
End: 2025-04-01
Payer: COMMERCIAL

## 2025-04-01 ENCOUNTER — OFFICE VISIT (OUTPATIENT)
Dept: INTERNAL MEDICINE CLINIC | Facility: CLINIC | Age: 80
End: 2025-04-01
Payer: COMMERCIAL

## 2025-04-01 VITALS
WEIGHT: 153 LBS | DIASTOLIC BLOOD PRESSURE: 80 MMHG | RESPIRATION RATE: 18 BRPM | HEIGHT: 62 IN | BODY MASS INDEX: 28.16 KG/M2 | HEART RATE: 77 BPM | TEMPERATURE: 93.5 F | OXYGEN SATURATION: 94 % | SYSTOLIC BLOOD PRESSURE: 140 MMHG

## 2025-04-01 DIAGNOSIS — Z74.1: ICD-10-CM

## 2025-04-01 DIAGNOSIS — R10.32 LLQ PAIN: Primary | ICD-10-CM

## 2025-04-01 DIAGNOSIS — Z87.19 HISTORY OF DIVERTICULITIS: ICD-10-CM

## 2025-04-01 DIAGNOSIS — M17.0 PRIMARY OSTEOARTHRITIS OF BOTH KNEES: ICD-10-CM

## 2025-04-01 DIAGNOSIS — R10.32 LLQ PAIN: ICD-10-CM

## 2025-04-01 LAB
ANION GAP SERPL CALCULATED.3IONS-SCNC: 10 MMOL/L (ref 4–13)
BUN SERPL-MCNC: 17 MG/DL (ref 5–25)
CALCIUM SERPL-MCNC: 10.3 MG/DL (ref 8.4–10.2)
CHLORIDE SERPL-SCNC: 103 MMOL/L (ref 96–108)
CO2 SERPL-SCNC: 25 MMOL/L (ref 21–32)
CREAT SERPL-MCNC: 0.58 MG/DL (ref 0.6–1.3)
GFR SERPL CREATININE-BSD FRML MDRD: 89 ML/MIN/1.73SQ M
GLUCOSE P FAST SERPL-MCNC: 89 MG/DL (ref 65–99)
POTASSIUM SERPL-SCNC: 4.9 MMOL/L (ref 3.5–5.3)
SODIUM SERPL-SCNC: 138 MMOL/L (ref 135–147)

## 2025-04-01 PROCEDURE — 80048 BASIC METABOLIC PNL TOTAL CA: CPT

## 2025-04-01 PROCEDURE — G2211 COMPLEX E/M VISIT ADD ON: HCPCS | Performed by: INTERNAL MEDICINE

## 2025-04-01 PROCEDURE — 36415 COLL VENOUS BLD VENIPUNCTURE: CPT

## 2025-04-01 PROCEDURE — 99214 OFFICE O/P EST MOD 30 MIN: CPT | Performed by: INTERNAL MEDICINE

## 2025-04-01 RX ORDER — CELECOXIB 200 MG/1
200 CAPSULE ORAL 2 TIMES DAILY PRN
Qty: 28 CAPSULE | Refills: 0 | Status: SHIPPED | OUTPATIENT
Start: 2025-04-01

## 2025-04-01 NOTE — PROGRESS NOTES
"Name: Beverly Soto      : 1945      MRN: [id number]  Encounter Provider: Denis Weaver DO  Encounter Date: 2025   [facility]  :\  Medical history of GERD, hypertension, pancreatic cysts, PACs, prediabetes, depression, moderate aortic dilatation, gallstones status post cholecystectomy, diverticulitis   Assessment & Plan  LLQ pain  Onset of left lower quadrant pain 3 days ago.  Described as \"very strong\".  Can be painful with coughing.  She had 1 day of nausea and constipation which has since resolved.  Does not report any fevers but did feel \"chills\" for 1 day.  No blood in the stool or diarrhea.  She tried meloxicam for the discomfort.    On exam, she has tenderness to palpation in the left lower quadrant without any rebound or guarding.  Normal bowel sounds.  She has known history of diverticulitis    Plan:  -Symptoms possibly consistent with diverticulitis.  We will obtain updated stat CT of the abdomen/pelvis with contrast.  Further management recommendations to follow    Orders:  •  Basic metabolic panel; Future  •  CT abdomen pelvis w contrast; Future    History of diverticulitis    Orders:  •  Basic metabolic panel; Future  •  CT abdomen pelvis w contrast; Future    Primary osteoarthritis of both knees    Orders:  •  celecoxib (CeleBREX) 200 mg capsule; Take 1 capsule (200 mg total) by mouth 2 (two) times a day as needed for moderate pain    Requires assistance with all activities of daily living (ADL)  Patient inquires about getting additional help in the home with ADLs.  Functional status limited by bilateral knee pain.  Will place referral to social work care management program  Orders:  •  Ambulatory Referral to Social Work Care Management Program; Future           History of Present Illness   HPI  Review of Systems    Objective   /80 (BP Location: Left arm, Patient Position: Sitting, Cuff Size: Standard)   Pulse 77   Temp " "(!) 93.5 °F (34.2 °C) (Temporal)   Resp 18   Ht 5' 2\" (1.575 m)   Wt 69.4 kg (153 lb)   SpO2 94%   BMI 27.98 kg/m²      Physical Exam  Abdominal:      Tenderness: There is abdominal tenderness in the left lower quadrant. There is no guarding or rebound.      Hernia: No hernia is present.         "

## 2025-04-01 NOTE — ASSESSMENT & PLAN NOTE
Orders:  •  celecoxib (CeleBREX) 200 mg capsule; Take 1 capsule (200 mg total) by mouth 2 (two) times a day as needed for moderate pain

## 2025-04-02 ENCOUNTER — HOSPITAL ENCOUNTER (OUTPATIENT)
Dept: RADIOLOGY | Facility: HOSPITAL | Age: 80
Discharge: HOME | End: 2025-04-02
Attending: INTERNAL MEDICINE
Payer: COMMERCIAL

## 2025-04-02 ENCOUNTER — RESULTS FOLLOW-UP (OUTPATIENT)
Dept: INTERNAL MEDICINE CLINIC | Facility: CLINIC | Age: 80
End: 2025-04-02

## 2025-04-02 DIAGNOSIS — Z87.19 HISTORY OF DIVERTICULITIS: ICD-10-CM

## 2025-04-02 DIAGNOSIS — R10.32 LLQ PAIN: ICD-10-CM

## 2025-04-02 DIAGNOSIS — K57.92 ACUTE DIVERTICULITIS: Primary | ICD-10-CM

## 2025-04-02 PROCEDURE — 74177 CT ABD & PELVIS W/CONTRAST: CPT

## 2025-04-02 RX ADMIN — IOHEXOL 75 ML: 350 INJECTION, SOLUTION INTRAVENOUS at 14:11

## 2025-04-03 NOTE — TELEPHONE ENCOUNTER
Received call from patient requesting CT scan results and if abx were prescribed. Used  # [id number]    I reviewed provider notes and advised abx is at [facility]

## 2025-04-04 ENCOUNTER — PATIENT OUTREACH (OUTPATIENT)
Dept: CASE MANAGEMENT | Facility: OTHER | Age: 80
End: 2025-04-04

## 2025-04-04 NOTE — PROGRESS NOTES
DAPHNIE EDEN received referral for patient from Denis Weaver DO, for requires assistance with all ADLs. Per chart review, patient is interested in receiving assistance in the home.    Patient does not have any prior OP DAPHNIE EDEN outreach.    DAPHNIE EDEN placed initial outreach call to patient using [employer] #[id number]. DAPHNIE EDEN introduced self, explained role and reasoning for outreach. Patient confirmed she is interested in assistance in the home. DAPHNIE EDEN reviewed private pay caregivers and Waiver program. Patient stated she would not be able to afford private pay. DAPHNIE EDEN explained Waiver to patient and she stated she was interested in knowing more about it. DAPHNIE EDEN explained Waiver and patient stated she was not sure if this is what she wanted to do. She stated she wanted to speak to her daughter-in-law about this. DAPHNIE EDEN did ask patient if she wanted to review the income limit and patient stated she was not sure and again stated she would like to review this with her DIL. DAPHNIE EDEN understood and provided patient with DAPHNIE EDEN's phone number for patient or patient's DIL to contact DAPHNIE EDEN if they would like to review the income limits for Waiver. Patient appreciative of outreach. Referral closed.

## 2025-04-14 DIAGNOSIS — M17.0 PRIMARY OSTEOARTHRITIS OF BOTH KNEES: ICD-10-CM

## 2025-04-15 RX ORDER — CELECOXIB 200 MG/1
CAPSULE ORAL
Qty: 28 CAPSULE | Refills: 0 | Status: SHIPPED | OUTPATIENT
Start: 2025-04-15

## 2025-04-30 DIAGNOSIS — M17.0 PRIMARY OSTEOARTHRITIS OF BOTH KNEES: ICD-10-CM

## 2025-05-01 RX ORDER — CELECOXIB 200 MG/1
CAPSULE ORAL
Qty: 28 CAPSULE | Refills: 0 | Status: SHIPPED | OUTPATIENT
Start: 2025-05-01

## 2025-05-05 ENCOUNTER — OFFICE VISIT (OUTPATIENT)
Dept: INTERNAL MEDICINE CLINIC | Facility: CLINIC | Age: 80
End: 2025-05-05
Payer: COMMERCIAL

## 2025-05-05 ENCOUNTER — TELEPHONE (OUTPATIENT)
Dept: PLASTIC SURGERY | Facility: CLINIC | Age: 80
End: 2025-05-05

## 2025-05-05 VITALS
WEIGHT: 157.2 LBS | TEMPERATURE: 97.8 F | BODY MASS INDEX: 28.93 KG/M2 | HEIGHT: 62 IN | HEART RATE: 74 BPM | OXYGEN SATURATION: 94 % | RESPIRATION RATE: 18 BRPM | SYSTOLIC BLOOD PRESSURE: 134 MMHG | DIASTOLIC BLOOD PRESSURE: 76 MMHG

## 2025-05-05 DIAGNOSIS — M17.0 PRIMARY OSTEOARTHRITIS OF BOTH KNEES: ICD-10-CM

## 2025-05-05 DIAGNOSIS — D22.5 IRRITATED NEVUS OF CHEST: Primary | ICD-10-CM

## 2025-05-05 DIAGNOSIS — R73.03 PRE-DIABETES: ICD-10-CM

## 2025-05-05 DIAGNOSIS — H57.12 DISCOMFORT OF LEFT EYE: ICD-10-CM

## 2025-05-05 DIAGNOSIS — M72.0 CONTRACTURE OF PALMAR FASCIA (DUPUYTREN'S): ICD-10-CM

## 2025-05-05 DIAGNOSIS — I10 ESSENTIAL HYPERTENSION: ICD-10-CM

## 2025-05-05 PROCEDURE — 99214 OFFICE O/P EST MOD 30 MIN: CPT | Performed by: INTERNAL MEDICINE

## 2025-05-05 PROCEDURE — G2211 COMPLEX E/M VISIT ADD ON: HCPCS | Performed by: INTERNAL MEDICINE

## 2025-05-05 NOTE — ASSESSMENT & PLAN NOTE
Prior x-rays of the knees completed December 2023.  Evidence of severe right knee osteoarthritis.  Moderate osteoarthritis of the left knee.  She continues to have bothersome knee pain  -Recommend evaluation with orthopedics to discuss CSI.  Would likely also benefit from physical therapy  Orders:  •  Ambulatory Referral to Orthopedic Surgery; Future

## 2025-05-05 NOTE — ASSESSMENT & PLAN NOTE
Control is acceptable  Continue losartan  Continue metoprolol succinate  Orders:  •  CBC and differential; Future  •  Comprehensive metabolic panel; Future  •  Lipid Panel with Direct LDL reflex; Future

## 2025-05-05 NOTE — TELEPHONE ENCOUNTER
Added translation #[id number]     [facility] to inform pt that we have referral for them, asked pt to please give us a call back in order to get this appointment scheduled.       If pt calls back please schedule with a PA - first open is fine, prefer [facility] office.

## 2025-05-05 NOTE — PROGRESS NOTES
"Name: Beverly Soto      : 1945      MRN: [id number]  Encounter Provider: Denis Weaver DO  Encounter Date: 2025   [facility]  :  Medical history of GERD, hypertension, pancreatic cysts, PACs, prediabetes, depression, moderate aortic dilatation, gallstones status post cholecystectomy, diverticulitis     Assessment & Plan  Irritated nevus of chest  Symmetric, raised, soft, pigmented verrucous lesion of the left anterior chest.  Suspect this is likely benign.  Will refer to plastic surgery to discuss excision/biopsy.  If unable to address, will refer to dermatology    Orders:  •  Ambulatory Referral to Plastic Surgery; Future    Discomfort of left eye  Intermittent \"palpitation\" sensation near the left lateral canthus area.  Does not report any visual disturbances.  She wears glasses.  No gross abnormalities on exam.  It is possible she is experiencing blepharospasm?  -Will monitor for now.  Will also refer to ophthalmology  Orders:  •  Ambulatory Referral to Ophthalmology; Future    Primary osteoarthritis of both knees  Prior x-rays of the knees completed 2023.  Evidence of severe right knee osteoarthritis.  Moderate osteoarthritis of the left knee.  She continues to have bothersome knee pain  -Recommend evaluation with orthopedics to discuss CSI.  Would likely also benefit from physical therapy  Orders:  •  Ambulatory Referral to Orthopedic Surgery; Future    Pre-diabetes  A1c trend has been stable  Recheck fasting glucose, A1c  Orders:  •  Hemoglobin A1C; Future    Essential hypertension  Control is acceptable  Continue losartan  Continue metoprolol succinate  Orders:  •  CBC and differential; Future  •  Comprehensive metabolic panel; Future  •  Lipid Panel with Direct LDL reflex; Future    Contracture of palmar fascia (Dupuytren's)  Possible mild contracture deformity of the palmar fascia near of the left fourth digit.  At times " "she will have difficulty with ring finger getting stuck in flexion  -Will monitor for now.  If symptoms become more bothersome, recommend evaluation with hand specialist              History of Present Illness   HPI  Review of Systems    Objective   /76 (BP Location: Left arm, Patient Position: Sitting, Cuff Size: Standard)   Pulse 74   Temp 97.8 °F (36.6 °C) (Temporal)   Resp 18   Ht 5' 2\" (1.575 m)   Wt 71.3 kg (157 lb 3.2 oz)   SpO2 94%   BMI 28.75 kg/m²      Physical Exam  Musculoskeletal:         General: Deformity present.   Skin:     Findings: Lesion present.                 "

## 2025-05-05 NOTE — LETTER
May 5, 2025     Patient: Beverly Soto  YOB: 1945  Date of Visit: 5/5/2025      To Whom it May Concern:    Beverly Soto is under my professional care. Beverly was seen in my office on 5/5/2025. Beverly would benefit from in-home health aide.  Her functional status is limited and she has a known history of bilateral knee osteoarthritis.    If you have any questions or concerns, please don't hesitate to call.         Sincerely,          Denis Weaver, DO        CC: No Recipients

## 2025-05-08 ENCOUNTER — TELEPHONE (OUTPATIENT)
Dept: INTERNAL MEDICINE CLINIC | Facility: CLINIC | Age: 80
End: 2025-05-08

## 2025-05-08 DIAGNOSIS — K57.92 ACUTE DIVERTICULITIS: Primary | ICD-10-CM

## 2025-05-08 NOTE — TELEPHONE ENCOUNTER
Prescription for 10-day course of Augmentin sent to pharmacy.  Can take Celebrex as needed for pain previously sent earlier this month    If her pain does not improve or condition worsens she should be evaluated promptly

## 2025-05-08 NOTE — TELEPHONE ENCOUNTER
Pt is having a flare up with her Diverticulitis and she would like for you to send a script for antibiotic and pain medication this started yesterday and she is in so much pain please advise

## 2025-05-19 DIAGNOSIS — M17.0 PRIMARY OSTEOARTHRITIS OF BOTH KNEES: ICD-10-CM

## 2025-05-19 RX ORDER — CELECOXIB 200 MG/1
CAPSULE ORAL
Qty: 28 CAPSULE | Refills: 0 | Status: SHIPPED | OUTPATIENT
Start: 2025-05-19

## 2025-05-20 ENCOUNTER — APPOINTMENT (OUTPATIENT)
Dept: LAB | Facility: HOSPITAL | Age: 80
End: 2025-05-20
Payer: COMMERCIAL

## 2025-05-20 ENCOUNTER — RESULTS FOLLOW-UP (OUTPATIENT)
Dept: INTERNAL MEDICINE CLINIC | Facility: CLINIC | Age: 80
End: 2025-05-20

## 2025-05-20 ENCOUNTER — TELEPHONE (OUTPATIENT)
Dept: NEUROLOGY | Facility: CLINIC | Age: 80
End: 2025-05-20

## 2025-05-20 DIAGNOSIS — R73.03 PRE-DIABETES: ICD-10-CM

## 2025-05-20 DIAGNOSIS — I10 ESSENTIAL HYPERTENSION: ICD-10-CM

## 2025-05-20 LAB
ALBUMIN SERPL BCG-MCNC: 4 G/DL (ref 3.5–5)
ALP SERPL-CCNC: 73 U/L (ref 34–104)
ALT SERPL W P-5'-P-CCNC: 13 U/L (ref 7–52)
ANION GAP SERPL CALCULATED.3IONS-SCNC: 6 MMOL/L (ref 4–13)
AST SERPL W P-5'-P-CCNC: 20 U/L (ref 13–39)
BASOPHILS # BLD AUTO: 0.04 THOUSANDS/ÂΜL (ref 0–0.1)
BASOPHILS NFR BLD AUTO: 1 % (ref 0–1)
BILIRUB SERPL-MCNC: 0.46 MG/DL (ref 0.2–1)
BUN SERPL-MCNC: 13 MG/DL (ref 5–25)
CALCIUM SERPL-MCNC: 9.6 MG/DL (ref 8.4–10.2)
CHLORIDE SERPL-SCNC: 105 MMOL/L (ref 96–108)
CHOLEST SERPL-MCNC: 210 MG/DL (ref ?–200)
CO2 SERPL-SCNC: 28 MMOL/L (ref 21–32)
CREAT SERPL-MCNC: 0.6 MG/DL (ref 0.6–1.3)
EOSINOPHIL # BLD AUTO: 0.14 THOUSAND/ÂΜL (ref 0–0.61)
EOSINOPHIL NFR BLD AUTO: 2 % (ref 0–6)
ERYTHROCYTE [DISTWIDTH] IN BLOOD BY AUTOMATED COUNT: 13 % (ref 11.6–15.1)
EST. AVERAGE GLUCOSE BLD GHB EST-MCNC: 126 MG/DL
GFR SERPL CREATININE-BSD FRML MDRD: 88 ML/MIN/1.73SQ M
GLUCOSE P FAST SERPL-MCNC: 85 MG/DL (ref 65–99)
HBA1C MFR BLD: 6 %
HCT VFR BLD AUTO: 38.3 % (ref 34.8–46.1)
HDLC SERPL-MCNC: 61 MG/DL
HGB BLD-MCNC: 12.4 G/DL (ref 11.5–15.4)
IMM GRANULOCYTES # BLD AUTO: 0.01 THOUSAND/UL (ref 0–0.2)
IMM GRANULOCYTES NFR BLD AUTO: 0 % (ref 0–2)
LDLC SERPL CALC-MCNC: 123 MG/DL (ref 0–100)
LYMPHOCYTES # BLD AUTO: 2.21 THOUSANDS/ÂΜL (ref 0.6–4.47)
LYMPHOCYTES NFR BLD AUTO: 34 % (ref 14–44)
MCH RBC QN AUTO: 31 PG (ref 26.8–34.3)
MCHC RBC AUTO-ENTMCNC: 32.4 G/DL (ref 31.4–37.4)
MCV RBC AUTO: 96 FL (ref 82–98)
MONOCYTES # BLD AUTO: 0.63 THOUSAND/ÂΜL (ref 0.17–1.22)
MONOCYTES NFR BLD AUTO: 10 % (ref 4–12)
NEUTROPHILS # BLD AUTO: 3.48 THOUSANDS/ÂΜL (ref 1.85–7.62)
NEUTS SEG NFR BLD AUTO: 53 % (ref 43–75)
NRBC BLD AUTO-RTO: 0 /100 WBCS
PLATELET # BLD AUTO: 238 THOUSANDS/UL (ref 149–390)
PMV BLD AUTO: 9.9 FL (ref 8.9–12.7)
POTASSIUM SERPL-SCNC: 3.9 MMOL/L (ref 3.5–5.3)
PROT SERPL-MCNC: 7 G/DL (ref 6.4–8.4)
RBC # BLD AUTO: 4 MILLION/UL (ref 3.81–5.12)
SODIUM SERPL-SCNC: 139 MMOL/L (ref 135–147)
TRIGL SERPL-MCNC: 128 MG/DL (ref ?–150)
WBC # BLD AUTO: 6.51 THOUSAND/UL (ref 4.31–10.16)

## 2025-05-20 PROCEDURE — 85025 COMPLETE CBC W/AUTO DIFF WBC: CPT

## 2025-05-20 PROCEDURE — 36415 COLL VENOUS BLD VENIPUNCTURE: CPT

## 2025-05-20 PROCEDURE — 83036 HEMOGLOBIN GLYCOSYLATED A1C: CPT

## 2025-05-20 PROCEDURE — 80061 LIPID PANEL: CPT

## 2025-05-20 PROCEDURE — 80053 COMPREHEN METABOLIC PANEL: CPT

## 2025-05-20 NOTE — TELEPHONE ENCOUNTER
Left PT VM [[employer] Interpretor] stating Dr. Wells wont be in office the week of their next appointment on 6/2/25. Offered rescheduling, following our office number of [phone].

## 2025-05-21 NOTE — TELEPHONE ENCOUNTER
Relayed results to patient representative as listed on communication consent form as per provider message. Patient Representative expressed understanding and did not have any further questions.

## 2025-05-30 DIAGNOSIS — M17.0 PRIMARY OSTEOARTHRITIS OF BOTH KNEES: ICD-10-CM

## 2025-05-30 RX ORDER — CELECOXIB 200 MG/1
CAPSULE ORAL
Qty: 28 CAPSULE | Refills: 0 | OUTPATIENT
Start: 2025-05-30

## 2025-05-31 PROBLEM — I77.89 AORTIC ROOT ENLARGEMENT (HCC): Status: ACTIVE | Noted: 2025-05-31

## 2025-07-07 ENCOUNTER — RA CDI HCC (OUTPATIENT)
Dept: OTHER | Facility: HOSPITAL | Age: 80
End: 2025-07-07

## 2025-07-08 ENCOUNTER — OFFICE VISIT (OUTPATIENT)
Dept: INTERNAL MEDICINE CLINIC | Facility: CLINIC | Age: 80
End: 2025-07-08
Payer: COMMERCIAL

## 2025-07-08 VITALS
DIASTOLIC BLOOD PRESSURE: 70 MMHG | OXYGEN SATURATION: 97 % | WEIGHT: 154 LBS | TEMPERATURE: 97.7 F | BODY MASS INDEX: 28.34 KG/M2 | SYSTOLIC BLOOD PRESSURE: 126 MMHG | HEIGHT: 62 IN | RESPIRATION RATE: 18 BRPM | HEART RATE: 67 BPM

## 2025-07-08 DIAGNOSIS — R00.2 PALPITATIONS: ICD-10-CM

## 2025-07-08 DIAGNOSIS — R06.02 SHORTNESS OF BREATH: ICD-10-CM

## 2025-07-08 DIAGNOSIS — R42 VERTIGO: Primary | ICD-10-CM

## 2025-07-08 DIAGNOSIS — M79.672 LEFT FOOT PAIN: ICD-10-CM

## 2025-07-08 DIAGNOSIS — I10 ESSENTIAL HYPERTENSION: ICD-10-CM

## 2025-07-08 DIAGNOSIS — H93.19 TINNITUS, UNSPECIFIED LATERALITY: ICD-10-CM

## 2025-07-08 PROCEDURE — 99214 OFFICE O/P EST MOD 30 MIN: CPT | Performed by: INTERNAL MEDICINE

## 2025-07-08 PROCEDURE — G2211 COMPLEX E/M VISIT ADD ON: HCPCS | Performed by: INTERNAL MEDICINE

## 2025-07-08 RX ORDER — MECLIZINE HYDROCHLORIDE 25 MG/1
25 TABLET ORAL 3 TIMES DAILY PRN
Qty: 15 TABLET | Refills: 0 | Status: SHIPPED | OUTPATIENT
Start: 2025-07-08

## 2025-07-08 RX ORDER — METOPROLOL SUCCINATE 100 MG/1
100 TABLET, EXTENDED RELEASE ORAL DAILY
Qty: 90 TABLET | Refills: 1 | Status: SHIPPED | OUTPATIENT
Start: 2025-07-08

## 2025-07-08 RX ORDER — ALBUTEROL SULFATE 90 UG/1
2 INHALANT RESPIRATORY (INHALATION) EVERY 6 HOURS PRN
Qty: 18 G | Refills: 1 | Status: SHIPPED | OUTPATIENT
Start: 2025-07-08

## 2025-07-08 RX ORDER — LOSARTAN POTASSIUM 25 MG/1
25 TABLET ORAL EVERY MORNING
Qty: 90 TABLET | Refills: 1 | Status: SHIPPED | OUTPATIENT
Start: 2025-07-08

## 2025-07-08 NOTE — ASSESSMENT & PLAN NOTE
Orders:  •  metoprolol succinate (TOPROL-XL) 100 mg 24 hr tablet; Take 1 tablet (100 mg total) by mouth daily

## 2025-07-08 NOTE — PROGRESS NOTES
Name: Beverly Soto      : 1945      MRN: [id number]  Encounter Provider: Denis Weaver DO  Encounter Date: 2025   Encounter department: [facility]  :  Medical history of GERD, hypertension, pancreatic cysts, PACs, prediabetes, depression, moderate aortic dilatation, gallstones status post cholecystectomy, diverticulitis     Assessment & Plan  Vertigo  Onset of dizziness symptoms approximately 4 days ago.  Provoked with looking up or down or side-to-side.  Can also have recurrence of symptoms when she is laying on her right side in bed.  Symptoms can improve when she closes her eyes.  Associated headache responsive to Tylenol.  At times she does report a buzzing sensation of the ears    Positive right-sided Huong-Hallpike maneuver today in the office with reproduction of symptoms    Plan:  -Suspect peripheral vertigo, probable BPPV.  We reviewed pathophysiology of this today.  Recommend course of vestibular therapy  -Okay to use meclizine as needed for severe symptoms  -Given her subjective complaints of headache and tinnitus, we will also obtain MRI of the brain IAC for completeness  Orders:  •  MRI brain IAC wo contrast; Future  •  meclizine (ANTIVERT) 25 mg tablet; Take 1 tablet (25 mg total) by mouth 3 (three) times a day as needed for dizziness  •  Ambulatory Referral to Physical Therapy; Future    Tinnitus, unspecified laterality    Orders:  •  MRI brain IAC wo contrast; Future    Left foot pain  Previously evaluated for numbness and tingling in the left side of her arm/leg.  She had EMG which showed evidence of mild carpal tunnel syndrome as well as tarsal tunnel syndrome.  Had previously been prescribed gabapentin which apparently was not particularly effective and caused cognitive issues.  Also trialed on duloxetine which did not make her feel well and she had nausea/trouble sleeping.     EMG completed 2024 - No electrodiagnostic evidence of tibial  "mononeuropathy, generalized polyneuropathy or lumbar radiculopathy.  Improvement compared to study performed in 2020    Today, her most bothersome symptom is burning of the left foot.  Bottom of her left foot feels \"hot\".  She does not report any loss of sensation.  Etiology unclear, consideration for small fiber neuropathy given normal EMG?  She may also have component of plantar fasciitis    Plan:  -Provided with plantar fasciitis exercises  -She has been intolerant to gabapentin and duloxetine as above.  Will refer to podiatry to see if they have any further treatment recommendations    Orders:  •  Ambulatory Referral to Podiatry; Future    Essential hypertension    Orders:  •  losartan (COZAAR) 25 mg tablet; Take 1 tablet (25 mg total) by mouth every morning    Palpitations    Orders:  •  metoprolol succinate (TOPROL-XL) 100 mg 24 hr tablet; Take 1 tablet (100 mg total) by mouth daily    Shortness of breath    Orders:  •  albuterol (Ventolin HFA) 90 mcg/act inhaler; Inhale 2 puffs every 6 (six) hours as needed for wheezing           History of Present Illness   HPI  Review of Systems    Objective   /70 (BP Location: Left arm, Patient Position: Sitting, Cuff Size: Standard)   Pulse 67   Temp 97.7 °F (36.5 °C) (Temporal)   Resp 18   Ht 5' 2\" (1.575 m)   Wt 69.9 kg (154 lb)   SpO2 97%   BMI 28.17 kg/m²      Physical Exam  HENT:      Right Ear: Tympanic membrane normal. There is no impacted cerumen.      Left Ear: Tympanic membrane normal. There is no impacted cerumen.     Neurological:      Comments: Positive right-sided Huong-Hallpike maneuver       "

## 2025-07-16 ENCOUNTER — OFFICE VISIT (OUTPATIENT)
Dept: CARDIOLOGY CLINIC | Facility: CLINIC | Age: 80
End: 2025-07-16
Payer: COMMERCIAL

## 2025-07-16 VITALS
HEIGHT: 62 IN | WEIGHT: 152.8 LBS | SYSTOLIC BLOOD PRESSURE: 124 MMHG | DIASTOLIC BLOOD PRESSURE: 80 MMHG | HEART RATE: 58 BPM | BODY MASS INDEX: 28.12 KG/M2

## 2025-07-16 DIAGNOSIS — K21.9 GASTROESOPHAGEAL REFLUX DISEASE, UNSPECIFIED WHETHER ESOPHAGITIS PRESENT: ICD-10-CM

## 2025-07-16 DIAGNOSIS — I10 ESSENTIAL HYPERTENSION: Primary | ICD-10-CM

## 2025-07-16 PROCEDURE — 93000 ELECTROCARDIOGRAM COMPLETE: CPT | Performed by: PHYSICIAN ASSISTANT

## 2025-07-16 PROCEDURE — 99214 OFFICE O/P EST MOD 30 MIN: CPT | Performed by: PHYSICIAN ASSISTANT

## 2025-07-16 RX ORDER — OMEPRAZOLE 20 MG/1
20 CAPSULE, DELAYED RELEASE ORAL AS NEEDED
Status: SHIPPED
Start: 2025-07-16

## 2025-07-16 NOTE — PROGRESS NOTES
"   Cardiology Follow Up    [facility] [address] [address]  PHONE: [phone]  FAX: [phone]    Beverly Soto  1945  [id number]    Assessment/Plan:  Nonrheumatic aortic valve insufficiency  Stable on echo from 4/8/2024 compared to 2022     Palpitations  Continue Toprol 100 mg QD. Can reduce to 75 mg QD if orthostasis or symptomatic bradycardia becomes a problem.    Dizziness  Continue vertigo treatment per PCP  She is getting brain MRI this week    RTO in 1 year w/ Dr. Jerry Washington    Interval History:   Beverly Soto is a 79 y.o. female who presents to the office for routine cardiovascular follow-up.  Patient occasionally reports palpitations during stressful time.  Her  has been sick with leukemia for 17 years and is doing well but sometimes gets asthma exacerbations which causes her to feel upset.  Denies exertional chest pain, pressure, tightness, HARDY or fatigue.  Denies peripheral edema.  She has had some vertigo with moving the head quickly side-to-side causes dizziness and requires her to steady herself or sit down on the chair to recover.  Meclizine helps greatly.    Physical Exam:  Vitals:    07/16/25 0829   BP: 124/80   Pulse: 58     Vitals:    07/16/25 0829   Weight: 69.3 kg (152 lb 12.8 oz)     Height: 5' 2\" (157.5 cm) Body mass index is 27.95 kg/m².    Physical Exam  Vitals and nursing note reviewed.   HENT:      Head: Normocephalic and atraumatic.      Nose: No congestion.      Mouth/Throat:      Mouth: Mucous membranes are moist.     Eyes:      Conjunctiva/sclera: Conjunctivae normal.     Neck:      Comments: - JVD  Cardiovascular:      Rate and Rhythm: Normal rate and regular rhythm.      Heart sounds: S1 normal and S2 normal. No murmur heard.  Pulmonary:      Effort: Pulmonary effort is normal.      Breath sounds: No wheezing, rhonchi or rales.   Abdominal:      General: Abdomen is flat.     Musculoskeletal:    "      General: No swelling.     Skin:     General: Skin is warm and dry.     Neurological:      General: No focal deficit present.      Mental Status: She is alert.     Psychiatric:         Behavior: Behavior normal.     Data: EKG today sinus bradycardia with heart rate 58 bpm, RBBB.  No ventricular enlargement, infarct or acute ischemia.    Jane Diaz PA-C  [facility]

## 2025-07-19 ENCOUNTER — HOSPITAL ENCOUNTER (OUTPATIENT)
Dept: MRI IMAGING | Facility: HOSPITAL | Age: 80
Discharge: HOME | End: 2025-07-19
Attending: INTERNAL MEDICINE
Payer: COMMERCIAL

## 2025-07-19 DIAGNOSIS — H93.19 TINNITUS, UNSPECIFIED LATERALITY: ICD-10-CM

## 2025-07-19 DIAGNOSIS — R42 VERTIGO: ICD-10-CM

## 2025-07-19 PROCEDURE — A9585 GADOBUTROL INJECTION: HCPCS | Performed by: INTERNAL MEDICINE

## 2025-07-19 PROCEDURE — 70553 MRI BRAIN STEM W/O & W/DYE: CPT

## 2025-07-19 RX ORDER — GADOBUTROL 604.72 MG/ML
7 INJECTION INTRAVENOUS
Status: COMPLETED | OUTPATIENT
Start: 2025-07-19 | End: 2025-07-19

## 2025-07-19 RX ADMIN — GADOBUTROL 7 ML: 604.72 INJECTION INTRAVENOUS at 08:05

## 2025-07-31 ENCOUNTER — RESULTS FOLLOW-UP (OUTPATIENT)
Dept: INTERNAL MEDICINE CLINIC | Facility: CLINIC | Age: 80
End: 2025-07-31

## 2025-07-31 DIAGNOSIS — R93.0 ABNORMAL MRI OF HEAD: Primary | ICD-10-CM

## 2025-08-08 ENCOUNTER — VBI (OUTPATIENT)
Dept: ADMINISTRATIVE | Facility: OTHER | Age: 80
End: 2025-08-08

## 2025-08-11 ENCOUNTER — OFFICE VISIT (OUTPATIENT)
Dept: INTERNAL MEDICINE CLINIC | Facility: CLINIC | Age: 80
End: 2025-08-11
Payer: COMMERCIAL

## (undated) DEVICE — SUT MONOCRYL 4-0 PS-2 27 IN Y426H

## (undated) DEVICE — CANNULA SEAL

## (undated) DEVICE — NEEDLE HYPO 22G X 1-1/2 IN

## (undated) DEVICE — [HIGH FLOW INSUFFLATOR,  DO NOT USE IF PACKAGE IS DAMAGED,  KEEP DRY,  KEEP AWAY FROM SUNLIGHT,  PROTECT FROM HEAT AND RADIOACTIVE SOURCES.]: Brand: PNEUMOSURE

## (undated) DEVICE — MEDIUM-LARGE CLIP APPLIER: Brand: ENDOWRIST

## (undated) DEVICE — MAYO STAND COVER: Brand: CONVERTORS

## (undated) DEVICE — TISSUE RETRIEVAL SYSTEM: Brand: INZII RETRIEVAL SYSTEM

## (undated) DEVICE — TROCAR: Brand: KII FIOS FIRST ENTRY

## (undated) DEVICE — PROGRASP FORCEPS: Brand: ENDOWRIST

## (undated) DEVICE — SYRINGE 20ML LL

## (undated) DEVICE — COLUMN DRAPE

## (undated) DEVICE — TELFA NON-ADHERENT ABSORBENT DRESSING: Brand: TELFA

## (undated) DEVICE — PLUMEPEN PRO 10FT

## (undated) DEVICE — TUBING SMOKE EVAC W/FILTRATION DEVICE PLUMEPORT ACTIV

## (undated) DEVICE — SUT PDS II 1 CT-1 27 IN Z347H

## (undated) DEVICE — DRAPE EQUIPMENT RF WAND

## (undated) DEVICE — 3M™ STERI-STRIP™ COMPOUND BENZOIN TINCTURE 40 BAGS/CARTON 4 CARTONS/CASE C1544: Brand: 3M™ STERI-STRIP™

## (undated) DEVICE — ADHESIVE SKIN HIGH VISCOSITY EXOFIN 1ML

## (undated) DEVICE — INTENDED FOR TISSUE SEPARATION, AND OTHER PROCEDURES THAT REQUIRE A SHARP SURGICAL BLADE TO PUNCTURE OR CUT.: Brand: BARD-PARKER SAFETY BLADES SIZE 15, STERILE

## (undated) DEVICE — BLADELESS OBTURATOR: Brand: WECK VISTA

## (undated) DEVICE — GLOVE SRG BIOGEL 6.5

## (undated) DEVICE — CHLORAPREP HI-LITE 26ML ORANGE

## (undated) DEVICE — 3M™ STERI-STRIP™ REINFORCED ADHESIVE SKIN CLOSURES, R1547, 1/2 IN X 4 IN (12 MM X 100 MM), 6 STRIPS/ENVELOPE: Brand: 3M™ STERI-STRIP™

## (undated) DEVICE — PERMANENT CAUTERY HOOK: Brand: ENDOWRIST

## (undated) DEVICE — IRRIG ENDO FLO TUBING

## (undated) DEVICE — GLOVE INDICATOR PI UNDERGLOVE SZ 6.5 BLUE

## (undated) DEVICE — PMI DISPOSABLE PUNCTURE CLOSURE DEVICE / SUTURE GRASPER: Brand: PMI

## (undated) DEVICE — ARM DRAPE

## (undated) DEVICE — 3000CC GUARDIAN II: Brand: GUARDIAN

## (undated) DEVICE — HEM-O-LOK CLIP CARTRIDGE MED/LARGE DA VINCI SI/XI

## (undated) DEVICE — ALLENTOWN LAP CHOLE APP PACK: Brand: CARDINAL HEALTH